# Patient Record
Sex: MALE | Race: BLACK OR AFRICAN AMERICAN | NOT HISPANIC OR LATINO | URBAN - METROPOLITAN AREA
[De-identification: names, ages, dates, MRNs, and addresses within clinical notes are randomized per-mention and may not be internally consistent; named-entity substitution may affect disease eponyms.]

---

## 2023-11-08 PROBLEM — R35.1 NOCTURIA: Status: ACTIVE | Noted: 2023-11-08

## 2023-11-08 PROBLEM — R01.1 HEART MURMUR: Status: ACTIVE | Noted: 2023-11-08

## 2023-11-08 PROBLEM — M47.816 LUMBAR SPONDYLOSIS: Status: ACTIVE | Noted: 2023-11-08

## 2023-11-08 PROBLEM — M54.16 LUMBAR RADICULITIS: Status: ACTIVE | Noted: 2023-11-08

## 2023-11-08 PROBLEM — M62.830 MUSCLE SPASM OF BACK: Status: ACTIVE | Noted: 2023-11-08

## 2023-11-08 PROBLEM — R33.9 INCOMPLETE BLADDER EMPTYING: Status: ACTIVE | Noted: 2023-11-08

## 2023-11-08 PROBLEM — R06.02 EXERTIONAL SHORTNESS OF BREATH: Status: ACTIVE | Noted: 2023-11-08

## 2023-11-08 PROBLEM — I10 HYPERTENSION: Status: ACTIVE | Noted: 2023-11-08

## 2023-11-08 RX ORDER — LOSARTAN POTASSIUM 25 MG/1
1 TABLET ORAL DAILY
COMMUNITY
Start: 2022-03-28 | End: 2024-02-26 | Stop reason: SDUPTHER

## 2023-11-08 RX ORDER — ALBUTEROL SULFATE 90 UG/1
2 AEROSOL, METERED RESPIRATORY (INHALATION) EVERY 4 HOURS PRN
COMMUNITY
Start: 2020-11-15 | End: 2023-11-27 | Stop reason: ALTCHOICE

## 2023-11-08 RX ORDER — AMLODIPINE BESYLATE 5 MG/1
5 TABLET ORAL DAILY
COMMUNITY
Start: 2020-11-15 | End: 2024-02-26 | Stop reason: ALTCHOICE

## 2023-11-08 RX ORDER — METHOCARBAMOL 500 MG/1
1-2 TABLET, FILM COATED ORAL EVERY 8 HOURS PRN
COMMUNITY
End: 2023-11-27 | Stop reason: ALTCHOICE

## 2023-11-08 RX ORDER — RISPERIDONE 1 MG/1
1 TABLET ORAL DAILY
COMMUNITY
Start: 2020-08-10 | End: 2023-11-27 | Stop reason: ALTCHOICE

## 2023-11-08 RX ORDER — ACYCLOVIR 50 MG/G
CREAM TOPICAL
COMMUNITY
Start: 2022-03-29

## 2023-11-08 RX ORDER — ATORVASTATIN CALCIUM 40 MG/1
1 TABLET, FILM COATED ORAL DAILY
COMMUNITY
Start: 2017-11-08 | End: 2023-11-27 | Stop reason: ALTCHOICE

## 2023-11-09 ENCOUNTER — OFFICE VISIT (OUTPATIENT)
Dept: ORTHOPEDIC SURGERY | Facility: CLINIC | Age: 53
End: 2023-11-09
Payer: COMMERCIAL

## 2023-11-09 DIAGNOSIS — M47.816 LUMBAR SPONDYLOSIS: ICD-10-CM

## 2023-11-09 DIAGNOSIS — M54.16 LUMBAR RADICULITIS: Primary | ICD-10-CM

## 2023-11-09 PROCEDURE — 99214 OFFICE O/P EST MOD 30 MIN: CPT | Performed by: PHYSICAL MEDICINE & REHABILITATION

## 2023-11-09 PROCEDURE — 1036F TOBACCO NON-USER: CPT | Performed by: PHYSICAL MEDICINE & REHABILITATION

## 2023-11-09 RX ORDER — GABAPENTIN 100 MG/1
100 CAPSULE ORAL 3 TIMES DAILY
Qty: 90 CAPSULE | Refills: 2 | Status: SHIPPED | OUTPATIENT
Start: 2023-11-09 | End: 2023-11-27 | Stop reason: ALTCHOICE

## 2023-11-09 NOTE — PROGRESS NOTES
Follow Up Note    Today's Date:   11/9/2023     Assessment: This is a very pleasant 53-year-old male DJ with chronic primarily right low back pain. Likely lumbar spondylosis with radiculitis.  Reticular pain into the right leg seems to be worsening. No red flag symptoms. Pain can be intermittently severe and impact his daily activity.  -Lumbar spondylosis with facet arthropathy most significant from L4-S1 bilaterally-of note does have mild retrolisthesis of L5 on S1  -Lumbar radiculitis    -November 9, 2023 update: Pain has been worsening since his last visit.  He has trialed medications as above and below with minimal relief.  He does have paresthesia to the right foot and ankle compared to left.  Due to this I will obtain a lumbar MRI to further assess.  He also understands the importance of starting physical therapy.  New Rx was also placed for low-dose gabapentin with instructions to titrate slowly and as tolerated.  OARRS was checked with no suspicious activity and he signed a pain agreement today.     PLAN:  1) Imaging/Diagnostic Studies: Lumbosacral x-ray due to chronicity and lack of prior imaging. Per my read mild retrolisthesis of L5 and S1 with facet arthropathy bilaterally.  2) Therapy/Rehabilitation: Referred to PT and encouraged home exercise program  3) Pharmacological Management: Advised to limit NSAIDs given history of elevated creatinine, will prescribe muscle relaxant-Robaxin 500 mg twice daily.  Low-dose gabapentin  4) Spine/Surgical Interventions: Discussed injections and surgery as potential future options, however patient wishes to avoid these at this time   5) Alternative Treatments: May consider alternative treatment options in the future including manipulation (chiropractor versus osteopathic) and/or acupuncture if patient does not obtain optimal relief with initial treatment plan.  6) Consultations: Physical therapy  7) Follow -up: 4-6 weeks or PRN if symptoms worsen/do not improve.   8)  Future treatment considerations: Pending lumbar MRI-May be a good candidate for ANTOINETTE and/or further surgical consult.  Further titration of gabapentin    Patient advised of the difference between hurt and harm and advised to continue with all normal activities and exercises. Patient verbalized understanding of the above plan and was happy with the care provided.      The above clinical summary has been dictated with voice recognition software. It has not been proofread for grammatical errors, typographical mistakes, or other semantic inconsistencies.    Thank you for visiting our office today. It was our pleasure to take part in your healthcare.     Do not hesitate to call with any questions regarding your plan of care after leaving at [phone]    To clinicians, thank you very much for this kind referral. It is a privilege to partner with you in the care of your patients. My office would be delighted to assist you with any further consultations or with questions regarding the plan of care outlined. Do not hesitate to call the office or contact me directly.     Sincerely,    JENNIFER Case MD  , Physical Medicine and Rehabilitation, Orthopedic Spine  [employer] [facility] [employer]  [facility] [place]        Ayla Paredes  is a 53 y.o. male who was last seen September 28, 2023.  Since the last visit the pain is worse overall.  More pain with deep breaths and bearing down.  Notes pain into the right leg worse than left.  No worsening weakness or numbness.  Pain can be a 7/10, sleeping in the wrong position, forward flexion.    - PT: Has not yet started  - Minimal relief with muscle relaxer      TREATMENTS  IN THE LAST SIX MONTHS     Active conservative therapy  in the last six months (see below)              1. Physical therapy:   No                                                                                  2. Home exercise program after PT: No                                                     3. A physician supervised home exercise program (HEP): Yes              4. Chiropractic Care:   Yes                                                                   Passive conservative therapy  in the last six months (see below)              1. NSAIDS:            Yes but avoiding due to CKD                                                                                               2. Prescription pain medication: Methocarbamol, gabapentin                                                            3. Acupuncture:                                                                                             4. Tens unit:      Patient denies bowel/bladder incontinence, denies fever, denies unintentional weight loss, denies clumsiness of hands, feet, or dropping things.  Denies any constitutional or myelopathic symptomatology.     ROS: Other than listed in HPI, PMHX below, the patient denies any complaint of the following: night pain, joint swelling, myalgias, cough, fainting, vision or language changes, shortness of breath, chest pain, nausea, vomiting or diarrhea, rash, dysuria, seizures, excessive sweating, or bleeding problems.    I have confirmed and edited as necessary Past Medical, Past Surgical, Family, Social History and ROS as obtained by others. No new sig. changes since last visit.       PHYSICAL EXAM:   GENERAL APPEARANCE:  Well nourished, well developed, and no apparent distress.  NEURO PSYCH: Patient oriented to person, place, Mood pleasant. Benign affect.  MUSCULOSKELETAL and NEUROLOGICAL   SPINE ROM:   LUMBAR ROM: Limited forward flexion due to pain  MUSCLE BULK: Normal and symmetrical in the upper & lower extremities.  MUSCLE TONE: Normal  MOTOR: Seems functionally full in the lower extremities.  Able to go up on heels and toes with no significant weakness  SENSORY: Mildly decreased to light touch at the right L5 compared to left  GAIT: Normal.  No sig.  balance deficit     DATA REVIEW:   The below imaging studies were personally reviewed and discussed with the patient.    Medical Decision Making:  The above note constitutes a Moderate to High level of medical decision making based on past data and imaging review, new and chronic symptoms with exacerbation, change in weakness or sensation, new imaging and diagnostic studies ordered, discussion of potential interventional or surgical treatment options, acute or chronic pain that may pose a threat to bodily function.    Current Outpatient Medications on File Prior to Visit   Medication Sig Dispense Refill    acyclovir (Zovirax) 5 % cream Acyclovir 5 % External Cream   Quantity: 5  Refills: 0        Start : 29-Mar-2022   Active      albuterol 90 mcg/actuation inhaler Inhale 2 puffs every 4 hours if needed.      amLODIPine (Norvasc) 5 mg tablet Take 1 tablet (5 mg) by mouth once daily.      atorvastatin (Lipitor) 40 mg tablet Take 1 tablet (40 mg) by mouth once daily.      losartan (Cozaar) 25 mg tablet Take 1 tablet (25 mg) by mouth once daily.      methocarbamol (Robaxin) 500 mg tablet Take 1-2 tablets (500-1,000 mg) by mouth every 8 hours if needed for muscle spasms.      risperiDONE (RisperDAL) 1 mg tablet Take 1 tablet (1 mg) by mouth once daily.       No current facility-administered medications on file prior to visit.        History reviewed. No pertinent past medical history.     History reviewed. No pertinent surgical history.     No Known Allergies

## 2023-11-26 ENCOUNTER — APPOINTMENT (OUTPATIENT)
Dept: RADIOLOGY | Facility: HOSPITAL | Age: 53
End: 2023-11-26
Payer: COMMERCIAL

## 2023-11-27 ENCOUNTER — LAB (OUTPATIENT)
Dept: LAB | Facility: LAB | Age: 53
End: 2023-11-27
Payer: COMMERCIAL

## 2023-11-27 ENCOUNTER — OFFICE VISIT (OUTPATIENT)
Dept: CARDIOLOGY | Facility: CLINIC | Age: 53
End: 2023-11-27
Payer: COMMERCIAL

## 2023-11-27 VITALS
WEIGHT: 187.5 LBS | DIASTOLIC BLOOD PRESSURE: 121 MMHG | HEART RATE: 68 BPM | BODY MASS INDEX: 33.21 KG/M2 | SYSTOLIC BLOOD PRESSURE: 168 MMHG | OXYGEN SATURATION: 98 %

## 2023-11-27 DIAGNOSIS — E78.2 MIXED HYPERLIPIDEMIA: ICD-10-CM

## 2023-11-27 DIAGNOSIS — I50.9 CONGESTIVE HEART FAILURE, UNSPECIFIED HF CHRONICITY, UNSPECIFIED HEART FAILURE TYPE (MULTI): Primary | ICD-10-CM

## 2023-11-27 DIAGNOSIS — I42.8 OTHER CARDIOMYOPATHY (MULTI): ICD-10-CM

## 2023-11-27 DIAGNOSIS — I10 PRIMARY HYPERTENSION: ICD-10-CM

## 2023-11-27 DIAGNOSIS — R06.02 EXERTIONAL SHORTNESS OF BREATH: ICD-10-CM

## 2023-11-27 PROBLEM — I42.9 CARDIOMYOPATHY (MULTI): Status: ACTIVE | Noted: 2023-11-27

## 2023-11-27 LAB
ALT SERPL W P-5'-P-CCNC: 13 U/L (ref 10–52)
AST SERPL W P-5'-P-CCNC: 17 U/L (ref 9–39)
ATRIAL RATE: 61 BPM
CHOLEST SERPL-MCNC: 277 MG/DL (ref 0–199)
CHOLESTEROL/HDL RATIO: 6.2
HDLC SERPL-MCNC: 45 MG/DL
LDLC SERPL CALC-MCNC: 202 MG/DL
NON HDL CHOLESTEROL: 232 MG/DL (ref 0–149)
P AXIS: 1 DEGREES
P OFFSET: 176 MS
P ONSET: 125 MS
PR INTERVAL: 180 MS
Q ONSET: 215 MS
QRS COUNT: 10 BEATS
QRS DURATION: 84 MS
QT INTERVAL: 408 MS
QTC CALCULATION(BAZETT): 410 MS
QTC FREDERICIA: 410 MS
R AXIS: -5 DEGREES
T AXIS: -10 DEGREES
T OFFSET: 419 MS
TRIGL SERPL-MCNC: 149 MG/DL (ref 0–149)
VENTRICULAR RATE: 61 BPM
VLDL: 30 MG/DL (ref 0–40)

## 2023-11-27 PROCEDURE — 93005 ELECTROCARDIOGRAM TRACING: CPT | Performed by: NURSE PRACTITIONER

## 2023-11-27 PROCEDURE — 36415 COLL VENOUS BLD VENIPUNCTURE: CPT

## 2023-11-27 PROCEDURE — 3077F SYST BP >= 140 MM HG: CPT | Performed by: NURSE PRACTITIONER

## 2023-11-27 PROCEDURE — 3080F DIAST BP >= 90 MM HG: CPT | Performed by: NURSE PRACTITIONER

## 2023-11-27 PROCEDURE — 1036F TOBACCO NON-USER: CPT | Performed by: NURSE PRACTITIONER

## 2023-11-27 PROCEDURE — 84460 ALANINE AMINO (ALT) (SGPT): CPT

## 2023-11-27 PROCEDURE — 99214 OFFICE O/P EST MOD 30 MIN: CPT | Performed by: NURSE PRACTITIONER

## 2023-11-27 PROCEDURE — 84450 TRANSFERASE (AST) (SGOT): CPT

## 2023-11-27 PROCEDURE — 93010 ELECTROCARDIOGRAM REPORT: CPT | Performed by: INTERNAL MEDICINE

## 2023-11-27 PROCEDURE — 80061 LIPID PANEL: CPT

## 2023-11-27 ASSESSMENT — ENCOUNTER SYMPTOMS
LOSS OF SENSATION IN FEET: 0
OCCASIONAL FEELINGS OF UNSTEADINESS: 0
DEPRESSION: 0

## 2023-11-27 ASSESSMENT — LIFESTYLE VARIABLES
HOW OFTEN DO YOU HAVE SIX OR MORE DRINKS ON ONE OCCASION: NEVER
AUDIT-C TOTAL SCORE: 2
HOW MANY STANDARD DRINKS CONTAINING ALCOHOL DO YOU HAVE ON A TYPICAL DAY: 1 OR 2
SKIP TO QUESTIONS 9-10: 1
HOW OFTEN DO YOU HAVE A DRINK CONTAINING ALCOHOL: 2-4 TIMES A MONTH

## 2023-11-27 ASSESSMENT — PAIN SCALES - GENERAL: PAINLEVEL: 4

## 2023-11-27 NOTE — PROGRESS NOTES
Subjective   Ayla Paredes is a 53 y.o. male.    Chief Complaint:  Hypertension, Hyperlipidemia, and Cardiomyopathy    Mr. Paredes returns for a follow up. He is seen in collaboration with Dr. Banks. He was last seen over a year and half ago. He comes in today with complaints of mucus in his throat. He was treated with a few different antibiotics and is not seeing an improvement. He is starting to wonder if it is more heart related. He denies a cough though does complain of some dyspnea. He reports compliance with his medications, though has been off lipid lowering therapy for some time. He denies any recent ER visits or hospitalizations. He offers no specific cardiovascular complaints or concerns today. He denies any complaints of chest pain, lightheadedness, dizziness, palpitations, syncope, orthopnea, paroxysmal nocturnal dyspnea, lower extremity swelling or bleeding concerns.          Review of Systems   All other systems reviewed and are negative.      Objective   Physical Exam  Constitutional:       Appearance: Healthy appearance. In no distress  Pulmonary:      Effort: Pulmonary effort is normal.      Breath sounds: Normal breath sounds.   Cardiovascular:      Normal rate. Regular rhythm. Normal S1. Normal S2.       Murmurs: There is no murmur.   Edema:     Peripheral edema absent.   Abdominal:      Palpations: Abdomen is soft.   Musculoskeletal: Normal range of motion.      Cervical back: Normal range of motion. Skin:     General: Skin is warm and dry.   Neurological:      Mental Status: Alert and oriented to person, place and time.     EKG obtained and reviewed. Normal sinus rhythm. Moderate voltage criteria for LVH. HR 61      Assessment/Plan   Mr. Paredes is a pleasant 53-year-old -American male with a past medical history significant for hypertension, hyperlipidemia, anxiety/depression and a family history of coronary artery disease. Stress test 5/2022 showed no evidence of ischemia at a  high workload. Echocardiogram 5/2022 showed a mildly reduced EF of 45%. He presents today after not being seen for over a year and a half. He comes in today with complaints of extra mucus in his throat and dyspnea. His BP is elevated, though he did not take his morning medications. He apparently has been off lipid lowering therapy for some and mentions needing a new PCP. I will have him obtain a FLP with AST and ALT today. I will call him with his results once available, and as we discussed will likely need to reinitiate atorvastatin. Given his complaints of dyspnea and his history of cardiomyopathy, I will also schedule him for an echocardiogram to reassess his LV function. Lastly, we will schedule him for a coronary CT angiogram as his dyspnea may represent an anginal equivalent and to rule out any CAD for the cause of his cardiomyopathy. He will follow up with us in clinic in 3 months. He knows to call for any concerns.

## 2023-11-27 NOTE — PATIENT INSTRUCTIONS
Schedule an echocardiogram     Schedule a coronary CT angio    Obtain fasting labs    Follow up in 3 months

## 2023-11-28 ENCOUNTER — APPOINTMENT (OUTPATIENT)
Dept: PHYSICAL THERAPY | Facility: CLINIC | Age: 53
End: 2023-11-28
Payer: COMMERCIAL

## 2023-11-28 ENCOUNTER — TELEPHONE (OUTPATIENT)
Dept: CARDIOLOGY | Facility: HOSPITAL | Age: 53
End: 2023-11-28
Payer: COMMERCIAL

## 2023-11-28 DIAGNOSIS — E78.2 MIXED HYPERLIPIDEMIA: Primary | ICD-10-CM

## 2023-11-28 RX ORDER — ATORVASTATIN CALCIUM 40 MG/1
40 TABLET, FILM COATED ORAL DAILY
Qty: 30 TABLET | Refills: 11 | Status: SHIPPED | OUTPATIENT
Start: 2023-11-28 | End: 2023-12-05 | Stop reason: SDUPTHER

## 2023-11-28 NOTE — TELEPHONE ENCOUNTER
I spoke to patient. Cholesterol panel elevated. Will restart lipitor 40 mg daily. Prescription sent to his [facility] on file.

## 2023-12-01 ENCOUNTER — LAB (OUTPATIENT)
Dept: LAB | Facility: LAB | Age: 53
End: 2023-12-01
Payer: COMMERCIAL

## 2023-12-01 DIAGNOSIS — I10 PRIMARY HYPERTENSION: Primary | ICD-10-CM

## 2023-12-01 DIAGNOSIS — I10 PRIMARY HYPERTENSION: ICD-10-CM

## 2023-12-01 LAB
ANION GAP SERPL CALC-SCNC: 12 MMOL/L (ref 10–20)
BUN SERPL-MCNC: 20 MG/DL (ref 6–23)
CALCIUM SERPL-MCNC: 9.7 MG/DL (ref 8.6–10.6)
CHLORIDE SERPL-SCNC: 99 MMOL/L (ref 98–107)
CO2 SERPL-SCNC: 31 MMOL/L (ref 21–32)
CREAT SERPL-MCNC: 1.49 MG/DL (ref 0.5–1.3)
GFR SERPL CREATININE-BSD FRML MDRD: 56 ML/MIN/1.73M*2
GLUCOSE SERPL-MCNC: 97 MG/DL (ref 74–99)
POTASSIUM SERPL-SCNC: 4 MMOL/L (ref 3.5–5.3)
SODIUM SERPL-SCNC: 138 MMOL/L (ref 136–145)

## 2023-12-01 PROCEDURE — 80048 BASIC METABOLIC PNL TOTAL CA: CPT

## 2023-12-01 PROCEDURE — 36415 COLL VENOUS BLD VENIPUNCTURE: CPT

## 2023-12-05 DIAGNOSIS — E78.2 MIXED HYPERLIPIDEMIA: ICD-10-CM

## 2023-12-05 RX ORDER — ATORVASTATIN CALCIUM 40 MG/1
40 TABLET, FILM COATED ORAL DAILY
Qty: 90 TABLET | Refills: 3 | Status: SHIPPED | OUTPATIENT
Start: 2023-12-05 | End: 2024-02-26 | Stop reason: SDUPTHER

## 2023-12-12 ENCOUNTER — TELEPHONE (OUTPATIENT)
Dept: PRIMARY CARE | Facility: CLINIC | Age: 53
End: 2023-12-12
Payer: COMMERCIAL

## 2023-12-12 ENCOUNTER — APPOINTMENT (OUTPATIENT)
Dept: PRIMARY CARE | Facility: CLINIC | Age: 53
End: 2023-12-12
Payer: COMMERCIAL

## 2023-12-12 NOTE — TELEPHONE ENCOUNTER
PT had appointment scheduled 12/12/2023 @ 8:30.  Patient had already been checked in when Dr. Clayton came to the  and stated that this patient needed to have his appointment rescheduled, as she stated she already had a physical scheduled before this appointment and didn't want to rush through this patients appointment, and this appointment should have been rescheduled.  PT was notified, but was very upset about not being seen today.  States he took time off of work and changed other appointments to be able to have today's appointment.  PT was rescheduled for next available appointment on 12/28/2023.

## 2023-12-13 ENCOUNTER — APPOINTMENT (OUTPATIENT)
Dept: CARDIOLOGY | Facility: CLINIC | Age: 53
End: 2023-12-13
Payer: COMMERCIAL

## 2023-12-18 ENCOUNTER — APPOINTMENT (OUTPATIENT)
Dept: CARDIOLOGY | Facility: CLINIC | Age: 53
End: 2023-12-18
Payer: COMMERCIAL

## 2023-12-18 ENCOUNTER — APPOINTMENT (OUTPATIENT)
Dept: RADIOLOGY | Facility: HOSPITAL | Age: 53
End: 2023-12-18
Payer: COMMERCIAL

## 2023-12-19 ENCOUNTER — HOSPITAL ENCOUNTER (OUTPATIENT)
Dept: RADIOLOGY | Facility: HOSPITAL | Age: 53
Discharge: HOME | End: 2023-12-19
Payer: COMMERCIAL

## 2023-12-19 VITALS — OXYGEN SATURATION: 100 % | DIASTOLIC BLOOD PRESSURE: 91 MMHG | HEART RATE: 62 BPM | SYSTOLIC BLOOD PRESSURE: 154 MMHG

## 2023-12-19 DIAGNOSIS — I50.9 CONGESTIVE HEART FAILURE, UNSPECIFIED HF CHRONICITY, UNSPECIFIED HEART FAILURE TYPE (MULTI): ICD-10-CM

## 2023-12-19 PROCEDURE — 2500000005 HC RX 250 GENERAL PHARMACY W/O HCPCS: Mod: SE | Performed by: RADIOLOGY

## 2023-12-19 PROCEDURE — 75574 CT ANGIO HRT W/3D IMAGE: CPT

## 2023-12-19 PROCEDURE — 2550000001 HC RX 255 CONTRASTS: Mod: SE | Performed by: NURSE PRACTITIONER

## 2023-12-19 PROCEDURE — 2500000001 HC RX 250 WO HCPCS SELF ADMINISTERED DRUGS (ALT 637 FOR MEDICARE OP): Mod: SE | Performed by: RADIOLOGY

## 2023-12-19 PROCEDURE — 75574 CT ANGIO HRT W/3D IMAGE: CPT | Performed by: RADIOLOGY

## 2023-12-19 RX ORDER — METOPROLOL TARTRATE 1 MG/ML
5 INJECTION, SOLUTION INTRAVENOUS ONCE AS NEEDED
Status: DISCONTINUED | OUTPATIENT
Start: 2023-12-19 | End: 2023-12-20 | Stop reason: HOSPADM

## 2023-12-19 RX ORDER — METOPROLOL TARTRATE 1 MG/ML
5 INJECTION, SOLUTION INTRAVENOUS ONCE
Status: COMPLETED | OUTPATIENT
Start: 2023-12-19 | End: 2023-12-19

## 2023-12-19 RX ORDER — NITROGLYCERIN 0.4 MG/1
0.8 TABLET SUBLINGUAL ONCE
Status: COMPLETED | OUTPATIENT
Start: 2023-12-19 | End: 2023-12-19

## 2023-12-19 RX ORDER — METOPROLOL TARTRATE 100 MG/1
100 TABLET ORAL ONCE
Status: DISCONTINUED | OUTPATIENT
Start: 2023-12-19 | End: 2023-12-20 | Stop reason: HOSPADM

## 2023-12-19 RX ORDER — METOPROLOL TARTRATE 1 MG/ML
5 INJECTION, SOLUTION INTRAVENOUS ONCE AS NEEDED
Status: COMPLETED | OUTPATIENT
Start: 2023-12-19 | End: 2023-12-19

## 2023-12-19 RX ORDER — METOPROLOL TARTRATE 100 MG/1
100 TABLET ORAL ONCE AS NEEDED
Status: DISCONTINUED | OUTPATIENT
Start: 2023-12-19 | End: 2023-12-20 | Stop reason: HOSPADM

## 2023-12-19 RX ORDER — LORAZEPAM 2 MG/ML
0.5 INJECTION INTRAMUSCULAR EVERY 5 MIN PRN
Status: DISCONTINUED | OUTPATIENT
Start: 2023-12-19 | End: 2023-12-20 | Stop reason: HOSPADM

## 2023-12-19 RX ADMIN — IOHEXOL 90 ML: 350 INJECTION, SOLUTION INTRAVENOUS at 09:52

## 2023-12-19 RX ADMIN — NITROGLYCERIN 0.8 MG: 0.4 TABLET SUBLINGUAL at 09:39

## 2023-12-19 RX ADMIN — METOPROLOL TARTRATE 5 MG: 5 INJECTION, SOLUTION INTRAVENOUS at 09:37

## 2023-12-19 ASSESSMENT — PAIN - FUNCTIONAL ASSESSMENT: PAIN_FUNCTIONAL_ASSESSMENT: 0-10

## 2023-12-19 ASSESSMENT — PAIN SCALES - GENERAL: PAINLEVEL_OUTOF10: 0 - NO PAIN

## 2023-12-28 ENCOUNTER — APPOINTMENT (OUTPATIENT)
Dept: PRIMARY CARE | Facility: CLINIC | Age: 53
End: 2023-12-28
Payer: COMMERCIAL

## 2024-02-26 ENCOUNTER — OFFICE VISIT (OUTPATIENT)
Dept: CARDIOLOGY | Facility: CLINIC | Age: 54
End: 2024-02-26
Payer: COMMERCIAL

## 2024-02-26 VITALS
SYSTOLIC BLOOD PRESSURE: 149 MMHG | BODY MASS INDEX: 30.73 KG/M2 | DIASTOLIC BLOOD PRESSURE: 103 MMHG | OXYGEN SATURATION: 98 % | HEART RATE: 72 BPM | WEIGHT: 180 LBS | HEIGHT: 64 IN

## 2024-02-26 DIAGNOSIS — E78.5 HYPERLIPIDEMIA, UNSPECIFIED HYPERLIPIDEMIA TYPE: ICD-10-CM

## 2024-02-26 DIAGNOSIS — E78.2 MIXED HYPERLIPIDEMIA: ICD-10-CM

## 2024-02-26 DIAGNOSIS — I10 PRIMARY HYPERTENSION: ICD-10-CM

## 2024-02-26 PROCEDURE — 3077F SYST BP >= 140 MM HG: CPT | Performed by: NURSE PRACTITIONER

## 2024-02-26 PROCEDURE — 99214 OFFICE O/P EST MOD 30 MIN: CPT | Performed by: NURSE PRACTITIONER

## 2024-02-26 PROCEDURE — 1036F TOBACCO NON-USER: CPT | Performed by: NURSE PRACTITIONER

## 2024-02-26 PROCEDURE — 3080F DIAST BP >= 90 MM HG: CPT | Performed by: NURSE PRACTITIONER

## 2024-02-26 RX ORDER — LOSARTAN POTASSIUM 50 MG/1
50 TABLET ORAL DAILY
Qty: 90 TABLET | Refills: 3 | Status: SHIPPED | OUTPATIENT
Start: 2024-02-26

## 2024-02-26 RX ORDER — METOPROLOL SUCCINATE 25 MG/1
25 TABLET, EXTENDED RELEASE ORAL DAILY
Qty: 90 TABLET | Refills: 3 | Status: SHIPPED | OUTPATIENT
Start: 2024-02-26 | End: 2025-02-25

## 2024-02-26 RX ORDER — ATORVASTATIN CALCIUM 40 MG/1
40 TABLET, FILM COATED ORAL DAILY
Qty: 90 TABLET | Refills: 3 | Status: SHIPPED | OUTPATIENT
Start: 2024-02-26 | End: 2025-02-25

## 2024-02-26 ASSESSMENT — PAIN SCALES - GENERAL: PAINLEVEL: 0-NO PAIN

## 2024-02-26 NOTE — PROGRESS NOTES
Subjective   Ayla Paredes is a 53 y.o. male.    Chief Complaint:  Hypertension    Mr. Paredes returns for a 3 month follow up. He is seen in collaboration with Dr. Banks. He has been feeling well from a cardiac standpoint. He has remained compliant with his medications, denying any intolerances. He unfortunately did not get his echocardiogram completed. He denies any recent ER visits or hospitalizations. He offers no specific cardiovascular complaints or concerns today. He denies any complaints of chest pain, shortness of breath, lightheadedness, dizziness, palpitations, syncope, orthopnea, paroxysmal nocturnal dyspnea, lower extremity swelling or bleeding concerns.          Review of Systems   All other systems reviewed and are negative.      Objective   Physical Exam  Constitutional:       Appearance: Healthy appearance. In no distress  Pulmonary:      Effort: Pulmonary effort is normal.      Breath sounds: Normal breath sounds.   Cardiovascular:      Normal rate. Regular rhythm. Normal S1. Normal S2.       Murmurs: There is no murmur.      Carotids: right carotid pulse +2, no bruit heard over the right carotid. left carotid pulse +2, no bruit heard over the left carotid.  Edema:     Peripheral edema absent.   Abdominal:      Palpations: Abdomen is soft.   Musculoskeletal:       Cervical back: Normal range of motion.   Skin:     General: Skin is warm and dry. Normal color and pigmentation   Neurological:      Mental Status: Alert and oriented to person, place and time.   Psychiatric:     Mood and Affect: appropriate mood and appropriate affect.       Lab Review:   Lab Results   Component Value Date     12/01/2023    K 4.0 12/01/2023    CL 99 12/01/2023    CO2 31 12/01/2023    BUN 20 12/01/2023    CREATININE 1.49 (H) 12/01/2023    GLUCOSE 97 12/01/2023    CALCIUM 9.7 12/01/2023       Lab Results   Component Value Date    CHOL 277 (H) 11/27/2023    TRIG 149 11/27/2023    HDL 45.0 11/27/2023        Assessment/Plan   Mr. Paredes is a pleasant 53-year-old -American male with a past medical history significant for hypertension, hyperlipidemia, anxiety/depression, cardiomyopathy with an EF of 45% by echocardiogram 5/2022 and a family history of coronary artery disease. Stress test 5/2022 showed no evidence of ischemia at a high workload. Coronary CT angiogram 12/2023 showed no significant atherosclerotic changes or stenotic disease with a total CACS of 20.3. He presents today for routine follow up fairly stable from a cardiac standpoint. Despite compliance with his medications, his BP remains elevated. I will increase his losartan to 50 mg daily. I will also have him start metoprolol 25 mg daily. I will have him obtain a FLP and CMP in about 2 weeks. He will follow up with us in clinic in 2 months with an echocardiogram to reassess his LV function. He knows to call for any concerns.

## 2024-02-26 NOTE — PATIENT INSTRUCTIONS
Increase losartan  to 50 mg daily    Start metoprolol 25 mg daily     Obtain fasting labs in 2 weeks     Follow up in 2 months with an echo the same day     Call for any concerns

## 2024-03-12 ENCOUNTER — HOSPITAL ENCOUNTER (OUTPATIENT)
Dept: CARDIOLOGY | Facility: CLINIC | Age: 54
Discharge: HOME | End: 2024-03-12
Payer: COMMERCIAL

## 2024-03-12 DIAGNOSIS — I50.9 CONGESTIVE HEART FAILURE, UNSPECIFIED HF CHRONICITY, UNSPECIFIED HEART FAILURE TYPE (MULTI): ICD-10-CM

## 2024-03-12 DIAGNOSIS — R06.02 EXERTIONAL SHORTNESS OF BREATH: ICD-10-CM

## 2024-03-12 LAB
AORTIC VALVE PEAK VELOCITY: 1.43 M/S
AV PEAK GRADIENT: 8.2 MMHG
AVA (PEAK VEL): 2.59 CM2
EJECTION FRACTION APICAL 4 CHAMBER: 62.6
EJECTION FRACTION: 64 %
LEFT ATRIUM VOLUME AREA LENGTH INDEX BSA: 33.5 ML/M2
LEFT VENTRICLE INTERNAL DIMENSION DIASTOLE: 4.74 CM (ref 3.5–6)
LEFT VENTRICULAR OUTFLOW TRACT DIAMETER: 1.91 CM
MITRAL VALVE E/A RATIO: 0.94
TRICUSPID ANNULAR PLANE SYSTOLIC EXCURSION: 2.7 CM

## 2024-03-12 PROCEDURE — 93306 TTE W/DOPPLER COMPLETE: CPT

## 2024-03-12 PROCEDURE — 93306 TTE W/DOPPLER COMPLETE: CPT | Performed by: INTERNAL MEDICINE

## 2024-05-28 ENCOUNTER — HOSPITAL ENCOUNTER (EMERGENCY)
Facility: HOSPITAL | Age: 54
Discharge: HOME | End: 2024-05-28
Payer: COMMERCIAL

## 2024-05-28 ENCOUNTER — CLINICAL SUPPORT (OUTPATIENT)
Dept: EMERGENCY MEDICINE | Facility: HOSPITAL | Age: 54
End: 2024-05-28
Payer: COMMERCIAL

## 2024-05-28 VITALS
TEMPERATURE: 98.7 F | OXYGEN SATURATION: 98 % | HEIGHT: 63 IN | BODY MASS INDEX: 31.89 KG/M2 | SYSTOLIC BLOOD PRESSURE: 161 MMHG | HEART RATE: 84 BPM | WEIGHT: 180 LBS | DIASTOLIC BLOOD PRESSURE: 105 MMHG | RESPIRATION RATE: 16 BRPM

## 2024-05-28 LAB
ALBUMIN SERPL BCP-MCNC: 4.4 G/DL (ref 3.4–5)
ALP SERPL-CCNC: 77 U/L (ref 33–120)
ALT SERPL W P-5'-P-CCNC: 10 U/L (ref 10–52)
ANION GAP SERPL CALC-SCNC: 12 MMOL/L (ref 10–20)
AST SERPL W P-5'-P-CCNC: 16 U/L (ref 9–39)
ATRIAL RATE: 70 BPM
BASOPHILS # BLD AUTO: 0.08 X10*3/UL (ref 0–0.1)
BASOPHILS NFR BLD AUTO: 1 %
BILIRUB SERPL-MCNC: 0.6 MG/DL (ref 0–1.2)
BNP SERPL-MCNC: 67 PG/ML (ref 0–99)
BUN SERPL-MCNC: 17 MG/DL (ref 6–23)
CALCIUM SERPL-MCNC: 9.5 MG/DL (ref 8.6–10.6)
CARDIAC TROPONIN I PNL SERPL HS: 30 NG/L (ref 0–53)
CARDIAC TROPONIN I PNL SERPL HS: 33 NG/L (ref 0–53)
CHLORIDE SERPL-SCNC: 103 MMOL/L (ref 98–107)
CO2 SERPL-SCNC: 30 MMOL/L (ref 21–32)
CREAT SERPL-MCNC: 1.43 MG/DL (ref 0.5–1.3)
EGFRCR SERPLBLD CKD-EPI 2021: 59 ML/MIN/1.73M*2
EOSINOPHIL # BLD AUTO: 0.18 X10*3/UL (ref 0–0.7)
EOSINOPHIL NFR BLD AUTO: 2.2 %
ERYTHROCYTE [DISTWIDTH] IN BLOOD BY AUTOMATED COUNT: 12.5 % (ref 11.5–14.5)
GLUCOSE SERPL-MCNC: 58 MG/DL (ref 74–99)
HCT VFR BLD AUTO: 38.9 % (ref 41–52)
HGB BLD-MCNC: 13.1 G/DL (ref 13.5–17.5)
IMM GRANULOCYTES # BLD AUTO: 0.01 X10*3/UL (ref 0–0.7)
IMM GRANULOCYTES NFR BLD AUTO: 0.1 % (ref 0–0.9)
LYMPHOCYTES # BLD AUTO: 3.57 X10*3/UL (ref 1.2–4.8)
LYMPHOCYTES NFR BLD AUTO: 44.2 %
MCH RBC QN AUTO: 28.1 PG (ref 26–34)
MCHC RBC AUTO-ENTMCNC: 33.7 G/DL (ref 32–36)
MCV RBC AUTO: 83 FL (ref 80–100)
MONOCYTES # BLD AUTO: 0.77 X10*3/UL (ref 0.1–1)
MONOCYTES NFR BLD AUTO: 9.5 %
NEUTROPHILS # BLD AUTO: 3.46 X10*3/UL (ref 1.2–7.7)
NEUTROPHILS NFR BLD AUTO: 43 %
NRBC BLD-RTO: 0 /100 WBCS (ref 0–0)
P AXIS: 15 DEGREES
P OFFSET: 185 MS
P ONSET: 129 MS
PLATELET # BLD AUTO: 316 X10*3/UL (ref 150–450)
POTASSIUM SERPL-SCNC: 3.5 MMOL/L (ref 3.5–5.3)
PR INTERVAL: 178 MS
PROT SERPL-MCNC: 7.5 G/DL (ref 6.4–8.2)
Q ONSET: 218 MS
QRS COUNT: 12 BEATS
QRS DURATION: 92 MS
QT INTERVAL: 394 MS
QTC CALCULATION(BAZETT): 425 MS
QTC FREDERICIA: 415 MS
R AXIS: -6 DEGREES
RBC # BLD AUTO: 4.67 X10*6/UL (ref 4.5–5.9)
SODIUM SERPL-SCNC: 141 MMOL/L (ref 136–145)
T AXIS: 0 DEGREES
T OFFSET: 415 MS
VENTRICULAR RATE: 70 BPM
WBC # BLD AUTO: 8.1 X10*3/UL (ref 4.4–11.3)

## 2024-05-28 PROCEDURE — 99281 EMR DPT VST MAYX REQ PHY/QHP: CPT

## 2024-05-28 PROCEDURE — 36415 COLL VENOUS BLD VENIPUNCTURE: CPT | Performed by: EMERGENCY MEDICINE

## 2024-05-28 PROCEDURE — 83880 ASSAY OF NATRIURETIC PEPTIDE: CPT | Performed by: EMERGENCY MEDICINE

## 2024-05-28 PROCEDURE — 80053 COMPREHEN METABOLIC PANEL: CPT | Performed by: EMERGENCY MEDICINE

## 2024-05-28 PROCEDURE — 93005 ELECTROCARDIOGRAM TRACING: CPT

## 2024-05-28 PROCEDURE — 85025 COMPLETE CBC W/AUTO DIFF WBC: CPT | Performed by: EMERGENCY MEDICINE

## 2024-05-28 PROCEDURE — 84484 ASSAY OF TROPONIN QUANT: CPT | Performed by: EMERGENCY MEDICINE

## 2024-05-28 ASSESSMENT — COLUMBIA-SUICIDE SEVERITY RATING SCALE - C-SSRS
2. HAVE YOU ACTUALLY HAD ANY THOUGHTS OF KILLING YOURSELF?: NO
1. IN THE PAST MONTH, HAVE YOU WISHED YOU WERE DEAD OR WISHED YOU COULD GO TO SLEEP AND NOT WAKE UP?: NO
6. HAVE YOU EVER DONE ANYTHING, STARTED TO DO ANYTHING, OR PREPARED TO DO ANYTHING TO END YOUR LIFE?: NO

## 2024-05-28 ASSESSMENT — PAIN SCALES - GENERAL: PAINLEVEL_OUTOF10: 6

## 2024-05-28 ASSESSMENT — PAIN - FUNCTIONAL ASSESSMENT: PAIN_FUNCTIONAL_ASSESSMENT: 0-10

## 2024-05-28 NOTE — ED TRIAGE NOTES
Patient is having chest pain and shortness of breath. Patient woke up 30 minutes ago with intermittent L chest pain with pain in L arm. Patient has a cardiac hx with a recent EF 2/24 of 45%.

## 2024-06-24 NOTE — PROGRESS NOTES
"Subjective   Ayla Paredes is a 54 y.o. male who presents for NPV TO ESTABLISH PCP CARE and FOR CARE GAP REVIEW.    HPI:    54 y.o. male who presents for NPV TO ESTABLISH PCP CARE and FOR CARE GAP REVIEW.     [facility] records reviewed.    PMHx:       HTN       Exertional shortness of breath  Heart murmur  Hyperlipidemia  Hypertension  Incomplete bladder emptying  Nocturia  Lumbar radiculitis  Lumbar spondylosis  Muscle spasm of back  Cardiomyopathy (Multi); EF of 45% by echocardiogram 5/2022.  Stress test 5/2022 showed no evidence of ischemia at a high workload. Coronary CT angiogram 12/2023 showed no significant atherosclerotic changes or stenotic disease with a total CACS of 20.3.          Healthcare Providers:  Cardiology: UGO Sarabia-CNP and Dr. Jocelyn Wade denistry    Preventive Health Services:  -Last physical: ?  -last PSA: ?  -last colonoscopy: ?  -last STI screening: ?  -Hep C screening: ?     Immunizations:   -Childhood vaccines: completed per patient    -COVID vaccine and booster:  -updated COVID spike vaccine: NOW DUE  -Flu vaccine: NOW DUE  -TDAP:  ? NOW DUE      Today Ayla reports:    - doing and feeling well.     -taking all medications as prescribed with no reported adverse medication side effects    -BP has been running at home (160-170s/90-100s).  Today he denies chest pain, heart palpitations, N/V, SOB, cough, orthopnea, or LE swelling.       Today no other reported complaints, issues, or problems.  ROS is NEG for HEADACHE, NAUSEA, VOMITING, DIARRHEA, CHEST PAIN, SOB, and BLEEDING.  Review of systems (10+) is negative for all systems except for any identified issues in HPI above.          Objective     BP (!) 172/116   Pulse 67   Temp 36.7 °C (98.1 °F)   Ht 1.626 m (5' 4\")   Wt 80.6 kg (177 lb 9.6 oz)   SpO2 98%   BMI 30.48 kg/m²      Physical Examination:       GENERAL           General Appearance: well-appearing, well-developed, well-hydrated, well-nourished, no " acute distress.        HEENT           NECK supple, no masses or thyromegaly, no carotid bruit.        EYES           Extraocular Movements: normal, bilateral eyes SVETA, no conjunctival injection.        HEART           Rate and Rhythm regular rate and rhythm. Heart sounds: normal S1S2, no S3 or S4. Murmurs: none.        CHEST           Breath sounds: Clear to IPPA, RR<16 no use of accessory muscles.        ABDOMEN           General: Neg for LKKS or masses, no scleral icterus or jaundice.        MUSCULOSKELETAL           Joints Demonstration: Neg for erythema, swelling or joint deformities. gross abnormalities no gross abnormalities.        EXTREMITIES           Lower Extremities: Neg for cyanosis, clubbing or edema.       Assessment/Plan   Problem List Items Addressed This Visit       Benign essential hypertension    Relevant Orders    Comprehensive metabolic panel    Urinalysis with Reflex Microscopic    Heart murmur    Hyperlipidemia    Relevant Orders    Lipid panel    Lumbar radiculitis    Cardiomyopathy (Multi)     Other Visit Diagnoses       Encounter to establish care    -  Primary    Relevant Orders    CBC and Auto Differential    Comprehensive metabolic panel    Urinalysis with Reflex Microscopic    Tsh With Reflex To Free T4 If Abnormal    Lower urinary tract symptoms (LUTS)        Relevant Orders    Referral to Urology    Diabetes mellitus screening        Relevant Orders    Hemoglobin A1c    Vitamin D deficiency        Relevant Orders    Vitamin D 25-Hydroxy,Total (for eval of Vitamin D levels)    Encounter for hepatitis C screening test for low risk patient        Relevant Orders    Hepatitis C antibody    Routine screening for STI (sexually transmitted infection)        Relevant Orders    HIV 1/2 Antigen/Antibody Screen with Reflex to Confirmation    Syphilis Screen with Reflex    C. trachomatis / N. gonorrhoeae, DNA probe    Trichomonas vaginalis, Amplified    Prostate cancer screening         Relevant Orders    Prostate Spec.Ag,Screen    Colon cancer screening        Relevant Orders    Colonoscopy Screening; Average Risk Patient    Cologuard® colon cancer screening    Immunization counseling              Establish PCP care  -labs ordered (see A/P above for details)    HTN: uncontrolled; asymptomatic. Follows with cardiology  -EK bpm, sinus bradycardia, LVH, no acute ischemic changes, abnormal EKG.   -increase losartan from 50 mg to 100 mg daily in AM  -CMP, UA ordered  -low salt, low cholesterol diet, regularly exercise, and limit alcohol intake  -referral to cardiology ordered; patent advised to schedule follow up with cardiology or he can see another cardiologist  -Emergency Dept and 911/EMS precautions discussed and reviewed with patient    Hyperlipidemia  -cont  atorvastatin 40 mg daily  -lipid panel ordered  -low salt, low cholesterol diet, regularly exercise, and limit alcohol intake    Cardiac murmur and cardiomyopathy: followed by cardiology  -cont management by cardiology  -emergency Dept precautions discussed and reviewed by patient    LUTS/BPH:  -start tamsulosin 0.4 mg at bedtime  -referral to urology ordered    Diabetes Screening  -HgBA1c ordered    Vitamin D deficiency  -Vit D levels ordered     Hep C screening  -Hep C antibody ordered     STI Screening:  -HIV, syphilis, GC/CT, trich ordered    Prostate Cancer screening:  -PSA ordered    Colon Cancer Screening:   -colonoscopy and cologuard ordered; patient advised to only complete one of these screening tests    Counseling:       Medication education:         Education:  The patient is counseled regarding potential side-effects of all new medications        Understanding:  Patient expressed understanding        Adherence:  No barriers to adherence identified        Immunizations Counseling  -TDAP now due==> will discuss at follow up  -recommend updated COVID spike vaccine that can be obtained at local pharmacy     FOLLOW-UP: 1 week for  BP check and 4 weeks to discuss and review test results and 8 weeks for PHYSICAL     Discussed recommended plan of care with patient. Patient expressed understanding and agreement with plan of care. All of patient's questions were answered at the time. Patient had no additional questions at the time.           Prudence Clayton MD, PhD

## 2024-06-24 NOTE — PATIENT INSTRUCTIONS
It was nice meeting you today.    Please increase losartan from 50 mg to 100 mg daily; this was sent to your pharmacy    Please schedule to see cardiology for uncontrolled blood pressure    Please go to [facility] lab or another [facility] lab facility to complete the lab testing that I ordered           Please call referral line to schedule : 1) to see urology and 2) colonoscopy     I have also ordered a cologuard if you prefer instead of a colonoscopy to screen for colon cancer screening that will be sent to your home. Only complete EITHER the colonoscopy or cologuard and not both.     I recommend receiving the TDAP booster that prevents tetanus and other infectious disease, and shingles vaccine.    I recommend the updated COVID vaccine that can be obtained at your local pharmacy    Please schedule a follow up with me in 1 week for blood pressure check and  4  weeks to discuss and review your test results    If you develop chest pain, heart palpitations, or pass out immediately call 911.

## 2024-06-25 ENCOUNTER — OFFICE VISIT (OUTPATIENT)
Dept: PRIMARY CARE | Facility: CLINIC | Age: 54
End: 2024-06-25
Payer: COMMERCIAL

## 2024-06-25 VITALS
OXYGEN SATURATION: 98 % | BODY MASS INDEX: 30.32 KG/M2 | SYSTOLIC BLOOD PRESSURE: 172 MMHG | WEIGHT: 177.6 LBS | HEART RATE: 67 BPM | DIASTOLIC BLOOD PRESSURE: 116 MMHG | TEMPERATURE: 98.1 F | HEIGHT: 64 IN

## 2024-06-25 DIAGNOSIS — I42.8 OTHER CARDIOMYOPATHY (MULTI): ICD-10-CM

## 2024-06-25 DIAGNOSIS — Z12.11 COLON CANCER SCREENING: ICD-10-CM

## 2024-06-25 DIAGNOSIS — E78.2 MIXED HYPERLIPIDEMIA: ICD-10-CM

## 2024-06-25 DIAGNOSIS — R01.1 HEART MURMUR: ICD-10-CM

## 2024-06-25 DIAGNOSIS — Z13.1 DIABETES MELLITUS SCREENING: ICD-10-CM

## 2024-06-25 DIAGNOSIS — E55.9 VITAMIN D DEFICIENCY: ICD-10-CM

## 2024-06-25 DIAGNOSIS — Z11.59 ENCOUNTER FOR HEPATITIS C SCREENING TEST FOR LOW RISK PATIENT: ICD-10-CM

## 2024-06-25 DIAGNOSIS — Z76.89 ENCOUNTER TO ESTABLISH CARE: Primary | ICD-10-CM

## 2024-06-25 DIAGNOSIS — R39.9 LOWER URINARY TRACT SYMPTOMS (LUTS): ICD-10-CM

## 2024-06-25 DIAGNOSIS — Z71.85 IMMUNIZATION COUNSELING: ICD-10-CM

## 2024-06-25 DIAGNOSIS — I10 BENIGN ESSENTIAL HYPERTENSION: ICD-10-CM

## 2024-06-25 DIAGNOSIS — I10 UNCONTROLLED HYPERTENSION: ICD-10-CM

## 2024-06-25 DIAGNOSIS — Z12.5 PROSTATE CANCER SCREENING: ICD-10-CM

## 2024-06-25 DIAGNOSIS — M54.16 LUMBAR RADICULITIS: ICD-10-CM

## 2024-06-25 DIAGNOSIS — Z11.3 ROUTINE SCREENING FOR STI (SEXUALLY TRANSMITTED INFECTION): ICD-10-CM

## 2024-06-25 PROCEDURE — 1036F TOBACCO NON-USER: CPT | Performed by: FAMILY MEDICINE

## 2024-06-25 PROCEDURE — 99204 OFFICE O/P NEW MOD 45 MIN: CPT | Performed by: FAMILY MEDICINE

## 2024-06-25 PROCEDURE — 3077F SYST BP >= 140 MM HG: CPT | Performed by: FAMILY MEDICINE

## 2024-06-25 PROCEDURE — 93010 ELECTROCARDIOGRAM REPORT: CPT | Performed by: FAMILY MEDICINE

## 2024-06-25 PROCEDURE — 3080F DIAST BP >= 90 MM HG: CPT | Performed by: FAMILY MEDICINE

## 2024-06-25 PROCEDURE — 99214 OFFICE O/P EST MOD 30 MIN: CPT | Performed by: FAMILY MEDICINE

## 2024-06-25 PROCEDURE — 93005 ELECTROCARDIOGRAM TRACING: CPT | Performed by: FAMILY MEDICINE

## 2024-06-25 RX ORDER — LOSARTAN POTASSIUM 100 MG/1
100 TABLET ORAL DAILY
Qty: 90 TABLET | Refills: 3 | Status: SHIPPED | OUTPATIENT
Start: 2024-06-25 | End: 2025-06-25

## 2024-06-25 ASSESSMENT — COLUMBIA-SUICIDE SEVERITY RATING SCALE - C-SSRS
1. IN THE PAST MONTH, HAVE YOU WISHED YOU WERE DEAD OR WISHED YOU COULD GO TO SLEEP AND NOT WAKE UP?: NO
6. HAVE YOU EVER DONE ANYTHING, STARTED TO DO ANYTHING, OR PREPARED TO DO ANYTHING TO END YOUR LIFE?: NO
2. HAVE YOU ACTUALLY HAD ANY THOUGHTS OF KILLING YOURSELF?: NO

## 2024-06-25 ASSESSMENT — PATIENT HEALTH QUESTIONNAIRE - PHQ9
1. LITTLE INTEREST OR PLEASURE IN DOING THINGS: NOT AT ALL
2. FEELING DOWN, DEPRESSED OR HOPELESS: NOT AT ALL
SUM OF ALL RESPONSES TO PHQ9 QUESTIONS 1 AND 2: 0

## 2024-06-25 ASSESSMENT — PAIN SCALES - GENERAL: PAINLEVEL: 4

## 2024-07-02 ENCOUNTER — APPOINTMENT (OUTPATIENT)
Dept: PRIMARY CARE | Facility: CLINIC | Age: 54
End: 2024-07-02
Payer: COMMERCIAL

## 2024-07-07 NOTE — PATIENT INSTRUCTIONS
It was nice seeing you today.     Please continue losartan 100 mg daily in morning m metoprolol 25 mg daily in AM    -please start amlodipine 10 mg daily in the morning     Please schedule to see cardiology for a follow up appointment.     Please go to [facility] lab or another [facility] lab facility to complete the lab testing that I ordered on 6/25/24.                            Please call referral line to schedule : 1) to see urology and 2) colonoscopy      I have also ordered on 6/25/24 a cologuard if you prefer instead of a colonoscopy to screen for colon cancer screening that will be sent to your home. Only complete EITHER the colonoscopy or cologuard and not both.                I recommend receiving the TDAP booster that prevents tetanus and other infectious disease, and shingles vaccine.      I recommend the updated COVID vaccine that can be obtained at your local pharmacy     Please schedule a follow up with me in 4  weeks to discuss and review your test results and 8 weeks for a physical     If you develop chest pain, heart palpitations, headache, blurry vision, difficulty talking or walking, or pass out immediately call 911.

## 2024-07-07 NOTE — PROGRESS NOTES
Subjective   Ayla Paredes is a 54 y.o. male who presents for  FOLLOW UP VISIT FOR HTN and BLOOD PRESSURE CHECK..    HPI:    54 y.o. male who presents for  FOLLOW UP VISIT FOR HTN and BLOOD PRESSURE CHECK.     EMR/EPIC records reviewed.      Last seen by me on NPV TO ESTABLISH PCP CARE and FOR CARE GAP REVIEW. At visit:    Establish PCP care  -labs ordered (see A/P above for details)     HTN: uncontrolled; asymptomatic. Follows with cardiology  -EK bpm, sinus bradycardia, LVH, no acute ischemic changes, abnormal EKG.   -increase losartan from 50 mg to 100 mg daily in AM  -CMP, UA ordered  -low salt, low cholesterol diet, regularly exercise, and limit alcohol intake  -referral to cardiology ordered; patent advised to schedule follow up with cardiology or he can see another cardiologist  -Emergency Dept and 911/EMS precautions discussed and reviewed with patient     Hyperlipidemia  -cont  atorvastatin 40 mg daily  -lipid panel ordered  -low salt, low cholesterol diet, regularly exercise, and limit alcohol intake     Cardiac murmur and cardiomyopathy: followed by cardiology  -cont management by cardiology  -emergency Dept precautions discussed and reviewed by patient     LUTS/BPH:  -start tamsulosin 0.4 mg at bedtime  -referral to urology ordered     Diabetes Screening  -HgBA1c ordered     Vitamin D deficiency  -Vit D levels ordered     Hep C screening  -Hep C antibody ordered     STI Screening:  -HIV, syphilis, GC/CT, trich ordered     Prostate Cancer screening:  -PSA ordered     Colon Cancer Screening:   -colonoscopy and cologuard ordered; patient advised to only complete one of these screening tests        Immunizations Counseling  -TDAP now due==> will discuss at follow up  -recommend updated COVID spike vaccine that can be obtained at local pharmacy     FOLLOW-UP: 1 week for BP check and 4 weeks to discuss and review test results and 8 weeks for PHYSICAL          PMHx:       HTN       Exertional shortness  of breath  Heart murmur  Hyperlipidemia  Hypertension  Incomplete bladder emptying  Nocturia  Lumbar radiculitis  Lumbar spondylosis  Muscle spasm of back  Cardiomyopathy (Multi); EF of 45% by echocardiogram 5/2022.  Stress test 5/2022 showed no evidence of ischemia at a high workload. Coronary CT angiogram 12/2023 showed no significant atherosclerotic changes or stenotic disease with a total CACS of 20.3.            Healthcare Providers:  Cardiology: Elma Jennings, APRN-CNP [facility]     Preventive Health Services:  -Last physical: ?  -last PSA: ?; ordered 6/25/24  -last colonoscopy: ?; cologuard ordered 6/25/24  -last STI screening: ? GC/CT/trich/HIV/syphilis ordered 6/25/24  -Hep C screening: ? ordered 6/25/24     Immunizations:   -Childhood vaccines: completed per patient    -COVID vaccine and booster:  -updated COVID spike vaccine: NOW DUE  -TDAP:   NOW DUE     Immunization History   Administered Date(s) Administered    Td (adult), unspecified 11/07/2011     INTERVAL HISTORY:    -labs ordered 6/25/24 NOT YET COMPLETED     Today Tamone reports:     - doing and feeling well.     -not taking tamsulosin 0.4 mg at bedtime, with continued BPH/LUTS      -taking all medications as prescribed, including losartan 100 mg daily in AM, with no reported adverse medication side effects     -BP at home 150/90s.  Today he denies chest pain, heart palpitations, N/V, SOB, cough, orthopnea, or LE swelling.         Today he has no other reported complaints, issues, or problems.    ROS is NEG for HEADACHE, NAUSEA, VOMITING, DIARRHEA, CHEST PAIN, SOB, and BLEEDING.  Review of systems (12 point) is negative for all systems except for any identified issues in HPI above.            Objective     BP (!) 168/110   Pulse 68   Temp 36.7 °C (98.1 °F)   SpO2 97%      Physical Examination:       GENERAL           General Appearance: pleasant, well-appearing, well-developed, well-hydrated, well-nourished,  well-groomed.        HEENT           NECK supple, Neg for adneopathy no thyroid enlargement or nodules, Oropharynx normal no exudates.        EYES           Pupils: PERRLA, no photophobia.        HEART           Rate and Rhythm regular rate and rhythm. Heart sounds: normal S1S2. Murmurs: systolic murmur Grade 2-3/6.        LUNGS           Effort: Normal chest wall, no pectus, Normal air entry all fields, Clear to IPPA, RR<16 with no use of accessory muscles.        BACK           General: unremarkable, no spinal tenderness or rashes.        ABDOMEN           General: Normal to inspection, neg for LKKS or masses and BSs heard in all quadrants         MUSCULOSKELETAL           gross abnormalities no gross abnormalities, no joint redness or swelling.        EXTREMITIES           Varicose veins: not present. Pulses: 2+ bilateral. Clubbing: none. Cyanosis: no.         PSYCHOLOGY           Affect: appropriate. Mood: pleasant.       Assessment/Plan   Problem List Items Addressed This Visit       Hyperlipidemia    Hypertension - Primary     Other Visit Diagnoses       Lower urinary tract symptoms (LUTS)        Diabetes mellitus screening        Vitamin D deficiency        Prostate cancer screening        Colon cancer screening        Immunization counseling              HTN: uncontrolled.  Follows with cardiology  -EKG 6/25/24: 56 bpm, sinus bradycardia, LVH, no acute ischemic changes, abnormal EKG.   -cont losartan 100 mg daily in AM and metoprolol XL 25 mg daily  -start amlodipine 10 mg daily  -CMP, UA ordered 6/25/24==> NOT YET COMPLETED==> patient advised to go to  lab to complete  -low salt, low cholesterol diet, regularly exercise, and limit alcohol intake  -referral to cardiology ordered; patent advised to schedule follow up with cardiology or he can see another cardiologist  -Emergency Dept and 911/EMS precautions discussed and reviewed with patient     Hyperlipidemia  -cont  atorvastatin 40 mg daily  -lipid panel  ordered 6/25/24==> NOT YET COMPLETED==> patient advised to go to  lab to complete  -low salt, low cholesterol diet, regularly exercise, and limit alcohol intake     Cardiac murmur and cardiomyopathy: followed by cardiology  -cont management by cardiology  -emergency Dept precautions discussed and reviewed by patient     LUTS/BPH:  -re-start tamsulosin 0.4 mg at bedtime  -referral to urology ordered 6/25/24; patient advised to call to see urology     Diabetes Screening  -HgBA1c ordered 6/25/24==> NOT YET COMPLETED==> patient advised to go to  lab to complete     Vitamin D deficiency  -Vit D levels ordered 6/25/24==> NOT YET COMPLETED==> patient advised to go to [facility] lab to complete     Hep C screening  -Hep C antibody ordered 6/25/24==> NOT YET COMPLETED==> patient advised to go to [facility] lab to complete     STI Screening:  -HIV, syphilis, GC/CT, trich ordered 6/25/24==> NOT YET COMPLETED==> patient advised to go to [facility] lab to complete     Prostate Cancer screening:  -PSA ordered 6/25/24==> NOT YET COMPLETED==> patient advised to go to [facility] lab to complete     Colon Cancer Screening:   -colonoscopy and cologuard ordered 6/25/24; patient advised to only complete one of these screening tests     Counseling:       Medication education:         Education:  The patient is counseled regarding potential side-effects of all new medications        Understanding:  Patient expressed understanding        Adherence:  No barriers to adherence identified        Immunizations Counseling  -TDAP now due==> declined today  -recommend updated COVID spike vaccine that can be obtained at local pharmacy     FOLLOW-UP: 4 weeks to discuss and review test results and 8 weeks for PHYSICAL     Discussed recommended plan of care with patient. Patient expressed understanding and agreement with plan of care. All of patient's questions were answered at the time. Patient had no additional questions at the time.       Prudence Clayton M.D.

## 2024-07-08 ENCOUNTER — APPOINTMENT (OUTPATIENT)
Dept: UROLOGY | Facility: CLINIC | Age: 54
End: 2024-07-08
Payer: COMMERCIAL

## 2024-07-09 ENCOUNTER — OFFICE VISIT (OUTPATIENT)
Dept: PRIMARY CARE | Facility: CLINIC | Age: 54
End: 2024-07-09
Payer: COMMERCIAL

## 2024-07-09 VITALS
OXYGEN SATURATION: 97 % | TEMPERATURE: 98.1 F | DIASTOLIC BLOOD PRESSURE: 110 MMHG | SYSTOLIC BLOOD PRESSURE: 168 MMHG | HEART RATE: 68 BPM

## 2024-07-09 DIAGNOSIS — R39.9 LOWER URINARY TRACT SYMPTOMS (LUTS): ICD-10-CM

## 2024-07-09 DIAGNOSIS — Z12.11 COLON CANCER SCREENING: ICD-10-CM

## 2024-07-09 DIAGNOSIS — E78.2 MIXED HYPERLIPIDEMIA: ICD-10-CM

## 2024-07-09 DIAGNOSIS — N40.1 BENIGN PROSTATIC HYPERPLASIA WITH URINARY FREQUENCY: ICD-10-CM

## 2024-07-09 DIAGNOSIS — Z71.85 IMMUNIZATION COUNSELING: ICD-10-CM

## 2024-07-09 DIAGNOSIS — R35.0 BENIGN PROSTATIC HYPERPLASIA WITH URINARY FREQUENCY: ICD-10-CM

## 2024-07-09 DIAGNOSIS — I10 PRIMARY HYPERTENSION: Primary | ICD-10-CM

## 2024-07-09 DIAGNOSIS — Z13.1 DIABETES MELLITUS SCREENING: ICD-10-CM

## 2024-07-09 DIAGNOSIS — Z12.5 PROSTATE CANCER SCREENING: ICD-10-CM

## 2024-07-09 DIAGNOSIS — E55.9 VITAMIN D DEFICIENCY: ICD-10-CM

## 2024-07-09 PROCEDURE — 3077F SYST BP >= 140 MM HG: CPT | Performed by: FAMILY MEDICINE

## 2024-07-09 PROCEDURE — 99214 OFFICE O/P EST MOD 30 MIN: CPT | Performed by: FAMILY MEDICINE

## 2024-07-09 PROCEDURE — 3080F DIAST BP >= 90 MM HG: CPT | Performed by: FAMILY MEDICINE

## 2024-07-09 PROCEDURE — 1036F TOBACCO NON-USER: CPT | Performed by: FAMILY MEDICINE

## 2024-07-09 RX ORDER — AMLODIPINE BESYLATE 5 MG/1
5 TABLET ORAL DAILY
Qty: 30 TABLET | Refills: 11 | Status: SHIPPED | OUTPATIENT
Start: 2024-07-09 | End: 2024-07-09

## 2024-07-09 RX ORDER — TAMSULOSIN HYDROCHLORIDE 0.4 MG/1
0.4 CAPSULE ORAL DAILY
Qty: 30 CAPSULE | Refills: 11 | Status: SHIPPED | OUTPATIENT
Start: 2024-07-09 | End: 2025-07-09

## 2024-07-09 RX ORDER — AMLODIPINE BESYLATE 5 MG/1
10 TABLET ORAL DAILY
Qty: 60 TABLET | Refills: 11 | Status: SHIPPED | OUTPATIENT
Start: 2024-07-09 | End: 2025-07-09

## 2024-07-09 ASSESSMENT — PAIN SCALES - GENERAL: PAINLEVEL: 0-NO PAIN

## 2024-07-17 NOTE — PROGRESS NOTES
Subjective   Ayla Paredes is a 54 y.o. male who presents for  UNCONTROLLED HTN and BP CHECK.    HPI:      54 y.o. male who presents for  UNCONTROLLED HTN and BP CHECK.     EMR/EPIC records reviewed.    Last seen by me on 7/9/24  for  FOLLOW UP VISIT FOR HTN and BLOOD PRESSURE CHECK. At visit:    HTN: uncontrolled.  Follows with cardiology  -EKG 6/25/24: 56 bpm, sinus bradycardia, LVH, no acute ischemic changes, abnormal EKG.   -cont losartan 100 mg daily in AM and metoprolol XL 25 mg daily  -start amlodipine 10 mg daily  -CMP, UA ordered 6/25/24==> NOT YET COMPLETED==> patient advised to go to  lab to complete  -low salt, low cholesterol diet, regularly exercise, and limit alcohol intake  -referral to cardiology ordered; patent advised to schedule follow up with cardiology or he can see another cardiologist  -Emergency Dept and 911/EMS precautions discussed and reviewed with patient     Hyperlipidemia  -cont  atorvastatin 40 mg daily  -lipid panel ordered 6/25/24==> NOT YET COMPLETED==> patient advised to go to  lab to complete  -low salt, low cholesterol diet, regularly exercise, and limit alcohol intake     Cardiac murmur and cardiomyopathy: followed by cardiology  -cont management by cardiology  -emergency Dept precautions discussed and reviewed by patient     LUTS/BPH:  -re-start tamsulosin 0.4 mg at bedtime  -referral to urology ordered 6/25/24; patient advised to call to see urology     Diabetes Screening  -HgBA1c ordered 6/25/24==> NOT YET COMPLETED==> patient advised to go to  lab to complete     Vitamin D deficiency  -Vit D levels ordered 6/25/24==> NOT YET COMPLETED==> patient advised to go to  lab to complete     Hep C screening  -Hep C antibody ordered 6/25/24==> NOT YET COMPLETED==> patient advised to go to  lab to complete     STI Screening:  -HIV, syphilis, GC/CT, trich ordered 6/25/24==> NOT YET COMPLETED==> patient advised to go to  lab to complete     Prostate Cancer  screening:  -PSA ordered 24==> NOT YET COMPLETED==> patient advised to go to  lab to complete     Colon Cancer Screening:   -colonoscopy and cologuard ordered 24; patient advised to only complete one of these screening tests      Immunizations Counseling  -TDAP now due==> declined today  -recommend updated COVID spike vaccine that can be obtained at local pharmacy     FOLLOW-UP: 4 weeks to discuss and review test results and 8 weeks for PHYSICAL             Also seen by me on NPV TO ESTABLISH PCP CARE and FOR CARE GAP REVIEW. At visit:     Establish PCP care  -labs ordered (see A/P above for details)     HTN: uncontrolled; asymptomatic. Follows with cardiology  -EK bpm, sinus bradycardia, LVH, no acute ischemic changes, abnormal EKG.   -increase losartan from 50 mg to 100 mg daily in AM  -CMP, UA ordered  -low salt, low cholesterol diet, regularly exercise, and limit alcohol intake  -referral to cardiology ordered; patent advised to schedule follow up with cardiology or he can see another cardiologist  -Emergency Dept and 911/EMS precautions discussed and reviewed with patient     Hyperlipidemia  -cont  atorvastatin 40 mg daily  -lipid panel ordered  -low salt, low cholesterol diet, regularly exercise, and limit alcohol intake     Cardiac murmur and cardiomyopathy: followed by cardiology  -cont management by cardiology  -emergency Dept precautions discussed and reviewed by patient     LUTS/BPH:  -start tamsulosin 0.4 mg at bedtime  -referral to urology ordered     Diabetes Screening  -HgBA1c ordered     Vitamin D deficiency  -Vit D levels ordered     Hep C screening  -Hep C antibody ordered     STI Screening:  -HIV, syphilis, GC/CT, trich ordered     Prostate Cancer screening:  -PSA ordered     Colon Cancer Screening:   -colonoscopy and cologuard ordered; patient advised to only complete one of these screening tests        Immunizations Counseling  -TDAP now due==> will discuss at follow  up  -recommend updated COVID spike vaccine that can be obtained at local pharmacy     FOLLOW-UP: 1 week for BP check and 4 weeks to discuss and review test results and 8 weeks for PHYSICAL           PMHx:       HTN       Exertional shortness of breath  Heart murmur  Hyperlipidemia  Hypertension  Incomplete bladder emptying  Nocturia  Lumbar radiculitis  Lumbar spondylosis  Muscle spasm of back  Cardiomyopathy (Multi); EF of 45% by echocardiogram 5/2022.  Stress test 5/2022 showed no evidence of ischemia at a high workload. Coronary CT angiogram 12/2023 showed no significant atherosclerotic changes or stenotic disease with a total CACS of 20.3.            Healthcare Providers:  Cardiology: Elma Jennings, UGO-CNP and Dr. Banks  [facility]     Preventive Health Services:  -Last physical: ?  -last PSA: ?; ordered 6/25/24  -last colonoscopy: ?; cologuard ordered 6/25/24  -last STI screening: ? GC/CT/trich/HIV/syphilis ordered 6/25/24  -Hep C screening: ? ordered 6/25/24     Immunizations:   -Childhood vaccines: completed per patient    -COVID vaccine and booster:  -updated COVID spike vaccine: NOW DUE  -TDAP:   NOW DUE   -shingles: NOW DUE     Immunization History   Administered Date(s) Administered    Td (adult), unspecified 11/07/2011          INTERVAL HISTORY:     -labs ordered 6/25/24 NOT YET COMPLETED    -cologuard ordered 6/25/24 NOT YET COMPLETED       Today Tamone reports:     - doing and feeling well.      -not taking tamsulosin 0.4 mg at bedtime, with continued BPH/LUTS      -taking all medications as prescribed, including losartan 100 mg daily in AM, amlodipine 10 mg daily, and  metoprolol XL 25 mg daily with no reported adverse medication side effects     -BP at home 130/80s.  Today he denies chest pain, heart palpitations, N/V, SOB, cough, orthopnea, or LE swelling.         Today he has no other reported complaints, issues, or problems.     ROS is NEG for HEADACHE, NAUSEA, VOMITING, DIARRHEA,  CHEST PAIN, SOB, and BLEEDING.  Review of systems (12 point) is negative for all systems except for any identified issues in HPI above.       Objective     There were no vitals taken for this visit.     Physical Examination:       GENERAL           General Appearance: well-appearing, well-developed, well-hydrated, well-nourished, no acute distress.        HEENT           NECK supple, no masses or thyromegaly, no carotid bruit.        EYES           Extraocular Movements: normal, bilateral eyes SVETA, no conjunctival injection.        HEART           Rate and Rhythm regular rate and rhythm. Heart sounds: normal S1S2, no S3 or S4. Murmurs: none.        CHEST           Breath sounds: Clear to IPPA, RR<16 no use of accessory muscles.        ABDOMEN           General: Neg for LKKS or masses, no scleral icterus or jaundice.        MUSCULOSKELETAL           Joints Demonstration: Neg for erythema, swelling or joint deformities. gross abnormalities no gross abnormalities.        EXTREMITIES           Lower Extremities: Neg for cyanosis, clubbing or edema.       Assessment/Plan   Problem List Items Addressed This Visit    None    HTN: well controlled.  Follows with cardiology  -EKG 6/25/24: 56 bpm, sinus bradycardia, LVH, no acute ischemic changes, abnormal EKG.   -cont losartan 100 mg daily in AM and metoprolol XL 25 mg daily  -cont amlodipine 10 mg daily  -CMP, UA ordered 6/25/24==> NOT YET COMPLETED==> patient advised to go to  lab to complete  -low salt, low cholesterol diet, regularly exercise, and limit alcohol intake  -referral to cardiology previously ordered; patent advised to schedule follow up with cardiology or he can see another cardiologist  -Emergency Dept and 911/EMS precautions discussed and reviewed with patient     Hyperlipidemia  -cont  atorvastatin 40 mg daily  -lipid panel ordered 6/25/24==> NOT YET COMPLETED==> patient advised to go to  lab to complete  -low salt, low cholesterol diet, regularly  exercise, and limit alcohol intake     Cardiac murmur and cardiomyopathy: followed by cardiology  -cont management by cardiology  -emergency Dept precautions discussed and reviewed by patient     LUTS/BPH:  -cont tamsulosin 0.4 mg at bedtime  -referral to urology ordered 6/25/24; patient advised to call to see urology     Diabetes Screening  -HgBA1c ordered 6/25/24==> NOT YET COMPLETED==> patient advised to go to  lab to complete     Vitamin D deficiency  -Vit D levels ordered 6/25/24==> NOT YET COMPLETED==> patient advised to go to  lab to complete     Hep C screening  -Hep C antibody ordered 6/25/24==> NOT YET COMPLETED==> patient advised to go to  lab to complete     STI Screening:  -HIV, syphilis, GC/CT, trich ordered 6/25/24==> NOT YET COMPLETED==> patient advised to go to  lab to complete     Prostate Cancer screening:  -PSA ordered 6/25/24==> NOT YET COMPLETED==> patient advised to go to  lab to complete     Colon Cancer Screening:   -colonoscopy and cologuard ordered 6/25/24; patient advised to only complete one of these screening tests     Counseling:       Medication education:         Education:  The patient is counseled regarding potential side-effects of all new medications        Understanding:  Patient expressed understanding        Adherence:  No barriers to adherence identified        Immunizations Counseling  -TDAP and shingles now due==> declined today  -recommend updated COVID spike vaccine that can be obtained at local pharmacy     FOLLOW-UP: 4 weeks to discuss and review test results and 8 weeks for PHYSICAL     Discussed recommended plan of care with patient. Patient expressed understanding and agreement with plan of care. All of patient's questions were answered at the time. Patient had no additional questions at the time.            Prudence Clayton MD, PhD

## 2024-07-18 ENCOUNTER — APPOINTMENT (OUTPATIENT)
Dept: PRIMARY CARE | Facility: CLINIC | Age: 54
End: 2024-07-18
Payer: COMMERCIAL

## 2024-07-26 ENCOUNTER — OFFICE VISIT (OUTPATIENT)
Dept: CARDIOLOGY | Facility: HOSPITAL | Age: 54
End: 2024-07-26
Payer: COMMERCIAL

## 2024-07-26 ENCOUNTER — LAB (OUTPATIENT)
Dept: LAB | Facility: LAB | Age: 54
End: 2024-07-26
Payer: COMMERCIAL

## 2024-07-26 VITALS
OXYGEN SATURATION: 97 % | WEIGHT: 181.6 LBS | HEIGHT: 64 IN | DIASTOLIC BLOOD PRESSURE: 137 MMHG | HEART RATE: 69 BPM | SYSTOLIC BLOOD PRESSURE: 191 MMHG | BODY MASS INDEX: 31 KG/M2

## 2024-07-26 DIAGNOSIS — Z12.5 PROSTATE CANCER SCREENING: ICD-10-CM

## 2024-07-26 DIAGNOSIS — N18.31 STAGE 3A CHRONIC KIDNEY DISEASE (MULTI): Primary | ICD-10-CM

## 2024-07-26 DIAGNOSIS — E78.2 MIXED HYPERLIPIDEMIA: ICD-10-CM

## 2024-07-26 DIAGNOSIS — Z13.1 DIABETES MELLITUS SCREENING: ICD-10-CM

## 2024-07-26 DIAGNOSIS — E55.9 VITAMIN D DEFICIENCY: ICD-10-CM

## 2024-07-26 DIAGNOSIS — Z11.3 ROUTINE SCREENING FOR STI (SEXUALLY TRANSMITTED INFECTION): ICD-10-CM

## 2024-07-26 DIAGNOSIS — I42.8 OTHER CARDIOMYOPATHY (MULTI): ICD-10-CM

## 2024-07-26 DIAGNOSIS — I10 BENIGN ESSENTIAL HYPERTENSION: Primary | ICD-10-CM

## 2024-07-26 DIAGNOSIS — Z76.89 ENCOUNTER TO ESTABLISH CARE: ICD-10-CM

## 2024-07-26 DIAGNOSIS — I10 BENIGN ESSENTIAL HYPERTENSION: ICD-10-CM

## 2024-07-26 DIAGNOSIS — Z11.59 ENCOUNTER FOR HEPATITIS C SCREENING TEST FOR LOW RISK PATIENT: ICD-10-CM

## 2024-07-26 LAB
25(OH)D3 SERPL-MCNC: 18 NG/ML (ref 30–100)
ALBUMIN SERPL BCP-MCNC: 4.4 G/DL (ref 3.4–5)
ALP SERPL-CCNC: 88 U/L (ref 33–120)
ALT SERPL W P-5'-P-CCNC: 13 U/L (ref 10–52)
ANION GAP SERPL CALC-SCNC: 11 MMOL/L (ref 10–20)
APPEARANCE UR: CLEAR
AST SERPL W P-5'-P-CCNC: 17 U/L (ref 9–39)
BASOPHILS # BLD AUTO: 0.06 X10*3/UL (ref 0–0.1)
BASOPHILS NFR BLD AUTO: 1 %
BILIRUB SERPL-MCNC: 0.5 MG/DL (ref 0–1.2)
BILIRUB UR STRIP.AUTO-MCNC: NEGATIVE MG/DL
BUN SERPL-MCNC: 13 MG/DL (ref 6–23)
C TRACH RRNA SPEC QL NAA+PROBE: NEGATIVE
CALCIUM SERPL-MCNC: 9.4 MG/DL (ref 8.6–10.3)
CHLORIDE SERPL-SCNC: 100 MMOL/L (ref 98–107)
CHOLEST SERPL-MCNC: 188 MG/DL (ref 0–199)
CHOLESTEROL/HDL RATIO: 4.5
CO2 SERPL-SCNC: 33 MMOL/L (ref 21–32)
COLOR UR: COLORLESS
CREAT SERPL-MCNC: 1.42 MG/DL (ref 0.5–1.3)
EGFRCR SERPLBLD CKD-EPI 2021: 59 ML/MIN/1.73M*2
EOSINOPHIL # BLD AUTO: 0.2 X10*3/UL (ref 0–0.7)
EOSINOPHIL NFR BLD AUTO: 3.5 %
ERYTHROCYTE [DISTWIDTH] IN BLOOD BY AUTOMATED COUNT: 12.4 % (ref 11.5–14.5)
EST. AVERAGE GLUCOSE BLD GHB EST-MCNC: 128 MG/DL
GLUCOSE SERPL-MCNC: 105 MG/DL (ref 74–99)
GLUCOSE UR STRIP.AUTO-MCNC: NORMAL MG/DL
HBA1C MFR BLD: 6.1 %
HCT VFR BLD AUTO: 41.5 % (ref 41–52)
HCV AB SER QL: NONREACTIVE
HDLC SERPL-MCNC: 41.4 MG/DL
HGB BLD-MCNC: 14 G/DL (ref 13.5–17.5)
HIV 1+2 AB+HIV1 P24 AG SERPL QL IA: NONREACTIVE
IMM GRANULOCYTES # BLD AUTO: 0.01 X10*3/UL (ref 0–0.7)
IMM GRANULOCYTES NFR BLD AUTO: 0.2 % (ref 0–0.9)
KETONES UR STRIP.AUTO-MCNC: NEGATIVE MG/DL
LDLC SERPL CALC-MCNC: 121 MG/DL
LEUKOCYTE ESTERASE UR QL STRIP.AUTO: NEGATIVE
LYMPHOCYTES # BLD AUTO: 2.91 X10*3/UL (ref 1.2–4.8)
LYMPHOCYTES NFR BLD AUTO: 50.8 %
MCH RBC QN AUTO: 29.3 PG (ref 26–34)
MCHC RBC AUTO-ENTMCNC: 33.7 G/DL (ref 32–36)
MCV RBC AUTO: 87 FL (ref 80–100)
MONOCYTES # BLD AUTO: 0.42 X10*3/UL (ref 0.1–1)
MONOCYTES NFR BLD AUTO: 7.3 %
N GONORRHOEA DNA SPEC QL PROBE+SIG AMP: NEGATIVE
NEUTROPHILS # BLD AUTO: 2.13 X10*3/UL (ref 1.2–7.7)
NEUTROPHILS NFR BLD AUTO: 37.2 %
NITRITE UR QL STRIP.AUTO: NEGATIVE
NON HDL CHOLESTEROL: 147 MG/DL (ref 0–149)
NRBC BLD-RTO: 0 /100 WBCS (ref 0–0)
PH UR STRIP.AUTO: 6.5 [PH]
PLATELET # BLD AUTO: 293 X10*3/UL (ref 150–450)
POTASSIUM SERPL-SCNC: 4 MMOL/L (ref 3.5–5.3)
PROT SERPL-MCNC: 7.1 G/DL (ref 6.4–8.2)
PROT UR STRIP.AUTO-MCNC: ABNORMAL MG/DL
PSA SERPL-MCNC: 0.78 NG/ML
RBC # BLD AUTO: 4.78 X10*6/UL (ref 4.5–5.9)
RBC # UR STRIP.AUTO: NEGATIVE /UL
RBC #/AREA URNS AUTO: NORMAL /HPF
SODIUM SERPL-SCNC: 140 MMOL/L (ref 136–145)
SP GR UR STRIP.AUTO: 1.01
T VAGINALIS RRNA SPEC QL NAA+PROBE: NEGATIVE
TREPONEMA PALLIDUM IGG+IGM AB [PRESENCE] IN SERUM OR PLASMA BY IMMUNOASSAY: NONREACTIVE
TRIGL SERPL-MCNC: 128 MG/DL (ref 0–149)
TSH SERPL-ACNC: 1.53 MIU/L (ref 0.44–3.98)
UROBILINOGEN UR STRIP.AUTO-MCNC: NORMAL MG/DL
VLDL: 26 MG/DL (ref 0–40)
WBC # BLD AUTO: 5.7 X10*3/UL (ref 4.4–11.3)
WBC #/AREA URNS AUTO: NORMAL /HPF

## 2024-07-26 PROCEDURE — 87491 CHLMYD TRACH DNA AMP PROBE: CPT

## 2024-07-26 PROCEDURE — 3008F BODY MASS INDEX DOCD: CPT | Performed by: NURSE PRACTITIONER

## 2024-07-26 PROCEDURE — 87389 HIV-1 AG W/HIV-1&-2 AB AG IA: CPT

## 2024-07-26 PROCEDURE — 36415 COLL VENOUS BLD VENIPUNCTURE: CPT

## 2024-07-26 PROCEDURE — 86780 TREPONEMA PALLIDUM: CPT

## 2024-07-26 PROCEDURE — 80061 LIPID PANEL: CPT

## 2024-07-26 PROCEDURE — 85025 COMPLETE CBC W/AUTO DIFF WBC: CPT

## 2024-07-26 PROCEDURE — 84443 ASSAY THYROID STIM HORMONE: CPT

## 2024-07-26 PROCEDURE — 87591 N.GONORRHOEAE DNA AMP PROB: CPT

## 2024-07-26 PROCEDURE — 81001 URINALYSIS AUTO W/SCOPE: CPT

## 2024-07-26 PROCEDURE — 84153 ASSAY OF PSA TOTAL: CPT

## 2024-07-26 PROCEDURE — 99214 OFFICE O/P EST MOD 30 MIN: CPT | Performed by: NURSE PRACTITIONER

## 2024-07-26 PROCEDURE — 3080F DIAST BP >= 90 MM HG: CPT | Performed by: NURSE PRACTITIONER

## 2024-07-26 PROCEDURE — 1036F TOBACCO NON-USER: CPT | Performed by: NURSE PRACTITIONER

## 2024-07-26 PROCEDURE — 86803 HEPATITIS C AB TEST: CPT

## 2024-07-26 PROCEDURE — 87661 TRICHOMONAS VAGINALIS AMPLIF: CPT

## 2024-07-26 PROCEDURE — 3077F SYST BP >= 140 MM HG: CPT | Performed by: NURSE PRACTITIONER

## 2024-07-26 PROCEDURE — 80053 COMPREHEN METABOLIC PANEL: CPT

## 2024-07-26 PROCEDURE — 82306 VITAMIN D 25 HYDROXY: CPT

## 2024-07-26 PROCEDURE — 83036 HEMOGLOBIN GLYCOSYLATED A1C: CPT

## 2024-07-26 RX ORDER — ERGOCALCIFEROL 1.25 MG/1
50000 CAPSULE ORAL
Qty: 4 CAPSULE | Refills: 2 | Status: SHIPPED | OUTPATIENT
Start: 2024-07-28 | End: 2024-10-20

## 2024-07-26 ASSESSMENT — ENCOUNTER SYMPTOMS
HYPERTENSION: 1
LOSS OF SENSATION IN FEET: 0
DEPRESSION: 0
OCCASIONAL FEELINGS OF UNSTEADINESS: 0

## 2024-07-26 NOTE — PATIENT INSTRUCTIONS
Schedule renal artery ultrasound    Obtain blood work today     Monitor your BP 2-3 hours after your medications    Follow up in 2 weeks

## 2024-07-26 NOTE — PROGRESS NOTES
Subjective   Ayla Paredes is a 54 y.o. male.    Chief Complaint:  Hypertension, Hyperlipidemia, and Cardiomyopathy    Mr. Paredes returns for a follow up. He was recently seen by his PCP and had losartan increased to 100 mg daily and amlodipine 10 mg added. He reports compliance with his medications and seems to be tolerating them. He does not monitor his BP at home. He offers no other cardiovascular complaints or concerns today. He denies any complaints of chest pain, shortness of breath, lightheadedness, dizziness, palpitations, syncope, orthopnea, paroxysmal nocturnal dyspnea, lower extremity swelling or bleeding concerns.     Hypertension    Hyperlipidemia        Review of Systems   All other systems reviewed and are negative.      Objective   Physical Exam  Constitutional:       Appearance: Healthy appearance. In no distress  Pulmonary:      Effort: Pulmonary effort is normal.      Breath sounds: Normal breath sounds.   Cardiovascular:      Normal rate. Regular rhythm. Normal S1. Normal S2.       Murmurs: There is no murmur.      Carotids: right carotid pulse +2, no bruit heard over the right carotid. left carotid pulse +2, no bruit heard over the left carotid.  Edema:     Peripheral edema absent.   Abdominal:      Palpations: Abdomen is soft.   Musculoskeletal:       Cervical back: Normal range of motion.   Skin:     General: Skin is warm and dry. Normal color and pigmentation   Neurological:      Mental Status: Alert and oriented to person, place and time.   Psychiatric:     Mood and Affect: appropriate mood and appropriate affect.       Lab Review:   Lab Results   Component Value Date     05/28/2024    K 3.5 05/28/2024     05/28/2024    CO2 30 05/28/2024    BUN 17 05/28/2024    CREATININE 1.43 (H) 05/28/2024    GLUCOSE 58 (L) 05/28/2024    CALCIUM 9.5 05/28/2024     Lab Results   Component Value Date    WBC 8.1 05/28/2024    HGB 13.1 (L) 05/28/2024    HCT 38.9 (L) 05/28/2024    MCV 83  05/28/2024     05/28/2024     Lab Results   Component Value Date    CHOL 277 (H) 11/27/2023    TRIG 149 11/27/2023    HDL 45.0 11/27/2023       Assessment/Plan   Mr. Paredes is a pleasant 53-year-old -American male with a past medical history significant for hypertension, hyperlipidemia, anxiety/depression, cardiomyopathy (EF 45%, 5/2022) and a family history of coronary artery disease. Coronary CT angiogram 12/2023 showed no significant atherosclerotic changes or stenotic disease with a total CACS of 20.3. Echocardiogram 3/2024 showed an improved EF of 55-60%. He presents today for routine follow up stable from a cardiac standpoint. Despite compliance with his medications, his BP remains elevated. I will have him schedule a renal artery ultrasound to rule out any secondary causes of HTN. I will have him continue all medications unchanged, as he just took h is meds 30 minutes before our appointment. He will start monitoring his BP at home, a good 2-3 hours after his medications. He will follow up with me in clinic in 2 weeks and bring his home BP readings to compare. I will also have him obtain a battery of blood work today. He knows to call for any concerns.

## 2024-07-29 ENCOUNTER — APPOINTMENT (OUTPATIENT)
Dept: CARDIOLOGY | Facility: CLINIC | Age: 54
End: 2024-07-29
Payer: COMMERCIAL

## 2024-08-02 ENCOUNTER — HOSPITAL ENCOUNTER (OUTPATIENT)
Dept: RADIOLOGY | Facility: CLINIC | Age: 54
End: 2024-08-02
Payer: COMMERCIAL

## 2024-08-05 ENCOUNTER — APPOINTMENT (OUTPATIENT)
Dept: PRIMARY CARE | Facility: CLINIC | Age: 54
End: 2024-08-05
Payer: COMMERCIAL

## 2024-08-07 ENCOUNTER — HOSPITAL ENCOUNTER (OUTPATIENT)
Dept: VASCULAR MEDICINE | Facility: CLINIC | Age: 54
Discharge: HOME | End: 2024-08-07
Payer: COMMERCIAL

## 2024-08-07 DIAGNOSIS — I10 BENIGN ESSENTIAL HYPERTENSION: ICD-10-CM

## 2024-08-07 PROCEDURE — 93975 VASCULAR STUDY: CPT | Performed by: SURGERY

## 2024-08-07 PROCEDURE — 93975 VASCULAR STUDY: CPT

## 2024-08-12 ENCOUNTER — OFFICE VISIT (OUTPATIENT)
Dept: CARDIOLOGY | Facility: CLINIC | Age: 54
End: 2024-08-12
Payer: COMMERCIAL

## 2024-08-12 VITALS
HEIGHT: 65 IN | WEIGHT: 179.8 LBS | HEART RATE: 61 BPM | OXYGEN SATURATION: 98 % | SYSTOLIC BLOOD PRESSURE: 173 MMHG | DIASTOLIC BLOOD PRESSURE: 112 MMHG | BODY MASS INDEX: 29.96 KG/M2

## 2024-08-12 DIAGNOSIS — I10 PRIMARY HYPERTENSION: Primary | ICD-10-CM

## 2024-08-12 PROCEDURE — 3077F SYST BP >= 140 MM HG: CPT | Performed by: NURSE PRACTITIONER

## 2024-08-12 PROCEDURE — 3080F DIAST BP >= 90 MM HG: CPT | Performed by: NURSE PRACTITIONER

## 2024-08-12 PROCEDURE — 99214 OFFICE O/P EST MOD 30 MIN: CPT | Performed by: NURSE PRACTITIONER

## 2024-08-12 PROCEDURE — 3008F BODY MASS INDEX DOCD: CPT | Performed by: NURSE PRACTITIONER

## 2024-08-12 PROCEDURE — 1036F TOBACCO NON-USER: CPT | Performed by: NURSE PRACTITIONER

## 2024-08-12 RX ORDER — HYDROCHLOROTHIAZIDE 25 MG/1
25 TABLET ORAL DAILY
Qty: 30 TABLET | Refills: 11 | Status: SHIPPED | OUTPATIENT
Start: 2024-08-12 | End: 2025-08-12

## 2024-08-12 ASSESSMENT — PATIENT HEALTH QUESTIONNAIRE - PHQ9
2. FEELING DOWN, DEPRESSED OR HOPELESS: NOT AT ALL
1. LITTLE INTEREST OR PLEASURE IN DOING THINGS: NOT AT ALL
SUM OF ALL RESPONSES TO PHQ9 QUESTIONS 1 AND 2: 0

## 2024-08-12 ASSESSMENT — PAIN SCALES - GENERAL: PAINLEVEL: 0-NO PAIN

## 2024-08-12 ASSESSMENT — ENCOUNTER SYMPTOMS: HYPERTENSION: 1

## 2024-08-12 NOTE — PROGRESS NOTES
Subjective   Ayla Paredes is a 54 y.o. male.    Chief Complaint:  Follow-up and Hypertension    Mr. Paredes returns for a routine 2 week follow up. He is here for a BP check. He reports compliance with his medications, though when reviewing everything, amlodipine didn't sound familiar. He offers no other cardiovascular complaints or concerns today. He denies any complaints of chest pain, shortness of breath, lightheadedness, dizziness, palpitations, syncope, orthopnea, paroxysmal nocturnal dyspnea, lower extremity swelling or bleeding concerns.      Hypertension        Review of Systems   All other systems reviewed and are negative.      Objective   Physical Exam  Constitutional:       Appearance: Healthy appearance. In no distress  Pulmonary:      Effort: Pulmonary effort is normal.      Breath sounds: Normal breath sounds.   Cardiovascular:      Normal rate. Regular rhythm. Normal S1. Normal S2.       Murmurs: There is no murmur.      Carotids: right carotid pulse +2, no bruit heard over the right carotid. left carotid pulse +2, no bruit heard over the left carotid.  Edema:     Peripheral edema absent.   Abdominal:      Palpations: Abdomen is soft.   Musculoskeletal:       Cervical back: Normal range of motion.   Skin:     General: Skin is warm and dry. Normal color and pigmentation   Neurological:      Mental Status: Alert and oriented to person, place and time.   Psychiatric:     Mood and Affect: appropriate mood and appropriate affect.       Lab Review:   Lab Results   Component Value Date     07/26/2024    K 4.0 07/26/2024     07/26/2024    CO2 33 (H) 07/26/2024    BUN 13 07/26/2024    CREATININE 1.42 (H) 07/26/2024    GLUCOSE 105 (H) 07/26/2024    CALCIUM 9.4 07/26/2024     Lab Results   Component Value Date    WBC 5.7 07/26/2024    HGB 14.0 07/26/2024    HCT 41.5 07/26/2024    MCV 87 07/26/2024     07/26/2024     Lab Results   Component Value Date    CHOL 188 07/26/2024    TRIG 128  07/26/2024    HDL 41.4 07/26/2024       Assessment/Plan   Mr. Paredes is a pleasant 54-year-old -American male with a past medical history significant for hypertension, hyperlipidemia, anxiety/depression, cardiomyopathy (EF 45%, 5/2022) and a family history of coronary artery disease. Coronary CT angiogram 12/2023 showed no significant atherosclerotic changes or stenotic disease with a total CACS of 20.3. Echocardiogram 3/2024 showed an improved EF of 55-60%. Renal artery ultrasound 8/2024 showed no evidence of hemodynamically significant stenosis. He presents today for routine follow up stable from a cardiac standpoint. His BP unfortunately continues to remain elevated, and he is unsure if he is taking amlodipine. I will have him restart amlodipine and add hydrochlorothiazide 25 mg once daily. He was encouraged to remain compliant with all of his medications. He again was encouraged to start monitoring his BP at home. He will follow up with us in clinic in 2 months. He knows to call for any concerns.

## 2024-08-14 ENCOUNTER — ANESTHESIA EVENT (OUTPATIENT)
Dept: OPERATING ROOM | Facility: CLINIC | Age: 54
End: 2024-08-14
Payer: COMMERCIAL

## 2024-08-15 ENCOUNTER — ANESTHESIA (OUTPATIENT)
Dept: OPERATING ROOM | Facility: CLINIC | Age: 54
End: 2024-08-15
Payer: COMMERCIAL

## 2024-08-15 ENCOUNTER — HOSPITAL ENCOUNTER (OUTPATIENT)
Dept: OPERATING ROOM | Facility: CLINIC | Age: 54
Setting detail: OUTPATIENT SURGERY
Discharge: HOME | End: 2024-08-15
Payer: COMMERCIAL

## 2024-08-15 VITALS
WEIGHT: 175.49 LBS | DIASTOLIC BLOOD PRESSURE: 92 MMHG | RESPIRATION RATE: 16 BRPM | TEMPERATURE: 96.8 F | SYSTOLIC BLOOD PRESSURE: 137 MMHG | BODY MASS INDEX: 29.96 KG/M2 | OXYGEN SATURATION: 99 % | HEART RATE: 56 BPM | HEIGHT: 64 IN

## 2024-08-15 DIAGNOSIS — Z12.11 COLON CANCER SCREENING: ICD-10-CM

## 2024-08-15 PROCEDURE — 3600000002 HC OR TIME - INITIAL BASE CHARGE - PROCEDURE LEVEL TWO: Performed by: ANESTHESIOLOGY

## 2024-08-15 PROCEDURE — 7100000009 HC PHASE TWO TIME - INITIAL BASE CHARGE: Performed by: ANESTHESIOLOGY

## 2024-08-15 PROCEDURE — 45385 COLONOSCOPY W/LESION REMOVAL: CPT | Performed by: COLON & RECTAL SURGERY

## 2024-08-15 PROCEDURE — 2500000004 HC RX 250 GENERAL PHARMACY W/ HCPCS (ALT 636 FOR OP/ED): Mod: SE | Performed by: NURSE ANESTHETIST, CERTIFIED REGISTERED

## 2024-08-15 PROCEDURE — 3700000002 HC GENERAL ANESTHESIA TIME - EACH INCREMENTAL 1 MINUTE: Performed by: ANESTHESIOLOGY

## 2024-08-15 PROCEDURE — 7100000010 HC PHASE TWO TIME - EACH INCREMENTAL 1 MINUTE: Performed by: ANESTHESIOLOGY

## 2024-08-15 PROCEDURE — 3700000001 HC GENERAL ANESTHESIA TIME - INITIAL BASE CHARGE: Performed by: ANESTHESIOLOGY

## 2024-08-15 PROCEDURE — 2500000005 HC RX 250 GENERAL PHARMACY W/O HCPCS: Mod: SE | Performed by: NURSE ANESTHETIST, CERTIFIED REGISTERED

## 2024-08-15 PROCEDURE — 3600000007 HC OR TIME - EACH INCREMENTAL 1 MINUTE - PROCEDURE LEVEL TWO: Performed by: ANESTHESIOLOGY

## 2024-08-15 RX ORDER — LIDOCAINE HYDROCHLORIDE 20 MG/ML
INJECTION, SOLUTION INFILTRATION; PERINEURAL AS NEEDED
Status: DISCONTINUED | OUTPATIENT
Start: 2024-08-15 | End: 2024-08-15

## 2024-08-15 RX ORDER — LIDOCAINE IN NACL,ISO-OSMOT/PF 30 MG/3 ML
0.1 SYRINGE (ML) INJECTION ONCE
Status: DISCONTINUED | OUTPATIENT
Start: 2024-08-15 | End: 2024-08-16 | Stop reason: HOSPADM

## 2024-08-15 RX ORDER — SODIUM CHLORIDE, SODIUM LACTATE, POTASSIUM CHLORIDE, CALCIUM CHLORIDE 600; 310; 30; 20 MG/100ML; MG/100ML; MG/100ML; MG/100ML
100 INJECTION, SOLUTION INTRAVENOUS CONTINUOUS
Status: DISCONTINUED | OUTPATIENT
Start: 2024-08-15 | End: 2024-08-16 | Stop reason: HOSPADM

## 2024-08-15 RX ORDER — SODIUM CHLORIDE, SODIUM LACTATE, POTASSIUM CHLORIDE, CALCIUM CHLORIDE 600; 310; 30; 20 MG/100ML; MG/100ML; MG/100ML; MG/100ML
INJECTION, SOLUTION INTRAVENOUS CONTINUOUS PRN
Status: DISCONTINUED | OUTPATIENT
Start: 2024-08-15 | End: 2024-08-15

## 2024-08-15 RX ORDER — ONDANSETRON HYDROCHLORIDE 2 MG/ML
4 INJECTION, SOLUTION INTRAVENOUS ONCE AS NEEDED
Status: DISCONTINUED | OUTPATIENT
Start: 2024-08-15 | End: 2024-08-16 | Stop reason: HOSPADM

## 2024-08-15 RX ORDER — PROPOFOL 10 MG/ML
INJECTION, EMULSION INTRAVENOUS CONTINUOUS PRN
Status: DISCONTINUED | OUTPATIENT
Start: 2024-08-15 | End: 2024-08-15

## 2024-08-15 SDOH — HEALTH STABILITY: MENTAL HEALTH: CURRENT SMOKER: 0

## 2024-08-15 ASSESSMENT — PAIN SCALES - GENERAL
PAINLEVEL_OUTOF10: 0 - NO PAIN

## 2024-08-15 ASSESSMENT — PAIN - FUNCTIONAL ASSESSMENT
PAIN_FUNCTIONAL_ASSESSMENT: 0-10

## 2024-08-15 ASSESSMENT — COLUMBIA-SUICIDE SEVERITY RATING SCALE - C-SSRS
2. HAVE YOU ACTUALLY HAD ANY THOUGHTS OF KILLING YOURSELF?: NO
6. HAVE YOU EVER DONE ANYTHING, STARTED TO DO ANYTHING, OR PREPARED TO DO ANYTHING TO END YOUR LIFE?: NO
1. IN THE PAST MONTH, HAVE YOU WISHED YOU WERE DEAD OR WISHED YOU COULD GO TO SLEEP AND NOT WAKE UP?: NO

## 2024-08-15 NOTE — ANESTHESIA POSTPROCEDURE EVALUATION
Patient: Ayla Paredes    Procedure Summary       Date: 08/15/24 Room / Location: [facility] ASC OR    Anesthesia Start: 0737 Anesthesia Stop: 0843    Procedure: COLONOSCOPY Diagnosis: Colon cancer screening    Scheduled Providers: Darren Warren MD Responsible Provider: Eligio Gonzalez MD    Anesthesia Type: MAC ASA Status: 2            Anesthesia Type: MAC    Vitals Value Taken Time   /92 08/15/24 0911   Temp 36 °C (96.8 °F) 08/15/24 0841   Pulse 56 08/15/24 0911   Resp 16 08/15/24 0911   SpO2 99 % 08/15/24 0850       Anesthesia Post Evaluation    Patient location during evaluation: PACU  Patient participation: complete - patient participated  Level of consciousness: awake  Pain management: satisfactory to patient  Multimodal analgesia pain management approach  Airway patency: patent  Cardiovascular status: acceptable  Respiratory status: acceptable  Hydration status: acceptable  Postoperative Nausea and Vomiting: none  Comments: Did well    No notable events documented.

## 2024-08-15 NOTE — ANESTHESIA PREPROCEDURE EVALUATION
Patient: Ayla Paredes    Procedure Information       Anesthesia Start Date/Time: 08/15/24 0737    Scheduled providers: Darren Warren MD    Procedure: COLONOSCOPY    Location: [facility] OR            Relevant Problems   Cardiac   (+) Benign essential hypertension   (+) Heart murmur   (+) Hyperlipidemia   (+) Hypertension      Pulmonary   (+) Exertional shortness of breath      Neuro   (+) Lumbar radiculitis      Musculoskeletal   (+) Lumbar spondylosis       Clinical information reviewed:   Tobacco  Allergies  Meds   Med Hx  Surg Hx   Fam Hx  Soc Hx        NPO Detail:  NPO/Void Status  Date of Last Liquid: 08/15/24  Time of Last Liquid: 0230 (prep)  Date of Last Solid: 08/14/24  Time of Last Solid: 0900         Physical Exam    Airway  Mallampati: II     Cardiovascular   Rhythm: regular  Rate: normal     Dental - normal exam     Pulmonary - normal exam     Abdominal        Anesthesia Plan    History of general anesthesia?: yes  History of complications of general anesthesia?: no    ASA 2     MAC     The patient is not a current smoker.    intravenous induction   Anesthetic plan and risks discussed with patient.    Plan discussed with CRNA.

## 2024-08-15 NOTE — ANESTHESIA POSTPROCEDURE EVALUATION
Patient: Ayla Paredes    Procedure Summary       Date: 08/15/24 Room / Location: [facility] ASC OR    Anesthesia Start: 0737 Anesthesia Stop: 0843    Procedure: COLONOSCOPY Diagnosis: Colon cancer screening    Scheduled Providers: Darren Warren MD Responsible Provider: Eligio Gonzalez MD    Anesthesia Type: MAC ASA Status: 2            Anesthesia Type: MAC    Vitals Value Taken Time   BP 93/52 08/15/24 0843   Temp 36 08/15/24 0843   Pulse 60 08/15/24 0843   Resp 16 08/15/24 0843   SpO2 96 08/15/24 0843       Anesthesia Post Evaluation    No notable events documented.

## 2024-08-15 NOTE — DISCHARGE INSTRUCTIONS
Patient Instructions after a Colonoscopy      The anesthetics, sedatives or narcotics which were given to you today will be acting in your body for the next 24 hours, so you might feel a little sleepy or groggy.  This feeling should slowly wear off. Carefully read and follow the instructions.     You received sedation today:  - Do not drive or operate any machinery or power tools of any kind.   - No alcoholic beverages today, not even beer or wine.  - Do not make any important decisions or sign any legal documents.  - No over the counter medications that contain alcohol or that may cause drowsiness.  - Do not make any important decisions or sign any legal documents.  - Make sure you have someone with you for first 24 hours.    While it is common to experience mild to moderate abdominal distention, gas, or belching after your procedure, if any of these symptoms occur following discharge from the GI Lab or within one week of having your procedure, call the [facility] to be advised whether a visit to your nearest Urgent Care or Emergency Department is indicated.  Take this paper with you if you go.     - If you develop an allergic reaction to the medications that were given during your procedure such as difficulty breathing, rash, hives, severe nausea, vomiting or lightheadedness.  - If you experience chest pain, shortness of breath, severe abdominal pain, fevers and chills.  -If you develop signs and symptoms of bleeding such as blood in your spit, if your stools turn black, tarry, or bloody  - If you have not urinated within 8 hours following your procedure.  - If your IV site becomes painful, red, inflamed, or looks infected.    If you received a biopsy/polypectomy/sphincterotomy the following instructions apply below:    __ Do not use Aspirin containing products, non-steroidal medications or anti-coagulants for one week following your procedure. (Examples of these types of medications are: Advil,  Arthrotec, Aleve, Coumadin, Ecotrin, Heparin, Ibuprofen, Indocin, Motrin, Naprosyn, Nuprin, Plavix, Vioxx, and Voltarin, or their generic forms.  This list is not all-inclusive.  Check with your physician or pharmacist before resuming medications.)   __ Eat a soft diet today.  Avoid foods that are poorly digested for the next 24 hours.  These foods would include: nuts, beans, lettuce, red meats, and fried foods. Start with liquids and advance your diet as tolerated, gradually work up to eating solids.   __ Do not have a Barium Study or Enema for one week.    Your physician recommends the additional following instructions:    -You have a contact number available for emergencies. The signs and symptoms of potential delayed complications were discussed with you. You may return to normal activities tomorrow.  -Resume your previous diet.  -Continue your present medications.   -We are waiting for your pathology results.  -Your physician has recommended a repeat colonoscopy (date to be determined after pending pathology results are reviewed) for surveillance based on pathology results.  -The findings and recommendations have been discussed with you.  -The findings and recommendations were discussed with your family.  - Please see Medication Reconciliation Form for new medication/medications prescribed.       If you experience any problems or have any questions following discharge from the GI Lab, please call:    Darren Warren MD  [facility]  Office Phone: [phone]  After Hours: [phone]        Nurse Signature                                                                        Date___________________                                                                            Patient/Responsible Party Signature                                        Date___________________

## 2024-08-26 LAB
LABORATORY COMMENT REPORT: NORMAL
PATH REPORT.FINAL DX SPEC: NORMAL
PATH REPORT.GROSS SPEC: NORMAL
PATH REPORT.TOTAL CANCER: NORMAL

## 2024-11-26 DIAGNOSIS — E55.9 VITAMIN D DEFICIENCY: ICD-10-CM

## 2024-11-26 RX ORDER — ERGOCALCIFEROL 1.25 MG/1
50000 CAPSULE ORAL
Qty: 12 CAPSULE | Refills: 0 | OUTPATIENT
Start: 2024-11-26

## 2024-11-26 NOTE — TELEPHONE ENCOUNTER
Patient should schedule follow up visit for HTN and physical; at visit will then order labs including Vit D testing

## 2024-12-10 DIAGNOSIS — E78.2 MIXED HYPERLIPIDEMIA: ICD-10-CM

## 2024-12-10 DIAGNOSIS — I10 PRIMARY HYPERTENSION: ICD-10-CM

## 2024-12-10 DIAGNOSIS — I10 BENIGN ESSENTIAL HYPERTENSION: ICD-10-CM

## 2024-12-10 RX ORDER — LOSARTAN POTASSIUM 100 MG/1
100 TABLET ORAL DAILY
Qty: 90 TABLET | Refills: 0 | Status: SHIPPED | OUTPATIENT
Start: 2024-12-10 | End: 2025-03-10

## 2024-12-10 RX ORDER — HYDROCHLOROTHIAZIDE 25 MG/1
25 TABLET ORAL DAILY
Qty: 90 TABLET | Refills: 0 | Status: SHIPPED | OUTPATIENT
Start: 2024-12-10 | End: 2025-03-10

## 2024-12-10 RX ORDER — METOPROLOL SUCCINATE 25 MG/1
25 TABLET, EXTENDED RELEASE ORAL DAILY
Qty: 90 TABLET | Refills: 0 | Status: SHIPPED | OUTPATIENT
Start: 2024-12-10 | End: 2025-03-10

## 2024-12-10 RX ORDER — AMLODIPINE BESYLATE 5 MG/1
10 TABLET ORAL DAILY
Qty: 90 TABLET | Refills: 0 | Status: SHIPPED | OUTPATIENT
Start: 2024-12-10 | End: 2024-12-12 | Stop reason: SDUPTHER

## 2024-12-10 RX ORDER — ATORVASTATIN CALCIUM 40 MG/1
40 TABLET, FILM COATED ORAL DAILY
Qty: 90 TABLET | Refills: 0 | Status: SHIPPED | OUTPATIENT
Start: 2024-12-10 | End: 2025-03-10

## 2024-12-10 NOTE — TELEPHONE ENCOUNTER
Pt needs refills of the populated rx's. Pt last seen 7/9/24 and has upcoming appt 3/13/25 as this was the soonest pt could schedule.

## 2024-12-12 ENCOUNTER — APPOINTMENT (OUTPATIENT)
Dept: NEPHROLOGY | Facility: CLINIC | Age: 54
End: 2024-12-12
Payer: COMMERCIAL

## 2024-12-12 VITALS
DIASTOLIC BLOOD PRESSURE: 115 MMHG | HEART RATE: 65 BPM | BODY MASS INDEX: 31.58 KG/M2 | SYSTOLIC BLOOD PRESSURE: 170 MMHG | WEIGHT: 184 LBS

## 2024-12-12 DIAGNOSIS — I10 PRIMARY HYPERTENSION: ICD-10-CM

## 2024-12-12 DIAGNOSIS — N18.31 STAGE 3A CHRONIC KIDNEY DISEASE (MULTI): ICD-10-CM

## 2024-12-12 PROCEDURE — 3080F DIAST BP >= 90 MM HG: CPT | Performed by: INTERNAL MEDICINE

## 2024-12-12 PROCEDURE — 1036F TOBACCO NON-USER: CPT | Performed by: INTERNAL MEDICINE

## 2024-12-12 PROCEDURE — 3077F SYST BP >= 140 MM HG: CPT | Performed by: INTERNAL MEDICINE

## 2024-12-12 PROCEDURE — 99204 OFFICE O/P NEW MOD 45 MIN: CPT | Performed by: INTERNAL MEDICINE

## 2024-12-12 RX ORDER — CHLORTHALIDONE 25 MG/1
25 TABLET ORAL DAILY
Qty: 90 TABLET | Refills: 3 | Status: SHIPPED | OUTPATIENT
Start: 2024-12-12 | End: 2025-12-12

## 2024-12-12 RX ORDER — TAMSULOSIN HYDROCHLORIDE 0.4 MG/1
0.4 CAPSULE ORAL DAILY
Qty: 90 CAPSULE | Refills: 3 | Status: SHIPPED | OUTPATIENT
Start: 2024-12-12 | End: 2025-12-12

## 2024-12-12 RX ORDER — AMLODIPINE BESYLATE 10 MG/1
10 TABLET ORAL DAILY
Qty: 90 TABLET | Refills: 3 | Status: SHIPPED | OUTPATIENT
Start: 2024-12-12 | End: 2025-12-12

## 2024-12-12 ASSESSMENT — PATIENT HEALTH QUESTIONNAIRE - PHQ9
SUM OF ALL RESPONSES TO PHQ9 QUESTIONS 1 & 2: 0
1. LITTLE INTEREST OR PLEASURE IN DOING THINGS: NOT AT ALL
2. FEELING DOWN, DEPRESSED OR HOPELESS: NOT AT ALL

## 2024-12-12 NOTE — PATIENT INSTRUCTIONS
Increase amlodipine to 10 mg once a day, new prescription sent. Stop hydrochlorothiazide, start Chlorthalidone 25 mg once a day, prescription sent. Start Tamsulosin 0.4 mg once at night. Complete blood and urine tests a week before next appointment.Check kidney US to rule out enlarged prostate.  See me in 4 mo

## 2024-12-12 NOTE — PROGRESS NOTES
"Referred by Dr. Clayton for elevated creatinine  No H/o DM   H/o htn since 14-15 years, \"always high at home\", unable to give me readings. No h/o ER visit/hospitalization for uncontrolled htn.No eye damage from high BP  No h/o CAD/CHF/PAD/stroke/TIA  No h/o kidney stones  No h/o regular NSAID use  No h/o OTC supplements/ herbal medications use  No h/o gout  No h/o cancers  No h/o autoimmune diseases like lupus, RA, sarcoidosis,Sjogren's  No h/o blood clots  H/o nocturia 10 times/night, every 30 min during the day, h/o stop and start urination and hesitancy last year, now improved  No h/o leg swelling/Louis/orthopnea/hematuria/cola colored/frothy urine  FH: no f/h/o dialysis/kidney stones/kidney disease  Occupational history: DJ  Smoking/alcohol/illicit drug use: quit smoking 30 years ago, occasional alcohol, no drugs  Out of amlodipine since a week, not sure of other medications he takes    RoS negative for all other systems except as noted above.        8/15/2024     7:10 AM 8/15/2024     8:41 AM 8/15/2024     8:56 AM 8/15/2024     9:11 AM 12/12/2024     1:15 PM 12/12/2024     1:16 PM 12/12/2024     1:17 PM   Vitals   Systolic 175 93 120 137 170 158 170   Diastolic 106 52 77 92 119 104 115   Heart Rate 55 60 69 56 62 67 65   Temp 36.2 °C (97.2 °F) 36 °C (96.8 °F)        Resp 16 16 16 16      Height 1.626 m (5' 4\")         Weight (lb) 175.49    184     BMI 30.12 kg/m2    31.58 kg/m2     BSA (m2) 1.9 m2    1.94 m2     Visit Report     Report Report Report   No distress  HEENT:  moist, no pallor  No edema tu LE  Breath sounds tu equal, clear  S1 S2 regular, normal, no rub or murmur  Abdomen soft  AAO x3, non focal    Sodium   Date/Time Value Ref Range Status   07/26/2024 09:23  136 - 145 mmol/L Final   05/28/2024 06:32  136 - 145 mmol/L Final   12/01/2023 02:01  136 - 145 mmol/L Final     Potassium   Date/Time Value Ref Range Status   07/26/2024 09:23 AM 4.0 3.5 - 5.3 mmol/L Final   05/28/2024 06:32 " PM 3.5 3.5 - 5.3 mmol/L Final   12/01/2023 02:01 PM 4.0 3.5 - 5.3 mmol/L Final     Chloride   Date/Time Value Ref Range Status   07/26/2024 09:23  98 - 107 mmol/L Final   05/28/2024 06:32  98 - 107 mmol/L Final   12/01/2023 02:01 PM 99 98 - 107 mmol/L Final     Urea Nitrogen   Date/Time Value Ref Range Status   07/26/2024 09:23 AM 13 6 - 23 mg/dL Final   05/28/2024 06:32 PM 17 6 - 23 mg/dL Final   12/01/2023 02:01 PM 20 6 - 23 mg/dL Final     Creatinine   Date/Time Value Ref Range Status   07/26/2024 09:23 AM 1.42 (H) 0.50 - 1.30 mg/dL Final   05/28/2024 06:32 PM 1.43 (H) 0.50 - 1.30 mg/dL Final   12/01/2023 02:01 PM 1.49 (H) 0.50 - 1.30 mg/dL Final     eGFR   Date/Time Value Ref Range Status   07/26/2024 09:23 AM 59 (L) >60 mL/min/1.73m*2 Final     Comment:     Calculations of estimated GFR are performed using the 2021 CKD-EPI Study Refit equation without the race variable for the IDMS-Traceable creatinine methods.  https://jasn.asnjournals.org/content/early/2021/09/22/ASN.8095213476   05/28/2024 06:32 PM 59 (L) >60 mL/min/1.73m*2 Final     Comment:     Calculations of estimated GFR are performed using the 2021 CKD-EPI Study Refit equation without the race variable for the IDMS-Traceable creatinine methods.  https://jasn.asnjournals.org/content/early/2021/09/22/ASN.8097845783   12/01/2023 02:01 PM 56 (L) >60 mL/min/1.73m*2 Final     Comment:     Calculations of estimated GFR are performed using the 2021 CKD-EPI Study Refit equation without the race variable for the IDMS-Traceable creatinine methods.  https://jasn.asnjournals.org/content/early/2021/09/22/ASN.3816825975     Calcium   Date/Time Value Ref Range Status   07/26/2024 09:23 AM 9.4 8.6 - 10.3 mg/dL Final   05/28/2024 06:32 PM 9.5 8.6 - 10.6 mg/dL Final   12/01/2023 02:01 PM 9.7 8.6 - 10.6 mg/dL Final     Vitamin D, 25-Hydroxy, Total   Date/Time Value Ref Range Status   07/26/2024 09:23 AM 18 (L) 30 - 100 ng/mL Final     Hemoglobin   Date/Time  Value Ref Range Status   07/26/2024 09:23 AM 14.0 13.5 - 17.5 g/dL Final   05/28/2024 06:32 PM 13.1 (L) 13.5 - 17.5 g/dL Final     54-year-old with longstanding history of uncontrolled hypertension referred for evaluation of elevated creatinine.    # CKD 3A A1:  Patient is a poor historian, unable to tell me if he had been aware of slow kidney function in the past.  On review of chart, creatinine 1.4 mg per DL in December 2023, no prior labs in  EMR.  Creatinine 1.3 mg per DL from 11/12/2020 to 11/14/2020 when he was admitted with COVID to [facility] based on records in Care Everywhere.  Previous urine analysis negative for blood and protein.  Suspect etiology of CKD is uncontrolled hypertension.  Continue to avoid NSAIDs.  Discussed the need to control blood pressure to avoid further damage to the kidneys.  Will check CARISSA, C3, C4, hepatitis B, C, HIV serologies as well as serum and urine immunofixation electrophoresis and kappa lambda ratio to rule out other causes of kidney disease.    # Hypertension: Goal blood pressure 120/80, not at goal.  Unclear if patient is compliant with his medications, does not know the names on review of medications.  Increase amlodipine to 10 mg p.o. daily, discontinue hydrochlorothiazide, start chlorthalidone 25 mg p.o. daily.  2.5 g sodium restricted diet per day, discussed need to read labels and avoid high sodium foods.    # Nocturia, daytime frequency: Suggestive of BPH.  Trial tamsulosin 0.4 mg p.o. nightly, advised him to double the dose if no relief of symptoms after 2 to 3 weeks.  Will check renal ultrasound to rule out bladder outlet obstruction.    RTC: 4 months, check labs 1 week before visit

## 2025-03-13 ENCOUNTER — APPOINTMENT (OUTPATIENT)
Dept: PRIMARY CARE | Facility: CLINIC | Age: 55
End: 2025-03-13
Payer: COMMERCIAL

## 2025-04-06 NOTE — PROGRESS NOTES
Subjective   Ayla Paredes is a 54 y.o. male who presents for FOLLOW UP FOR ANNUAL PHYSICAL EXAM.        HPI:       54 y.o. male who presents for FOLLOW UP FOR ANNUAL PHYSICAL EXAM.       EMR/EPIC records reviewed.    Last seen by me on 7/9/24 for  FOLLOW UP VISIT FOR HTN and BLOOD PRESSURE CHECK. At visit:    HTN: uncontrolled.  Follows with cardiology  -EKG 6/25/24: 56 bpm, sinus bradycardia, LVH, no acute ischemic changes, abnormal EKG.   -RESTART losartan 100 mg daily in AM, metoprolol XL 25 mg daily, and amlodipine 10 mg daily  -CMP, UA ordered 6/25/24==> NOT YET COMPLETED==> patient advised to go to  lab to complete  -low salt, low cholesterol diet, regularly exercise, and limit alcohol intake  -referral to cardiology ordered; patent advised to schedule follow up with cardiology or he can see another cardiologist  -Emergency Dept and 911/EMS precautions discussed and reviewed with patient     Hyperlipidemia  -cont  atorvastatin 40 mg daily  -lipid panel ordered 6/25/24==> NOT YET COMPLETED==> patient advised to go to  lab to complete  -low salt, low cholesterol diet, regularly exercise, and limit alcohol intake     Cardiac murmur and cardiomyopathy: followed by cardiology  -cont management by cardiology  -emergency Dept precautions discussed and reviewed by patient     LUTS/BPH:  -re-start tamsulosin 0.4 mg at bedtime  -referral to urology ordered 6/25/24; patient advised to call to see urology     Diabetes Screening  -HgBA1c ordered 6/25/24==> NOT YET COMPLETED==> patient advised to go to  lab to complete     Vitamin D deficiency  -Vit D levels ordered 6/25/24==> NOT YET COMPLETED==> patient advised to go to  lab to complete     Hep C screening  -Hep C antibody ordered 6/25/24==> NOT YET COMPLETED==> patient advised to go to  lab to complete     STI Screening:  -HIV, syphilis, GC/CT, trich ordered 6/25/24==> NOT YET COMPLETED==> patient advised to go to  lab to complete     Prostate Cancer  screening:  -PSA ordered 6/25/24==> NOT YET COMPLETED==> patient advised to go to  lab to complete     Colon Cancer Screening:   -colonoscopy and cologuard ordered 6/25/24; patient advised to only complete one of these screening tests           Immunizations Counseling  -TDAP now due==> declined today  -recommend updated COVID spike vaccine that can be obtained at local pharmacy     FOLLOW-UP: 4 weeks to discuss and review test results and 8 weeks for PHYSICAL    PMHx:       HTN       Prediabetes      CKD stage 3A       Exertional shortness of breath  Heart murmur  Hyperlipidemia  Incomplete bladder emptying  Nocturia  Lumbar radiculitis  Lumbar spondylosis  Muscle spasm of back  Cardiomyopathy (Multi); EF of 45% by echocardiogram 5/2022.  Stress test 5/2022 showed no evidence of ischemia at a high workload. Coronary CT angiogram 12/2023 showed no significant atherosclerotic changes or stenotic disease with a total CACS of 20.3.            Healthcare Providers:  Cardiology: UGO Sarabia-SIVA and Dr. Banks  Nephrology: Diane Bush MD   GI/colorectal surgery (colonoscopy): Darren Warren MD   [facility]     Preventive Health Services:  -Last physical: 4/8/25  -last PSA: 0.78 (7/26/24)   -last colonoscopy: 8/15/24==> polyp==>REPEAT 5 YEARS (DUE 8/2029)  -last STI screening: negative  GC/CT/trich/HIV/syphilis 7/26/24  -Hep C screening: negative 7/26/24     Immunizations:   -Childhood vaccines: completed per patient    -COVID vaccine and booster:  -updated COVID spike vaccine: NOW DUE  -TDAP:   NOW DUE  -Prevnar 20: NOW DUE  -shingrix: NOW DUE    Immunization History   Administered Date(s) Administered    Td (adult), unspecified 11/07/2011          INTERVAL HISTORY:     -completed colonoscopy    -saw nephrology for CKD stage 3A       Today Tamone reports:     - doing and feeling well.      -not taking tamsulosin 0.4 mg at bedtime, with continued BPH/LUTS     -DID NOT TAKE BP MEDICATIONS  "TODAY     -taking all medications as prescribed with no reported adverse medication side effects     -BP at home 130/80s.   Today he denies chest pain, heart palpitations, N/V, SOB, cough, orthopnea, or LE swelling.         Today he has no other reported complaints, issues, or problems.     ROS is NEG for HEADACHE, NAUSEA, VOMITING, DIARRHEA, CHEST PAIN, SOB, and BLEEDING.  Review of systems (12 point) is negative for all systems except for any identified issues in HPI above.         Objective     BP (!) 158/92   Pulse 67   Temp 36.9 °C (98.4 °F)   Ht 1.651 m (5' 5\")   Wt 85 kg (187 lb 6.4 oz)   SpO2 97%   BMI 31.18 kg/m²      Physical Examination: patient declined chaperone       GENERAL           General Appearance: pleasant, well-appearing, well-developed, well-hydrated, well-nourished, well-groomed.        HEENT           NECK supple, Neg for adneopathy no thyroid enlargement or nodules, Oropharynx normal no exudates. Ears: TMs intact, normal, no effusions       EYES           Pupils: PERRLA, no photophobia.        HEART           Rate and Rhythm regular rate and rhythm. Heart sounds: normal S1S2. Murmurs: systolic murmur Grade 2-3/6.          LUNGS           Effort: Normal chest wall, no pectus, Normal air entry all fields, Clear to IPPA, RR<16 with no use of accessory muscles.        BACK           General: unremarkable, no spinal tenderness or rashes.        ABDOMEN           General: Normal to inspection, neg for LKKS or masses and BSs heard in all quadrants                  LYMPHATICS           Cervical: none. Axillary: none.        MUSCULOSKELETAL           gross abnormalities no gross abnormalities, no joint redness or swelling.        EXTREMITIES           Varicose veins: not present. Pulses: 2+ bilateral. Clubbing: none. Cyanosis: no.        NEUROLOGICAL           Orientation: alert and oriented x 3. Grossly normal: yes. Plantars: downgoing bilaterally. Muscle Bulk: normal . Cranial Nerves: CN's " II-XII grossly intact.        PSYCHOLOGY           Affect: appropriate. Mood: pleasant.       SKIN: no rashes. No lesions.      (patient declined chaperone): Penis: normal circumcised phallus no lesions. Scrotum: no lesions. Testicles: descended bilaterally, non-tender to palpation, no palpable masses or inguinal hernias. No inguinal LAD.        Assessment/Plan   Problem List Items Addressed This Visit       Heart murmur    Hyperlipidemia    Relevant Orders    Lipid Panel    Hypertension    Relevant Orders    Urinalysis with Reflex Microscopic    Comprehensive Metabolic Panel    Cardiomyopathy     Other Visit Diagnoses       Physical exam    -  Primary    Relevant Orders    TSH with reflex to Free T4 if abnormal    Urinalysis with Reflex Microscopic    Comprehensive Metabolic Panel    CBC and Auto Differential    Prediabetes        Relevant Orders    POCT glycosylated hemoglobin (Hb A1C) manually resulted    Lower urinary tract symptoms (LUTS)        Relevant Orders    Urinalysis with Reflex Microscopic    Vitamin D deficiency        Relevant Orders    Vitamin D 25-Hydroxy,Total (for eval of Vitamin D levels)    Routine screening for STI (sexually transmitted infection)        Relevant Orders    Trichomonas vaginalis, Amplified    C. trachomatis / N. gonorrhoeae, Amplified, Urogenital    Syphilis Screen with Reflex    HIV 1/2 Antigen/Antibody Screen with Reflex to Confirmation    Prostate cancer screening        Relevant Orders    Prostate Spec.Ag,Screen    Immunization counseling        Stage 3a chronic kidney disease (Multi)        Encounter for screening for coronary artery disease        Relevant Orders    CT cardiac scoring wo IV contrast          Annual Physical Exam:  -labs ordered (see A/P above)    HTN: uncontrolled-- did NOT TAKE BP MEDICATIONS TODAY.  asymptomatic Follows with cardiology. EKG 6/25/24: 56 bpm, sinus bradycardia, LVH, no acute ischemic changes, abnormal EKG.   -cont amlodipine 10 mg  daily, chlorthalidone 25 mg daily; at follow up visit if BP is elevated will start losartan 50 mg daily in AM  -CMP, UA ordered   -low carbohydrate, low salt, low cholesterol diet, regularly exercise, and limit alcohol intake  -referral to cardiology previously ordered; patent advised to schedule follow up with cardiology or he can see another cardiologist  -Emergency Dept and 911/EMS precautions discussed and reviewed with patient       Prediabetes: POCT HgBA1c: 6.0 TODAY (4/8/25)  -POCT HgBA1c , CMP  -low carbohydrate, low salt, low cholesterol diet, regularly exercise, and limit alcohol intake  -HgBa1c NEXT DUE 7/2025       CKD Stage 3A: followed by nephrology  -CMP, UA, urine albumin ordered  -low carbohydrate, low salt, low cholesterol diet, regularly exercise, and limit alcohol intake  -BP and glycemic control  -patient counseled to avoid NSAIDS that can worsen renal function  -cont management by nephrology    Hyperlipidemia  -cont  atorvastatin 40 mg daily  -lipid panel ordered   -low salt, low cholesterol diet, regularly exercise, and limit alcohol intake     Cardiac murmur and cardiomyopathy: followed by cardiology  -cont management by cardiology  -emergency Dept precautions discussed and reviewed by patient     LUTS/BPH:  -cont tamsulosin 0.4 mg at bedtime  -referral to urology previously ordered 6/25/24; patient advised to call to see urology     Vitamin D deficiency  -Vit D levels ordered      Hep C screening: negative 2024     STI Screening: negative HIV and syphilis 2024.  GC/CT, trich, HIV, syphilis  ordered      Prostate Cancer screening:  -PSA ordered      Colon Cancer Screening: UTD       Heart Disease Screening:  -CT Heart Ca scoring ordered    Counseling:       Medication education:         Education:  The patient is counseled regarding potential side-effects of all new medications        Understanding:  Patient expressed understanding        Adherence:  No barriers to adherence identified         Immunizations Counseling  -TDAP, Prevnar 20, shingrix now due==> declined today  -recommend updated COVID spike vaccine that can be obtained at local pharmacy     FOLLOW-UP: 2-3 weeks for BP check and to discuss and review test results     Discussed recommended plan of care with patient. Patient expressed understanding and agreement with plan of care. All of patient's questions were answered at the time. Patient had no additional questions at the time.            Prudence Clayton M.D.

## 2025-04-08 ENCOUNTER — OFFICE VISIT (OUTPATIENT)
Dept: PRIMARY CARE | Facility: CLINIC | Age: 55
End: 2025-04-08
Payer: COMMERCIAL

## 2025-04-08 VITALS
DIASTOLIC BLOOD PRESSURE: 92 MMHG | WEIGHT: 187.4 LBS | BODY MASS INDEX: 31.22 KG/M2 | TEMPERATURE: 98.4 F | OXYGEN SATURATION: 97 % | HEIGHT: 65 IN | HEART RATE: 67 BPM | SYSTOLIC BLOOD PRESSURE: 158 MMHG

## 2025-04-08 DIAGNOSIS — E78.2 MIXED HYPERLIPIDEMIA: ICD-10-CM

## 2025-04-08 DIAGNOSIS — Z00.00 PHYSICAL EXAM: Primary | ICD-10-CM

## 2025-04-08 DIAGNOSIS — R73.03 PREDIABETES: ICD-10-CM

## 2025-04-08 DIAGNOSIS — E55.9 VITAMIN D DEFICIENCY: ICD-10-CM

## 2025-04-08 DIAGNOSIS — Z13.6 ENCOUNTER FOR SCREENING FOR CORONARY ARTERY DISEASE: ICD-10-CM

## 2025-04-08 DIAGNOSIS — Z12.5 PROSTATE CANCER SCREENING: ICD-10-CM

## 2025-04-08 DIAGNOSIS — Z71.85 IMMUNIZATION COUNSELING: ICD-10-CM

## 2025-04-08 DIAGNOSIS — I42.8 OTHER CARDIOMYOPATHY: ICD-10-CM

## 2025-04-08 DIAGNOSIS — Z11.3 ROUTINE SCREENING FOR STI (SEXUALLY TRANSMITTED INFECTION): ICD-10-CM

## 2025-04-08 DIAGNOSIS — I10 BENIGN ESSENTIAL HYPERTENSION: ICD-10-CM

## 2025-04-08 DIAGNOSIS — N18.31 STAGE 3A CHRONIC KIDNEY DISEASE (MULTI): ICD-10-CM

## 2025-04-08 DIAGNOSIS — R01.1 HEART MURMUR: ICD-10-CM

## 2025-04-08 DIAGNOSIS — I10 PRIMARY HYPERTENSION: ICD-10-CM

## 2025-04-08 DIAGNOSIS — R39.9 LOWER URINARY TRACT SYMPTOMS (LUTS): ICD-10-CM

## 2025-04-08 LAB — POC HEMOGLOBIN A1C: 6 % (ref 4.2–6.5)

## 2025-04-08 PROCEDURE — 99396 PREV VISIT EST AGE 40-64: CPT | Performed by: FAMILY MEDICINE

## 2025-04-08 PROCEDURE — 83036 HEMOGLOBIN GLYCOSYLATED A1C: CPT | Performed by: FAMILY MEDICINE

## 2025-04-08 PROCEDURE — 3080F DIAST BP >= 90 MM HG: CPT | Performed by: FAMILY MEDICINE

## 2025-04-08 PROCEDURE — 3077F SYST BP >= 140 MM HG: CPT | Performed by: FAMILY MEDICINE

## 2025-04-08 PROCEDURE — 1036F TOBACCO NON-USER: CPT | Performed by: FAMILY MEDICINE

## 2025-04-08 PROCEDURE — 3008F BODY MASS INDEX DOCD: CPT | Performed by: FAMILY MEDICINE

## 2025-04-08 ASSESSMENT — COLUMBIA-SUICIDE SEVERITY RATING SCALE - C-SSRS
1. IN THE PAST MONTH, HAVE YOU WISHED YOU WERE DEAD OR WISHED YOU COULD GO TO SLEEP AND NOT WAKE UP?: NO
2. HAVE YOU ACTUALLY HAD ANY THOUGHTS OF KILLING YOURSELF?: NO
6. HAVE YOU EVER DONE ANYTHING, STARTED TO DO ANYTHING, OR PREPARED TO DO ANYTHING TO END YOUR LIFE?: NO

## 2025-04-08 ASSESSMENT — PATIENT HEALTH QUESTIONNAIRE - PHQ9
2. FEELING DOWN, DEPRESSED OR HOPELESS: NOT AT ALL
SUM OF ALL RESPONSES TO PHQ9 QUESTIONS 1 AND 2: 0
1. LITTLE INTEREST OR PLEASURE IN DOING THINGS: NOT AT ALL

## 2025-04-08 ASSESSMENT — PAIN SCALES - GENERAL: PAINLEVEL_OUTOF10: 0-NO PAIN

## 2025-04-11 RX ORDER — ERGOCALCIFEROL 1.25 MG/1
1.25 CAPSULE ORAL WEEKLY
Qty: 4 CAPSULE | Refills: 2 | Status: SHIPPED | OUTPATIENT
Start: 2025-04-11 | End: 2025-07-04

## 2025-04-13 LAB
25(OH)D3+25(OH)D2 SERPL-MCNC: 22 NG/ML (ref 30–100)
ALBUMIN SERPL-MCNC: 4.5 G/DL (ref 3.6–5.1)
ALP SERPL-CCNC: 79 U/L (ref 35–144)
ALT SERPL-CCNC: 12 U/L (ref 9–46)
ANION GAP SERPL CALCULATED.4IONS-SCNC: 12 MMOL/L (CALC) (ref 7–17)
APPEARANCE UR: CLEAR
AST SERPL-CCNC: 16 U/L (ref 10–35)
BASOPHILS # BLD AUTO: 70 CELLS/UL (ref 0–200)
BASOPHILS NFR BLD AUTO: 1.3 %
BILIRUB SERPL-MCNC: 0.6 MG/DL (ref 0.2–1.2)
BILIRUB UR QL STRIP: NEGATIVE
BUN SERPL-MCNC: 17 MG/DL (ref 7–25)
C TRACH RRNA SPEC QL NAA+PROBE: NOT DETECTED
CALCIUM SERPL-MCNC: 10 MG/DL (ref 8.6–10.3)
CHLORIDE SERPL-SCNC: 100 MMOL/L (ref 98–110)
CHOLEST SERPL-MCNC: 280 MG/DL
CHOLEST/HDLC SERPL: 6.5 (CALC)
CO2 SERPL-SCNC: 26 MMOL/L (ref 20–32)
COLOR UR: YELLOW
CREAT SERPL-MCNC: 1.56 MG/DL (ref 0.7–1.3)
EGFRCR SERPLBLD CKD-EPI 2021: 52 ML/MIN/1.73M2
EOSINOPHIL # BLD AUTO: 232 CELLS/UL (ref 15–500)
EOSINOPHIL NFR BLD AUTO: 4.3 %
ERYTHROCYTE [DISTWIDTH] IN BLOOD BY AUTOMATED COUNT: 13.3 % (ref 11–15)
GLUCOSE SERPL-MCNC: 124 MG/DL (ref 65–99)
GLUCOSE UR QL STRIP: NEGATIVE
HCT VFR BLD AUTO: 44.5 % (ref 38.5–50)
HDLC SERPL-MCNC: 43 MG/DL
HGB BLD-MCNC: 14.6 G/DL (ref 13.2–17.1)
HGB UR QL STRIP: NEGATIVE
HIV 1+2 AB+HIV1 P24 AG SERPL QL IA: NORMAL
KETONES UR QL STRIP: NEGATIVE
LDLC SERPL CALC-MCNC: 209 MG/DL (CALC)
LEUKOCYTE ESTERASE UR QL STRIP: NEGATIVE
LYMPHOCYTES # BLD AUTO: 2754 CELLS/UL (ref 850–3900)
LYMPHOCYTES NFR BLD AUTO: 51 %
MCH RBC QN AUTO: 28.6 PG (ref 27–33)
MCHC RBC AUTO-ENTMCNC: 32.8 G/DL (ref 32–36)
MCV RBC AUTO: 87.3 FL (ref 80–100)
MONOCYTES # BLD AUTO: 394 CELLS/UL (ref 200–950)
MONOCYTES NFR BLD AUTO: 7.3 %
N GONORRHOEA RRNA SPEC QL NAA+PROBE: NOT DETECTED
NEUTROPHILS # BLD AUTO: 1949 CELLS/UL (ref 1500–7800)
NEUTROPHILS NFR BLD AUTO: 36.1 %
NITRITE UR QL STRIP: NEGATIVE
NONHDLC SERPL-MCNC: 237 MG/DL (CALC)
PH UR STRIP: 5.5 [PH] (ref 5–8)
PLATELET # BLD AUTO: 369 THOUSAND/UL (ref 140–400)
PMV BLD REES-ECKER: 9.6 FL (ref 7.5–12.5)
POTASSIUM SERPL-SCNC: 4.2 MMOL/L (ref 3.5–5.3)
PROT SERPL-MCNC: 7.3 G/DL (ref 6.1–8.1)
PROT UR QL STRIP: NEGATIVE
PSA SERPL-MCNC: 0.76 NG/ML
QUEST GC CT AMPLIFIED (ALWAYS MESSAGE): NORMAL
RBC # BLD AUTO: 5.1 MILLION/UL (ref 4.2–5.8)
SODIUM SERPL-SCNC: 138 MMOL/L (ref 135–146)
SP GR UR STRIP: 1.02 (ref 1–1.03)
T PALLIDUM AB SER QL IA: NEGATIVE
T VAGINALIS RRNA SPEC QL NAA+PROBE: NOT DETECTED
TRIGL SERPL-MCNC: 131 MG/DL
TSH SERPL-ACNC: 0.87 MIU/L (ref 0.4–4.5)
WBC # BLD AUTO: 5.4 THOUSAND/UL (ref 3.8–10.8)

## 2025-04-24 ENCOUNTER — APPOINTMENT (OUTPATIENT)
Dept: NEPHROLOGY | Facility: CLINIC | Age: 55
End: 2025-04-24
Payer: COMMERCIAL

## 2025-04-24 VITALS
SYSTOLIC BLOOD PRESSURE: 145 MMHG | BODY MASS INDEX: 31.12 KG/M2 | HEART RATE: 64 BPM | DIASTOLIC BLOOD PRESSURE: 90 MMHG | WEIGHT: 187 LBS

## 2025-04-24 DIAGNOSIS — I10 PRIMARY HYPERTENSION: ICD-10-CM

## 2025-04-24 DIAGNOSIS — N18.31 STAGE 3A CHRONIC KIDNEY DISEASE (MULTI): Primary | ICD-10-CM

## 2025-04-24 PROCEDURE — 3080F DIAST BP >= 90 MM HG: CPT | Performed by: INTERNAL MEDICINE

## 2025-04-24 PROCEDURE — 3075F SYST BP GE 130 - 139MM HG: CPT | Performed by: INTERNAL MEDICINE

## 2025-04-24 PROCEDURE — 99214 OFFICE O/P EST MOD 30 MIN: CPT | Performed by: INTERNAL MEDICINE

## 2025-04-24 RX ORDER — CHLORTHALIDONE 25 MG/1
37.5 TABLET ORAL DAILY
Qty: 135 TABLET | Refills: 3 | Status: SHIPPED | OUTPATIENT
Start: 2025-04-24 | End: 2026-04-24

## 2025-04-24 NOTE — PROGRESS NOTES
"Referred by Dr. Clayton for elevated creatinine. History of CKD3a likely from HTN.     No H/o DM   H/o htn since 14-15 years, \"always high at home\", unable to give me readings. No h/o ER visit/hospitalization for uncontrolled htn.No eye damage from high BP  No h/o CAD/CHF/PAD/stroke/TIA    Did not have blood work done for GN workup. Did not have renal US completed. He notes that he stopped taking his medications last week other than Losartan and Atorvastatin.    He notes waking up at night to urinate but also episodes of choking. No history of sleep apnea in self and reports not being tested.            Lab Results   Component Value Date    WBC 5.4 04/09/2025    HGB 14.6 04/09/2025    HCT 44.5 04/09/2025    MCV 87.3 04/09/2025     04/09/2025     Lab Results   Component Value Date    GLUCOSE 124 (H) 04/09/2025    CALCIUM 10.0 04/09/2025     04/09/2025    K 4.2 04/09/2025    CO2 26 04/09/2025     04/09/2025    BUN 17 04/09/2025    CREATININE 1.56 (H) 04/09/2025     # CKD 3A A1:  Patient is a poor historian, unable to tell me if he had been aware of slow kidney function in the past.  On review of chart, creatinine 1.4 mg per DL in December 2023, no prior labs in  EMR.  Creatinine 1.3 mg per DL from 11/12/2020 to 11/14/2020 when he was admitted with COVID to [facility] based on records in Care Everywhere.  Previous urine analysis negative for blood and protein.  Suspect etiology of CKD is uncontrolled hypertension.  Continue to avoid NSAIDs.  Discussed the need to control blood pressure to avoid further damage to the kidneys.  Will check CARISAS, C3, C4, hepatitis B, C, HIV serologies as well as serum and urine immunofixation electrophoresis and kappa lambda ratio to rule out other causes of kidney disease.    -Discussed benefits of SGLT2i. He is refusing at this time but was open to taking an information packet that was printed for him.  -To schedule ultrasound as well  -RTO in 4 months       # " Hypertension: Goal blood pressure 120/80, not at goal.  Not taking BP meds for past week.  -Increased Chlorthalidone to 37.5  -Amlodipine at 10mg  -Losartan 100mg qday  -To discuss Possible sleep apnea testing given intermittent history of choking.     # Nocturia, daytime frequency: Suggestive of BPH.  Trial tamsulosin 0.4 mg p.o. nightly, advised him to double the dose if no relief of symptoms after 2 to 3 weeks.  Will check renal ultrasound to rule out bladder outlet obstruction.  Would recommend SGLT2       RTC: 4 months, check labs 1 week before visit

## 2025-04-24 NOTE — PATIENT INSTRUCTIONS
Please continue checking your blood pressure daily. 1 hour after taking your medications  Your current medications for blood pressure are:  1) Amlodipine 10mg, 1 tablet daily  2) Chlorthalidone 1.5 tablets daily  Please continue with these doses    Discuss with your primary doctor about having a sleep study done, which can cause high blood pressure  Please visit and  lab and call scheduling to have your kidney ultrasound scheduled    See you in 4 months!

## 2025-06-08 NOTE — PROGRESS NOTES
Subjective   Ayla Paredes is a 54 y.o. male who presents for follow up visit to discuss and review test results, HTN and other health issues, and  BP check.    HPI:      54 y.o. male who presents for follow up visit to discuss and review test results, HTN and other health issues, and  BP check.       [facility]/[employer] records reviewed.    Last seen by me on  4/8/25 for FOLLOW UP FOR ANNUAL PHYSICAL EXAM. At visit:     Annual Physical Exam:  -labs ordered (see A/P above)     HTN: uncontrolled-- did NOT TAKE BP MEDICATIONS TODAY.  asymptomatic Follows with cardiology. EKG 6/25/24: 56 bpm, sinus bradycardia, LVH, no acute ischemic changes, abnormal EKG.   -cont amlodipine 10 mg daily, chlorthalidone 25 mg daily; at follow up visit if BP is elevated will start losartan 50 mg daily in AM  -CMP, UA ordered   -low carbohydrate, low salt, low cholesterol diet, regularly exercise, and limit alcohol intake  -referral to cardiology previously ordered; patent advised to schedule follow up with cardiology or he can see another cardiologist  -Emergency Dept and 911/EMS precautions discussed and reviewed with patient        Prediabetes: POCT HgBA1c: 6.0 TODAY (4/8/25)  -POCT HgBA1c , CMP  -low carbohydrate, low salt, low cholesterol diet, regularly exercise, and limit alcohol intake  -HgBa1c NEXT DUE 7/2025        CKD Stage 3A: followed by nephrology  -CMP, UA, urine albumin ordered  -low carbohydrate, low salt, low cholesterol diet, regularly exercise, and limit alcohol intake  -BP and glycemic control  -patient counseled to avoid NSAIDS that can worsen renal function  -cont management by nephrology     Hyperlipidemia  -cont  atorvastatin 40 mg daily  -lipid panel ordered   -low salt, low cholesterol diet, regularly exercise, and limit alcohol intake     Cardiac murmur and cardiomyopathy: followed by cardiology  -cont management by cardiology  -emergency Dept precautions discussed and reviewed by patient     LUTS/BPH:  -cont  tamsulosin 0.4 mg at bedtime  -referral to urology previously ordered 6/25/24; patient advised to call to see urology     Vitamin D deficiency  -Vit D levels ordered      Hep C screening: negative 2024     STI Screening: negative HIV and syphilis 2024.  GC/CT, trich, HIV, syphilis  ordered      Prostate Cancer screening:  -PSA ordered      Colon Cancer Screening: UTD        Heart Disease Screening:  -CT Heart Ca scoring ordered         Immunizations Counseling  -TDAP, Prevnar 20, shingrix now due==> declined today  -recommend updated COVID spike vaccine that can be obtained at local pharmacy     FOLLOW-UP: 2-3 weeks for BP check and to discuss and review test results       PMHx:       HTN       Prediabetes      CKD stage 3A       Exertional shortness of breath  Heart murmur  Hyperlipidemia  Incomplete bladder emptying  Nocturia  Lumbar radiculitis  Lumbar spondylosis  Muscle spasm of back  Cardiomyopathy (Multi); EF of 45% by echocardiogram 5/2022.  Stress test 5/2022 showed no evidence of ischemia at a high workload. Coronary CT angiogram 12/2023 showed no significant atherosclerotic changes or stenotic disease with a total CACS of 20.3.            Healthcare Providers:  Cardiology: UGO Sarabia-CNP and Dr. Banks  Nephrology: Diane Bush MD   GI/colorectal surgery (colonoscopy): Darren Warren MD   [facility]     Preventive Health Services:  -Last physical: 4/8/25  -last PSA: 0.78 (7/26/24)   -last colonoscopy: 8/15/24==> polyp==>REPEAT 5 YEARS (DUE 8/2029)  -last STI screening: negative  GC/CT/trich/HIV/syphilis 7/26/24  -Hep C screening: negative 7/26/24     Immunizations:   -Childhood vaccines: completed per patient    -COVID vaccine and booster:  -updated COVID spike vaccine: NOW DUE  -TDAP:   NOW DUE  -Prevnar 20: NOW DUE  -shingrix: NOW DUE     Immunization History   Administered Date(s) Administered    Td (adult), unspecified 11/07/2011           INTERVAL HISTORY:     -CT  Heart Ca scoring ordered 4/8/25 NOT YET COMPLETED     -saw nephrology for CKD stage 3A who increased chlorthalidone to  37.5 mg daily and renal US.        -completed labs ordered 4/9/25. Based on test results:    -borderline Vit D deficiency: patient advised please start Vit D 50,000 units once weekly for 12 weeks; Rx sent to pharmacy with 2 refills     -negative GC/CT/trich , HIV, syphilis,     -normal thyroid function     -normal PSA     -VERY elevated LDL cholesterol, elevated total cholesterol and non-HDL cholesterol: patient  advised that given elevated risk of heart attack and stroke in the next 10 years (15.9%) I recommend taking a cholesterol medication; you were previously prescribed atorvastatin 40 mg daily.   Please follow a low carbohydrate, low salt, low cholesterol, low fat diet, regularly exercise, and limit alcohol intake.     ASCVD 10-Year Risk Score   Current as of 2 days ago (Tuesday)   15.9%   0 to < 5%: Low Risk   5 to < 7.5%: Borderline Risk   7.5 to < 20%: Intermediate Risk   20 to 100%: High Risk     Last Change:     An Atherosclerotic Cardiovascular Disease (ASCVD) event is defined as myocardial infarction, CHD death, or stroke. The ASCVD risk score (Jarocho DK, et al., 2019, © 2021 American College [employer] Foundation) returns the percentage likelihood of a first time ASCVD event.         Age: 54   Legal Sex: Male   Non- : Yes   Smokes Tobacco: No   Has Diabetes Excluding Gestational Diabetes: No   Systolic BP: 158   HDL: 41.4 mg/dL   Total cholesterol: 188 mg/dL   Is BP Treated: Yes       -elevated blood sugar in the setting of prediabetes     -decreased kidney function: Stage 3A chronic kidney disease. Patient advised to Please follow a low carbohydrate,  low salt, low cholesterol, low fat diet, regularly exercise, and limit alcohol intake. Please AVOID NSAID medications that can worsen kidney function.     -normal urinalysis     -normal blood count              Today Ayla reports:     - doing and feeling well.      -now taking tamsulosin 0.4 mg at bedtime, with improved BPH/LUTS sxs     -took BP medication 20 minutes ago.      -taking all medications as prescribed with no reported adverse medication side effects     -BP at home 1110-120/70-80s.   Today he denies chest pain, heart palpitations, N/V, SOB, cough, orthopnea, or LE swelling.         Today he has no other reported complaints, issues, or problems.     ROS is NEG for HEADACHE, NAUSEA, VOMITING, DIARRHEA, CHEST PAIN, SOB, and BLEEDING.  Review of systems (12 point) is negative for all systems except for any identified issues in HPI above.         Objective     BP (!) 134/92   Pulse 73   Temp 36.8 °C (98.2 °F)   Wt 84.7 kg (186 lb 12.8 oz)   SpO2 98%   BMI 31.09 kg/m²      Physical Examination:       GENERAL           General Appearance: well-appearing, well-developed, well-hydrated, well-nourished, no acute distress.        HEENT           NECK supple, no masses or thyromegaly, no carotid bruit.        EYES           Extraocular Movements: normal, bilateral eyes SVETA, no conjunctival injection.        HEART           Rate and Rhythm regular rate and rhythm. Heart sounds: normal S1S2, no S3 or S4. Murmurs: systolic murmur Grade 2-3/6.           CHEST           Breath sounds: Clear to IPPA, RR<16 no use of accessory muscles.        ABDOMEN           General: Neg for LKKS or masses, no scleral icterus or jaundice.        MUSCULOSKELETAL           Joints Demonstration: Neg for erythema, swelling or joint deformities. gross abnormalities no gross abnormalities.        EXTREMITIES           Lower Extremities: Neg for cyanosis, clubbing or edema.       Assessment/Plan   Problem List Items Addressed This Visit       Heart murmur    Hyperlipidemia    Relevant Medications    atorvastatin (Lipitor) 40 mg tablet    Hypertension - Primary    Relevant Orders    Renal function panel     Other Visit Diagnoses          Prediabetes          Lower urinary tract symptoms (LUTS)          Vitamin D deficiency          Stage 3a chronic kidney disease (Multi)        Relevant Orders    Albumin-Creatinine Ratio, Urine Random      Medicine refill        Relevant Medications    acyclovir (Zovirax) 5 % cream      Encounter for screening for coronary artery disease              HTN: mildly elevated, asymptomatic. Follows with cardiology and nephrology. EKG 6/25/24: 56 bpm, sinus bradycardia, LVH, no acute ischemic changes, abnormal EKG.   -RFP ordered  -cont amlodipine 10 mg daily, chlorthalidone 37.5 mg daily, and losartan 100 mg daily  -low carbohydrate, low salt, low cholesterol diet, regularly exercise, and limit alcohol intake  -referral to cardiology previously ordered; patent advised to schedule follow up with cardiology or he can see another cardiologist  -Emergency Dept and 911/EMS precautions discussed and reviewed with patient        Prediabetes: POCT HgBA1c: 6.0 (4/8/25)  -POCT HgBA1c , CMP  -low carbohydrate, low salt, low cholesterol diet, regularly exercise, and limit alcohol intake  -HgBa1c NEXT DUE 7/2025        CKD Stage 3A: followed by nephrology  -low carbohydrate, low salt, low cholesterol diet, regularly exercise, and limit alcohol intake  -BP and glycemic control  -patient counseled to avoid NSAIDS that can worsen renal function  -cont management by nephrology     Hyperlipidemia  -cont  atorvastatin 40 mg daily  -lipid panel ordered   -low salt, low cholesterol diet, regularly exercise, and limit alcohol intake     Cardiac murmur and cardiomyopathy: followed by cardiology  -cont management by cardiology  -emergency Dept precautions discussed and reviewed by patient     LUTS/BPH:  -cont tamsulosin 0.4 mg at bedtime  -referral to urology previously ordered 6/25/24; patient advised to call to see urology     Vitamin D deficiency  -cont Vit D 50,000 units once weekly; patient advised after completing Vit D 50,000 units once  weekly to start OTC Vit D3 2,000 units daily   -will plan to check Vit D after repletion            Heart Disease Screening:  -CT Heart Ca scoring ordered 4/8/25     Counseling:       Medication education:         Education:  The patient is counseled regarding potential side-effects of all new medications        Understanding:  Patient expressed understanding        Adherence:  No barriers to adherence identified        Immunizations Counseling  -TDAP, Prevnar 20, shingrix now due==> declined today  -recommend updated COVID spike vaccine that can be obtained at local pharmacy     FOLLOW-UP: 4 weeks HgBA1c check      I have personally reviewed all available pertinent labs, imaging, and consult notes with the patient.     Discussed recommended plan of care with patient. Patient expressed understanding and agreement with plan of care. All of patient's  questions were answered at the time. Patient had no additional questions at the time.        Prudence Clayton MD, PhD

## 2025-06-12 ENCOUNTER — OFFICE VISIT (OUTPATIENT)
Dept: PRIMARY CARE | Facility: CLINIC | Age: 55
End: 2025-06-12
Payer: COMMERCIAL

## 2025-06-12 VITALS
OXYGEN SATURATION: 98 % | HEART RATE: 73 BPM | BODY MASS INDEX: 31.09 KG/M2 | SYSTOLIC BLOOD PRESSURE: 134 MMHG | WEIGHT: 186.8 LBS | DIASTOLIC BLOOD PRESSURE: 92 MMHG | TEMPERATURE: 98.2 F

## 2025-06-12 DIAGNOSIS — E78.2 MIXED HYPERLIPIDEMIA: ICD-10-CM

## 2025-06-12 DIAGNOSIS — R73.03 PREDIABETES: ICD-10-CM

## 2025-06-12 DIAGNOSIS — Z13.6 ENCOUNTER FOR SCREENING FOR CORONARY ARTERY DISEASE: ICD-10-CM

## 2025-06-12 DIAGNOSIS — R39.9 LOWER URINARY TRACT SYMPTOMS (LUTS): ICD-10-CM

## 2025-06-12 DIAGNOSIS — Z76.0 MEDICINE REFILL: ICD-10-CM

## 2025-06-12 DIAGNOSIS — R01.1 HEART MURMUR: ICD-10-CM

## 2025-06-12 DIAGNOSIS — N18.31 STAGE 3A CHRONIC KIDNEY DISEASE (MULTI): ICD-10-CM

## 2025-06-12 DIAGNOSIS — E55.9 VITAMIN D DEFICIENCY: ICD-10-CM

## 2025-06-12 DIAGNOSIS — I10 PRIMARY HYPERTENSION: Primary | ICD-10-CM

## 2025-06-12 PROCEDURE — 99214 OFFICE O/P EST MOD 30 MIN: CPT | Performed by: FAMILY MEDICINE

## 2025-06-12 PROCEDURE — 3075F SYST BP GE 130 - 139MM HG: CPT | Performed by: FAMILY MEDICINE

## 2025-06-12 PROCEDURE — 3080F DIAST BP >= 90 MM HG: CPT | Performed by: FAMILY MEDICINE

## 2025-06-12 PROCEDURE — 1036F TOBACCO NON-USER: CPT | Performed by: FAMILY MEDICINE

## 2025-06-12 RX ORDER — ACYCLOVIR 50 MG/G
CREAM TOPICAL
Qty: 5 G | Refills: 1 | Status: SHIPPED | OUTPATIENT
Start: 2025-06-12 | End: 2025-07-12

## 2025-06-12 RX ORDER — ATORVASTATIN CALCIUM 40 MG/1
40 TABLET, FILM COATED ORAL DAILY
Qty: 90 TABLET | Refills: 3 | Status: SHIPPED | OUTPATIENT
Start: 2025-06-12 | End: 2026-06-12

## 2025-06-12 ASSESSMENT — PAIN SCALES - GENERAL: PAINLEVEL_OUTOF10: 0-NO PAIN

## 2025-06-21 DIAGNOSIS — I10 BENIGN ESSENTIAL HYPERTENSION: ICD-10-CM

## 2025-06-23 RX ORDER — LOSARTAN POTASSIUM 100 MG/1
100 TABLET ORAL DAILY
Qty: 90 TABLET | Refills: 0 | Status: SHIPPED | OUTPATIENT
Start: 2025-06-23

## 2025-06-24 NOTE — TELEPHONE ENCOUNTER
[facility]  patient is to be having dental surgery and they are requesting copy of labs and recent office notes to be sent by end of day. According to Trudy they have been trying to send a medical records request for the past week but I do not see one on file. Please advise.     F [phone]

## 2025-06-26 ENCOUNTER — TELEPHONE (OUTPATIENT)
Dept: PRIMARY CARE | Facility: CLINIC | Age: 55
End: 2025-06-26
Payer: COMMERCIAL

## 2025-06-26 NOTE — TELEPHONE ENCOUNTER
[facility] has refaxed the Medical Clearance Request needing the providers signature and faxed back to [facility] at [phone]. Document has been placed in the providers folder in the front office. For any questions call [facility] [phone]. Please advise.

## 2025-06-26 NOTE — TELEPHONE ENCOUNTER
Call placed to pt explained multiple times pt will need an appt for ppwk. Pt asked for me to hold the line. Or can I call back pt advised I can hold for a second pt states okay and the phone disconnected

## 2025-06-28 ENCOUNTER — HOSPITAL ENCOUNTER (OUTPATIENT)
Dept: RADIOLOGY | Facility: HOSPITAL | Age: 55
Discharge: HOME | End: 2025-06-28
Payer: COMMERCIAL

## 2025-06-28 DIAGNOSIS — Z13.6 ENCOUNTER FOR SCREENING FOR CORONARY ARTERY DISEASE: ICD-10-CM

## 2025-06-28 PROCEDURE — 75571 CT HRT W/O DYE W/CA TEST: CPT

## 2025-06-29 ENCOUNTER — TELEPHONE (OUTPATIENT)
Dept: PRIMARY CARE | Facility: CLINIC | Age: 55
End: 2025-06-29
Payer: COMMERCIAL

## 2025-06-29 NOTE — TELEPHONE ENCOUNTER
Please call patient and ask if  his dentist will be using anesthesia for his dental procedure. If anesthesia or nitrous gas is being used, he will need cardiac clearance from his cardiologist given history of cardiomyopathy. Please advise.     Please advise him to make sure he takes his blood pressure medication at least 1 hour before  his visit with me.

## 2025-06-30 NOTE — PROGRESS NOTES
Subjective   Ayla Paredes is a 55 y.o. male who presents for follow-up visit for hypertension, cardiomyopathy and other health issues requesting paperwork completion for clearance for dental procedure. No paperwork available at appt today for review.    HPI:    55 y.o. male who presents for follow-up visit for hypertension, cardiomyopathy and other health issues requesting paperwork completion for clearance for dental procedure.No paperwork available at appt today for review.       [facility] records reviewed.    Last seen by me on 6/12/2025 follow up visit to discuss and review test results, HTN and other health issues, and  BP check.  At visit:     HTN: mildly elevated, asymptomatic. Follows with cardiology and nephrology. EKG 6/25/24: 56 bpm, sinus bradycardia, LVH, no acute ischemic changes, abnormal EKG.   -RFP ordered  -cont amlodipine 10 mg daily, chlorthalidone 37.5 mg daily, and losartan 100 mg daily  -low carbohydrate, low salt, low cholesterol diet, regularly exercise, and limit alcohol intake  -referral to cardiology previously ordered; patent advised to schedule follow up with cardiology or he can see another cardiologist  -Emergency Dept and 911/EMS precautions discussed and reviewed with patient        Prediabetes: POCT HgBA1c: 6.0 (4/8/25)  -POCT HgBA1c , CMP  -low carbohydrate, low salt, low cholesterol diet, regularly exercise, and limit alcohol intake  -HgBa1c NEXT DUE 7/2025        CKD Stage 3A: followed by nephrology  -low carbohydrate, low salt, low cholesterol diet, regularly exercise, and limit alcohol intake  -BP and glycemic control  -patient counseled to avoid NSAIDS that can worsen renal function  -cont management by nephrology     Hyperlipidemia  -cont  atorvastatin 40 mg daily  -lipid panel ordered   -low salt, low cholesterol diet, regularly exercise, and limit alcohol intake     Cardiac murmur and cardiomyopathy: followed by cardiology  -cont management by cardiology  -emergency  Dept precautions discussed and reviewed by patient     LUTS/BPH:  -cont tamsulosin 0.4 mg at bedtime  -referral to urology previously ordered 6/25/24; patient advised to call to see urology     Vitamin D deficiency  -cont Vit D 50,000 units once weekly; patient advised after completing Vit D 50,000 units once weekly to start OTC Vit D3 2,000 units daily   -will plan to check Vit D after repletion            Heart Disease Screening:  -CT Heart Ca scoring ordered 4/8/25         Immunizations Counseling  -TDAP, Prevnar 20, shingrix now due==> declined today  -recommend updated COVID spike vaccine that can be obtained at local pharmacy     FOLLOW-UP: 4 weeks HgBA1c check          PMHx:       HTN       Prediabetes       Hyperlipidemia       CKD stage 3A       Exertional shortness of breath  Heart murmur  Incomplete bladder emptying  Nocturia  Lumbar radiculitis  Lumbar spondylosis  Muscle spasm of back  Cardiomyopathy; EF of 45% by echocardiogram 5/2022.  Stress test 5/2022 showed no evidence of ischemia at a high workload. Coronary CT angiogram 12/2023 showed no significant atherosclerotic changes or stenotic disease with a total CACS of 20.3.    Vitamin D deficiency          Healthcare Providers:  Cardiology: UGO Sarabia-CNP and Dr. Banks  Nephrology: Diane Bush MD   GI/colorectal surgery (colonoscopy): Darren Warren MD   [facility]     Preventive Health Services:  -Last physical: 4/8/25  -last PSA: 0.76 (4/9/25)   -last colonoscopy: 8/15/24==> polyp==>REPEAT 5 YEARS (DUE 8/2029)  -last STI screening: negative  GC/CT/trich/HIV/syphilis 7/26/24  -Hep C screening: negative 7/26/24     Immunizations:   -Childhood vaccines: completed per patient    -COVID vaccine and booster:  -updated COVID spike vaccine: NOW DUE  -TDAP:   NOW DUE  -Prevnar 20: NOW DUE  -shingrix: NOW DUE     Immunization History   Administered Date(s) Administered    Td (adult), unspecified 11/07/2011            INTERVAL HISTORY:     -CT Heart Ca scoring ordered 4/8/25 NOT YET COMPLETED      -RFP and urine albumin ordered by me 6/12/25 not yet completed     Today Tamone reports:     - Requesting completion of paperwork for clearance for upcoming dental procedure, although no paperwork available for review at appt today.  Patient states that he will be receiving anesthesia and put to sleep.  Discussed with patient that since he is receiving general anesthesia/? nitrous gas he will need to be cleared for dental procedure by cardiology given that he has heart failure and cardiomyopathy.    -doing and feeling well.       -taking all medications as prescribed with no reported adverse medication side effects     -ongoing seasonal allergy sxs and is interested in starting allergy medications        Today he has no other reported complaints, issues, or problems.     ROS is NEG for HEADACHE, NAUSEA, VOMITING, DIARRHEA, CHEST PAIN, SOB, and BLEEDING.  Review of systems (12 point) is negative for all systems except for any identified issues in HPI above.        Objective     BP (!) 144/94   Pulse 75   Temp 37.1 °C (98.7 °F)   Wt 84.5 kg (186 lb 3.2 oz)   SpO2 98%   BMI 30.99 kg/m²      Physical Examination:       GENERAL           General Appearance: well-appearing, well-developed, well-hydrated, well-nourished, no acute distress.        HEENT           NECK supple, no masses or thyromegaly, no carotid bruit.        EYES           Extraocular Movements: normal, bilateral eyes SVETA, no conjunctival injection.        HEART           Rate and Rhythm regular rate and rhythm. Heart sounds: normal S1S2, no S3 or S4. Murmurs: none.        CHEST           Breath sounds: Clear to IPPA, RR<16 no use of accessory muscles.        ABDOMEN           General: Neg for LKKS or masses, no scleral icterus or jaundice.        MUSCULOSKELETAL           Joints Demonstration: Neg for erythema, swelling or joint deformities. gross abnormalities no  gross abnormalities.        EXTREMITIES           Lower Extremities: Neg for cyanosis, clubbing or edema.       Assessment/Plan   Problem List Items Addressed This Visit       Heart murmur    Hyperlipidemia    Relevant Orders    Lipid panel    Hypertension - Primary    Relevant Medications    metoprolol succinate XL (Toprol-XL) 50 mg 24 hr tablet    fluticasone (Flonase) 50 mcg/actuation nasal spray    cetirizine (ZyrTEC) 10 mg tablet    Other Relevant Orders    Renal function panel    Urinalysis with Reflex Microscopic    Cardiomyopathy    Relevant Medications    metoprolol succinate XL (Toprol-XL) 50 mg 24 hr tablet     Other Visit Diagnoses         Prediabetes        Relevant Orders    Referral to Nutrition Services    POCT glycosylated hemoglobin (Hb A1C) manually resulted      Lower urinary tract symptoms (LUTS)          Vitamin D deficiency        Relevant Orders    Vitamin D 25-Hydroxy,Total (for eval of Vitamin D levels)      Stage 3a chronic kidney disease (Multi)        Relevant Orders    Renal function panel    Urinalysis with Reflex Microscopic    Albumin-Creatinine Ratio, Urine Random      Encounter for completion of form with patient          Diabetes mellitus screening        Relevant Medications    metFORMIN XR (Glucophage-XR) 500 mg 24 hr tablet    Dexcom G7 Sensor device    Dexcom G7  misc    Other Relevant Orders    POCT glycosylated hemoglobin (Hb A1C) manually resulted    Referral to Endocrinology    Referral to Diabetes Education    Referral to Ophthalmology    Referral to Podiatry    Referral to Clinical Pharmacy          Request for form completion for clearance for dental procedure: no form available to complete at visit today  - Discussed with patient since he is receiving general anesthesia/? nitrous oxide for his dental procedure he will need to receive cardiac clearance from cardiology for the procedure; patient advised to call and schedule follow-up appointment with cardiology  for clearance for dental procedure. [facility]  -CBC, CMP ordered  -discussed with patient that he is newly diagnosed with diabetes that increases his risk of an infection from a surgical procedure    HTN: mildly elevated, asymptomatic. Follows with cardiology and nephrology. EKG 6/25/24: 56 bpm, sinus bradycardia, LVH, no acute ischemic changes, abnormal EKG.   -RFP ordered 6/15/25 not yet completed  -cont amlodipine 10 mg daily, chlorthalidone 37.5 mg daily, and losartan 100 mg daily  -increase metoprolol XL from 25 to  50 mg daily  -diabetic, low salt, low cholesterol diet, regularly exercise, and limit alcohol intake  -referral to cardiology previously ordered; patent advised to schedule follow up with cardiology   -Emergency Dept and 911/EMS precautions discussed and reviewed with patient        DM2: HgBA1c: 6.5   today 7/2/25<  6.0 (4/8/25)  -POCT HgBA1c , RFP, urine albumin ordered  -start metformin 500 mg XR bid   -cont statin   -referral to ophthalmology and podiatry ordered for diabetic eye exam and foot care  -referral to clinical pharmacy ordered for assistance with diabetes management  -referral to endocrinology ordered  -referral to nutrition and diabetes education ordered  -diabetic,  low salt, low cholesterol diet, regularly exercise, and limit alcohol intake  -HgBa1c NEXT DUE 10/2/2025        CKD Stage 3A: followed by nephrology  -CMP, UA ordered  -low carbohydrate, low salt, low cholesterol diet, regularly exercise, and limit alcohol intake  -BP and glycemic control  -patient counseled to avoid NSAIDS that can worsen renal function  -cont management by nephrology     Hyperlipidemia  -Lipid panel ordered  -cont  atorvastatin 40 mg daily  -low salt, low cholesterol diet, regularly exercise, and limit alcohol intake     Cardiac murmur, HFrEF, cardiomyopathy: followed by cardiology  -cont management by cardiology  -emergency Dept  and 911/EMS  precautions discussed and reviewed by patient     LUTS/BPH:  -cont tamsulosin 0.4 mg at bedtime  -referral to urology previously ordered 6/25/24; patient advised to call to see urology     Vitamin D deficiency  -cont Vit D 50,000 units once weekly; patient advised after completing Vit D 50,000 units once weekly to start OTC Vit D3 2,000 units daily   -Vit D level ordered            Heart Disease Screening:  -CT Heart Ca scoring ordered 4/8/25     Counseling:       Medication education:         Education:  The patient is counseled regarding potential side-effects of all new medications        Understanding:  Patient expressed understanding        Adherence:  No barriers to adherence identified        Immunizations Counseling  -TDAP, Prevnar 20, shingrix now due==> declined today  -recommend updated COVID spike vaccine that can be obtained at local pharmacy     FOLLOW-UP: 4 weeks for BP check and POCT glucose       I have personally reviewed all available pertinent labs, imaging, and consult notes with the patient.     Discussed recommended plan of care with patient. Patient expressed understanding and agreement with plan of care. All of patient's  questions were answered at the time. Patient had no additional questions at the time.           Prudence Clayton MD, PhD

## 2025-07-02 ENCOUNTER — OFFICE VISIT (OUTPATIENT)
Dept: PRIMARY CARE | Facility: CLINIC | Age: 55
End: 2025-07-02
Payer: COMMERCIAL

## 2025-07-02 ENCOUNTER — TELEPHONE (OUTPATIENT)
Dept: PRIMARY CARE | Facility: CLINIC | Age: 55
End: 2025-07-02

## 2025-07-02 VITALS
BODY MASS INDEX: 30.99 KG/M2 | OXYGEN SATURATION: 98 % | DIASTOLIC BLOOD PRESSURE: 94 MMHG | SYSTOLIC BLOOD PRESSURE: 144 MMHG | WEIGHT: 186.2 LBS | HEART RATE: 75 BPM | TEMPERATURE: 98.7 F

## 2025-07-02 DIAGNOSIS — I42.8 OTHER CARDIOMYOPATHY: ICD-10-CM

## 2025-07-02 DIAGNOSIS — R73.03 PREDIABETES: ICD-10-CM

## 2025-07-02 DIAGNOSIS — R39.9 LOWER URINARY TRACT SYMPTOMS (LUTS): ICD-10-CM

## 2025-07-02 DIAGNOSIS — R82.90 ABNORMAL FINDING ON URINALYSIS: ICD-10-CM

## 2025-07-02 DIAGNOSIS — E87.6 HYPOKALEMIA: ICD-10-CM

## 2025-07-02 DIAGNOSIS — E78.2 MIXED HYPERLIPIDEMIA: ICD-10-CM

## 2025-07-02 DIAGNOSIS — Z01.89 ENCOUNTER FOR ROUTINE LABORATORY TESTING: ICD-10-CM

## 2025-07-02 DIAGNOSIS — I10 PRIMARY HYPERTENSION: Primary | ICD-10-CM

## 2025-07-02 DIAGNOSIS — Z13.1 DIABETES MELLITUS SCREENING: ICD-10-CM

## 2025-07-02 DIAGNOSIS — E55.9 VITAMIN D DEFICIENCY: ICD-10-CM

## 2025-07-02 DIAGNOSIS — Z02.89 ENCOUNTER FOR COMPLETION OF FORM WITH PATIENT: ICD-10-CM

## 2025-07-02 DIAGNOSIS — R01.1 HEART MURMUR: ICD-10-CM

## 2025-07-02 DIAGNOSIS — I10 BENIGN ESSENTIAL HYPERTENSION: Primary | ICD-10-CM

## 2025-07-02 DIAGNOSIS — N18.31 STAGE 3A CHRONIC KIDNEY DISEASE (MULTI): ICD-10-CM

## 2025-07-02 DIAGNOSIS — E83.52 HYPERCALCEMIA: ICD-10-CM

## 2025-07-02 DIAGNOSIS — E66.9 OBESITY (BMI 30-39.9): ICD-10-CM

## 2025-07-02 LAB — POC HEMOGLOBIN A1C: 6.5 % (ref 4.2–6.5)

## 2025-07-02 PROCEDURE — 3077F SYST BP >= 140 MM HG: CPT | Performed by: FAMILY MEDICINE

## 2025-07-02 PROCEDURE — 3080F DIAST BP >= 90 MM HG: CPT | Performed by: FAMILY MEDICINE

## 2025-07-02 PROCEDURE — 99214 OFFICE O/P EST MOD 30 MIN: CPT | Performed by: FAMILY MEDICINE

## 2025-07-02 PROCEDURE — 1036F TOBACCO NON-USER: CPT | Performed by: FAMILY MEDICINE

## 2025-07-02 PROCEDURE — 83036 HEMOGLOBIN GLYCOSYLATED A1C: CPT | Performed by: FAMILY MEDICINE

## 2025-07-02 RX ORDER — METOPROLOL SUCCINATE 50 MG/1
50 TABLET, EXTENDED RELEASE ORAL DAILY
Qty: 90 TABLET | Refills: 3 | Status: SHIPPED | OUTPATIENT
Start: 2025-07-02 | End: 2026-07-02

## 2025-07-02 RX ORDER — CETIRIZINE HYDROCHLORIDE 10 MG/1
10 TABLET ORAL DAILY
Qty: 90 TABLET | Refills: 3 | Status: SHIPPED | OUTPATIENT
Start: 2025-07-02 | End: 2026-07-02

## 2025-07-02 RX ORDER — METFORMIN HYDROCHLORIDE 500 MG/1
500 TABLET, EXTENDED RELEASE ORAL
Qty: 180 TABLET | Refills: 3 | Status: SHIPPED | OUTPATIENT
Start: 2025-07-02 | End: 2026-07-02

## 2025-07-02 RX ORDER — BLOOD-GLUCOSE SENSOR
EACH MISCELLANEOUS
Qty: 3 EACH | Refills: 11 | Status: SHIPPED | OUTPATIENT
Start: 2025-07-02

## 2025-07-02 RX ORDER — FLUTICASONE PROPIONATE 50 MCG
1 SPRAY, SUSPENSION (ML) NASAL DAILY
Qty: 16 G | Refills: 11 | Status: SHIPPED | OUTPATIENT
Start: 2025-07-02 | End: 2026-07-02

## 2025-07-02 RX ORDER — BLOOD-GLUCOSE,RECEIVER,CONT
EACH MISCELLANEOUS
Qty: 1 EACH | Refills: 0 | Status: SHIPPED | OUTPATIENT
Start: 2025-07-02

## 2025-07-02 ASSESSMENT — PAIN SCALES - GENERAL: PAINLEVEL_OUTOF10: 0-NO PAIN

## 2025-07-02 NOTE — TELEPHONE ENCOUNTER
Call placed to daina who is asking why wasn't the pt cleared by Dr. Clayton. Daina made aware pt was told he will need to show that he has clearance from cardiology daina stating pt was cleared by cardiology on Monday 3/30 stating she will fax the ppwk to us    Awaiting fax from daina

## 2025-07-02 NOTE — TELEPHONE ENCOUNTER
At his visit today our office HAD NOT YET RECEIVED paperwork for surgical  clearance, and I let him know he needed cardiac clearance since the procedure is under anesthesia.    He will need an appt with me specifically for surgical clearance once I receive documentation of cardiac clearance for his oral surgery.      Today patient was diagnosed with diabetes at his appt as well.  Thank you

## 2025-07-02 NOTE — TELEPHONE ENCOUNTER
Received a call from [facility] Trudy. Trudy states the patient was in our office today 07/02/25 and Dr Clayton would not clear him for surgery. Office is reaching out for more information as to why he was not cleared as they received clearance from Cardiology 06/24/2025. Please advise.

## 2025-07-02 NOTE — TELEPHONE ENCOUNTER
Call placed to [facility] to update spoke with daina who is stating they never needed pcp clearance only cardiac also letting her know he is newly diagnosed diabetic at 6.5 daina stating pt is within limits for their standard wanting to be 8% or less. Daina  asking for pcp to reach out to ab the  [facility] at [phone] in regards to this stating the labs that the pt has from April is also within in there limits for labs stating from their stand point the pt is good to go for surgery      Daina was told pcp is out of the office and will not be back until next week stating is there any way possible pcp could reach out before the 7th

## 2025-07-03 NOTE — TELEPHONE ENCOUNTER
Covering provider for Dr. Clayton.  I would defer to PCP return for final recommendations, however cardiac clearance from March is likely not sufficient.  With patient's history of heart disease should have updated evaluation generally within 30 days prior to anaesthesia.

## 2025-07-03 NOTE — TELEPHONE ENCOUNTER
PT came into office requesting referral for a nutritionist. Please advise if referral can be placed.

## 2025-07-03 NOTE — TELEPHONE ENCOUNTER
Pt made aware given referral line number    Also made aware he needs to have labs drawn that was order by physician before surgery date

## 2025-07-04 LAB
ALBUMIN SERPL-MCNC: 4.7 G/DL (ref 3.6–5.1)
ALP SERPL-CCNC: 79 U/L (ref 35–144)
ALT SERPL-CCNC: 18 U/L (ref 9–46)
ANION GAP SERPL CALCULATED.4IONS-SCNC: 8 MMOL/L (CALC) (ref 7–17)
AST SERPL-CCNC: 18 U/L (ref 10–35)
BASOPHILS # BLD AUTO: 72 CELLS/UL (ref 0–200)
BASOPHILS NFR BLD AUTO: 1.1 %
BILIRUB SERPL-MCNC: 0.6 MG/DL (ref 0.2–1.2)
BUN SERPL-MCNC: 22 MG/DL (ref 7–25)
CALCIUM SERPL-MCNC: 10.5 MG/DL (ref 8.6–10.3)
CHLORIDE SERPL-SCNC: 97 MMOL/L (ref 98–110)
CO2 SERPL-SCNC: 33 MMOL/L (ref 20–32)
CREAT SERPL-MCNC: 1.6 MG/DL (ref 0.7–1.3)
EGFRCR SERPLBLD CKD-EPI 2021: 51 ML/MIN/1.73M2
EOSINOPHIL # BLD AUTO: 169 CELLS/UL (ref 15–500)
EOSINOPHIL NFR BLD AUTO: 2.6 %
ERYTHROCYTE [DISTWIDTH] IN BLOOD BY AUTOMATED COUNT: 13.9 % (ref 11–15)
GLUCOSE SERPL-MCNC: 100 MG/DL (ref 65–139)
HCT VFR BLD AUTO: 41.2 % (ref 38.5–50)
HGB BLD-MCNC: 13.6 G/DL (ref 13.2–17.1)
LYMPHOCYTES # BLD AUTO: 2568 CELLS/UL (ref 850–3900)
LYMPHOCYTES NFR BLD AUTO: 39.5 %
MCH RBC QN AUTO: 29.1 PG (ref 27–33)
MCHC RBC AUTO-ENTMCNC: 33 G/DL (ref 32–36)
MCV RBC AUTO: 88.2 FL (ref 80–100)
MONOCYTES # BLD AUTO: 442 CELLS/UL (ref 200–950)
MONOCYTES NFR BLD AUTO: 6.8 %
NEUTROPHILS # BLD AUTO: 3250 CELLS/UL (ref 1500–7800)
NEUTROPHILS NFR BLD AUTO: 50 %
PLATELET # BLD AUTO: 372 THOUSAND/UL (ref 140–400)
PMV BLD REES-ECKER: 9.1 FL (ref 7.5–12.5)
POTASSIUM SERPL-SCNC: 3.4 MMOL/L (ref 3.5–5.3)
PROT SERPL-MCNC: 7.8 G/DL (ref 6.1–8.1)
RBC # BLD AUTO: 4.67 MILLION/UL (ref 4.2–5.8)
SODIUM SERPL-SCNC: 138 MMOL/L (ref 135–146)
WBC # BLD AUTO: 6.5 THOUSAND/UL (ref 3.8–10.8)

## 2025-07-05 LAB
25(OH)D3+25(OH)D2 SERPL-MCNC: 89 NG/ML (ref 30–100)
ALBUMIN SERPL-MCNC: 4.6 G/DL (ref 3.6–5.1)
ALBUMIN/CREAT UR: 7 MG/G CREAT
APPEARANCE UR: CLEAR
BILIRUB UR QL STRIP: NEGATIVE
BUN SERPL-MCNC: 22 MG/DL (ref 7–25)
BUN/CREAT SERPL: 14 (CALC) (ref 6–22)
CALCIUM SERPL-MCNC: 10.7 MG/DL (ref 8.6–10.3)
CHLORIDE SERPL-SCNC: 97 MMOL/L (ref 98–110)
CHOLEST SERPL-MCNC: 164 MG/DL
CHOLEST/HDLC SERPL: 4 (CALC)
CO2 SERPL-SCNC: 32 MMOL/L (ref 20–32)
COLOR UR: YELLOW
CREAT SERPL-MCNC: 1.59 MG/DL (ref 0.7–1.3)
CREAT UR-MCNC: 56 MG/DL (ref 20–320)
EGFRCR SERPLBLD CKD-EPI 2021: 51 ML/MIN/1.73M2
GLUCOSE SERPL-MCNC: 99 MG/DL (ref 65–139)
GLUCOSE UR QL STRIP: NEGATIVE
HDLC SERPL-MCNC: 41 MG/DL
HGB UR QL STRIP: NEGATIVE
KETONES UR QL STRIP: NEGATIVE
LDLC SERPL CALC-MCNC: 102 MG/DL (CALC)
LEUKOCYTE ESTERASE UR QL STRIP: NEGATIVE
MICROALBUMIN UR-MCNC: 0.4 MG/DL
NITRITE UR QL STRIP: NEGATIVE
NONHDLC SERPL-MCNC: 123 MG/DL (CALC)
PH UR STRIP: 8 [PH] (ref 5–8)
PHOSPHATE SERPL-MCNC: 4 MG/DL (ref 2.5–4.5)
POTASSIUM SERPL-SCNC: 3.4 MMOL/L (ref 3.5–5.3)
PROT UR QL STRIP: NEGATIVE
SODIUM SERPL-SCNC: 138 MMOL/L (ref 135–146)
SP GR UR STRIP: 1.01 (ref 1–1.03)
TRIGL SERPL-MCNC: 117 MG/DL

## 2025-07-06 RX ORDER — POTASSIUM CHLORIDE 750 MG/1
10 TABLET, FILM COATED, EXTENDED RELEASE ORAL 2 TIMES DAILY
Qty: 60 TABLET | Refills: 11 | Status: SHIPPED | OUTPATIENT
Start: 2025-07-06 | End: 2026-07-06

## 2025-07-11 ENCOUNTER — APPOINTMENT (OUTPATIENT)
Dept: RADIOLOGY | Facility: HOSPITAL | Age: 55
End: 2025-07-11
Payer: COMMERCIAL

## 2025-07-11 DIAGNOSIS — I10 PRIMARY HYPERTENSION: ICD-10-CM

## 2025-07-11 DIAGNOSIS — N18.31 STAGE 3A CHRONIC KIDNEY DISEASE (MULTI): ICD-10-CM

## 2025-07-11 PROCEDURE — 76770 US EXAM ABDO BACK WALL COMP: CPT | Performed by: RADIOLOGY

## 2025-07-11 PROCEDURE — 76770 US EXAM ABDO BACK WALL COMP: CPT

## 2025-07-16 ENCOUNTER — APPOINTMENT (OUTPATIENT)
Dept: CARDIOLOGY | Facility: HOSPITAL | Age: 55
End: 2025-07-16
Payer: COMMERCIAL

## 2025-07-16 ENCOUNTER — HOSPITAL ENCOUNTER (EMERGENCY)
Facility: HOSPITAL | Age: 55
Discharge: HOME | End: 2025-07-16
Payer: COMMERCIAL

## 2025-07-16 ENCOUNTER — RESULTS FOLLOW-UP (OUTPATIENT)
Dept: NEPHROLOGY | Facility: HOSPITAL | Age: 55
End: 2025-07-16
Payer: COMMERCIAL

## 2025-07-16 ENCOUNTER — APPOINTMENT (OUTPATIENT)
Dept: RADIOLOGY | Facility: HOSPITAL | Age: 55
End: 2025-07-16
Payer: COMMERCIAL

## 2025-07-16 VITALS
OXYGEN SATURATION: 98 % | WEIGHT: 180 LBS | TEMPERATURE: 98.2 F | BODY MASS INDEX: 26.66 KG/M2 | SYSTOLIC BLOOD PRESSURE: 129 MMHG | HEIGHT: 69 IN | DIASTOLIC BLOOD PRESSURE: 87 MMHG | RESPIRATION RATE: 14 BRPM | HEART RATE: 63 BPM

## 2025-07-16 DIAGNOSIS — E87.6 HYPOKALEMIA: ICD-10-CM

## 2025-07-16 DIAGNOSIS — G62.9 NEUROPATHY: Primary | ICD-10-CM

## 2025-07-16 DIAGNOSIS — E11.40 TYPE 2 DIABETES MELLITUS WITH DIABETIC NEUROPATHY, UNSPECIFIED WHETHER LONG TERM INSULIN USE (MULTI): ICD-10-CM

## 2025-07-16 LAB
ALBUMIN SERPL BCP-MCNC: 4.2 G/DL (ref 3.4–5)
ALP SERPL-CCNC: 74 U/L (ref 33–120)
ALT SERPL W P-5'-P-CCNC: 12 U/L (ref 10–52)
ANION GAP BLDV CALCULATED.4IONS-SCNC: 7 MMOL/L (ref 10–25)
ANION GAP SERPL CALCULATED.3IONS-SCNC: 13 MMOL/L (ref 10–20)
APPEARANCE UR: CLEAR
AST SERPL W P-5'-P-CCNC: 18 U/L (ref 9–39)
BASE EXCESS BLDV CALC-SCNC: 6.9 MMOL/L (ref -2–3)
BASOPHILS # BLD AUTO: 0.09 X10*3/UL (ref 0–0.1)
BASOPHILS NFR BLD AUTO: 0.9 %
BILIRUB SERPL-MCNC: 0.5 MG/DL (ref 0–1.2)
BILIRUB UR STRIP.AUTO-MCNC: NEGATIVE MG/DL
BODY TEMPERATURE: 37 DEGREES CELSIUS
BUN SERPL-MCNC: 23 MG/DL (ref 6–23)
CA-I BLDV-SCNC: 1.16 MMOL/L (ref 1.1–1.33)
CALCIUM SERPL-MCNC: 9.4 MG/DL (ref 8.6–10.3)
CARDIAC TROPONIN I PNL SERPL HS: 51 NG/L (ref 0–20)
CARDIAC TROPONIN I PNL SERPL HS: 52 NG/L (ref 0–20)
CHLORIDE BLDV-SCNC: 96 MMOL/L (ref 98–107)
CHLORIDE SERPL-SCNC: 97 MMOL/L (ref 98–107)
CO2 SERPL-SCNC: 28 MMOL/L (ref 21–32)
COLOR UR: COLORLESS
CREAT SERPL-MCNC: 1.47 MG/DL (ref 0.5–1.3)
EGFRCR SERPLBLD CKD-EPI 2021: 56 ML/MIN/1.73M*2
EOSINOPHIL # BLD AUTO: 0.22 X10*3/UL (ref 0–0.7)
EOSINOPHIL NFR BLD AUTO: 2.3 %
ERYTHROCYTE [DISTWIDTH] IN BLOOD BY AUTOMATED COUNT: 12.7 % (ref 11.5–14.5)
GLUCOSE BLD MANUAL STRIP-MCNC: 106 MG/DL (ref 74–99)
GLUCOSE BLDV-MCNC: 107 MG/DL (ref 74–99)
GLUCOSE SERPL-MCNC: 104 MG/DL (ref 74–99)
GLUCOSE UR STRIP.AUTO-MCNC: NORMAL MG/DL
HCO3 BLDV-SCNC: 31.3 MMOL/L (ref 22–26)
HCT VFR BLD AUTO: 33.6 % (ref 41–52)
HCT VFR BLD EST: 36 % (ref 41–52)
HGB BLD-MCNC: 11.2 G/DL (ref 13.5–17.5)
HGB BLDV-MCNC: 12 G/DL (ref 13.5–17.5)
IMM GRANULOCYTES # BLD AUTO: 0.02 X10*3/UL (ref 0–0.7)
IMM GRANULOCYTES NFR BLD AUTO: 0.2 % (ref 0–0.9)
INHALED O2 CONCENTRATION: 97 %
KETONES UR STRIP.AUTO-MCNC: NEGATIVE MG/DL
LACTATE BLDV-SCNC: 1.3 MMOL/L (ref 0.4–2)
LEUKOCYTE ESTERASE UR QL STRIP.AUTO: NEGATIVE
LYMPHOCYTES # BLD AUTO: 4.43 X10*3/UL (ref 1.2–4.8)
LYMPHOCYTES NFR BLD AUTO: 46.6 %
MAGNESIUM SERPL-MCNC: 2.01 MG/DL (ref 1.6–2.4)
MCH RBC QN AUTO: 28.6 PG (ref 26–34)
MCHC RBC AUTO-ENTMCNC: 33.3 G/DL (ref 32–36)
MCV RBC AUTO: 86 FL (ref 80–100)
MONOCYTES # BLD AUTO: 0.86 X10*3/UL (ref 0.1–1)
MONOCYTES NFR BLD AUTO: 9.1 %
NEUTROPHILS # BLD AUTO: 3.88 X10*3/UL (ref 1.2–7.7)
NEUTROPHILS NFR BLD AUTO: 40.9 %
NITRITE UR QL STRIP.AUTO: NEGATIVE
NRBC BLD-RTO: 0 /100 WBCS (ref 0–0)
OXYHGB MFR BLDV: 73.1 % (ref 45–75)
PCO2 BLDV: 43 MM HG (ref 41–51)
PH BLDV: 7.47 PH (ref 7.33–7.43)
PH UR STRIP.AUTO: 5.5 [PH]
PHOSPHATE SERPL-MCNC: 3.3 MG/DL (ref 2.5–4.9)
PLATELET # BLD AUTO: 350 X10*3/UL (ref 150–450)
PO2 BLDV: 46 MM HG (ref 35–45)
POTASSIUM BLDV-SCNC: 3.3 MMOL/L (ref 3.5–5.3)
POTASSIUM SERPL-SCNC: 3 MMOL/L (ref 3.5–5.3)
PROT SERPL-MCNC: 7.6 G/DL (ref 6.4–8.2)
PROT UR STRIP.AUTO-MCNC: NEGATIVE MG/DL
RBC # BLD AUTO: 3.92 X10*6/UL (ref 4.5–5.9)
RBC # UR STRIP.AUTO: NEGATIVE MG/DL
SAO2 % BLDV: 74 % (ref 45–75)
SODIUM BLDV-SCNC: 131 MMOL/L (ref 136–145)
SODIUM SERPL-SCNC: 135 MMOL/L (ref 136–145)
SP GR UR STRIP.AUTO: 1.01
UROBILINOGEN UR STRIP.AUTO-MCNC: NORMAL MG/DL
WBC # BLD AUTO: 9.5 X10*3/UL (ref 4.4–11.3)

## 2025-07-16 PROCEDURE — 93005 ELECTROCARDIOGRAM TRACING: CPT

## 2025-07-16 PROCEDURE — 36415 COLL VENOUS BLD VENIPUNCTURE: CPT

## 2025-07-16 PROCEDURE — 70450 CT HEAD/BRAIN W/O DYE: CPT | Performed by: RADIOLOGY

## 2025-07-16 PROCEDURE — 85018 HEMOGLOBIN: CPT | Mod: 59

## 2025-07-16 PROCEDURE — 84100 ASSAY OF PHOSPHORUS: CPT

## 2025-07-16 PROCEDURE — 85025 COMPLETE CBC W/AUTO DIFF WBC: CPT

## 2025-07-16 PROCEDURE — 2500000001 HC RX 250 WO HCPCS SELF ADMINISTERED DRUGS (ALT 637 FOR MEDICARE OP)

## 2025-07-16 PROCEDURE — 81003 URINALYSIS AUTO W/O SCOPE: CPT | Mod: 59

## 2025-07-16 PROCEDURE — 80307 DRUG TEST PRSMV CHEM ANLYZR: CPT

## 2025-07-16 PROCEDURE — 83735 ASSAY OF MAGNESIUM: CPT

## 2025-07-16 PROCEDURE — 2500000004 HC RX 250 GENERAL PHARMACY W/ HCPCS (ALT 636 FOR OP/ED)

## 2025-07-16 PROCEDURE — 84484 ASSAY OF TROPONIN QUANT: CPT

## 2025-07-16 PROCEDURE — 82947 ASSAY GLUCOSE BLOOD QUANT: CPT | Mod: 59

## 2025-07-16 PROCEDURE — 99285 EMERGENCY DEPT VISIT HI MDM: CPT | Mod: 25

## 2025-07-16 PROCEDURE — 70450 CT HEAD/BRAIN W/O DYE: CPT

## 2025-07-16 PROCEDURE — 96374 THER/PROPH/DIAG INJ IV PUSH: CPT

## 2025-07-16 PROCEDURE — 84132 ASSAY OF SERUM POTASSIUM: CPT | Mod: 59

## 2025-07-16 RX ORDER — PROCHLORPERAZINE EDISYLATE 5 MG/ML
10 INJECTION INTRAMUSCULAR; INTRAVENOUS ONCE
Status: COMPLETED | OUTPATIENT
Start: 2025-07-16 | End: 2025-07-16

## 2025-07-16 RX ORDER — DIPHENHYDRAMINE HYDROCHLORIDE 50 MG/ML
25 INJECTION, SOLUTION INTRAMUSCULAR; INTRAVENOUS ONCE
Status: DISCONTINUED | OUTPATIENT
Start: 2025-07-16 | End: 2025-07-16 | Stop reason: HOSPADM

## 2025-07-16 RX ORDER — ACETAMINOPHEN 325 MG/1
975 TABLET ORAL ONCE
Status: COMPLETED | OUTPATIENT
Start: 2025-07-16 | End: 2025-07-16

## 2025-07-16 RX ORDER — POTASSIUM CHLORIDE 1.5 G/1.58G
40 POWDER, FOR SOLUTION ORAL ONCE
Status: COMPLETED | OUTPATIENT
Start: 2025-07-16 | End: 2025-07-16

## 2025-07-16 RX ADMIN — PROCHLORPERAZINE EDISYLATE 10 MG: 5 INJECTION INTRAMUSCULAR; INTRAVENOUS at 08:15

## 2025-07-16 RX ADMIN — ACETAMINOPHEN 975 MG: 325 TABLET ORAL at 08:15

## 2025-07-16 RX ADMIN — POTASSIUM CHLORIDE 40 MEQ: 1.5 POWDER, FOR SOLUTION ORAL at 05:27

## 2025-07-16 NOTE — ED PROVIDER NOTES
HPI   Chief Complaint   Patient presents with    Numbness     PT comes into the ER with a chief complaint of bilateral numbness of the upper and lower extremities that started about 24 hrs ago. Pt says he was recently diagnosed with type 2 diabetes in the past week and has been wearing a monitor device on his arm that syncs with his phone. The last blood sugar at 02:59 per the susan was 120. Pt says he takes metformin daily and hasn't had any side effects that he knows about. He says he also has a hx of hypertension and that's what lead to the diabetes. Pt has no co         History provided by:  Patient          Patient History   Medical History[1]  Surgical History[2]  Family History[3]  Social History[4]    Physical Exam   ED Triage Vitals [07/16/25 0255]   Temperature Heart Rate Respirations BP   36.8 °C (98.2 °F) 68 16 (!) 142/106      Pulse Ox Temp Source Heart Rate Source Patient Position   97 % Tympanic Monitor Sitting      BP Location FiO2 (%)     Left arm --       Physical Exam      ED Course & MDM                  No data recorded     [place] Coma Scale Score: 15 (07/16/25 0256 : Bertha Sanford, EMT)                           Medical Decision Making      Procedure  Procedures       [1]   Past Medical History:  Diagnosis Date    Hypertension    [2]   Past Surgical History:  Procedure Laterality Date    CT ANGIO CORONARY ART WITH HEARTFLOW IF SCORE >30%  12/19/2023    CT ANGIO CORONARY ART WITH HEARTFLOW IF SCORE >30% 12/19/2023 [facility] CT   [3]   Family History  Problem Relation Name Age of Onset    Heart attack Father     [4]   Social History  Tobacco Use    Smoking status: Never     Passive exposure: Past    Smokeless tobacco: Never   Vaping Use    Vaping status: Never Used   Substance Use Topics    Alcohol use: Not Currently     Alcohol/week: 4.0 standard drinks of alcohol     Types: 4 Shots of liquor per week     Comment: occassionally    Drug use: Never          Eyes:      Extraocular Movements: Extraocular movements intact.      Conjunctiva/sclera: Conjunctivae normal.      Pupils: Pupils are equal, round, and reactive to light.       Cardiovascular:      Rate and Rhythm: Normal rate and regular rhythm.      Pulses: Normal pulses.      Heart sounds: Normal heart sounds. No murmur heard.  Pulmonary:      Effort: Pulmonary effort is normal. No respiratory distress.      Breath sounds: Normal breath sounds.   Abdominal:      General: Abdomen is flat. Bowel sounds are normal.      Palpations: Abdomen is soft.      Tenderness: There is no abdominal tenderness.     Musculoskeletal:         General: No swelling. Normal range of motion.      Cervical back: Normal range of motion and neck supple.     Skin:     General: Skin is warm and dry.      Capillary Refill: Capillary refill takes less than 2 seconds.     Neurological:      General: No focal deficit present.      Mental Status: He is alert and oriented to person, place, and time. Mental status is at baseline.      Cranial Nerves: No cranial nerve deficit.     Psychiatric:         Mood and Affect: Mood normal.         Behavior: Behavior normal.         Thought Content: Thought content normal.         Judgment: Judgment normal.           ED Course & MDM   ED Course as of 08/01/25 1554   Wed Jul 16, 2025   0336 EKG interpreted by myself independently, EKG showed normal sinus rhythm, rate 66 beats minute, parable 202, QRS 86, , QTc 429, patient has no ST elevation or depression, negative for acute MI. [WILMRA]      ED Course User Index  [WILMAR] Mansoor Street DO         Diagnoses as of 08/01/25 1554   Neuropathy   Type 2 diabetes mellitus with diabetic neuropathy, unspecified whether long term insulin use (Multi)   Hypokalemia                 No data recorded     [place] Coma Scale Score: 15 (07/16/25 0256 : Bertha Sanford, SKY)                           Medical Decision Making  Patient seen and evaluated at bedside, patient is in  no acute distress.  I will order a CBC, troponin, CMP, magnesium, phosphorus, urinalysis, CT head, . Differential diagnosis includes but is not limited to neuropathy, electrolyte abnormality, myalgias,  Patient's lab work shows a CBC with a anemia of 11.2, hematocrit 33.6, pH 7.47, CMP showed potassium 3.0, this was replenished orally, creatinine 1.47, troponin 52 and 51, urinalysis negative, patient was pending results of CT scan at time of my departure, please see oncoming clinician note for final disposition.        Procedure  Procedures  Sections of this report were created using voice-to-text technology and may contain errors in translation    Mansoor Street DO  Emergency Medicine           [1]   Past Medical History:  Diagnosis Date    Hypertension    [2]   Past Surgical History:  Procedure Laterality Date    CT ANGIO CORONARY ART WITH HEARTFLOW IF SCORE >30%  12/19/2023    CT ANGIO CORONARY ART WITH HEARTFLOW IF SCORE >30% 12/19/2023 [facility] CT   [3]   Family History  Problem Relation Name Age of Onset    Heart attack Father     [4]   Social History  Tobacco Use    Smoking status: Never     Passive exposure: Past    Smokeless tobacco: Never   Vaping Use    Vaping status: Never Used   Substance Use Topics    Alcohol use: Not Currently     Alcohol/week: 4.0 standard drinks of alcohol     Types: 4 Shots of liquor per week     Comment: occassionally    Drug use: Never        Mansoor Street DO  08/01/25 [id number]

## 2025-07-16 NOTE — DISCHARGE INSTRUCTIONS
The cause of your numbness and tingling is likely neuropathy that can be caused by diabetes.  Is very important to control your blood sugars well.  Follow-up with your primary care physician for repeat evaluation and consider of medication adjustment to help with the symptoms if they persist.  Your potassium is low at 3.0 which also can contribute.  Would recommend that you increase your dose of potassium to 20 mill equivalents twice daily for the next 1 week and have this rechecked by your primary care physician.  Return to emergency department if you develop weakness in one-sided extremities or other symptoms that concern you.

## 2025-07-16 NOTE — ED PROVIDER NOTES
Care endorsed to me by Dr. Street pending CT brain to evaluate for bilateral hand and feet paresthesias.  Otherwise neurologically intact with NIH of 0.  Given bilaterality very low suspicion for CVA.  CT brain without acute process.  Labs without significant abnormalities aside from potassium of 3.0, is on chronic supplement advised to increase dosing for next 1 week and follow-up with PCP for recheck as this could be contributory to his paresthesias.  Troponins are flat likely elevated due to his chronic renal insufficiency as he is not having any chest pain or ACS equivalent symptoms.  At this time low suspicion for emergent pathology and appropriate for outpatient management.    ED Course as of 07/16/25 0929 Wed Jul 16, 2025 0336 EKG interpreted by myself independently, EKG showed normal sinus rhythm, rate 66 beats minute, parable 202, QRS 86, , QTc 429, patient has no ST elevation or depression, negative for acute MI. [WILMAR]      ED Course User Index  [WILMAR] Mansoor Street DO         Diagnoses as of 07/16/25 0929   Neuropathy   Type 2 diabetes mellitus with diabetic neuropathy, unspecified whether long term insulin use (Multi)   Hypokalemia

## 2025-07-16 NOTE — Clinical Note
Ayla Paredes was seen and treated in our emergency department on 7/16/2025.  He may return to work on 07/17/2025.       If you have any questions or concerns, please don't hesitate to call.      Nicole Bai MD

## 2025-07-17 ENCOUNTER — TELEPHONE (OUTPATIENT)
Dept: PRIMARY CARE | Facility: CLINIC | Age: 55
End: 2025-07-17
Payer: COMMERCIAL

## 2025-07-17 LAB
ATRIAL RATE: 66 BPM
P AXIS: 36 DEGREES
P OFFSET: 171 MS
P ONSET: 119 MS
PR INTERVAL: 202 MS
Q ONSET: 220 MS
QRS COUNT: 11 BEATS
QRS DURATION: 86 MS
QT INTERVAL: 410 MS
QTC CALCULATION(BAZETT): 429 MS
QTC FREDERICIA: 423 MS
R AXIS: 81 DEGREES
T AXIS: -12 DEGREES
T OFFSET: 425 MS
VENTRICULAR RATE: 66 BPM

## 2025-07-17 NOTE — TELEPHONE ENCOUNTER
Pt elodialy at [facility] ED waiting to be seen - he scheduled for ER fu with PCP next week

## 2025-07-17 NOTE — TELEPHONE ENCOUNTER
PT came into office stating he was seen in [facility] last night for numbness in his hands and feet. PT states his hands were blue when he went to the ER. PT states they did a CT scan, and said something about his brain, and that they can't treat him in the ER. (ER notes show diagnosis for visit as diabetic neuropathy)     PT states he still can't feel his hands and feet, and it is now traveling up his leg toward his knee and affecting his speech as he feels his tongue is numb as well. PT advised to return to ER (Advised [facility]). PT requesting a return call at [phone]

## 2025-07-18 ENCOUNTER — APPOINTMENT (OUTPATIENT)
Dept: RADIOLOGY | Facility: HOSPITAL | Age: 55
End: 2025-07-18
Payer: COMMERCIAL

## 2025-07-18 ENCOUNTER — CLINICAL SUPPORT (OUTPATIENT)
Dept: EMERGENCY MEDICINE | Facility: HOSPITAL | Age: 55
End: 2025-07-18
Payer: COMMERCIAL

## 2025-07-18 ENCOUNTER — TELEPHONE (OUTPATIENT)
Dept: PRIMARY CARE | Facility: CLINIC | Age: 55
End: 2025-07-18

## 2025-07-18 ENCOUNTER — HOSPITAL ENCOUNTER (INPATIENT)
Facility: HOSPITAL | Age: 55
DRG: 094 | End: 2025-07-18
Attending: EMERGENCY MEDICINE | Admitting: PSYCHIATRY & NEUROLOGY
Payer: COMMERCIAL

## 2025-07-18 ENCOUNTER — TELEPHONE (OUTPATIENT)
Dept: PRIMARY CARE | Facility: CLINIC | Age: 55
End: 2025-07-18
Payer: COMMERCIAL

## 2025-07-18 ENCOUNTER — OFFICE VISIT (OUTPATIENT)
Dept: PRIMARY CARE | Facility: CLINIC | Age: 55
End: 2025-07-18
Payer: COMMERCIAL

## 2025-07-18 VITALS
DIASTOLIC BLOOD PRESSURE: 104 MMHG | HEART RATE: 55 BPM | WEIGHT: 180.6 LBS | TEMPERATURE: 96.3 F | BODY MASS INDEX: 30.09 KG/M2 | OXYGEN SATURATION: 99 % | SYSTOLIC BLOOD PRESSURE: 130 MMHG | HEIGHT: 65 IN

## 2025-07-18 DIAGNOSIS — G61.0 AIDP (ACUTE INFLAMMATORY DEMYELINATING POLYNEUROPATHY) (MULTI): ICD-10-CM

## 2025-07-18 DIAGNOSIS — R79.89 ELEVATED TROPONIN: ICD-10-CM

## 2025-07-18 DIAGNOSIS — E22.2 SIADH (SYNDROME OF INAPPROPRIATE ADH PRODUCTION) (MULTI): ICD-10-CM

## 2025-07-18 DIAGNOSIS — M54.2 NECK PAIN: ICD-10-CM

## 2025-07-18 DIAGNOSIS — E87.6 HYPOKALEMIA: ICD-10-CM

## 2025-07-18 DIAGNOSIS — I10 HYPERTENSION, UNSPECIFIED TYPE: ICD-10-CM

## 2025-07-18 DIAGNOSIS — R06.02 EXERTIONAL SHORTNESS OF BREATH: ICD-10-CM

## 2025-07-18 DIAGNOSIS — R26.81 UNSTEADY GAIT: ICD-10-CM

## 2025-07-18 DIAGNOSIS — M79.2 NEUROPATHIC PAIN: ICD-10-CM

## 2025-07-18 DIAGNOSIS — R27.0 ATAXIA: Primary | ICD-10-CM

## 2025-07-18 LAB
ALBUMIN SERPL BCP-MCNC: 4.2 G/DL (ref 3.4–5)
ALP SERPL-CCNC: 76 U/L (ref 33–120)
ALT SERPL W P-5'-P-CCNC: 11 U/L (ref 10–52)
AMPHETAMINES UR QL SCN: NORMAL
AMPHETAMINES UR QL SCN: NORMAL
ANION GAP SERPL CALC-SCNC: 9 MMOL/L (ref 10–20)
AST SERPL W P-5'-P-CCNC: 18 U/L (ref 9–39)
BARBITURATES UR QL SCN: NORMAL
BARBITURATES UR QL SCN: NORMAL
BASOPHILS # BLD AUTO: 0.07 X10*3/UL (ref 0–0.1)
BASOPHILS NFR BLD AUTO: 1 %
BENZODIAZ UR QL SCN: NORMAL
BENZODIAZ UR QL SCN: NORMAL
BILIRUB SERPL-MCNC: 0.5 MG/DL (ref 0–1.2)
BUN SERPL-MCNC: 15 MG/DL (ref 6–23)
BZE UR QL SCN: NORMAL
BZE UR QL SCN: NORMAL
CALCIUM SERPL-MCNC: 9.3 MG/DL (ref 8.6–10.6)
CANNABINOIDS UR QL SCN: NORMAL
CANNABINOIDS UR QL SCN: NORMAL
CARDIAC TROPONIN I PNL SERPL HS: 31 NG/L (ref 0–53)
CARDIAC TROPONIN I PNL SERPL HS: 34 NG/L (ref 0–53)
CHLORIDE SERPL-SCNC: 93 MMOL/L (ref 98–107)
CO2 SERPL-SCNC: 32 MMOL/L (ref 21–32)
CREAT SERPL-MCNC: 1.39 MG/DL (ref 0.5–1.3)
EGFRCR SERPLBLD CKD-EPI 2021: 60 ML/MIN/1.73M*2
EOSINOPHIL # BLD AUTO: 0.07 X10*3/UL (ref 0–0.7)
EOSINOPHIL NFR BLD AUTO: 1 %
ERYTHROCYTE [DISTWIDTH] IN BLOOD BY AUTOMATED COUNT: 12.7 % (ref 11.5–14.5)
ETHANOL SERPL-MCNC: <10 MG/DL
FENTANYL+NORFENTANYL UR QL SCN: NORMAL
FENTANYL+NORFENTANYL UR QL SCN: NORMAL
FOLATE SERPL-MCNC: 12.9 NG/ML
FOLATE SERPL-MCNC: 13.7 NG/ML
GLUCOSE BLD MANUAL STRIP-MCNC: 105 MG/DL (ref 74–99)
GLUCOSE BLD MANUAL STRIP-MCNC: 110 MG/DL (ref 74–99)
GLUCOSE SERPL-MCNC: 98 MG/DL (ref 74–99)
HAV IGM SER QL: NONREACTIVE
HBV CORE IGM SER QL: NONREACTIVE
HBV SURFACE AG SERPL QL IA: NONREACTIVE
HCT VFR BLD AUTO: 34.7 % (ref 41–52)
HCV AB SER QL: NONREACTIVE
HGB BLD-MCNC: 11.6 G/DL (ref 13.5–17.5)
HIV 1+2 AB+HIV1 P24 AG SERPL QL IA: NONREACTIVE
IMM GRANULOCYTES # BLD AUTO: 0.02 X10*3/UL (ref 0–0.7)
IMM GRANULOCYTES NFR BLD AUTO: 0.3 % (ref 0–0.9)
LYMPHOCYTES # BLD AUTO: 2.19 X10*3/UL (ref 1.2–4.8)
LYMPHOCYTES NFR BLD AUTO: 32.4 %
MAGNESIUM SERPL-MCNC: 1.97 MG/DL (ref 1.6–2.4)
MCH RBC QN AUTO: 27.6 PG (ref 26–34)
MCHC RBC AUTO-ENTMCNC: 33.4 G/DL (ref 32–36)
MCV RBC AUTO: 83 FL (ref 80–100)
METHADONE UR QL SCN: NORMAL
METHADONE UR QL SCN: NORMAL
MONOCYTES # BLD AUTO: 0.39 X10*3/UL (ref 0.1–1)
MONOCYTES NFR BLD AUTO: 5.8 %
NEUTROPHILS # BLD AUTO: 4.02 X10*3/UL (ref 1.2–7.7)
NEUTROPHILS NFR BLD AUTO: 59.5 %
NRBC BLD-RTO: 0 /100 WBCS (ref 0–0)
OPIATES UR QL SCN: NORMAL
OPIATES UR QL SCN: NORMAL
OXYCODONE+OXYMORPHONE UR QL SCN: NORMAL
OXYCODONE+OXYMORPHONE UR QL SCN: NORMAL
PCP UR QL SCN: NORMAL
PCP UR QL SCN: NORMAL
PLATELET # BLD AUTO: 422 X10*3/UL (ref 150–450)
POTASSIUM SERPL-SCNC: 3.4 MMOL/L (ref 3.5–5.3)
PROT SERPL-MCNC: 7.5 G/DL (ref 6.4–8.2)
RBC # BLD AUTO: 4.2 X10*6/UL (ref 4.5–5.9)
SODIUM SERPL-SCNC: 131 MMOL/L (ref 136–145)
TREPONEMA PALLIDUM IGG+IGM AB [PRESENCE] IN SERUM OR PLASMA BY IMMUNOASSAY: NONREACTIVE
TSH SERPL-ACNC: 0.75 MIU/L (ref 0.44–3.98)
VIT B12 SERPL-MCNC: 872 PG/ML (ref 211–911)
WBC # BLD AUTO: 6.8 X10*3/UL (ref 4.4–11.3)

## 2025-07-18 PROCEDURE — 1036F TOBACCO NON-USER: CPT | Performed by: PHYSICIAN ASSISTANT

## 2025-07-18 PROCEDURE — 85025 COMPLETE CBC W/AUTO DIFF WBC: CPT | Performed by: EMERGENCY MEDICINE

## 2025-07-18 PROCEDURE — 2500000004 HC RX 250 GENERAL PHARMACY W/ HCPCS (ALT 636 FOR OP/ED)

## 2025-07-18 PROCEDURE — 3075F SYST BP GE 130 - 139MM HG: CPT | Performed by: PHYSICIAN ASSISTANT

## 2025-07-18 PROCEDURE — 99223 1ST HOSP IP/OBS HIGH 75: CPT

## 2025-07-18 PROCEDURE — 82947 ASSAY GLUCOSE BLOOD QUANT: CPT

## 2025-07-18 PROCEDURE — 83825 ASSAY OF MERCURY: CPT

## 2025-07-18 PROCEDURE — 82607 VITAMIN B-12: CPT

## 2025-07-18 PROCEDURE — 93005 ELECTROCARDIOGRAM TRACING: CPT

## 2025-07-18 PROCEDURE — A9575 INJ GADOTERATE MEGLUMI 0.1ML: HCPCS | Performed by: EMERGENCY MEDICINE

## 2025-07-18 PROCEDURE — 2500000001 HC RX 250 WO HCPCS SELF ADMINISTERED DRUGS (ALT 637 FOR MEDICARE OP)

## 2025-07-18 PROCEDURE — 80307 DRUG TEST PRSMV CHEM ANLYZR: CPT

## 2025-07-18 PROCEDURE — 70496 CT ANGIOGRAPHY HEAD: CPT | Performed by: STUDENT IN AN ORGANIZED HEALTH CARE EDUCATION/TRAINING PROGRAM

## 2025-07-18 PROCEDURE — 99285 EMERGENCY DEPT VISIT HI MDM: CPT | Mod: 25 | Performed by: EMERGENCY MEDICINE

## 2025-07-18 PROCEDURE — 72157 MRI CHEST SPINE W/O & W/DYE: CPT | Performed by: STUDENT IN AN ORGANIZED HEALTH CARE EDUCATION/TRAINING PROGRAM

## 2025-07-18 PROCEDURE — 72156 MRI NECK SPINE W/O & W/DYE: CPT

## 2025-07-18 PROCEDURE — 72157 MRI CHEST SPINE W/O & W/DYE: CPT

## 2025-07-18 PROCEDURE — 36415 COLL VENOUS BLD VENIPUNCTURE: CPT | Performed by: EMERGENCY MEDICINE

## 2025-07-18 PROCEDURE — 82746 ASSAY OF FOLIC ACID SERUM: CPT

## 2025-07-18 PROCEDURE — 71046 X-RAY EXAM CHEST 2 VIEWS: CPT | Performed by: RADIOLOGY

## 2025-07-18 PROCEDURE — 80053 COMPREHEN METABOLIC PANEL: CPT | Performed by: EMERGENCY MEDICINE

## 2025-07-18 PROCEDURE — 71046 X-RAY EXAM CHEST 2 VIEWS: CPT

## 2025-07-18 PROCEDURE — 83735 ASSAY OF MAGNESIUM: CPT | Performed by: EMERGENCY MEDICINE

## 2025-07-18 PROCEDURE — 84630 ASSAY OF ZINC: CPT

## 2025-07-18 PROCEDURE — 84425 ASSAY OF VITAMIN B-1: CPT

## 2025-07-18 PROCEDURE — 96374 THER/PROPH/DIAG INJ IV PUSH: CPT

## 2025-07-18 PROCEDURE — 99215 OFFICE O/P EST HI 40 MIN: CPT | Performed by: PHYSICIAN ASSISTANT

## 2025-07-18 PROCEDURE — 84443 ASSAY THYROID STIM HORMONE: CPT

## 2025-07-18 PROCEDURE — 36415 COLL VENOUS BLD VENIPUNCTURE: CPT

## 2025-07-18 PROCEDURE — 70498 CT ANGIOGRAPHY NECK: CPT

## 2025-07-18 PROCEDURE — 80074 ACUTE HEPATITIS PANEL: CPT

## 2025-07-18 PROCEDURE — 3080F DIAST BP >= 90 MM HG: CPT | Performed by: PHYSICIAN ASSISTANT

## 2025-07-18 PROCEDURE — 70551 MRI BRAIN STEM W/O DYE: CPT | Performed by: STUDENT IN AN ORGANIZED HEALTH CARE EDUCATION/TRAINING PROGRAM

## 2025-07-18 PROCEDURE — 2550000001 HC RX 255 CONTRASTS: Performed by: EMERGENCY MEDICINE

## 2025-07-18 PROCEDURE — 3008F BODY MASS INDEX DOCD: CPT | Performed by: PHYSICIAN ASSISTANT

## 2025-07-18 PROCEDURE — 84207 ASSAY OF VITAMIN B-6: CPT

## 2025-07-18 PROCEDURE — 84484 ASSAY OF TROPONIN QUANT: CPT | Performed by: EMERGENCY MEDICINE

## 2025-07-18 PROCEDURE — 72156 MRI NECK SPINE W/O & W/DYE: CPT | Performed by: STUDENT IN AN ORGANIZED HEALTH CARE EDUCATION/TRAINING PROGRAM

## 2025-07-18 PROCEDURE — 84446 ASSAY OF VITAMIN E: CPT

## 2025-07-18 PROCEDURE — 70498 CT ANGIOGRAPHY NECK: CPT | Performed by: STUDENT IN AN ORGANIZED HEALTH CARE EDUCATION/TRAINING PROGRAM

## 2025-07-18 PROCEDURE — 70551 MRI BRAIN STEM W/O DYE: CPT

## 2025-07-18 PROCEDURE — 1100000001 HC PRIVATE ROOM DAILY

## 2025-07-18 PROCEDURE — 82077 ASSAY SPEC XCP UR&BREATH IA: CPT

## 2025-07-18 PROCEDURE — 87389 HIV-1 AG W/HIV-1&-2 AB AG IA: CPT

## 2025-07-18 PROCEDURE — 99285 EMERGENCY DEPT VISIT HI MDM: CPT | Performed by: EMERGENCY MEDICINE

## 2025-07-18 PROCEDURE — 86318 IA INFECTIOUS AGENT ANTIBODY: CPT

## 2025-07-18 PROCEDURE — 82525 ASSAY OF COPPER: CPT

## 2025-07-18 PROCEDURE — 86780 TREPONEMA PALLIDUM: CPT

## 2025-07-18 RX ORDER — GADOTERATE MEGLUMINE 376.9 MG/ML
20 INJECTION INTRAVENOUS
Status: COMPLETED | OUTPATIENT
Start: 2025-07-18 | End: 2025-07-18

## 2025-07-18 RX ORDER — THIAMINE HYDROCHLORIDE 100 MG/ML
500 INJECTION, SOLUTION INTRAMUSCULAR; INTRAVENOUS ONCE
Status: COMPLETED | OUTPATIENT
Start: 2025-07-18 | End: 2025-07-18

## 2025-07-18 RX ORDER — POTASSIUM CHLORIDE 1.5 G/1.58G
40 POWDER, FOR SOLUTION ORAL ONCE
Status: COMPLETED | OUTPATIENT
Start: 2025-07-18 | End: 2025-07-18

## 2025-07-18 RX ORDER — DEXTROSE 50 % IN WATER (D50W) INTRAVENOUS SYRINGE
25
Status: DISCONTINUED | OUTPATIENT
Start: 2025-07-18 | End: 2025-07-30 | Stop reason: HOSPADM

## 2025-07-18 RX ORDER — INSULIN LISPRO 100 [IU]/ML
0-5 INJECTION, SOLUTION INTRAVENOUS; SUBCUTANEOUS
Status: DISCONTINUED | OUTPATIENT
Start: 2025-07-19 | End: 2025-07-30 | Stop reason: HOSPADM

## 2025-07-18 RX ORDER — ACETAMINOPHEN 325 MG/1
650 TABLET ORAL ONCE
Status: COMPLETED | OUTPATIENT
Start: 2025-07-18 | End: 2025-07-18

## 2025-07-18 RX ORDER — CETIRIZINE HYDROCHLORIDE 10 MG/1
10 TABLET ORAL DAILY
Status: DISCONTINUED | OUTPATIENT
Start: 2025-07-18 | End: 2025-07-21

## 2025-07-18 RX ORDER — FLUTICASONE PROPIONATE 50 MCG
1 SPRAY, SUSPENSION (ML) NASAL DAILY
Status: DISCONTINUED | OUTPATIENT
Start: 2025-07-19 | End: 2025-07-30 | Stop reason: HOSPADM

## 2025-07-18 RX ORDER — LOSARTAN POTASSIUM 100 MG/1
100 TABLET ORAL DAILY
Status: DISCONTINUED | OUTPATIENT
Start: 2025-07-18 | End: 2025-07-20

## 2025-07-18 RX ORDER — TAMSULOSIN HYDROCHLORIDE 0.4 MG/1
0.4 CAPSULE ORAL DAILY
Status: DISCONTINUED | OUTPATIENT
Start: 2025-07-18 | End: 2025-07-21

## 2025-07-18 RX ORDER — AMLODIPINE BESYLATE 10 MG/1
10 TABLET ORAL DAILY
Status: DISCONTINUED | OUTPATIENT
Start: 2025-07-18 | End: 2025-07-21

## 2025-07-18 RX ORDER — POLYETHYLENE GLYCOL 3350 17 G/17G
17 POWDER, FOR SOLUTION ORAL DAILY
Status: DISCONTINUED | OUTPATIENT
Start: 2025-07-18 | End: 2025-07-21

## 2025-07-18 RX ORDER — METOPROLOL SUCCINATE 25 MG/1
25 TABLET, EXTENDED RELEASE ORAL DAILY
Status: DISCONTINUED | OUTPATIENT
Start: 2025-07-18 | End: 2025-07-21

## 2025-07-18 RX ORDER — ERGOCALCIFEROL 1.25 MG/1
1.25 CAPSULE ORAL
Status: DISCONTINUED | OUTPATIENT
Start: 2025-07-20 | End: 2025-07-30 | Stop reason: HOSPADM

## 2025-07-18 RX ORDER — DEXTROSE 50 % IN WATER (D50W) INTRAVENOUS SYRINGE
12.5
Status: DISCONTINUED | OUTPATIENT
Start: 2025-07-18 | End: 2025-07-30 | Stop reason: HOSPADM

## 2025-07-18 RX ORDER — ATORVASTATIN CALCIUM 40 MG/1
40 TABLET, FILM COATED ORAL NIGHTLY
Status: DISCONTINUED | OUTPATIENT
Start: 2025-07-18 | End: 2025-07-21

## 2025-07-18 RX ADMIN — GADOTERATE MEGLUMINE 20 ML: 376.9 INJECTION INTRAVENOUS at 19:43

## 2025-07-18 RX ADMIN — AMLODIPINE BESYLATE 10 MG: 10 TABLET ORAL at 18:22

## 2025-07-18 RX ADMIN — IOHEXOL 80 ML: 350 INJECTION, SOLUTION INTRAVENOUS at 13:41

## 2025-07-18 RX ADMIN — ACETAMINOPHEN 650 MG: 325 TABLET ORAL at 18:22

## 2025-07-18 RX ADMIN — LOSARTAN POTASSIUM 100 MG: 50 TABLET, FILM COATED ORAL at 18:23

## 2025-07-18 RX ADMIN — ATORVASTATIN CALCIUM 40 MG: 40 TABLET, FILM COATED ORAL at 22:05

## 2025-07-18 RX ADMIN — THIAMINE HYDROCHLORIDE 500 MG: 100 INJECTION, SOLUTION INTRAMUSCULAR; INTRAVENOUS at 16:00

## 2025-07-18 RX ADMIN — POTASSIUM CHLORIDE 40 MEQ: 1.5 POWDER, FOR SOLUTION ORAL at 16:08

## 2025-07-18 SDOH — SOCIAL STABILITY: SOCIAL INSECURITY: HAS ANYONE EVER THREATENED TO HURT YOUR FAMILY OR YOUR PETS?: NO

## 2025-07-18 SDOH — SOCIAL STABILITY: SOCIAL INSECURITY: HAVE YOU HAD ANY THOUGHTS OF HARMING ANYONE ELSE?: NO

## 2025-07-18 SDOH — SOCIAL STABILITY: SOCIAL INSECURITY: ABUSE: ADULT

## 2025-07-18 SDOH — SOCIAL STABILITY: SOCIAL INSECURITY: WERE YOU ABLE TO COMPLETE ALL THE BEHAVIORAL HEALTH SCREENINGS?: YES

## 2025-07-18 SDOH — SOCIAL STABILITY: SOCIAL INSECURITY: DO YOU FEEL ANYONE HAS EXPLOITED OR TAKEN ADVANTAGE OF YOU FINANCIALLY OR OF YOUR PERSONAL PROPERTY?: NO

## 2025-07-18 SDOH — SOCIAL STABILITY: SOCIAL INSECURITY: HAVE YOU HAD THOUGHTS OF HARMING ANYONE ELSE?: NO

## 2025-07-18 SDOH — SOCIAL STABILITY: SOCIAL INSECURITY: DO YOU FEEL UNSAFE GOING BACK TO THE PLACE WHERE YOU ARE LIVING?: NO

## 2025-07-18 SDOH — SOCIAL STABILITY: SOCIAL INSECURITY: ARE YOU OR HAVE YOU BEEN THREATENED OR ABUSED PHYSICALLY, EMOTIONALLY, OR SEXUALLY BY ANYONE?: NO

## 2025-07-18 SDOH — SOCIAL STABILITY: SOCIAL INSECURITY: ARE THERE ANY APPARENT SIGNS OF INJURIES/BEHAVIORS THAT COULD BE RELATED TO ABUSE/NEGLECT?: NO

## 2025-07-18 SDOH — SOCIAL STABILITY: SOCIAL INSECURITY: DOES ANYONE TRY TO KEEP YOU FROM HAVING/CONTACTING OTHER FRIENDS OR DOING THINGS OUTSIDE YOUR HOME?: NO

## 2025-07-18 ASSESSMENT — ACTIVITIES OF DAILY LIVING (ADL)
HEARING - RIGHT EAR: FUNCTIONAL
FEEDING YOURSELF: INDEPENDENT
DRESSING YOURSELF: INDEPENDENT
JUDGMENT_ADEQUATE_SAFELY_COMPLETE_DAILY_ACTIVITIES: YES
WALKS IN HOME: INDEPENDENT
HEARING - LEFT EAR: FUNCTIONAL
PATIENT'S MEMORY ADEQUATE TO SAFELY COMPLETE DAILY ACTIVITIES?: YES
BATHING: INDEPENDENT
LACK_OF_TRANSPORTATION: NO
TOILETING: INDEPENDENT
GROOMING: INDEPENDENT
ADEQUATE_TO_COMPLETE_ADL: YES

## 2025-07-18 ASSESSMENT — ENCOUNTER SYMPTOMS
DYSPHORIC MOOD: 0
SPEECH DIFFICULTY: 0
NERVOUS/ANXIOUS: 1
SLEEP DISTURBANCE: 0
FACIAL ASYMMETRY: 0
ENDOCRINE NEGATIVE: 1
ARTHRALGIAS: 1
RESPIRATORY NEGATIVE: 1
ACTIVITY CHANGE: 0
NECK PAIN: 1
FATIGUE: 0
HALLUCINATIONS: 0
DIAPHORESIS: 0
APPETITE CHANGE: 0
ALLERGIC/IMMUNOLOGIC NEGATIVE: 1
DIZZINESS: 1
HEMATOLOGIC/LYMPHATIC NEGATIVE: 1
AGITATION: 0
FEVER: 0
DECREASED CONCENTRATION: 1
TREMORS: 0
HEADACHES: 1
CHILLS: 0
CARDIOVASCULAR NEGATIVE: 1
SEIZURES: 0
JOINT SWELLING: 0
NECK STIFFNESS: 1
MYALGIAS: 0
CONFUSION: 1
LIGHT-HEADEDNESS: 1
WEAKNESS: 1
UNEXPECTED WEIGHT CHANGE: 0
GASTROINTESTINAL NEGATIVE: 1
EYES NEGATIVE: 1
HYPERACTIVE: 0
NUMBNESS: 0
BACK PAIN: 1

## 2025-07-18 ASSESSMENT — LIFESTYLE VARIABLES
HAVE PEOPLE ANNOYED YOU BY CRITICIZING YOUR DRINKING: NO
HOW MANY STANDARD DRINKS CONTAINING ALCOHOL DO YOU HAVE ON A TYPICAL DAY: PATIENT DOES NOT DRINK
EVER HAD A DRINK FIRST THING IN THE MORNING TO STEADY YOUR NERVES TO GET RID OF A HANGOVER: NO
HOW OFTEN DO YOU HAVE A DRINK CONTAINING ALCOHOL: NEVER
AUDIT-C TOTAL SCORE: 0
HOW OFTEN DO YOU HAVE 6 OR MORE DRINKS ON ONE OCCASION: NEVER
SUBSTANCE_ABUSE_PAST_12_MONTHS: NO
PRESCIPTION_ABUSE_PAST_12_MONTHS: NO
AUDIT-C TOTAL SCORE: 0
SKIP TO QUESTIONS 9-10: 1
EVER FELT BAD OR GUILTY ABOUT YOUR DRINKING: NO
HAVE YOU EVER FELT YOU SHOULD CUT DOWN ON YOUR DRINKING: NO
TOTAL SCORE: 0

## 2025-07-18 ASSESSMENT — PAIN - FUNCTIONAL ASSESSMENT
PAIN_FUNCTIONAL_ASSESSMENT: 0-10

## 2025-07-18 ASSESSMENT — PATIENT HEALTH QUESTIONNAIRE - PHQ9
1. LITTLE INTEREST OR PLEASURE IN DOING THINGS: NOT AT ALL
SUM OF ALL RESPONSES TO PHQ9 QUESTIONS 1 & 2: 0
2. FEELING DOWN, DEPRESSED OR HOPELESS: SEVERAL DAYS
SUM OF ALL RESPONSES TO PHQ9 QUESTIONS 1 AND 2: 1
1. LITTLE INTEREST OR PLEASURE IN DOING THINGS: NOT AT ALL
2. FEELING DOWN, DEPRESSED OR HOPELESS: NOT AT ALL

## 2025-07-18 ASSESSMENT — COGNITIVE AND FUNCTIONAL STATUS - GENERAL
MOBILITY SCORE: 23
CLIMB 3 TO 5 STEPS WITH RAILING: A LITTLE
PATIENT BASELINE BEDBOUND: NO
DAILY ACTIVITIY SCORE: 24

## 2025-07-18 ASSESSMENT — PAIN DESCRIPTION - ORIENTATION: ORIENTATION: UPPER

## 2025-07-18 ASSESSMENT — PAIN SCALES - GENERAL
PAINLEVEL_OUTOF10: 7
PAINLEVEL_OUTOF10: 7
PAINLEVEL_OUTOF10: 0 - NO PAIN
PAINLEVEL_OUTOF10: 7

## 2025-07-18 ASSESSMENT — PAIN DESCRIPTION - LOCATION
LOCATION: NECK
LOCATION: BACK

## 2025-07-18 ASSESSMENT — PAIN DESCRIPTION - DESCRIPTORS
DESCRIPTORS: DISCOMFORT
DESCRIPTORS: TIGHTNESS;SHOOTING

## 2025-07-18 ASSESSMENT — PAIN DESCRIPTION - PAIN TYPE: TYPE: ACUTE PAIN

## 2025-07-18 NOTE — PROGRESS NOTES
Subjective     Patient ID: Ayla Paredes is a 55 y.o. male who presents for Follow-up (Decrease circulation in both hands, feet and knees. Numbing sensation. X 4 d. Only change in habit was increased weight lifting and exercise. Unstable ambulation.).    HPI  Ayla Paredes is new to me today, normally see's Dr. Clayton.    Approximately 72 hours ago he started noticing numbness and tingling in bilateral hands which moved to his wrists, then feet, then knees. He then went to [facility] where a CT brain was performed which showed no acute process, troponin's were elevated but flat. Hypokalemia was noted and thought to be responsible for his paresthesias, he was subsequently discharged w/ instructions to follow up with PCP.    His gait then start to become uncoordinated the day after so he went to [facility] where workup was negative, abnormal gait was noted but not further investigated. He was subsequently discharged. He came to our office today requesting to be seen, I was able to accommodate seeing him in my schedule. He reports over the past 24 hours his gait continued to become unsteady and has almost fallen several times. He does endorse some mild UE bilateral weakness (noted 4/5 on exam), new headache, and C-Spine pressure/pain. He was found to be hypertensive, EMS was subsequently called for him to be taken back to the ER out of concerns of a cerebellar stroke. Recommend MRI/neuro consult. EMS mentioned they would take him to [facility] [place], so I personally called the ER there and spoke to Jerri to express my concern of cerebellar CVA. Pt was agreeable to plan of care.     Review of Systems   Constitutional:  Negative for activity change, appetite change, chills, diaphoresis, fatigue, fever and unexpected weight change.   HENT: Negative.     Eyes: Negative.    Respiratory: Negative.     Cardiovascular: Negative.    Gastrointestinal: Negative.    Endocrine: Negative.    Genitourinary: Negative.   "  Musculoskeletal:  Positive for arthralgias, back pain, gait problem (Large), neck pain and neck stiffness. Negative for joint swelling and myalgias.   Skin: Negative.    Allergic/Immunologic: Negative.    Neurological:  Positive for dizziness, weakness (Slight bilat UE), light-headedness and headaches. Negative for tremors, seizures, syncope, facial asymmetry, speech difficulty and numbness.   Hematological: Negative.    Psychiatric/Behavioral:  Positive for confusion (Trace) and decreased concentration. Negative for agitation, behavioral problems, dysphoric mood, hallucinations, self-injury, sleep disturbance and suicidal ideas. The patient is nervous/anxious. The patient is not hyperactive.        Objective  Vitals:  BP (!) 130/104 (BP Location: Right arm, Patient Position: Sitting, BP Cuff Size: Large adult)   Pulse 55   Temp 35.7 °C (96.3 °F)   Ht 1.651 m (5' 5\")   Wt 81.9 kg (180 lb 9.6 oz)   SpO2 99%   BMI 30.05 kg/m²     Physical Exam  Vitals and nursing note reviewed.   Constitutional:       General: He is not in acute distress.     Appearance: He is not ill-appearing, toxic-appearing or diaphoretic.      Comments: Alert and oriented x 3, but appears to have slight delay in cognitive response to questions.   HENT:      Head: Normocephalic and atraumatic.      Right Ear: Tympanic membrane, ear canal and external ear normal. There is no impacted cerumen.      Left Ear: Tympanic membrane, ear canal and external ear normal. There is no impacted cerumen.      Nose: Nose normal. No congestion or rhinorrhea.      Mouth/Throat:      Mouth: Mucous membranes are moist.      Pharynx: No oropharyngeal exudate or posterior oropharyngeal erythema.     Eyes:      General:         Right eye: No discharge.         Left eye: No discharge.      Extraocular Movements: Extraocular movements intact.      Conjunctiva/sclera: Conjunctivae normal.      Pupils: Pupils are equal, round, and reactive to light.     Neck:      " Vascular: No carotid bruit.      Comments: Cervical paraspinal muscle spasm.    Cardiovascular:      Rate and Rhythm: Regular rhythm. Bradycardia present.      Pulses: Normal pulses.      Heart sounds: Normal heart sounds. No murmur heard.  Pulmonary:      Effort: Pulmonary effort is normal. No respiratory distress.      Breath sounds: Normal breath sounds. No stridor. No wheezing, rhonchi or rales.   Chest:      Chest wall: No tenderness.   Abdominal:      General: There is no distension.      Palpations: Abdomen is soft. There is no mass.      Tenderness: There is no abdominal tenderness. There is no right CVA tenderness, left CVA tenderness, guarding or rebound.      Hernia: No hernia is present.     Musculoskeletal:         General: No swelling, deformity or signs of injury. Normal range of motion.      Cervical back: Normal range of motion. Rigidity and tenderness present.      Right lower leg: No edema.      Left lower leg: No edema.   Lymphadenopathy:      Cervical: No cervical adenopathy.     Skin:     General: Skin is warm.      Capillary Refill: Capillary refill takes less than 2 seconds.      Coloration: Skin is not jaundiced.      Findings: No bruising, erythema, lesion or rash.     Neurological:      Mental Status: He is alert and oriented to person, place, and time.      Cranial Nerves: No cranial nerve deficit.      Sensory: No sensory deficit.      Motor: Weakness (Trace UE weakness against downward force. 4/5 bilaterally) present.      Coordination: Coordination normal.      Gait: Gait abnormal (Strong abnormal gait. Strong fall risk).      Deep Tendon Reflexes: Reflexes normal.     Psychiatric:         Mood and Affect: Mood normal.         Behavior: Behavior normal.         Thought Content: Thought content normal.         Judgment: Judgment normal.       .CBC:   Lab Results   Component Value Date    WBC 9.5 07/16/2025    RBC 3.92 (L) 07/16/2025    HGB 11.2 (L) 07/16/2025    HCT 33.6 (L) 07/16/2025  "   MCV 86 07/16/2025    MCH 28.6 07/16/2025    MCHC 33.3 07/16/2025     07/16/2025    MPV 9.1 07/03/2025       CBC w/Diff:   Lab Results   Component Value Date    WBC 9.5 07/16/2025    NRBC 0.0 07/16/2025    RBC 3.92 (L) 07/16/2025    HGB 11.2 (L) 07/16/2025    HCT 33.6 (L) 07/16/2025    MCV 86 07/16/2025    MCHC 33.3 07/16/2025     07/16/2025    RDW 12.7 07/16/2025    NEUTOPHILPCT 40.9 07/16/2025    IGPCT 0.2 07/16/2025    LYMPHOPCT 46.6 07/16/2025    MONOPCT 9.1 07/16/2025    EOSPCT 2.3 07/16/2025    BASOPCT 0.9 07/16/2025    NEUTROABS 3.88 07/16/2025    LYMPHSABS 4.43 07/16/2025    MONOSABS 0.86 07/16/2025    EOSABS 0.22 07/16/2025    BASOSABS 0.09 07/16/2025        CMP:  Lab Results   Component Value Date    GLUCOSE 104 (H) 07/16/2025     (L) 07/16/2025    K 3.0 (L) 07/16/2025    CL 97 (L) 07/16/2025    CO2 28 07/16/2025    ANIONGAP 13 07/16/2025    BUN 23 07/16/2025    CREATININE 1.47 (H) 07/16/2025    CALCIUM 9.4 07/16/2025    ALBUMIN 4.2 07/16/2025    ALKPHOS 74 07/16/2025    PROT 7.6 07/16/2025    AST 18 07/16/2025    BILITOT 0.5 07/16/2025    ALT 12 07/16/2025        BMP:  Lab Results   Component Value Date    GLUCOSE 104 (H) 07/16/2025    BUN 23 07/16/2025    CREATININE 1.47 (H) 07/16/2025     (L) 07/16/2025    K 3.0 (L) 07/16/2025    CL 97 (L) 07/16/2025    CO2 28 07/16/2025    ANIONGAP 13 07/16/2025    CALCIUM 9.4 07/16/2025    EGFR 56 (L) 07/16/2025        Lipid:   Lab Results   Component Value Date    CHOL 164 07/03/2025    HDL 41 07/03/2025    CHHDL 4.0 07/03/2025    LDLCALC 102 (H) 07/03/2025    VLDL 26 07/26/2024    TRIG 117 07/03/2025       LDL Direct:  No results found for: \"LDLDIRECT\"     TSH:   Lab Results   Component Value Date    TSH 0.87 04/09/2025        B12:  No results found for: \"TYOHSAKA04\"    Vitamin D:  Lab Results   Component Value Date    VITD25 89 07/03/2025        HgA1c:   Lab Results   Component Value Date    HGBA1C 6.5 07/02/2025    DULXMUVM4Q 128 " "07/26/2024       PSA:   Lab Results   Component Value Date    PSA 0.76 04/09/2025       FreeT4:  No results found for: \"FREET4\"    T3:   No results found for: \"T3FREE\"      ontains critical data Troponin, High Sensitivity, 1 Hour  Order: [id number] - Part of Panel Order [id number]   Status: Final result    Test Result Released: Yes (not seen)    0 Result Notes         Component  Ref Range & Units 2 d ago  (7/16/25) 2 d ago  (7/16/25) 1 yr ago  (5/28/24) 1 yr ago  (5/28/24)   Troponin I, High Sensitivity  0 - 20 ng/L 51 High Panic  52 High Panic  33 R 30 R   Comment: Previous result verified on 7/16/2025 0442 on specimen/case [id number] called with component TRPHS for procedure Troponin I, High Sensitivity, Initial with value 52 ng/L.        Narrative & Impression   Interpreted By:  Miley Infante,   STUDY:  CT HEAD WO IV CONTRAST;  7/16/2025 7:50 am      INDICATION:  Signs/Symptoms:Numbness.      COMPARISON:  None      ACCESSION NUMBER(S):  [id number]      ORDERING CLINICIAN:  LOYDA ARDON      TECHNIQUE:  Examination was performed in the axial plane with sagittal and  coronal reconstructions. Bone and soft tissue algorithms were  performed.      FINDINGS:  INTRACRANIAL:  The ventricular system is symmetrical and nondilated.  No mass or mass effect is identified.  There is no hemorrhage or subdural fluid collection.  There is no acute infarct.          EXTRACRANIAL:  Visualized paranasal sinuses and mastoids are clear.      IMPRESSION:  No acute intracranial pathology.      MACRO:  None      Signed by: Miley Infante 7/16/2025 8:31 AM  Dictation workstation:   [id number]           1. Ataxia        2. Unsteady gait        3. Neck pain        4. Hypertension, unspecified type        5. Elevated troponin     Flat at ER but higher then past documented ones.       Sent by [facility] to [facility] out of concern for Cerebellar Stroke. Recommend neuro consult w/ possible MRI. May benefit from admission for neuro " workup.       I personally called in report to nurse Guthrie at [facility] [place], operated by [facility],

## 2025-07-18 NOTE — CONSULTS
GENERAL CONSULT NOTE    History of Present Illness: Ayla Praedes is a 55 y.o.  male with a PMHx of HLD, T2DM, CKD presenting to the ED for bilateral hand and foot numbness for which neurology is consulted for.     Patient reports that on Tuesday 7/15 he developed acute onset hand and foot numbness post-coitally. He was seen at [facility] on 7/16. CTH was normal. Had a mild troponin elevation which was thought to be secondary to chronic kidney disease. CMP with mild electrolyte abnoramlities and kidney dysfunction. CBC without leukocytosis. UA bland. Syphilis treponemal w/reflex was negative.     Today he reports that he continues to have numbness in his hands and feet and is now unsteady on his feet. No complaints of any visual changes.     Occupation: Runs night clubs   Travel: None   Living: No pets, lives with fiance  Recent Illnesses: None   Vaccinations: None     ROS: All systems reviewed and were negative except as above    Home Medications:     Current Outpatient Medications   Medication Instructions    amLODIPine (NORVASC) 10 mg, oral, Daily    atorvastatin (LIPITOR) 40 mg, oral, Daily    cetirizine (ZYRTEC) 10 mg, oral, Daily    chlorthalidone (HYGROTON) 37.5 mg, oral, Daily    Dexcom G7  misc Use as instructed    Dexcom G7 Sensor device Change every 10 days    ergocalciferol (VITAMIN D-2) 50,000 Units, oral, Once Weekly    fluticasone (Flonase) 50 mcg/actuation nasal spray 1 spray, Each Nostril, Daily, Shake gently. Before first use, prime pump. After use, clean tip and replace cap.    losartan (COZAAR) 100 mg, oral, Daily    metFORMIN XR (GLUCOPHAGE-XR) 500 mg, oral, 2 times daily (morning and late afternoon), Do not crush, chew, or split.    metoprolol succinate XL (TOPROL-XL) 25 mg, oral, Daily, Do not crush or chew.    metoprolol succinate XL (TOPROL-XL) 50 mg, oral, Daily, Do not crush or chew.    potassium chloride CR (Klor-Con) 10 mEq ER tablet 10 mEq, oral, 2 times daily, Do not crush, chew, or  split.    tamsulosin (FLOMAX) 0.4 mg, oral, Daily     Past Medical History:    has a past medical history of Hypertension.    Past Surgical History:    has a past surgical history that includes CT angio coronary art with heartflow if score >30% (12/19/2023).    Allergies:   RX Allergies[1]    Family History:   Family History[2]    Past Social History:   Social History[3]    Vitals:   Temperature:  [35.7 °C (96.3 °F)-36 °C (96.8 °F)] 36 °C (96.8 °F)  Heart Rate:  [55-64] 55  Respirations:  [16] 16  BP: (130-210)/(104-130) 210/130    Physical Exam:   General Appearance:  No distress, alert, interactive and cooperative.     Cardiovascular: Regular, rate and rhythm, no murmurs, normal S1 and S2. Carotid pulses intact without any bruits. Pulses +2 and equal in all extremities. No swelling, varicosities, edema, or tenderness to palpation.      Mental State: Orientation was normal to time, place and person. Recent and remote memory was intact.  Attention span and concentration were normal. Language testing was normal for comprehension, repetition, expression, and naming. General fund of knowledge intact    Cranial Nerves:   CN2 : Not tested  CN 3, 4, 6: Pupils round, 4 mm in diameter, equally reactive to light. Lids symmetric; no ptosis. EOMs normal alignment, full range with normal saccades, pursuit and convergence.   No nystagmus.   CN 5: Facial sensation intact bilaterally.   CN 7: Normal and symmetric facial strength. Nasolabial folds symmetric.   CN 8: Hearing intact to voice  CN 9: Palate elevates symmetrically.   CN 11: Normal strength of shoulder shrug and neck turning.   CN 12: Tongue midline, with normal bulk and strength; no fasciculations.     Motor: Muscle bulk and tone were normal in both upper and lower extremities. No fasciculations, tremor or other abnormal movements were present.      Strength      R L  Shoulder abduction (C4-C5)  5 5  Elbow flexion (C5-C6)   5 5  Elbow extension (C7-C8)  5 5  Wrist  extension (C6-C7)  5 5  Finger extension (C7-C8)  5 5  Finger flexion Median (C8-T1)  5 5  Finger flexion Ulnar (C8-T1)  5 5    Hip flexion (IP, L2-L3)      5 5  Knee extension (Fem, L2-L4)    5 5  Knee flexion (Sci, L5-S1)      5 5  DorsiFlex (DePer, L5-S1l)             5          5  PlantarFlex (Tibial, S1-S2)          5          5  Inversion (Tibial L5 )   5 5   Eversion (SupPer, L5-S1)  5 5    Reflexes: Right/ Left:  Biceps 2/2, brachioradialis 2/2, triceps 2/2, patellar 3/3 (bilateral cross adductors present), ankle 2/2  toes downgoing to plantar stimulation. No clonus, frontal release signs or other pathologic reflexes present.     Sensory: In both upper and lower extremities, sensation was intact to light touch  - Sensation reduced to pinprick by 20% in the left dorsal and volar aspects of the hand  - Otherwise sensation to pinprick intact in the bilateral upper and lower extremities proximally and distally   - Sensation to temperature intact in the bilateral upper and lower extremities proximally and distally   - Proprioception intact in bilateral toes     Coordination: In both upper extremities, finger-nose-finger was intact without dysmetria or overshoot. I     Gait: Station was unstable with a wide base.  Romberg sign was positive. Unable to perform tandem gait.    Labs:   Results from last 72 hours   Lab Units 07/18/25  1348 07/16/25  0331   WBC AUTO x10*3/uL 6.8 9.5   HEMOGLOBIN g/dL 11.6* 11.2*   HEMATOCRIT % 34.7* 33.6*   PLATELETS AUTO x10*3/uL 422 350      Results from last 72 hours   Lab Units 07/18/25  1348 07/16/25  0331   SODIUM mmol/L 131* 135*   POTASSIUM mmol/L 3.4* 3.0*   CHLORIDE mmol/L 93* 97*   CO2 mmol/L 32 28   BUN mg/dL 15 23   CREATININE mg/dL 1.39* 1.47*   MAGNESIUM mg/dL 1.97 2.01   PHOSPHORUS mg/dL  --  3.3   GLUCOSE mg/dL 98 104*      Results from last 72 hours   Lab Units 07/18/25  1348 07/16/25  0331   ALK PHOS U/L 76 74   AST U/L 18 18   ALT U/L 11 12   BILIRUBIN TOTAL mg/dL  0.5 0.5               BNP   Date/Time Value Ref Range Status   05/28/2024 06:32 PM 67 0 - 99 pg/mL Final       Lab Results   Component Value Date    GLUCOSEU Normal 07/16/2025    BLOODU NEGATIVE 07/16/2025    PROTUR NEGATIVE 07/16/2025    NITRITEU NEGATIVE 07/16/2025    LEUKOCYTESU NEGATIVE 07/16/2025    WBCU NONE 07/26/2024    RBCU 1-2 07/26/2024       Imaging:  MR brain wo IV contrast  Result Date: 7/18/2025  1. No acute infarction or acute hemorrhage.   2. Probable mild chronic white matter small vessel ischemic changes.   I personally reviewed the images/study and I agree with the findings as stated by resident Primo Caldera. This study was interpreted at [place], [place].   MACRO: None   Signed by: Jessee Solano 7/18/2025 3:50 PM Dictation workstation:   [id number]    CT head wo IV contrast  Result Date: 7/16/2025  No acute intracranial pathology.   MACRO: None   Signed by: Miley Infante 7/16/2025 8:31 AM Dictation workstation:   [id number]      Assessment/Recommendations  Ayla Paredes is a 55 y.o.  male with a PMHx of  HTN, DM, CKD presenting for bilateral hand and foot numbness for which neurology is consulted for. Symptoms began acutely on  7/15 and have not progressed beyond the hands and feet. On neurological exam he has no objective sensory changes, however he has a positive romberg sign as well as a wide based gait with inability to perform tandem gait. He has no recent travel, but his occupation does put him at a increased infection risk. No recent weight changes or substance use to suggest a nutritional issue. MRI brain personally reviewed and shows no acute abnormalities. Given the diffuse hyperreflexia and positive romberg sign this picture localizes to a likely spinal cord etiology, possibly posterior spinal. In light of this would recommend further imaging to rule out cord compression.     - MRI C, T, L spine with and without contrast    -  Please send Vitamin B12, B6, B1, Vitamin E, TSH, Hepatitis panel, Copper, Zinc, HIV, syphillis with reflex, UDS, heavy metal screen.     - Regarding the Treponemal antibody testing,  per laboratory services this lab is available but I do not have clearance to order this lab.  I have requested a consult from the physician lab consult team and when I receive a response I will provide an update. Per the automated message line this can take up to 24 hours.      - Discussed admission with patient, he requested to not stay. If imaging is negative for acute emergencies acceptable to follow up outpatient with very close follow up.     - Please place outpatient neurology follow up with STAT referral.     Patient seen, examined, and discussed with attending physician Dr. Bullock.    Yamilex Benites MD  Department of Neurology, PGY-4  General [id number]             [1] No Known Allergies  [2]   Family History  Problem Relation Name Age of Onset    Heart attack Father     [3]   Social History  Tobacco Use    Smoking status: Never     Passive exposure: Past    Smokeless tobacco: Never   Vaping Use    Vaping status: Never Used   Substance Use Topics    Alcohol use: Not Currently     Alcohol/week: 4.0 standard drinks of alcohol     Types: 4 Shots of liquor per week     Comment: occassionally    Drug use: Never

## 2025-07-18 NOTE — TELEPHONE ENCOUNTER
"Nurse went up and spoke with patient who states the ER has sent him home 2 times now but something is wrong and he needs to see his PCP is what they are telling him - I advised him she is out of office and we had an appt on Tuesday we added him tro - he states \"something is wrong\" I advised patient that we call 911 to have him sent to ER for a workup and evaluation patient then stood uop and walked out of the office. Will keep visit scheduled for Tuesday on file. Sending to PCP as an FYI. Epic notes available for review from ER visits   "

## 2025-07-18 NOTE — ED PROVIDER NOTES
History of Present Illness     History provided by: Patient  Limitations to History: None    HPI:  Ayla Paredes is a 55 y.o. male presenting with shortness of breath, bilateral hand and feet numbness tingling and upper back tingling.  Patient states that these symptoms been going on for 3 days and progressing daily.  He states his symptoms for started at 2 AM the day prior to presenting to [facility].  Patient states that the evening before this started he was working out and bench pressing, states that he did not have any symptoms at the time of working out and his neck pain and bilateral hand and feet numbness started acutely in the middle of the night after sexual intercourse.  Patient had imaging done at [facility] including a CT head without contrast on 7/16 that did not show any acute infarct or hemorrhage.  Patient was then seen at [facility] ED with numbness and tingling in bilateral palms and soles of feet.  Patient was ambulatory at that time with an unsteady gait, he was discharged home to follow-up with his PCP.  Patient saw a primary care physician today sent him to the ED with concern for cerebellar stroke.  Patient states the progression of numbness and tingling bilateral hands which then moved to his wrists then feet then knees, this is now progressed to uncoordination with difficulty ambulating, patient has not fallen but states he is almost fallen several times.  He has head and C-spine pressure and pain.  Patient states that last night he was woken up out of his sleep feeling short of breath and this feeling lasted for 30 minutes.  He states that it went away and he has not felt short of breath since then.  Denies any shortness of breath with ambulation.  No chest pain.  Denies any vision changes, lightheadedness or dizziness.  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  He states the only  changes in his food recently were he tried lobster rolls from a new place this week.  Denies any recent travel.  Denies daily alcohol use.  Denies any recent illness, cough or congestion.    Physical Exam   Triage vitals:  T 36 °C (96.8 °F)  HR 64  BP (!) 190/118  RR 16  O2 97 % None (Room air)    General: Awake, alert, in no acute distress  Eyes: Gaze conjugate.  No scleral icterus or injection  HENT: Normo-cephalic, atraumatic. No stridor. pain to palpation of C-spine, described as pressure  CV: Regular rate, regular rhythm. Radial pulses 2+ bilaterally  Resp: Breathing non-labored, speaking in full sentences.  Clear to auscultation bilaterally  GI: Soft, non-distended, non-tender. No rebound or guarding.  MSK/Extremities: No gross bony deformities. Moving all extremities  Skin: Warm. Appropriate color  Neuro: Awake and alert. Answers all questions appropriately. Speech is clear. Face is symmetric without facial droop and facial sensation to light touch equal bilaterally. Uvula midline. Tongue protrusion midline. Hearing intact bilaterally. Full and equal shoulder shrug and head turn against resistance. 5/5 motor strength of UEs and LEs. Sensation decreased to light touch in bilateral hands and feet, sensation is intact at the level of the wrist and ankles. Finger to nose and heel to shin intact.  Positive Romberg sign.  Ataxia and wide-based gait when ambulating.  Psych: Appropriate mood and affect    Medical Decision Making & ED Course   Medical Decision Makin y.o. male presenting with ataxia, shortness of breath, bilateral upper and lower extremity hands and feet numbness and pain to C-spine. Triage vital signs reviewed and patient is hemodynamically stable at this time.  On no examination patient has signs of ataxia, when he attempts to ambulate he falls from side to side and has a wide-based gait, in the setting I am concerned for cerebellar stroke.  Additionally patient has history of lifting  weights at gym prior to symptoms, differential includes vertebral artery dissection.  Given episode of shortness of breath, chest x-ray was obtained that showed no acute abnormalities including pleural effusions or pneumothorax.  Given distribution of numbness and tingling, also considered Wernicke's, vitamin deficiencies, the studies were ordered.  Alcohol level was negative, patient additionally does not endorse daily drinking.  Patient does not have signs of infection, no prior symptoms or recent illness, patient is afebrile.  No cough or congestion.  CT imaging of head and neck were obtained.  Reviewed prior CT head imaging from ED's.  These were interpreted as negative for acute abnormalities, no lesions described. neurology was consulted for ataxia and symptoms, discussed with neurology need for LP, they recommended obtaining MRIs of spine first to evaluate for lesions and discussed with patient admission to their service for further workup.  Additional lab work was ordered at this time including heavy metals, copper and zinc.  Syphilis had not resulted at this time, patient did have prior negative syphilis test at [facility].  HIV testing was ordered as well.  Neurology recommended admission to their service, patient initially stated he did not want to stay in the hospital, however had lengthy discussion with patient at bedside discussing the risks given his symptoms and progression and need for further observation and possible LP after MRI imaging.  Patient agreeable to stay for admission and admitted to the neurology service with spine MRIs pending.    ED Course:  Diagnoses as of 07/20/25 2109   Ataxia       Independent Result Review and Interpretation: Relevant laboratory and radiographic results were reviewed and independently interpreted by myself.  As necessary, they are commented on in the ED Course.    Care Considerations: As documented above in MDM      Disposition   As a result of their  workup, the patient will require admission to the hospital.  The patient was informed of his diagnosis.  The patient was given the opportunity to ask questions and I answered them. The patient agreed to be admitted to the hospital.    Procedures   Procedures    Patient seen and discussed with ED attending physician.    Vida Lou DO  Emergency Medicine     Vida Lou DO  Resident  07/20/25 4003

## 2025-07-18 NOTE — TELEPHONE ENCOUNTER
PT came into office stating he was seen in [facility] ER and [facility] for numbness in his hands and feet. PT states he was discharged from both hospitals, but he is getting worse. PT states it is now going into his spine. PT very unstable while walking, and now having shortness of breath.  Offered PT appointment on Tuesday when Dr. Clayton returns to the office. Advised PT to return to ER with new symptom of shortness of breath. Offered to call 911, but PT refused and walked out of the office stating he doesn't think he will be alive by Tuesday.

## 2025-07-18 NOTE — ED TRIAGE NOTES
Pt presented with c/o SOB, bilateral hand and feet numbness and tingling and upper back numbness and tingling, pt states that symptoms have been going on for 3 days pt was advised that he needed an MRI   
DISPLAY PLAN FREE TEXT

## 2025-07-18 NOTE — TELEPHONE ENCOUNTER
PT calling office stating he was at [facility] and told he would have to make an appointment for MRI, as he was told that is what he needed while in the office today for his appointment to rule out stroke. PT was sent out from appointment via EMS and taken to [facility], but PT states he was told he had to go to [facility] and had a friend pick him up to take him there. PT currently very confused.    PT advised to go to ER at [facility] to be checked in for concern for cerebellar stroke. PT states he will go to ER at [facility].

## 2025-07-18 NOTE — TELEPHONE ENCOUNTER
Offer him an appointment to see me today, otherwise agree with 911/ER as needed.    Kenneth Francisco PA-C

## 2025-07-18 NOTE — H&P
GENERAL CONSULT NOTE    History of Present Illness: Ayla Paredes is a 55 y.o.  male with a PMHx of HLD, T2DM, CKD presenting to the ED for bilateral hand and foot numbness for which neurology is consulted for.     Patient reports that on Tuesday 7/15 he developed acute onset hand and foot numbness post-coitally. He was seen at [facility] on 7/16. CTH was normal. Had a mild troponin elevation which was thought to be secondary to chronic kidney disease. CMP with mild electrolyte abnoramlities and kidney dysfunction. CBC without leukocytosis. UA bland. Syphilis treponemal w/reflex was negative.     Today he reports that he continues to have numbness in his hands and feet and is now unsteady on his feet. No complaints of any visual changes.     Occupation: Runs night clubs   Travel: None   Living: No pets, lives with fiance  Recent Illnesses: None   Vaccinations: None     ROS: All systems reviewed and were negative except as above    Home Medications:     Current Outpatient Medications   Medication Instructions    amLODIPine (NORVASC) 10 mg, oral, Daily    atorvastatin (LIPITOR) 40 mg, oral, Daily    cetirizine (ZYRTEC) 10 mg, oral, Daily    chlorthalidone (HYGROTON) 37.5 mg, oral, Daily    Dexcom G7  misc Use as instructed    Dexcom G7 Sensor device Change every 10 days    ergocalciferol (VITAMIN D-2) 50,000 Units, oral, Once Weekly    fluticasone (Flonase) 50 mcg/actuation nasal spray 1 spray, Each Nostril, Daily, Shake gently. Before first use, prime pump. After use, clean tip and replace cap.    losartan (COZAAR) 100 mg, oral, Daily    metFORMIN XR (GLUCOPHAGE-XR) 500 mg, oral, 2 times daily (morning and late afternoon), Do not crush, chew, or split.    metoprolol succinate XL (TOPROL-XL) 25 mg, oral, Daily, Do not crush or chew.    metoprolol succinate XL (TOPROL-XL) 50 mg, oral, Daily, Do not crush or chew.    potassium chloride CR (Klor-Con) 10 mEq ER tablet 10 mEq, oral, 2 times daily, Do not crush, chew, or  split.    tamsulosin (FLOMAX) 0.4 mg, oral, Daily     Past Medical History:    has a past medical history of Hypertension.    Past Surgical History:    has a past surgical history that includes CT angio coronary art with heartflow if score >30% (12/19/2023).    Allergies:   RX Allergies[1]    Family History:   Family History[2]    Past Social History:   Social History[3]    Vitals:   Temperature:  [35.7 °C (96.3 °F)-36 °C (96.8 °F)] 36 °C (96.8 °F)  Heart Rate:  [55-64] 55  Respirations:  [15-16] 15  BP: (130-210)/(104-130) 199/110    Physical Exam:   General Appearance:  No distress, alert, interactive and cooperative.     Cardiovascular: Regular, rate and rhythm, no murmurs, normal S1 and S2. Carotid pulses intact without any bruits. Pulses +2 and equal in all extremities. No swelling, varicosities, edema, or tenderness to palpation.      Mental State: Orientation was normal to time, place and person. Recent and remote memory was intact.  Attention span and concentration were normal. Language testing was normal for comprehension, repetition, expression, and naming. General fund of knowledge intact    Cranial Nerves:   CN2 : Not tested  CN 3, 4, 6: Pupils round, 4 mm in diameter, equally reactive to light. Lids symmetric; no ptosis. EOMs normal alignment, full range with normal saccades, pursuit and convergence.   No nystagmus.   CN 5: Facial sensation intact bilaterally.   CN 7: Normal and symmetric facial strength. Nasolabial folds symmetric.   CN 8: Hearing intact to voice  CN 9: Palate elevates symmetrically.   CN 11: Normal strength of shoulder shrug and neck turning.   CN 12: Tongue midline, with normal bulk and strength; no fasciculations.     Motor: Muscle bulk and tone were normal in both upper and lower extremities. No fasciculations, tremor or other abnormal movements were present.      Strength      R L  Shoulder abduction (C4-C5)  5 5  Elbow flexion (C5-C6)   5 5  Elbow extension (C7-C8)  5 5  Wrist  extension (C6-C7)  5 5  Finger extension (C7-C8)  5 5  Finger flexion Median (C8-T1)  5 5  Finger flexion Ulnar (C8-T1)  5 5    Hip flexion (IP, L2-L3)      5 5  Knee extension (Fem, L2-L4)    5 5  Knee flexion (Sci, L5-S1)      5 5  DorsiFlex (DePer, L5-S1l)             5          5  PlantarFlex (Tibial, S1-S2)          5          5  Inversion (Tibial L5 )   5 5   Eversion (SupPer, L5-S1)  5 5    Reflexes: Right/ Left:  Biceps 2/2, brachioradialis 2/2, triceps 2/2, patellar 3/3 (bilateral cross adductors present), ankle 2/2  toes downgoing to plantar stimulation. No clonus, frontal release signs or other pathologic reflexes present.     Sensory: In both upper and lower extremities, sensation was intact to light touch  - Sensation reduced to pinprick by 20% in the left dorsal and volar aspects of the hand  - Otherwise sensation to pinprick intact in the bilateral upper and lower extremities proximally and distally   - Sensation to temperature intact in the bilateral upper and lower extremities proximally and distally   - Proprioception intact in bilateral toes     Coordination: In both upper extremities, finger-nose-finger was intact without dysmetria or overshoot. I     Gait: Station was unstable with a wide base.  Romberg sign was positive. Unable to perform tandem gait.    Labs:   Results from last 72 hours   Lab Units 07/18/25  1348 07/16/25  0331   WBC AUTO x10*3/uL 6.8 9.5   HEMOGLOBIN g/dL 11.6* 11.2*   HEMATOCRIT % 34.7* 33.6*   PLATELETS AUTO x10*3/uL 422 350      Results from last 72 hours   Lab Units 07/18/25  1348 07/16/25  0331   SODIUM mmol/L 131* 135*   POTASSIUM mmol/L 3.4* 3.0*   CHLORIDE mmol/L 93* 97*   CO2 mmol/L 32 28   BUN mg/dL 15 23   CREATININE mg/dL 1.39* 1.47*   MAGNESIUM mg/dL 1.97 2.01   PHOSPHORUS mg/dL  --  3.3   GLUCOSE mg/dL 98 104*      Results from last 72 hours   Lab Units 07/18/25  1348 07/16/25  0331   ALK PHOS U/L 76 74   AST U/L 18 18   ALT U/L 11 12   BILIRUBIN TOTAL mg/dL  0.5 0.5               BNP   Date/Time Value Ref Range Status   05/28/2024 06:32 PM 67 0 - 99 pg/mL Final       Lab Results   Component Value Date    GLUCOSEU Normal 07/16/2025    BLOODU NEGATIVE 07/16/2025    PROTUR NEGATIVE 07/16/2025    NITRITEU NEGATIVE 07/16/2025    LEUKOCYTESU NEGATIVE 07/16/2025    WBCU NONE 07/26/2024    RBCU 1-2 07/26/2024       Imaging:  MR brain wo IV contrast  Result Date: 7/18/2025  1. No acute infarction or acute hemorrhage.   2. Probable mild chronic white matter small vessel ischemic changes.   I personally reviewed the images/study and I agree with the findings as stated by resident Primo Caldera. This study was interpreted at [place], [place].   MACRO: None   Signed by: Jessee Solano 7/18/2025 3:50 PM Dictation workstation:   [id number]    CT head wo IV contrast  Result Date: 7/16/2025  No acute intracranial pathology.   MACRO: None   Signed by: Miley Infante 7/16/2025 8:31 AM Dictation workstation:   [id number]      Assessment/Recommendations  Ayla Paredes is a 55 y.o.  male with a PMHx of  HTN, DM, CKD presenting for bilateral hand and foot numbness for which neurology is consulted for. Symptoms began acutely on  7/15 and have not progressed beyond the hands and feet. On neurological exam he has no objective sensory changes, however he has a positive romberg sign as well as a wide based gait with inability to perform tandem gait. He has no recent travel, but his occupation does put him at a increased infection risk. No recent weight changes or substance use to suggest a nutritional issue. MRI brain personally reviewed and shows no acute abnormalities. Given the diffuse hyperreflexia and positive romberg sign this picture localizes to a likely spinal cord etiology, possibly posterior spinal. In light of this would recommend further imaging to rule out cord compression.     Impression: Acute symmetric stocking and glove  neuropathy     #Acute symmetric stocking and glove neuropathy   - MRI C, T, L spine with and without contrast  -  Vitamin B12, B6, B1, Vitamin E, TSH, Hepatitis panel, Copper, Zinc, HIV, syphillis with reflex, UDS, heavy metal screen.   - Regarding the Treponemal antibody testing,  per laboratory services this lab is available but I do not have clearance to order this lab.  I have requested a consult from the physician lab consult team and when I receive a response I will provide an update. Per the automated message line this can take up to 24 hours.    - Consider LP in the AM     #HTN  - BP Goal: Normotension   - c/w losartan 100 mg daily   - c/w metoprolol succinate 50 mg daily   - c/w amlodipine 10 mg daily     #HLD   - c/w atorvastatin 40 mg daily   - hold metformin   - Glucose checks ACHS     F: PRN  E: PRN  N: Regular   A: PIV    O2:regular   Bowel Regimen: miralax  DVT ppx: Low DVT risk score, SCDs     Code Status: Presumed full code     Patient seen, examined, and discussed with attending physician Dr. Bullock.    Yamilex Benites MD  Department of Neurology, PGY-4  General [id number]               [1] No Known Allergies  [2]   Family History  Problem Relation Name Age of Onset    Heart attack Father     [3]   Social History  Tobacco Use    Smoking status: Never     Passive exposure: Past    Smokeless tobacco: Never   Vaping Use    Vaping status: Never Used   Substance Use Topics    Alcohol use: Not Currently     Alcohol/week: 4.0 standard drinks of alcohol     Types: 4 Shots of liquor per week     Comment: occassionally    Drug use: Never

## 2025-07-19 LAB
ALBUMIN SERPL BCP-MCNC: 4.6 G/DL (ref 3.4–5)
ANION GAP SERPL CALC-SCNC: 14 MMOL/L (ref 10–20)
ATRIAL RATE: 59 BPM
BASOPHILS # BLD AUTO: 0.08 X10*3/UL (ref 0–0.1)
BASOPHILS NFR BLD AUTO: 1.1 %
BUN SERPL-MCNC: 13 MG/DL (ref 6–23)
CALCIUM SERPL-MCNC: 10.1 MG/DL (ref 8.6–10.6)
CHLORIDE SERPL-SCNC: 88 MMOL/L (ref 98–107)
CO2 SERPL-SCNC: 30 MMOL/L (ref 21–32)
CREAT SERPL-MCNC: 1.28 MG/DL (ref 0.5–1.3)
EGFRCR SERPLBLD CKD-EPI 2021: 66 ML/MIN/1.73M*2
EOSINOPHIL # BLD AUTO: 0.08 X10*3/UL (ref 0–0.7)
EOSINOPHIL NFR BLD AUTO: 1.1 %
ERYTHROCYTE [DISTWIDTH] IN BLOOD BY AUTOMATED COUNT: 12.7 % (ref 11.5–14.5)
GLUCOSE BLD MANUAL STRIP-MCNC: 145 MG/DL (ref 74–99)
GLUCOSE BLD MANUAL STRIP-MCNC: 153 MG/DL (ref 74–99)
GLUCOSE BLD MANUAL STRIP-MCNC: 153 MG/DL (ref 74–99)
GLUCOSE SERPL-MCNC: 124 MG/DL (ref 74–99)
HCT VFR BLD AUTO: 37.8 % (ref 41–52)
HGB BLD-MCNC: 13 G/DL (ref 13.5–17.5)
IMM GRANULOCYTES # BLD AUTO: 0.02 X10*3/UL (ref 0–0.7)
IMM GRANULOCYTES NFR BLD AUTO: 0.3 % (ref 0–0.9)
LYMPHOCYTES # BLD AUTO: 2.74 X10*3/UL (ref 1.2–4.8)
LYMPHOCYTES NFR BLD AUTO: 36.7 %
MAGNESIUM SERPL-MCNC: 1.96 MG/DL (ref 1.6–2.4)
MCH RBC QN AUTO: 28.4 PG (ref 26–34)
MCHC RBC AUTO-ENTMCNC: 34.4 G/DL (ref 32–36)
MCV RBC AUTO: 83 FL (ref 80–100)
MONOCYTES # BLD AUTO: 0.39 X10*3/UL (ref 0.1–1)
MONOCYTES NFR BLD AUTO: 5.2 %
NEUTROPHILS # BLD AUTO: 4.15 X10*3/UL (ref 1.2–7.7)
NEUTROPHILS NFR BLD AUTO: 55.6 %
NRBC BLD-RTO: 0 /100 WBCS (ref 0–0)
P AXIS: 36 DEGREES
P OFFSET: 161 MS
P ONSET: 134 MS
PHOSPHATE SERPL-MCNC: 4.3 MG/DL (ref 2.5–4.9)
PLATELET # BLD AUTO: 488 X10*3/UL (ref 150–450)
POTASSIUM SERPL-SCNC: 3.6 MMOL/L (ref 3.5–5.3)
PR INTERVAL: 168 MS
Q ONSET: 218 MS
QRS COUNT: 9 BEATS
QRS DURATION: 84 MS
QT INTERVAL: 428 MS
QTC CALCULATION(BAZETT): 423 MS
QTC FREDERICIA: 425 MS
R AXIS: -3 DEGREES
RBC # BLD AUTO: 4.57 X10*6/UL (ref 4.5–5.9)
SODIUM SERPL-SCNC: 128 MMOL/L (ref 136–145)
T AXIS: -19 DEGREES
T OFFSET: 432 MS
VENTRICULAR RATE: 59 BPM
WBC # BLD AUTO: 7.5 X10*3/UL (ref 4.4–11.3)

## 2025-07-19 PROCEDURE — 2500000004 HC RX 250 GENERAL PHARMACY W/ HCPCS (ALT 636 FOR OP/ED)

## 2025-07-19 PROCEDURE — 83735 ASSAY OF MAGNESIUM: CPT

## 2025-07-19 PROCEDURE — 82947 ASSAY GLUCOSE BLOOD QUANT: CPT

## 2025-07-19 PROCEDURE — 99233 SBSQ HOSP IP/OBS HIGH 50: CPT

## 2025-07-19 PROCEDURE — 97161 PT EVAL LOW COMPLEX 20 MIN: CPT | Mod: GP

## 2025-07-19 PROCEDURE — 2500000001 HC RX 250 WO HCPCS SELF ADMINISTERED DRUGS (ALT 637 FOR MEDICARE OP)

## 2025-07-19 PROCEDURE — 93010 ELECTROCARDIOGRAM REPORT: CPT

## 2025-07-19 PROCEDURE — 2500000002 HC RX 250 W HCPCS SELF ADMINISTERED DRUGS (ALT 637 FOR MEDICARE OP, ALT 636 FOR OP/ED)

## 2025-07-19 PROCEDURE — 2500000005 HC RX 250 GENERAL PHARMACY W/O HCPCS

## 2025-07-19 PROCEDURE — 1100000001 HC PRIVATE ROOM DAILY

## 2025-07-19 PROCEDURE — 80069 RENAL FUNCTION PANEL: CPT

## 2025-07-19 PROCEDURE — 85025 COMPLETE CBC W/AUTO DIFF WBC: CPT

## 2025-07-19 PROCEDURE — 36415 COLL VENOUS BLD VENIPUNCTURE: CPT

## 2025-07-19 RX ORDER — CALCIUM CARBONATE 200(500)MG
500 TABLET,CHEWABLE ORAL ONCE AS NEEDED
Status: COMPLETED | OUTPATIENT
Start: 2025-07-19 | End: 2025-07-19

## 2025-07-19 RX ORDER — DIPHENHYDRAMINE HCL 25 MG
25 CAPSULE ORAL ONCE
Status: DISCONTINUED | OUTPATIENT
Start: 2025-07-19 | End: 2025-07-20

## 2025-07-19 RX ORDER — LIDOCAINE 560 MG/1
1 PATCH PERCUTANEOUS; TOPICAL; TRANSDERMAL DAILY
Status: DISCONTINUED | OUTPATIENT
Start: 2025-07-19 | End: 2025-07-30 | Stop reason: HOSPADM

## 2025-07-19 RX ORDER — ACETAMINOPHEN 325 MG/1
650 TABLET ORAL ONCE
Status: COMPLETED | OUTPATIENT
Start: 2025-07-19 | End: 2025-07-19

## 2025-07-19 RX ORDER — LIDOCAINE 560 MG/1
2 PATCH PERCUTANEOUS; TOPICAL; TRANSDERMAL DAILY PRN
Status: DISCONTINUED | OUTPATIENT
Start: 2025-07-19 | End: 2025-07-30 | Stop reason: HOSPADM

## 2025-07-19 RX ORDER — HYDRALAZINE HYDROCHLORIDE 10 MG/1
10 TABLET, FILM COATED ORAL 3 TIMES DAILY
Status: DISCONTINUED | OUTPATIENT
Start: 2025-07-19 | End: 2025-07-20

## 2025-07-19 RX ADMIN — METOPROLOL SUCCINATE 25 MG: 25 TABLET, FILM COATED, EXTENDED RELEASE ORAL at 08:34

## 2025-07-19 RX ADMIN — LOSARTAN POTASSIUM 100 MG: 50 TABLET, FILM COATED ORAL at 08:34

## 2025-07-19 RX ADMIN — HYDRALAZINE HYDROCHLORIDE 10 MG: 10 TABLET ORAL at 10:08

## 2025-07-19 RX ADMIN — ACETAMINOPHEN 650 MG: 325 TABLET ORAL at 10:08

## 2025-07-19 RX ADMIN — TAMSULOSIN HYDROCHLORIDE 0.4 MG: 0.4 CAPSULE ORAL at 08:34

## 2025-07-19 RX ADMIN — POLYETHYLENE GLYCOL 3350 17 G: 17 POWDER, FOR SOLUTION ORAL at 22:23

## 2025-07-19 RX ADMIN — HYDRALAZINE HYDROCHLORIDE 10 MG: 10 TABLET ORAL at 16:43

## 2025-07-19 RX ADMIN — CETIRIZINE HYDROCHLORIDE 10 MG: 10 TABLET ORAL at 08:34

## 2025-07-19 RX ADMIN — AMLODIPINE BESYLATE 10 MG: 10 TABLET ORAL at 08:34

## 2025-07-19 RX ADMIN — LIDOCAINE 4% 1 PATCH: 40 PATCH TOPICAL at 02:13

## 2025-07-19 RX ADMIN — CALCIUM CARBONATE 1 TABLET: 500 TABLET, CHEWABLE ORAL at 22:35

## 2025-07-19 ASSESSMENT — COGNITIVE AND FUNCTIONAL STATUS - GENERAL
MOBILITY SCORE: 15
STANDING UP FROM CHAIR USING ARMS: A LITTLE
MOVING FROM LYING ON BACK TO SITTING ON SIDE OF FLAT BED WITH BEDRAILS: A LITTLE
MOVING TO AND FROM BED TO CHAIR: A LITTLE
CLIMB 3 TO 5 STEPS WITH RAILING: TOTAL
WALKING IN HOSPITAL ROOM: A LOT
TURNING FROM BACK TO SIDE WHILE IN FLAT BAD: A LITTLE

## 2025-07-19 ASSESSMENT — PAIN SCALES - GENERAL
PAINLEVEL_OUTOF10: 0 - NO PAIN
PAINLEVEL_OUTOF10: 6

## 2025-07-19 ASSESSMENT — PAIN - FUNCTIONAL ASSESSMENT
PAIN_FUNCTIONAL_ASSESSMENT: 0-10
PAIN_FUNCTIONAL_ASSESSMENT: 0-10

## 2025-07-19 ASSESSMENT — ACTIVITIES OF DAILY LIVING (ADL): ADL_ASSISTANCE: INDEPENDENT

## 2025-07-19 NOTE — PROGRESS NOTES
"Ayla Paredes is a 55 y.o. male on day 1 of admission presenting with Ataxia.      Subjective   No acute events overnight. Overnight, patient had peak in blood pressure to 185/134. Night team was later called, but patient was asymptomatic. No acute treatment changes were made. Patient this morning denied new changes in vision, dizziness, headache, or chest pain. He endorsed pain bilaterally in his shoulders which he has had for some time and is exacerbated by resting in the lateral position. Patient also noted he continues to have numbness in his hands and feet, along with increased sensation of cold. Since yesterday, patient's lips have been numb as well.    During rounds, patient described having a mild headache primarily in the occipital region.        Objective     Last Recorded Vitals  Blood pressure (!) 171/107, pulse 58, temperature 36.4 °C (97.6 °F), resp. rate 18, height 1.651 m (5' 5\"), weight 81.6 kg (180 lb), SpO2 97%.    Physical Exam    General Appearance:  No distress, alert, interactive and cooperative.      Cardiovascular: Regular, rate and rhythm, no murmurs, normal S1 and S2. Carotid pulses intact without any bruits. Pulses +2 and equal in all extremities. No swelling, varicosities, edema, or tenderness to palpation.      Mental State: Orientation was normal to time, place and person. Recent and remote memory was intact.  Attention span and concentration were normal. Language testing was normal for comprehension, repetition, expression, and naming. General fund of knowledge intact     Cranial Nerves:   CN2 : Not tested  CN 3, 4, 6: Pupils round, 4 mm in diameter, equally reactive to light. Lids symmetric; no ptosis. EOMs normal alignment, full range with normal saccades, pursuit and convergence.   No nystagmus.   CN 5: Facial sensation intact bilaterally.   CN 7: Normal and symmetric facial strength. Nasolabial folds symmetric.   CN 8: Hearing intact to voice  CN 9: Palate elevates " symmetrically.   CN 11: Normal strength of shoulder shrug and neck turning.   CN 12: Tongue midline, with normal bulk and strength; no fasciculations.      Motor: Muscle bulk and tone were normal in both upper and lower extremities. No fasciculations, tremor or other abnormal movements were present.       Strength                                             R          L  Shoulder abduction (C4-C5)                5          5  Elbow flexion (C5-C6)                          5          5  Elbow extension (C7-C8)                     5          5  Wrist extension (C6-C7)                      5          5  Finger extension (C7-C8)                     5          5  Finger flexion Median (C8-T1)             5          5  Finger flexion Ulnar (C8-T1)                5          5     Hip flexion (IP, L2-L3)                          5          5  Knee extension (Fem, L2-L4)               5          5  Knee flexion (Sci, L5-S1)                      5          5  DorsiFlex (DePer, L5-S1l)                     5          5  PlantarFlex (Tibial, S1-S2)                    5          5  Inversion (Tibial L5 )                            5          5   Eversion (SupPer, L5-S1)                     5          5     Reflexes: Right/ Left:  Biceps 2/2, brachioradialis 2/2, triceps 2/2,  ankle 2/2  toes downgoing to plantar stimulation. No clonus, frontal release signs or other pathologic reflexes present.      Sensory: In both upper and lower extremities, sensation was intact to light touch  - Otherwise sensation to pinprick intact in the bilateral upper and lower extremities proximally and distally   - Sensation to temperature intact in the bilateral upper and lower extremities proximally and distally   - Proprioception intact in bilateral toes      Coordination: In both upper extremities, finger-nose-finger was intact without dysmetria or overshoot. Patient mildly slower during cerebellar testing.     Gait: Station was unstable with a  wide base.  Romberg sign was positive. Unable to perform tandem gait.      Relevant Results              Webster Coma Scale  Best Eye Response: Spontaneous  Best Verbal Response: Oriented  Best Motor Response: Follows commands  Webster Coma Scale Score: 15           Scheduled medications  Scheduled Medications[1]  Continuous medications  Continuous Medications[2]  PRN medications  PRN Medications[3]     Results for orders placed or performed during the hospital encounter of 07/18/25 (from the past 24 hours)   Troponin, High Sensitivity, 1 Hour   Result Value Ref Range    Troponin I, High Sensitivity (CMC) 34 0 - 53 ng/L   Vitamin B12   Result Value Ref Range    Vitamin B12 872 211 - 911 pg/mL   Folate   Result Value Ref Range    Folate, Serum 12.9 >5.0 ng/mL   Ethanol   Result Value Ref Range    Alcohol <10 <=10 mg/dL   Syphilis Screen with Reflex   Result Value Ref Range    Syphilis Total Ab Nonreactive Nonreactive   Hepatitis panel, acute   Result Value Ref Range    Hepatitis B Surface AG Nonreactive Nonreactive    Hepatitis A  AB- IgM Nonreactive Nonreactive    Hepatitis B Core AB; IgM Nonreactive Nonreactive    Hepatitis C AB Nonreactive Nonreactive   CBC and Auto Differential   Result Value Ref Range    WBC 6.8 4.4 - 11.3 x10*3/uL    nRBC 0.0 0.0 - 0.0 /100 WBCs    RBC 4.20 (L) 4.50 - 5.90 x10*6/uL    Hemoglobin 11.6 (L) 13.5 - 17.5 g/dL    Hematocrit 34.7 (L) 41.0 - 52.0 %    MCV 83 80 - 100 fL    MCH 27.6 26.0 - 34.0 pg    MCHC 33.4 32.0 - 36.0 g/dL    RDW 12.7 11.5 - 14.5 %    Platelets 422 150 - 450 x10*3/uL    Neutrophils % 59.5 40.0 - 80.0 %    Immature Granulocytes %, Automated 0.3 0.0 - 0.9 %    Lymphocytes % 32.4 13.0 - 44.0 %    Monocytes % 5.8 2.0 - 10.0 %    Eosinophils % 1.0 0.0 - 6.0 %    Basophils % 1.0 0.0 - 2.0 %    Neutrophils Absolute 4.02 1.20 - 7.70 x10*3/uL    Immature Granulocytes Absolute, Automated 0.02 0.00 - 0.70 x10*3/uL    Lymphocytes Absolute 2.19 1.20 - 4.80 x10*3/uL    Monocytes  Absolute 0.39 0.10 - 1.00 x10*3/uL    Eosinophils Absolute 0.07 0.00 - 0.70 x10*3/uL    Basophils Absolute 0.07 0.00 - 0.10 x10*3/uL   Comprehensive metabolic panel   Result Value Ref Range    Glucose 98 74 - 99 mg/dL    Sodium 131 (L) 136 - 145 mmol/L    Potassium 3.4 (L) 3.5 - 5.3 mmol/L    Chloride 93 (L) 98 - 107 mmol/L    Bicarbonate 32 21 - 32 mmol/L    Anion Gap 9 (L) 10 - 20 mmol/L    Urea Nitrogen 15 6 - 23 mg/dL    Creatinine 1.39 (H) 0.50 - 1.30 mg/dL    eGFR 60 (L) >60 mL/min/1.73m*2    Calcium 9.3 8.6 - 10.6 mg/dL    Albumin 4.2 3.4 - 5.0 g/dL    Alkaline Phosphatase 76 33 - 120 U/L    Total Protein 7.5 6.4 - 8.2 g/dL    AST 18 9 - 39 U/L    Bilirubin, Total 0.5 0.0 - 1.2 mg/dL    ALT 11 10 - 52 U/L   Magnesium   Result Value Ref Range    Magnesium 1.97 1.60 - 2.40 mg/dL   Troponin I, High Sensitivity, Initial   Result Value Ref Range    Troponin I, High Sensitivity (CMC) 31 0 - 53 ng/L   HIV 1/2 Antigen/Antibody Screen with Reflex to Confirmation   Result Value Ref Range    HIV 1/2 Antigen/Antibody Screen with Reflex to Confirmation Nonreactive Nonreactive   TSH   Result Value Ref Range    Thyroid Stimulating Hormone 0.75 0.44 - 3.98 mIU/L   Drug Screen, Urine   Result Value Ref Range    Amphetamine Screen, Urine Presumptive Negative Presumptive Negative    Barbiturate Screen, Urine Presumptive Negative Presumptive Negative    Benzodiazepines Screen, Urine Presumptive Negative Presumptive Negative    Cannabinoid Screen, Urine Presumptive Negative Presumptive Negative    Cocaine Metabolite Screen, Urine Presumptive Negative Presumptive Negative    Fentanyl Screen, Urine Presumptive Negative Presumptive Negative    Opiate Screen, Urine Presumptive Negative Presumptive Negative    Oxycodone Screen, Urine Presumptive Negative Presumptive Negative    PCP Screen, Urine Presumptive Negative Presumptive Negative    Methadone Screen, Urine Presumptive Negative Presumptive Negative   Green Top   Result Value Ref  Range    Extra Tube Hold for add-ons.    Lavender Top   Result Value Ref Range    Extra Tube Hold for add-ons.    PST Top   Result Value Ref Range    Extra Tube Hold for add-ons.    SST TOP   Result Value Ref Range    Extra Tube Hold for add-ons.    PST Top   Result Value Ref Range    Extra Tube Hold for add-ons.    PST Top   Result Value Ref Range    Extra Tube Hold for add-ons.    Folate   Result Value Ref Range    Folate, Serum 13.7 >5.0 ng/mL   POCT GLUCOSE   Result Value Ref Range    POCT Glucose 110 (H) 74 - 99 mg/dL   Lavender Top   Result Value Ref Range    Extra Tube Hold for add-ons.    Lavender Top   Result Value Ref Range    Extra Tube Hold for add-ons.    Gray Top   Result Value Ref Range    Extra Tube Hold for add-ons.    Gray Top   Result Value Ref Range    Extra Tube Hold for add-ons.    Gray Top   Result Value Ref Range    Extra Tube Hold for add-ons.    POCT GLUCOSE   Result Value Ref Range    POCT Glucose 145 (H) 74 - 99 mg/dL   Magnesium   Result Value Ref Range    Magnesium 1.96 1.60 - 2.40 mg/dL   Renal Function Panel   Result Value Ref Range    Glucose 124 (H) 74 - 99 mg/dL    Sodium 128 (L) 136 - 145 mmol/L    Potassium 3.6 3.5 - 5.3 mmol/L    Chloride 88 (L) 98 - 107 mmol/L    Bicarbonate 30 21 - 32 mmol/L    Anion Gap 14 10 - 20 mmol/L    Urea Nitrogen 13 6 - 23 mg/dL    Creatinine 1.28 0.50 - 1.30 mg/dL    eGFR 66 >60 mL/min/1.73m*2    Calcium 10.1 8.6 - 10.6 mg/dL    Phosphorus 4.3 2.5 - 4.9 mg/dL    Albumin 4.6 3.4 - 5.0 g/dL   CBC and Auto Differential   Result Value Ref Range    WBC 7.5 4.4 - 11.3 x10*3/uL    nRBC 0.0 0.0 - 0.0 /100 WBCs    RBC 4.57 4.50 - 5.90 x10*6/uL    Hemoglobin 13.0 (L) 13.5 - 17.5 g/dL    Hematocrit 37.8 (L) 41.0 - 52.0 %    MCV 83 80 - 100 fL    MCH 28.4 26.0 - 34.0 pg    MCHC 34.4 32.0 - 36.0 g/dL    RDW 12.7 11.5 - 14.5 %    Platelets 488 (H) 150 - 450 x10*3/uL    Neutrophils % 55.6 40.0 - 80.0 %    Immature Granulocytes %, Automated 0.3 0.0 - 0.9 %     Lymphocytes % 36.7 13.0 - 44.0 %    Monocytes % 5.2 2.0 - 10.0 %    Eosinophils % 1.1 0.0 - 6.0 %    Basophils % 1.1 0.0 - 2.0 %    Neutrophils Absolute 4.15 1.20 - 7.70 x10*3/uL    Immature Granulocytes Absolute, Automated 0.02 0.00 - 0.70 x10*3/uL    Lymphocytes Absolute 2.74 1.20 - 4.80 x10*3/uL    Monocytes Absolute 0.39 0.10 - 1.00 x10*3/uL    Eosinophils Absolute 0.08 0.00 - 0.70 x10*3/uL    Basophils Absolute 0.08 0.00 - 0.10 x10*3/uL   POCT GLUCOSE   Result Value Ref Range    POCT Glucose 153 (H) 74 - 99 mg/dL      MR cervical spine w and wo IV contrast  Result Date: 7/19/2025  CERVICAL SPINE:   Multilevel cervical spondylosis with posterior disc osteophyte complexes causing C5-C6 mild-to-moderate and C6-C7 moderate narrowing of the central canal. Addition, there is moderate-to-severe neural foramen narrowing bilaterally at these levels.   THORACIC SPINE:   No evidence of significant spinal canal stenosis or other acute abnormality in the thoracic spine.   I personally reviewed the images/study and I agree with the findings as stated by Jose Raul Waters DO, PGY-4. This study was interpreted at [place], [place].   MACRO: None   Signed by: River Diehl 7/19/2025 12:12 AM Dictation workstation:   [id number]    MR thoracic spine w and wo IV contrast  Result Date: 7/19/2025  CERVICAL SPINE:   Multilevel cervical spondylosis with posterior disc osteophyte complexes causing C5-C6 mild-to-moderate and C6-C7 moderate narrowing of the central canal. Addition, there is moderate-to-severe neural foramen narrowing bilaterally at these levels.   THORACIC SPINE:   No evidence of significant spinal canal stenosis or other acute abnormality in the thoracic spine.   I personally reviewed the images/study and I agree with the findings as stated by Jose Raul Waters DO, PGY-4. This study was interpreted at [place], [place].    MACRO: None   Signed by: River Diehl 7/19/2025 12:12 AM Dictation workstation:   [id number]    MR brain wo IV contrast  Result Date: 7/18/2025  1. No acute infarction or acute hemorrhage.   2. Probable mild chronic white matter small vessel ischemic changes.   I personally reviewed the images/study and I agree with the findings as stated by resident Primo Caldera. This study was interpreted at [facility], [place], [place].   MACRO: None   Signed by: Jessee Solano 7/18/2025 3:50 PM Dictation workstation:   [id number]    CT angio head and neck w and wo IV contrast  Result Date: 7/18/2025  No hemodynamically significant stenosis of the head and neck vasculature.   Recommendation: If clinically concerned for small acute ischemic infarct, further evaluation with MR of the brain without contrast is recommended.   MACRO: None   Signed by: Jessee Solano 7/18/2025 1:46 PM Dictation workstation:   [id number]    XR chest 2 views  Result Date: 7/18/2025  1.  No evidence of acute cardiopulmonary process.       MACRO: None   Signed by: Arpan Bradford 7/18/2025 12:55 PM Dictation workstation:   [id number]     Assessment & Plan  Ataxia    Ayla Paredes is a 55 y.o.  male with a PMHx of  HTN, DM, CKD presenting for bilateral hand and foot numbness for which neurology is consulted for. Symptoms began acutely on  7/15 and have not progressed beyond the hands and feet. On neurological exam he has no objective sensory changes, however he has a positive romberg sign as well as a wide based gait with inability to perform tandem gait. He has no recent travel, but his occupation does put him at a increased infection risk. No recent weight changes or substance use to suggest a nutritional issue. MRI brain personally reviewed and shows no acute abnormalities. Given the diffuse hyperreflexia and positive romberg sign this picture localizes to a likely spinal cord etiology, possibly  posterior spinal. Due to this, spinal imaging was conducted which showed no acute abnormalities in thoracic but cervical had multilevel cervical spondylosis with posterior disc osteophyte complexes causing C5-C6 and C6-C7 central canal narrowing.     Cervical imaging is unable to explain patient's neuropathy limited to hands and feet. Will await blood work. Adding paraneoplastic, thyroid antibodies, celiac panel. Possibility of a trigeminal neuralgia in association with patient's lip numbness.     Impression: Acute symmetric stocking and glove neuropathy     Updates as of 7/19/25  - Pt is considering leaving AMA  - Awaiting bloodwork     #Acute symmetric stocking and glove neuropathy   - MRI C, T, L spine with and without contrast  - B12 872, TSH 0.75, Hep Panel nonreactive, HIV nonreactive, Syphillis with reflex negative  Plan:  - Awaiting B6, B1, Vitamin E, TSH, Hepatitis panel, Copper, Zinc, heavy metal screen. .    - Consider LP   - Awaiting PT/OT     #HTN  - BP Goal: Normotension   - Started hydralazine 10 mg TID due to increased BP 7/18-7/19  - c/w losartan 100 mg daily   - c/w metoprolol succinate 50 mg daily   - c/w amlodipine 10 mg daily      #HLD   - c/w atorvastatin 40 mg daily   - hold metformin   - Glucose checks ACHS      F: PRN  E: PRN  N: Regular   A: PIV     O2:regular   Bowel Regimen: miralax  DVT ppx: Low DVT risk score, SCDs      Code Status: Presumed full code      Patient seen, examined, and discussed with attending physician Dr. Bullock.           Beth Molina MD  Neuro, PGY-1         [1] amLODIPine, 10 mg, oral, Daily  atorvastatin, 40 mg, oral, Nightly  cetirizine, 10 mg, oral, Daily  [START ON 7/20/2025] ergocalciferol, 1.25 mg, oral, Every Sunday  fluticasone, 1 spray, Each Nostril, Daily  hydrALAZINE, 10 mg, oral, TID  HYDROmorphone, 0.2 mg, intravenous, Once  insulin lispro, 0-5 Units, subcutaneous, TID AC  lidocaine, 1 patch, transdermal, Daily  losartan, 100 mg, oral,  Daily  metoprolol succinate XL, 25 mg, oral, Daily  polyethylene glycol, 17 g, oral, Daily  tamsulosin, 0.4 mg, oral, Daily  [2]    [3] PRN medications: dextrose, dextrose, glucagon, glucagon, lidocaine

## 2025-07-19 NOTE — CARE PLAN
The patient's goals for the shift include      The clinical goals for the shift include pt will remain safe and free from falls/injury    Over the shift, the patient did not have any falls or injury.  Problem: Pain - Adult  Goal: Verbalizes/displays adequate comfort level or baseline comfort level  Outcome: Progressing     Problem: Safety - Adult  Goal: Free from fall injury  Outcome: Progressing     Problem: Discharge Planning  Goal: Discharge to home or other facility with appropriate resources  Outcome: Progressing     Problem: Chronic Conditions and Co-morbidities  Goal: Patient's chronic conditions and co-morbidity symptoms are monitored and maintained or improved  Outcome: Progressing

## 2025-07-19 NOTE — PROGRESS NOTES
Physical Therapy    Physical Therapy Evaluation    Patient Name: Ayla Paredes  MRN: [id number]  Department: [place] [id number]  Room: 202/202A  Today's Date: 7/19/2025   Time Calculation  Start Time: 1420  Stop Time: 1437  Time Calculation (min): 17 min    Assessment/Plan   PT Assessment  PT Assessment Results: Decreased strength, Decreased range of motion, Decreased endurance, Impaired balance, Decreased mobility, Decreased coordination  Rehab Prognosis: Good  Barriers to Discharge Home: Caregiver assistance, Physical needs  Caregiver Assistance: Caregiver assistance needed per identified barriers - however, level of patient's required assistance exceeds assistance available at home  Physical Needs: Stair navigation into home limited by function/safety, 24hr mobility assistance needed, 24hr ADL assistance needed, High falls risk due to function or environment  Evaluation/Treatment Tolerance: Patient tolerated treatment well, Patient limited by pain  Medical Staff Made Aware: Yes  End of Session Communication: Bedside nurse  Assessment Comment: 55 y.o. M admitted for work up of acute onset hand/feet numbness and ataxic gait. Pt demonstrating impaired balance, gait, and strength. Pt will benefit from continued PT in house and after discharge at HIGH intensity to restore independence.  End of Session Patient Position: Up in chair, Alarm off, not on at start of session  IP OR SWING BED PT PLAN  Inpatient or Swing Bed: Inpatient  PT Plan  Treatment/Interventions: Bed mobility, Transfer training, Gait training, Balance training, Strengthening, Therapeutic exercise, Therapeutic activity  PT Plan: Ongoing PT  PT Frequency: 5 times per week  PT Discharge Recommendations: High intensity level of continued care (If impairments resolve, patient may tolerate LOW intensity PT at discharge.)  PT Recommended Transfer Status: Assist x1  PT - OK to Discharge: Yes    Subjective     PT Visit Info:  PT Received On: 07/19/25  General Visit  "Information:  General  Reason for Referral: ataxia, numbness in hands/feet  Past Medical History Relevant to Rehab: 55 y.o. male with a PMHx of  HTN, DM, CKD presenting for bilateral hand and foot numbness, unsteady/ataxic gait.  Family/Caregiver Present: No  Prior to Session Communication: Bedside nurse  Patient Position Received: Up in chair, Alarm off, not on at start of session  Preferred Learning Style: verbal, auditory  General Comment: Pt seated in chair upon entry. Lunch arrived upon PT entry, though patient willling to work with PT prior to eating lunch.  Home Living:  Home Living  Type of Home: House  Lives With: Spouse  Home Adaptive Equipment: None  Home Layout: One level  Home Access: Stairs to enter with rails  Entrance Stairs-Number of Steps: 3  Home Living Comments: No concerns with living arrangements.  Prior Level of Function:  Prior Function Per Pt/Caregiver Report  Level of Stevens: Independent with ADLs and functional transfers  ADL Assistance: Independent  Homemaking Assistance: Independent  Ambulatory Assistance: Independent  Prior Function Comments: No concerns with mobility at baseline.  Precautions:  Precautions  Medical Precautions: Fall precautions  Precautions Comment: Pt noting he hasn't fallen, though commented, \"I almost fell earlier.\"     Objective   Pain:  Pain Assessment  Pain Assessment: 0-10  0-10 (Numeric) Pain Score: 6  Pain Type: Acute pain  Pain Location: Shoulder (when turning/rolling/leaning on shoulder in bed.)  Cognition:  Cognition  Overall Cognitive Status: Within Functional Limits  Orientation Level: Oriented X4  Attention: Within Functional Limits  Insight: Within function limits  Impulsive: Within functional limits  Processing Speed: Within funtional limits    General Assessments:     Activity Tolerance  Endurance: Decreased tolerance for upright activites  Activity Tolerance Comments: Pt reports rapid oset of fatigue with short distance of " ambulation.    Sensation  Sensation Comment: Decreased sensation in bilat hands/feet    Strength  Strength Comments: BLE grossly >4/5 throughout  Strength  Strength Comments: BLE grossly >4/5 throughout    Perception  Inattention/Neglect: Appears intact    Coordination  Movements are Fluid and Coordinated: No  Coordination Comment: ataxic/unsteady gait. Pt requiring visual feedback to look at LE's for proper placement.    Postural Control  Postural Control: Impaired  Posture Comment: flexed posture with increased reliance on walker for stability.    Static Sitting Balance  Static Sitting-Balance Support: Feet supported  Static Sitting-Level of Assistance: Close supervision    Static Standing Balance  Static Standing-Balance Support: Bilateral upper extremity supported (on a wheeled walker)  Static Standing-Level of Assistance: Minimum assistance  Functional Assessments:     Bed Mobility  Bed Mobility: Yes  Bed Mobility 1  Bed Mobility Comments 1: Pt OOB in chair pre/post visit.    Transfers  Transfer: Yes  Transfer 1  Transfer From 1: Sit to, Stand to  Transfer to 1: Stand, Sit  Transfer Device 1: Walker  Transfer Level of Assistance 1: Minimum assistance, Minimal verbal cues, Minimal tactile cues    Ambulation/Gait Training  Ambulation/Gait Training Performed: Yes  Ambulation/Gait Training 1  Surface 1: Level tile  Device 1: Rolling walker  Assistance 1: Minimum assistance, Minimal verbal cues, Minimal tactile cues  Quality of Gait 1: Inconsistent stride length, Decreased step length, Shuffling gait, Ataxic, Forward flexed posture, Foot slap  Comments/Distance (ft) 1: 25ft    Outcome Measures:  [facility] Basic Mobility  Turning from your back to your side while in a flat bed without using bedrails: A little  Moving from lying on your back to sitting on the side of a flat bed without using bedrails: A little  Moving to and from bed to chair (including a wheelchair): A little  Standing up from a chair using your arms  (e.g. wheelchair or bedside chair): A little  To walk in hospital room: A lot  Climbing 3-5 steps with railing: Total  Basic Mobility - Total Score: 15    Encounter Problems       Encounter Problems (Active)       Mobility       STG - Patient will ambulate >75ft, wheeled walker, CGA       Start:  07/19/25    Expected End:  08/02/25               PT Transfers       STG - Patient to transfer to and from sit to supine CGA       Start:  07/19/25    Expected End:  08/02/25            STG - Patient will transfer sit to and from stand CGA       Start:  07/19/25    Expected End:  08/02/25                   Education Documentation  Precautions, taught by Bobby Montague, PT at 7/19/2025  3:12 PM.  Learner: Patient  Readiness: Acceptance  Method: Explanation  Response: Verbalizes Understanding  Comment: up with assist. Benefit from rehab at [place] for balance/gait.    Mobility Training, taught by Bobby Montague, PT at 7/19/2025  3:12 PM.  Learner: Patient  Readiness: Acceptance  Method: Explanation  Response: Verbalizes Understanding  Comment: up with assist. Benefit from rehab at [place] for balance/gait.    Education Comments  No comments found.

## 2025-07-20 ENCOUNTER — APPOINTMENT (OUTPATIENT)
Dept: RADIOLOGY | Facility: HOSPITAL | Age: 55
DRG: 094 | End: 2025-07-20
Payer: COMMERCIAL

## 2025-07-20 ENCOUNTER — APPOINTMENT (OUTPATIENT)
Dept: RADIOLOGY | Facility: HOSPITAL | Age: 55
End: 2025-07-20
Payer: COMMERCIAL

## 2025-07-20 LAB
ALBUMIN SERPL BCP-MCNC: 4.8 G/DL (ref 3.4–5)
ANION GAP BLDA CALCULATED.4IONS-SCNC: 9 MMO/L (ref 10–25)
ANION GAP BLDV CALCULATED.4IONS-SCNC: 9 MMOL/L (ref 10–25)
ANION GAP SERPL CALC-SCNC: 16 MMOL/L (ref 10–20)
APTT PPP: 21 SECONDS (ref 26–36)
BASE EXCESS BLDA CALC-SCNC: 2.9 MMOL/L (ref -2–3)
BASE EXCESS BLDV CALC-SCNC: 4.9 MMOL/L (ref -2–3)
BASOPHILS # BLD AUTO: 0.04 X10*3/UL (ref 0–0.1)
BASOPHILS # BLD AUTO: 0.04 X10*3/UL (ref 0–0.1)
BASOPHILS NFR BLD AUTO: 0.5 %
BASOPHILS NFR BLD AUTO: 0.5 %
BODY TEMPERATURE: ABNORMAL
BODY TEMPERATURE: ABNORMAL
BUN SERPL-MCNC: 12 MG/DL (ref 6–23)
CA-I BLDA-SCNC: 1.08 MMOL/L (ref 1.1–1.33)
CA-I BLDV-SCNC: 1.13 MMOL/L (ref 1.1–1.33)
CALCIUM SERPL-MCNC: 10.5 MG/DL (ref 8.6–10.6)
CHLORIDE BLDA-SCNC: 93 MMOL/L (ref 98–107)
CHLORIDE BLDV-SCNC: 87 MMOL/L (ref 98–107)
CHLORIDE SERPL-SCNC: 81 MMOL/L (ref 98–107)
CHLORIDE UR-SCNC: 139 MMOL/L
CHLORIDE/CREATININE (MMOL/G) IN URINE: 256 MMOL/G CREAT (ref 23–275)
CO2 SERPL-SCNC: 29 MMOL/L (ref 21–32)
CREAT SERPL-MCNC: 1.13 MG/DL (ref 0.5–1.3)
CREAT UR-MCNC: 54.2 MG/DL (ref 20–370)
CREAT UR-MCNC: 54.2 MG/DL (ref 20–370)
EGFRCR SERPLBLD CKD-EPI 2021: 77 ML/MIN/1.73M*2
EOSINOPHIL # BLD AUTO: 0.04 X10*3/UL (ref 0–0.7)
EOSINOPHIL # BLD AUTO: 0.07 X10*3/UL (ref 0–0.7)
EOSINOPHIL NFR BLD AUTO: 0.5 %
EOSINOPHIL NFR BLD AUTO: 0.9 %
ERYTHROCYTE [DISTWIDTH] IN BLOOD BY AUTOMATED COUNT: 12.3 % (ref 11.5–14.5)
ERYTHROCYTE [DISTWIDTH] IN BLOOD BY AUTOMATED COUNT: 12.4 % (ref 11.5–14.5)
GLUCOSE BLD MANUAL STRIP-MCNC: 138 MG/DL (ref 74–99)
GLUCOSE BLD MANUAL STRIP-MCNC: 156 MG/DL (ref 74–99)
GLUCOSE BLD MANUAL STRIP-MCNC: 157 MG/DL (ref 74–99)
GLUCOSE BLD MANUAL STRIP-MCNC: 164 MG/DL (ref 74–99)
GLUCOSE BLDA-MCNC: 160 MG/DL (ref 74–99)
GLUCOSE BLDV-MCNC: 161 MG/DL (ref 74–99)
GLUCOSE SERPL-MCNC: 142 MG/DL (ref 74–99)
HCO3 BLDA-SCNC: 25.7 MMOL/L (ref 22–26)
HCO3 BLDV-SCNC: 29.2 MMOL/L (ref 22–26)
HCT VFR BLD AUTO: 32.1 % (ref 41–52)
HCT VFR BLD AUTO: 40.6 % (ref 41–52)
HCT VFR BLD EST: 38 % (ref 41–52)
HCT VFR BLD EST: 39 % (ref 41–52)
HGB BLD-MCNC: 11.6 G/DL (ref 13.5–17.5)
HGB BLD-MCNC: 14.1 G/DL (ref 13.5–17.5)
HGB BLDA-MCNC: 12.8 G/DL (ref 13.5–17.5)
HGB BLDV-MCNC: 12.9 G/DL (ref 13.5–17.5)
HOLD SPECIMEN: NORMAL
IMM GRANULOCYTES # BLD AUTO: 0.02 X10*3/UL (ref 0–0.7)
IMM GRANULOCYTES # BLD AUTO: 0.03 X10*3/UL (ref 0–0.7)
IMM GRANULOCYTES NFR BLD AUTO: 0.3 % (ref 0–0.9)
IMM GRANULOCYTES NFR BLD AUTO: 0.4 % (ref 0–0.9)
INHALED O2 CONCENTRATION: 28 %
INHALED O2 CONCENTRATION: 30 %
INR PPP: 1.1 (ref 0.9–1.1)
LACTATE BLDA-SCNC: 0.9 MMOL/L (ref 0.4–2)
LACTATE BLDV-SCNC: 1.4 MMOL/L (ref 0.4–2)
LYMPHOCYTES # BLD AUTO: 1.8 X10*3/UL (ref 1.2–4.8)
LYMPHOCYTES # BLD AUTO: 2.03 X10*3/UL (ref 1.2–4.8)
LYMPHOCYTES NFR BLD AUTO: 23 %
LYMPHOCYTES NFR BLD AUTO: 25 %
MAGNESIUM SERPL-MCNC: 1.82 MG/DL (ref 1.6–2.4)
MCH RBC QN AUTO: 28.2 PG (ref 26–34)
MCH RBC QN AUTO: 28.3 PG (ref 26–34)
MCHC RBC AUTO-ENTMCNC: 34.7 G/DL (ref 32–36)
MCHC RBC AUTO-ENTMCNC: 36.1 G/DL (ref 32–36)
MCV RBC AUTO: 78 FL (ref 80–100)
MCV RBC AUTO: 81 FL (ref 80–100)
MONOCYTES # BLD AUTO: 0.49 X10*3/UL (ref 0.1–1)
MONOCYTES # BLD AUTO: 0.54 X10*3/UL (ref 0.1–1)
MONOCYTES NFR BLD AUTO: 6.3 %
MONOCYTES NFR BLD AUTO: 6.7 %
NEUTROPHILS # BLD AUTO: 5.41 X10*3/UL (ref 1.2–7.7)
NEUTROPHILS # BLD AUTO: 5.44 X10*3/UL (ref 1.2–7.7)
NEUTROPHILS NFR BLD AUTO: 66.5 %
NEUTROPHILS NFR BLD AUTO: 69.4 %
NRBC BLD-RTO: 0 /100 WBCS (ref 0–0)
NRBC BLD-RTO: 0 /100 WBCS (ref 0–0)
OSMOLALITY SERPL: 250 MOSM/KG (ref 280–300)
OSMOLALITY SERPL: 266 MOSM/KG (ref 280–300)
OSMOLALITY UR: 481 MOSM/KG (ref 200–1200)
OXYHGB MFR BLDA: 94.8 % (ref 94–98)
OXYHGB MFR BLDV: 68.7 % (ref 45–75)
PCO2 BLDA: 33 MM HG (ref 38–42)
PCO2 BLDV: 41 MM HG (ref 41–51)
PH BLDA: 7.5 PH (ref 7.38–7.42)
PH BLDV: 7.46 PH (ref 7.33–7.43)
PHOSPHATE SERPL-MCNC: 4.1 MG/DL (ref 2.5–4.9)
PLATELET # BLD AUTO: 365 X10*3/UL (ref 150–450)
PLATELET # BLD AUTO: 498 X10*3/UL (ref 150–450)
PO2 BLDA: 73 MM HG (ref 85–95)
PO2 BLDV: 43 MM HG (ref 35–45)
POTASSIUM BLDA-SCNC: 3.2 MMOL/L (ref 3.5–5.3)
POTASSIUM BLDV-SCNC: 3.1 MMOL/L (ref 3.5–5.3)
POTASSIUM SERPL-SCNC: 3 MMOL/L (ref 3.5–5.3)
POTASSIUM UR-SCNC: 37 MMOL/L
POTASSIUM/CREAT UR-RTO: 68 MMOL/G CREAT
PROTHROMBIN TIME: 11.8 SECONDS (ref 9.8–12.4)
RBC # BLD AUTO: 4.11 X10*6/UL (ref 4.5–5.9)
RBC # BLD AUTO: 4.99 X10*6/UL (ref 4.5–5.9)
SAO2 % BLDA: 96 % (ref 94–100)
SAO2 % BLDV: 70 % (ref 45–75)
SODIUM BLDA-SCNC: 124 MMOL/L (ref 136–145)
SODIUM BLDV-SCNC: 122 MMOL/L (ref 136–145)
SODIUM SERPL-SCNC: 116 MMOL/L (ref 136–145)
SODIUM SERPL-SCNC: 116 MMOL/L (ref 136–145)
SODIUM SERPL-SCNC: 123 MMOL/L (ref 136–145)
SODIUM SERPL-SCNC: 123 MMOL/L (ref 136–145)
SODIUM SERPL-SCNC: 125 MMOL/L (ref 136–145)
SODIUM UR-SCNC: 145 MMOL/L
SODIUM UR-SCNC: 145 MMOL/L
SODIUM/CREAT UR-RTO: 268 MMOL/G CREAT
SODIUM/CREAT UR-RTO: 268 MMOL/G CREAT
THYROPEROXIDASE AB SERPL-ACNC: 58 IU/ML
WBC # BLD AUTO: 7.8 X10*3/UL (ref 4.4–11.3)
WBC # BLD AUTO: 8.1 X10*3/UL (ref 4.4–11.3)

## 2025-07-20 PROCEDURE — 82947 ASSAY GLUCOSE BLOOD QUANT: CPT

## 2025-07-20 PROCEDURE — 84295 ASSAY OF SERUM SODIUM: CPT

## 2025-07-20 PROCEDURE — 2500000001 HC RX 250 WO HCPCS SELF ADMINISTERED DRUGS (ALT 637 FOR MEDICARE OP)

## 2025-07-20 PROCEDURE — 71045 X-RAY EXAM CHEST 1 VIEW: CPT | Performed by: RADIOLOGY

## 2025-07-20 PROCEDURE — 2500000002 HC RX 250 W HCPCS SELF ADMINISTERED DRUGS (ALT 637 FOR MEDICARE OP, ALT 636 FOR OP/ED)

## 2025-07-20 PROCEDURE — 84207 ASSAY OF VITAMIN B-6: CPT

## 2025-07-20 PROCEDURE — 99233 SBSQ HOSP IP/OBS HIGH 50: CPT

## 2025-07-20 PROCEDURE — 06HY33Z INSERTION OF INFUSION DEVICE INTO LOWER VEIN, PERCUTANEOUS APPROACH: ICD-10-PCS | Performed by: STUDENT IN AN ORGANIZED HEALTH CARE EDUCATION/TRAINING PROGRAM

## 2025-07-20 PROCEDURE — 2580000001 HC RX 258 IV SOLUTIONS

## 2025-07-20 PROCEDURE — 84132 ASSAY OF SERUM POTASSIUM: CPT

## 2025-07-20 PROCEDURE — 74018 RADEX ABDOMEN 1 VIEW: CPT

## 2025-07-20 PROCEDURE — 83935 ASSAY OF URINE OSMOLALITY: CPT

## 2025-07-20 PROCEDURE — 43761 REPOSITION GASTROSTOMY TUBE: CPT

## 2025-07-20 PROCEDURE — 86255 FLUORESCENT ANTIBODY SCREEN: CPT

## 2025-07-20 PROCEDURE — 2500000004 HC RX 250 GENERAL PHARMACY W/ HCPCS (ALT 636 FOR OP/ED)

## 2025-07-20 PROCEDURE — 36556 INSERT NON-TUNNEL CV CATH: CPT

## 2025-07-20 PROCEDURE — 82436 ASSAY OF URINE CHLORIDE: CPT

## 2025-07-20 PROCEDURE — 36415 COLL VENOUS BLD VENIPUNCTURE: CPT

## 2025-07-20 PROCEDURE — 84445 ASSAY OF TSI GLOBULIN: CPT

## 2025-07-20 PROCEDURE — 83930 ASSAY OF BLOOD OSMOLALITY: CPT

## 2025-07-20 PROCEDURE — 2020000001 HC ICU ROOM DAILY

## 2025-07-20 PROCEDURE — 83735 ASSAY OF MAGNESIUM: CPT

## 2025-07-20 PROCEDURE — 37799 UNLISTED PX VASCULAR SURGERY: CPT

## 2025-07-20 PROCEDURE — 3E0G76Z INTRODUCTION OF NUTRITIONAL SUBSTANCE INTO UPPER GI, VIA NATURAL OR ARTIFICIAL OPENING: ICD-10-PCS | Performed by: STUDENT IN AN ORGANIZED HEALTH CARE EDUCATION/TRAINING PROGRAM

## 2025-07-20 PROCEDURE — 82525 ASSAY OF COPPER: CPT

## 2025-07-20 PROCEDURE — 85610 PROTHROMBIN TIME: CPT

## 2025-07-20 PROCEDURE — 82726 LONG CHAIN FATTY ACIDS: CPT

## 2025-07-20 PROCEDURE — 0DH67UZ INSERTION OF FEEDING DEVICE INTO STOMACH, VIA NATURAL OR ARTIFICIAL OPENING: ICD-10-PCS | Performed by: STUDENT IN AN ORGANIZED HEALTH CARE EDUCATION/TRAINING PROGRAM

## 2025-07-20 PROCEDURE — 83825 ASSAY OF MERCURY: CPT

## 2025-07-20 PROCEDURE — 84300 ASSAY OF URINE SODIUM: CPT

## 2025-07-20 PROCEDURE — 82726 LONG CHAIN FATTY ACIDS: CPT | Performed by: PSYCHIATRY & NEUROLOGY

## 2025-07-20 PROCEDURE — 85025 COMPLETE CBC W/AUTO DIFF WBC: CPT

## 2025-07-20 PROCEDURE — 84630 ASSAY OF ZINC: CPT

## 2025-07-20 PROCEDURE — 84425 ASSAY OF VITAMIN B-1: CPT

## 2025-07-20 PROCEDURE — 71045 X-RAY EXAM CHEST 1 VIEW: CPT

## 2025-07-20 PROCEDURE — 99291 CRITICAL CARE FIRST HOUR: CPT

## 2025-07-20 PROCEDURE — 80069 RENAL FUNCTION PANEL: CPT

## 2025-07-20 PROCEDURE — 74018 RADEX ABDOMEN 1 VIEW: CPT | Performed by: RADIOLOGY

## 2025-07-20 PROCEDURE — 86376 MICROSOMAL ANTIBODY EACH: CPT

## 2025-07-20 RX ORDER — LABETALOL HYDROCHLORIDE 5 MG/ML
10 INJECTION, SOLUTION INTRAVENOUS EVERY 2 HOUR PRN
Status: DISCONTINUED | OUTPATIENT
Start: 2025-07-20 | End: 2025-07-20

## 2025-07-20 RX ORDER — MIDAZOLAM HYDROCHLORIDE 2 MG/2ML
2 INJECTION, SOLUTION INTRAMUSCULAR; INTRAVENOUS ONCE
Status: DISCONTINUED | OUTPATIENT
Start: 2025-07-20 | End: 2025-07-20

## 2025-07-20 RX ORDER — HYDROMORPHONE HYDROCHLORIDE 1 MG/ML
0.2 INJECTION, SOLUTION INTRAMUSCULAR; INTRAVENOUS; SUBCUTANEOUS
Status: DISCONTINUED | OUTPATIENT
Start: 2025-07-20 | End: 2025-07-23

## 2025-07-20 RX ORDER — 3% SODIUM CHLORIDE 3 G/100ML
75 INJECTION, SOLUTION INTRAVENOUS CONTINUOUS
Status: DISCONTINUED | OUTPATIENT
Start: 2025-07-20 | End: 2025-07-21

## 2025-07-20 RX ORDER — LABETALOL HYDROCHLORIDE 5 MG/ML
10 INJECTION, SOLUTION INTRAVENOUS EVERY 4 HOURS PRN
Status: DISCONTINUED | OUTPATIENT
Start: 2025-07-20 | End: 2025-07-20

## 2025-07-20 RX ORDER — POTASSIUM CHLORIDE 14.9 MG/ML
20 INJECTION INTRAVENOUS
Status: DISCONTINUED | OUTPATIENT
Start: 2025-07-20 | End: 2025-07-20

## 2025-07-20 RX ORDER — ACETAMINOPHEN 325 MG/1
650 TABLET ORAL EVERY 6 HOURS PRN
Status: DISCONTINUED | OUTPATIENT
Start: 2025-07-20 | End: 2025-07-20

## 2025-07-20 RX ORDER — HYDRALAZINE HYDROCHLORIDE 20 MG/ML
10 INJECTION INTRAMUSCULAR; INTRAVENOUS
Status: DISPENSED | OUTPATIENT
Start: 2025-07-20 | End: 2025-07-24

## 2025-07-20 RX ORDER — HYDROCHLOROTHIAZIDE 25 MG/1
25 TABLET ORAL DAILY
Status: DISCONTINUED | OUTPATIENT
Start: 2025-07-20 | End: 2025-07-20

## 2025-07-20 RX ORDER — HYDRALAZINE HYDROCHLORIDE 20 MG/ML
10 INJECTION INTRAMUSCULAR; INTRAVENOUS 3 TIMES DAILY
Status: DISCONTINUED | OUTPATIENT
Start: 2025-07-20 | End: 2025-07-20

## 2025-07-20 RX ORDER — ACETAMINOPHEN 325 MG/1
650 TABLET ORAL EVERY 6 HOURS PRN
Status: DISCONTINUED | OUTPATIENT
Start: 2025-07-20 | End: 2025-07-21

## 2025-07-20 RX ORDER — HYDRALAZINE HYDROCHLORIDE 20 MG/ML
10 INJECTION INTRAMUSCULAR; INTRAVENOUS EVERY 2 HOUR PRN
Status: DISCONTINUED | OUTPATIENT
Start: 2025-07-20 | End: 2025-07-20

## 2025-07-20 RX ORDER — POLYETHYLENE GLYCOL 3350 17 G/17G
17 POWDER, FOR SOLUTION ORAL DAILY
Status: CANCELLED | OUTPATIENT
Start: 2025-07-20

## 2025-07-20 RX ORDER — LABETALOL HYDROCHLORIDE 5 MG/ML
10 INJECTION, SOLUTION INTRAVENOUS EVERY 10 MIN PRN
Status: DISPENSED | OUTPATIENT
Start: 2025-07-20 | End: 2025-07-24

## 2025-07-20 RX ORDER — 3% SODIUM CHLORIDE 3 G/100ML
150 INJECTION, SOLUTION INTRAVENOUS ONCE
Status: COMPLETED | OUTPATIENT
Start: 2025-07-20 | End: 2025-07-20

## 2025-07-20 RX ORDER — 3% SODIUM CHLORIDE 3 G/100ML
75 INJECTION, SOLUTION INTRAVENOUS CONTINUOUS
Status: DISCONTINUED | OUTPATIENT
Start: 2025-07-20 | End: 2025-07-20

## 2025-07-20 RX ORDER — HYDROXYZINE HYDROCHLORIDE 25 MG/1
25 TABLET, FILM COATED ORAL EVERY 8 HOURS PRN
Status: DISCONTINUED | OUTPATIENT
Start: 2025-07-20 | End: 2025-07-21

## 2025-07-20 RX ORDER — SENNOSIDES 8.6 MG/1
2 TABLET ORAL 2 TIMES DAILY
Status: DISCONTINUED | OUTPATIENT
Start: 2025-07-20 | End: 2025-07-21

## 2025-07-20 RX ORDER — FENTANYL CITRATE 50 UG/ML
50 INJECTION, SOLUTION INTRAMUSCULAR; INTRAVENOUS ONCE
Status: COMPLETED | OUTPATIENT
Start: 2025-07-20 | End: 2025-07-20

## 2025-07-20 RX ADMIN — HYDROXYZINE HYDROCHLORIDE 25 MG: 25 TABLET, FILM COATED ORAL at 09:35

## 2025-07-20 RX ADMIN — HYDRALAZINE HYDROCHLORIDE 10 MG: 10 TABLET ORAL at 09:03

## 2025-07-20 RX ADMIN — LABETALOL HYDROCHLORIDE 10 MG: 5 INJECTION, SOLUTION INTRAVENOUS at 19:23

## 2025-07-20 RX ADMIN — SODIUM CHLORIDE 75 ML/HR: 3 INJECTION, SOLUTION INTRAVENOUS at 22:01

## 2025-07-20 RX ADMIN — SODIUM CHLORIDE 250 ML: 0.9 INJECTION, SOLUTION INTRAVENOUS at 09:29

## 2025-07-20 RX ADMIN — LOSARTAN POTASSIUM 100 MG: 50 TABLET, FILM COATED ORAL at 09:03

## 2025-07-20 RX ADMIN — HYDRALAZINE HYDROCHLORIDE 10 MG: 20 INJECTION INTRAMUSCULAR; INTRAVENOUS at 12:06

## 2025-07-20 RX ADMIN — ERGOCALCIFEROL 1.25 MG: 1.25 CAPSULE ORAL at 09:03

## 2025-07-20 RX ADMIN — HYDRALAZINE HYDROCHLORIDE 10 MG: 10 TABLET ORAL at 00:32

## 2025-07-20 RX ADMIN — SODIUM CHLORIDE 150 ML: 3 INJECTION, SOLUTION INTRAVENOUS at 22:37

## 2025-07-20 RX ADMIN — SODIUM CHLORIDE 250 ML: 3 INJECTION, SOLUTION INTRAVENOUS at 15:54

## 2025-07-20 RX ADMIN — FENTANYL CITRATE 50 MCG: 50 INJECTION INTRAMUSCULAR; INTRAVENOUS at 16:22

## 2025-07-20 RX ADMIN — HYDRALAZINE HYDROCHLORIDE 10 MG: 20 INJECTION INTRAMUSCULAR; INTRAVENOUS at 19:06

## 2025-07-20 RX ADMIN — TAMSULOSIN HYDROCHLORIDE 0.4 MG: 0.4 CAPSULE ORAL at 09:03

## 2025-07-20 RX ADMIN — HYDRALAZINE HYDROCHLORIDE 10 MG: 20 INJECTION INTRAMUSCULAR; INTRAVENOUS at 23:07

## 2025-07-20 RX ADMIN — POLYETHYLENE GLYCOL 3350 17 G: 17 POWDER, FOR SOLUTION ORAL at 09:02

## 2025-07-20 RX ADMIN — SODIUM CHLORIDE 75 ML/HR: 3 INJECTION, SOLUTION INTRAVENOUS at 18:02

## 2025-07-20 RX ADMIN — CETIRIZINE HYDROCHLORIDE 10 MG: 10 TABLET ORAL at 09:03

## 2025-07-20 RX ADMIN — ATORVASTATIN CALCIUM 40 MG: 40 TABLET, FILM COATED ORAL at 20:36

## 2025-07-20 RX ADMIN — HYDRALAZINE HYDROCHLORIDE 10 MG: 20 INJECTION INTRAMUSCULAR; INTRAVENOUS at 20:14

## 2025-07-20 RX ADMIN — LABETALOL HYDROCHLORIDE 10 MG: 5 INJECTION INTRAVENOUS at 09:30

## 2025-07-20 RX ADMIN — HYDROMORPHONE HYDROCHLORIDE 0.2 MG: 1 INJECTION, SOLUTION INTRAMUSCULAR; INTRAVENOUS; SUBCUTANEOUS at 14:54

## 2025-07-20 RX ADMIN — HYDROXYZINE HYDROCHLORIDE 25 MG: 25 TABLET, FILM COATED ORAL at 21:32

## 2025-07-20 RX ADMIN — AMLODIPINE BESYLATE 10 MG: 10 TABLET ORAL at 09:03

## 2025-07-20 RX ADMIN — SODIUM CHLORIDE 250 ML: 3 INJECTION, SOLUTION INTRAVENOUS at 18:30

## 2025-07-20 RX ADMIN — METOPROLOL SUCCINATE 25 MG: 25 TABLET, FILM COATED, EXTENDED RELEASE ORAL at 09:03

## 2025-07-20 RX ADMIN — SENNOSIDES 17.2 MG: 8.6 TABLET, FILM COATED ORAL at 20:36

## 2025-07-20 RX ADMIN — HYDRALAZINE HYDROCHLORIDE 10 MG: 20 INJECTION INTRAMUSCULAR; INTRAVENOUS at 15:19

## 2025-07-20 RX ADMIN — ACETAMINOPHEN 650 MG: 325 TABLET ORAL at 10:47

## 2025-07-20 RX ADMIN — SODIUM CHLORIDE 250 ML: 3 INJECTION, SOLUTION INTRAVENOUS at 14:26

## 2025-07-20 SDOH — ECONOMIC STABILITY: FOOD INSECURITY: WITHIN THE PAST 12 MONTHS, THE FOOD YOU BOUGHT JUST DIDN'T LAST AND YOU DIDN'T HAVE MONEY TO GET MORE.: NEVER TRUE

## 2025-07-20 SDOH — SOCIAL STABILITY: SOCIAL INSECURITY
WITHIN THE LAST YEAR, HAVE YOU BEEN KICKED, HIT, SLAPPED, OR OTHERWISE PHYSICALLY HURT BY YOUR PARTNER OR EX-PARTNER?: PATIENT DECLINED

## 2025-07-20 SDOH — ECONOMIC STABILITY: INCOME INSECURITY
IN THE PAST 12 MONTHS HAS THE ELECTRIC, GAS, OIL, OR WATER COMPANY THREATENED TO SHUT OFF SERVICES IN YOUR HOME?: PATIENT DECLINED

## 2025-07-20 SDOH — ECONOMIC STABILITY: FOOD INSECURITY: WITHIN THE PAST 12 MONTHS, YOU WORRIED THAT YOUR FOOD WOULD RUN OUT BEFORE YOU GOT THE MONEY TO BUY MORE.: NEVER TRUE

## 2025-07-20 SDOH — SOCIAL STABILITY: SOCIAL INSECURITY
WITHIN THE LAST YEAR, HAVE YOU BEEN RAPED OR FORCED TO HAVE ANY KIND OF SEXUAL ACTIVITY BY YOUR PARTNER OR EX-PARTNER?: PATIENT DECLINED

## 2025-07-20 SDOH — SOCIAL STABILITY: SOCIAL INSECURITY: WITHIN THE LAST YEAR, HAVE YOU BEEN AFRAID OF YOUR PARTNER OR EX-PARTNER?: PATIENT DECLINED

## 2025-07-20 SDOH — SOCIAL STABILITY: SOCIAL INSECURITY
WITHIN THE LAST YEAR, HAVE YOU BEEN HUMILIATED OR EMOTIONALLY ABUSED IN OTHER WAYS BY YOUR PARTNER OR EX-PARTNER?: PATIENT DECLINED

## 2025-07-20 ASSESSMENT — PAIN - FUNCTIONAL ASSESSMENT
PAIN_FUNCTIONAL_ASSESSMENT: 0-10
PAIN_FUNCTIONAL_ASSESSMENT: 0-10

## 2025-07-20 ASSESSMENT — PAIN SCALES - GENERAL
PAINLEVEL_OUTOF10: 0 - NO PAIN
PAINLEVEL_OUTOF10: 9

## 2025-07-20 ASSESSMENT — PAIN DESCRIPTION - LOCATION: LOCATION: HEAD

## 2025-07-20 NOTE — PROGRESS NOTES
[facility]  NEUROSCIENCE INTENSIVE CARE UNIT  DAILY PROGRESS NOTE       Patient Name: Ayla Paredes   MRN: [id number]     Admit Date: 2025     : 1970 AGE: 55 y.o. GENDER: male        Subjective  Ayla Paredes is a 55 y.o.  male with a PMHx of  HTN, DM, CKD transferred from [facility] [place] for bilateral hand and foot numbness. Symptoms began acutely on 7/15 and have not progressed beyond the hands and feet. Exam was notable for positive romberg sign and a wide gait with inability to perform tandem gait. Pt with no recent travel and trena changes or substance use to suggest a nutritional issue. MRI brain with no acute abnormalities. Given the diffuse hyperreflexia and positive romberg sign this picture localizes to a likely spinal cord etiology, possibly posterior spinal. Due to this, spinal imaging was conducted which showed no acute abnormalities in thoracic but cervical had multilevel cervical spondylosis with posterior disc osteophyte complexes causing C5-C6 and C6-C7 central canal narrowing. Cervical imaging is unremarkable. Pending Paraneoplastic and autoimmune labs results and MRI with contrast. Pt with Sodium of 134 on admission which downtrended to 123 within 48 hours.     Interval Events: Admitted to NSU for hypertonic saline      Objective   VITALS (24H):  Temp:  [36.2 °C (97.1 °F)-36.8 °C (98.2 °F)] 36.6 °C (97.9 °F)  Heart Rate:  [56-73] 66  Resp:  [16-23] 16  BP: (127-199)/() 159/105  INTAKE/OUTPUT:  Intake/Output Summary (Last 24 hours) at 2025 1457  Last data filed at 2025 1450  Gross per 24 hour   Intake 500 ml   Output 250 ml   Net 250 ml     VENT SETTINGS:        PHYSICAL EXAM:    NEUROLOGIC EXAM:    MENTAL STATUS:  - Alert and oriented to person, place, and time  - Recall of recent events intact. Fund of knowledge intact.    CRANIAL NERVES:  - CN I: Not Assessed  - CN II: Pupils constrict to light bilaterally 3->2 mm, visual fields intact  bilaterally  - CN III, IV, VI: Extraocular movements intact without nystagmus  - CN V: Facial sensation intact to light touch  - CN VII: No facial droop, closes eyes against resistance  - CN VIII: Hearing intact to voice  - CN IX, X: Palate elevates symmetrically  - CN XII: Tongue midline without atrophy or fasciculations    MOTOR:  - No tremors or abnormal movements  - Normal bulk and tone throughout  - Strength: R                L  Shoulder Abd 4                4  Shoulder Add 5                5  Elbow Flex 4                4  Elbow Ext  4                4  Hip flexion 5                5  Knee Ext  5                5  Knee Flex        5                 5  DorsiFlex 5                5  PlantarFlex       5                5    REFLEXES:   R               L  Biceps            +2               +2  Brachioradialis+2               +2  Patellar            +3               +3  Achilles  +2               +2  Plantar           Down            Down  Ankle Clonus   Absent          Absent    Sensation to light touch, temperature and vibration was sporadically decreased in the bilateral lower extremities, not limited to specific region or extremity. Intact sensation in bilateral upper extremities.    CV:  - RRR on telemetry, NSR  RESP:  - No signs of resp distress  - Oxygen: RA    :  - No catheter in place  GI:  - Abdomen NT/ND, soft  SKIN:  - Intact    MEDICATIONS:  Scheduled: PRN: Continuous:   Scheduled Medications[1] PRN Medications[2] Continuous Medications[3]     LAB RESULTS:  Results from last 72 hours   Lab Units 07/20/25  0801 07/19/25  0750   GLUCOSE mg/dL 142* 124*   SODIUM mmol/L 123* 128*   POTASSIUM mmol/L 3.0* 3.6   CHLORIDE mmol/L 81* 88*   CO2 mmol/L 29 30   ANION GAP mmol/L 16 14   BUN mg/dL 12 13   CREATININE mg/dL 1.13 1.28   EGFR mL/min/1.73m*2 77 66   CALCIUM mg/dL 10.5 10.1   PHOSPHORUS mg/dL 4.1 4.3   ALBUMIN g/dL 4.8 4.6   MAGNESIUM mg/dL 1.82 1.96      Results from last 72 hours   Lab Units  07/20/25  1400 07/20/25  0801   WBC AUTO x10*3/uL 8.1 7.8   NRBC AUTO /100 WBCs 0.0 0.0   RBC AUTO x10*6/uL 4.11* 4.99   HEMOGLOBIN g/dL 11.6* 14.1   HEMATOCRIT % 32.1* 40.6*   MCV fL 78* 81   MCH pg 28.2 28.3   MCHC g/dL 36.1* 34.7   RDW % 12.3 12.4   PLATELETS AUTO x10*3/uL 365 498*   NEUTROS PCT AUTO % 66.5 69.4   IG PCT AUTO % 0.4 0.3   LYMPHS PCT AUTO % 25.0 23.0   MONOS PCT AUTO % 6.7 6.3   EOS PCT AUTO % 0.9 0.5   BASOS PCT AUTO % 0.5 0.5   NEUTROS ABS x10*3/uL 5.41 5.44   IG AUTO x10*3/uL 0.03 0.02   LYMPHS ABS AUTO x10*3/uL 2.03 1.80   MONOS ABS AUTO x10*3/uL 0.54 0.49   EOS ABS AUTO x10*3/uL 0.07 0.04   BASOS ABS AUTO x10*3/uL 0.04 0.04           [unfilled]    IMAGING RESULTS:  MR brain wo IV contrast   Final Result   1. No acute infarction or acute hemorrhage.        2. Probable mild chronic white matter small vessel ischemic changes.        I personally reviewed the images/study and I agree with the findings   as stated by resident Primo Caldera. This study was interpreted   at [place], [place].        MACRO:   None        Signed by: Jessee Solano 7/18/2025 3:50 PM   Dictation workstation:   [id number]      CT angio head and neck w and wo IV contrast   Final Result   No hemodynamically significant stenosis of the head and neck   vasculature.        Recommendation: If clinically concerned for small acute ischemic   infarct, further evaluation with MR of the brain without contrast is   recommended.        MACRO:   None        Signed by: Jessee Solano 7/18/2025 1:46 PM   Dictation workstation:   [id number]      XR chest 2 views   Final Result   1.  No evidence of acute cardiopulmonary process.                  MACRO:   None        Signed by: Arpan Bradford 7/18/2025 12:55 PM   Dictation workstation:   [id number]             Assessment/Plan    55 y.o. male with PMH HTN, DM, CKD.  Admitted 7/18/2025 with Ataxia, admitted to NSU on 07/20 d/t acute  hyponatremia with neurologic exam change.    Originally presented for bilateral hand and foot numbness for which neurology is consulted for. Symptoms began acutely on  7/15 and have not progressed beyond the hands and feet. Spinal imaging was conducted which showed no acute abnormalities in thoracic but cervical had multilevel cervical spondylosis with posterior disc osteophyte complexes causing C5-C6 and C6-C7 central canal narrowing. Not included in impression, but lesion seen in right lateral angelic on MRI as shown below. Laterality unable to explain bilateral symptoms and significant gait issues. Sensory deficits are also stocking glove according to patient and on exam they appear in both lower extremities. Ordered paraneoplastic and autoimmune labs, awaiting. Will obtain MRI with contrast, and ordered CT abdomen/pelvis.        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 MRI Brain, not included in the impression, unilateral  - B12 872, TSH 0.75, Hep Panel nonreactive, HIV nonreactive, Syphillis with reflex negative  Plan:  - NSU  - Pending B6, B1, Vitamin E, TSH, Hepatitis panel, Copper, Zinc, heavy metal screen. .    - Pending MRI with contrast    - Pending LP today   - Neuro Checks: Q1H  - Pain: acetaminophen PRN  - Nausea: ondansetron  - PT/OT/SLP    CARDIOVASCULAR:  #HTN  #HLD  Assessment:  - Home meds: Losartan 100, metop succinate 50 daily, amlodipine 10 daily  - ECHO: 11/2023: Left ventricular systolic function is normal with a 55-60% estimated ejection fraction.     Plan:  - Continue to monitor on telemetry  - BP Goal: Normotension   - amlodipine 10 mg daily  - metop succinate 25 mg  - c/w atorvastatin 40 mg daily   - labetalol and hydral prn for sbp >180  - BP goal: SBP < 180    --> PRN: Labetalol and Hydralazine      RESPIRATORY:  #LAURA  Assessment:  - Subjective hypoxia, sating well on RA  Plan:  - Continuous pulse oximetry   - O2 PRN to maintain SpO2 > 94%, wean as tolerated  - c/w NC prn    RENAL/:  #LAURA  Assessment:  - Baseline BUN/Cr: 12/1.1-2  Results from last 72 hours   Lab Units 07/20/25  0801 07/19/25  0750   BUN mg/dL 12 13   CREATININE mg/dL 1.13 1.28       Plan:  - Monitor with daily RFP    FEN/GI:  #LAURA  Assessment:  - Acute hyponatremia, c/f siadh, etiology unknown   Plan:  - Monitor and replace electrolytes per protocol  - IVF: Hypertonic NaCl 3% bolus prn  - Diet: NPO   - Bowel Regimen: Docusate-Senna BID and Miralax BID    ENDOCRINE:  #Hyponatremia SIADH vs unknown etiology   #T2DM   Assessment:  Results from last 7 days   Lab Units 07/20/25  1127 07/20/25  0801 07/20/25  0751 07/19/25  1603 07/19/25  1138 07/19/25  0750 07/19/25  0744 07/18/25  2149 07/18/25  1348 07/16/25  0635 07/16/25  0331   POCT GLUCOSE mg/dL 157*  --  164* 153* 153*  --  145* 110*  --    < >  --    GLUCOSE mg/dL  --  142*  --   --   --  124*  --   --  98  --  104*   SODIUM mmol/L  --  123*  --   --   --  128*  --   --  131*  --  135*    < > = values in this interval not displayed.   - Acute hyponatremia correlating with worsening neuro exam and subjective fatigue/confusion   - No clear etiology at this time.  - Euvolemic   Plan:  - Accuchecks & ISS AC&HS   - hold metformin   - Repeat sodium, sodium q4h  - Urine lytes  - Hypertonic Saline bolus prn, goal improvement 6-8 mmol/L in first 24 hours  - Strict I&Os    HEMATOLOGY:  #LAURA  Assessment:  - Baseline Hgb: 13-14  - Baseline Plts: 350  Results from last 7 days   Lab Units 07/20/25  1400 07/20/25  0801 07/19/25  0750   HEMOGLOBIN g/dL 11.6* 14.1 13.0*   HEMATOCRIT % 32.1* 40.6* 37.8*   PLATELETS AUTO x10*3/uL 365 498* 488*     Plan:  - Continue to monitor with daily CBC and Coag panel    INFECTIOUS DISEASE:  #LAURA  Assessment:  Results from last 7 days   Lab Units 07/20/25  1400  25  0801 25  0750   WBC AUTO x10*3/uL 8.1 7.8 7.5    - Temp (24hrs), Av.5 °C (97.7 °F), Min:36.2 °C (97.1 °F), Max:36.8 °C (98.2 °F)     Plan:  - Continue to monitor for s/sx of infection  - Pan culture for temperature > 38.4 C    MUSCULOSKELETAL:  - No acute issues    SKIN:  - No acute issues  - Turns and skin care per NSU protocol    ACCESS:  - PIVs    PROPHYLAXIS:  - DVT Ppx: SCDs SQH on hold for LP  - GI Ppx: not indicated    RESTRAINTS:  Not indicated/Patient does not meet criteria for restraints    Daily ICU Checklist:  - Restraint order/note  [] Yes [] No [x] N/A   - NIHSS Frequency appropriate? [] Yes [x] No   - Daily Labs Ordered? [x] Yes [] No [] N/A   - Medication Route? [x] Yes [] No  - (PO, NG, OG, G Tube)   - PPI ordered/needed? [] Yes [x] No [] N/A   - DVT PPx [] Yes [x] No   - Antibiotic stop date? [] Yes [] No [x] N/A   - Miner? [] Yes [x] No  DC? [] Y [] N [] N/A   - Central Line? [] Yes [x] No  LOC: n/a  DC? [] Y [] N [x] N/A   - Central  orders [] Yes [] No [x] N/A   - Vent weaning plan? [] Yes [] No [x] N/A               UGO Bagley-CNP  Neuroscience Intensive Care       Total critical care time of 55 minutes, with > 50% of time spent in direct contact with patient/family for education, counseling and coordination of care.          [1] amLODIPine, 10 mg, oral, Daily  atorvastatin, 40 mg, oral, Nightly  cetirizine, 10 mg, oral, Daily  ergocalciferol, 1.25 mg, oral, Every   fluticasone, 1 spray, Each Nostril, Daily  insulin lispro, 0-5 Units, subcutaneous, TID AC  lidocaine, 1 patch, transdermal, Daily  metoprolol succinate XL, 25 mg, oral, Daily  polyethylene glycol, 17 g, oral, Daily  sennosides, 2 tablet, oral, BID  tamsulosin, 0.4 mg, oral, Daily     [2] PRN medications: acetaminophen, dextrose, dextrose, glucagon, glucagon, hydrALAZINE, HYDROmorphone, hydrOXYzine HCL, labetaloL, lidocaine  [3]

## 2025-07-20 NOTE — PROCEDURES
Central Line    Date/Time: 7/20/2025 5:32 PM    Performed by: FERNANDO Bagley  Authorized by: FERNANDO Bagley    Consent:     Consent obtained:  Verbal and written    Consent given by:  Patient    Risks, benefits, and alternatives were discussed: yes      Risks discussed:  Arterial puncture, incorrect placement, nerve damage, bleeding and infection    Alternatives discussed:  Delayed treatment  Universal protocol:     Procedure explained and questions answered to patient or proxy's satisfaction: yes      Site/side marked: yes      Immediately prior to procedure, a time out was called: yes      Patient identity confirmed:  Verbally with patient and arm band  Pre-procedure details:     Indication(s): central venous access      Hand hygiene: Hand hygiene performed prior to insertion      Sterile barrier technique: All elements of maximal sterile technique followed      Skin preparation:  Chlorhexidine    Skin preparation agent: Skin preparation agent completely dried prior to procedure    Sedation:     Sedation type:  Anxiolysis  Anesthesia:     Anesthesia method:  Local infiltration    Local anesthetic:  Lidocaine 1% w/o epi  Procedure details:     Location:  R femoral    Site selection rationale:  Pt is unable to lay flat. Perform multiple trials of laying flat and pt didn't tolerated. Also requiring frequent NT suctioning    Patient position:  Supine    Procedural supplies:  Triple lumen    Catheter length:  16    Landmarks identified: yes      Ultrasound guidance: yes      Ultrasound guidance timing: real time      Sterile ultrasound techniques: Sterile gel and sterile probe covers were used      Number of attempts:  1    Successful placement: yes    Post-procedure details:     Post-procedure:  Dressing applied and line sutured    Assessment:  Blood return through all ports    Procedure completion:  Tolerated well, no immediate complications  Comments:      VBG verify pO2 of 42

## 2025-07-20 NOTE — PROGRESS NOTES
Have Your Skin Lesions Been Treated?: not been treated Subjective   Ayla Paredes is a 55 y.o. male who presents for transitional care management and hospital follow-up visit for ataxia; admit 7/18/25-  .    HPI:      55 y.o. male who presents for transitional care management and hospital follow-up visit for ataxia; admit 7/18/25-       EMR records reviewed.    Last seen by Lauro Jurado PA-C 7/18/2025 for paresthesias and ataxia and sent to the emergency room for further evaluation and to rule out cerebellar stroke.      PMHx:       HTN       Prediabetes       Hyperlipidemia       CKD stage 3A       Exertional shortness of breath  Heart murmur  Incomplete bladder emptying  Nocturia  Lumbar radiculitis  Lumbar spondylosis  Muscle spasm of back  Cardiomyopathy; EF of 45% by echocardiogram 5/2022.  Stress test 5/2022 showed no evidence of ischemia at a high workload. Coronary CT angiogram 12/2023 showed no significant atherosclerotic changes or stenotic disease with a total CACS of 20.3.    Vitamin D deficiency  ataxia          Healthcare Providers:  Cardiology: UGO Sarabia-CNP and Dr. Banks  Nephrology: Diane Bush MD   GI/colorectal surgery (colonoscopy): Darren Warren MD   [facility]     Preventive Health Services:  -Last physical: 4/8/25  -last PSA: 0.76 (4/9/25)   -last colonoscopy: 8/15/24==> polyp==>REPEAT 5 YEARS (DUE 8/2029)  -last STI screening: negative  GC/CT/trich/HIV/syphilis 7/26/24  -Hep C screening: negative 7/26/24     Immunizations:   -Childhood vaccines: completed per patient    -COVID vaccine and booster:  -updated COVID spike vaccine: NOW DUE  -TDAP:   NOW DUE  -Prevnar 20: NOW DUE  -shingrix: NOW DUE     Immunization History   Administered Date(s) Administered    Td (adult), unspecified 11/07/2011             INTERVAL HISTORY:     CT Heart Ca scoring 6/28/25  Interpreted By: Sy Lagunas   IMPRESSION:  1. Coronary artery calcium score of 42       - Seen in ED and admitted  7/18/25  for ataxia and bilateral hand  "and foot numbness.  Emergency department and inpatient and neurology consult provider notes, labs, and imaging personally reviewed.  CT head neck angio 7/18/2025 per radiology report showed \"No hemodynamically significant stenosis of the head and neck vasculature, \" MRI brain without contrast 7/18/2025 per radiology report showed\" 1. No acute infarction or acute hemorrhage. 2. Probable mild chronic white matter small vessel ischemic changes, \"MRI cervical and thoracic spine with and without contrast 7/18/2025 per radiology report showed \" CERVICAL SPINE: Multilevel cervical spondylosis with posterior disc osteophyte complexes causing C5-C6 mild-to-moderate and C6-C7 moderate narrowing of the central canal. Addition, there is moderate-to-severe neural foramen narrowing bilaterally at these levels. THORACIC SPINE: No evidence of significant spinal canal stenosis or other acute abnormality in the thoracic spine.    Per provider hospital discharge summary:           Today Tamone reports:       - Ongoing bilateral hand and foot paresthesias since hospital discharge, rated as severity as 7/10, not worsening over time.  He expressed interest in starting gabapentin for possible diabetic neuropathy and referral to neurology for further evaluation.     - Otherwise doing and feeling well since hospital discharge      -taking all medications as prescribed with no reported adverse medication side effects           Today he has no other reported complaints, issues, or problems.     ROS is NEG for HEADACHE, NAUSEA, VOMITING, DIARRHEA, CHEST PAIN, SOB, and BLEEDING.  Review of systems (12 point) is negative for all systems except for any identified issues in HPI above.       Objective     There were no vitals taken for this visit.     Physical Examination:       GENERAL           General Appearance: well-appearing, well-developed, well-hydrated, well-nourished, no acute distress.        HEENT           NECK supple, no masses or " Is This A New Presentation, Or A Follow-Up?: Skin Lesions How Severe Is Your Skin Lesion?: mild thyromegaly, no carotid bruit.        EYES           Extraocular Movements: normal, bilateral eyes SVETA, no conjunctival injection.        HEART           Rate and Rhythm regular rate and rhythm. Heart sounds: normal S1S2, no S3 or S4. Murmurs: none.        CHEST           Breath sounds: Clear to IPPA, RR<16 no use of accessory muscles.        ABDOMEN           General: Neg for LKKS or masses, no scleral icterus or jaundice.        MUSCULOSKELETAL           Joints Demonstration: Neg for erythema, swelling or joint deformities. gross abnormalities no gross abnormalities.        EXTREMITIES           Lower Extremities: Neg for cyanosis, clubbing or edema.       Assessment/Plan   Problem List Items Addressed This Visit    None    Transitional care management and Hospital follow-up for ataxia and neuropathy (admit 7/18/25-   ): No red flag signs or symptoms today  -Referral to neurology ordered for further evaluation and management  - Start gabapentin 300 mg twice daily for suspected diabetic neuropathy  - Emergency department and 911/EMS precautions discussed and reviewed with patient     HTN: mildly elevated, asymptomatic. Follows with cardiology and nephrology. EKG 6/25/24: 56 bpm, sinus bradycardia, LVH, no acute ischemic changes, abnormal EKG.   -cont amlodipine 10 mg daily, chlorthalidone 37.5 mg daily, and losartan 100 mg daily  -cont metoprolol XL from 25 to  50 mg daily  -cont hydralazine hydralazine 10 mg TID   -diabetic, low salt, low cholesterol diet, regularly exercise, and limit alcohol intake  -referral to cardiology previously ordered; patent advised to schedule follow up with cardiology   -Emergency Dept and 911/EMS precautions discussed and reviewed with patient        DM2 with CKD stage 3a: HgBA1c: 6.5  7/2/25<  6.0 (4/8/25)  -cont metformin 500 mg XR bid   -cont statin   -referral to ophthalmology and podiatry ordered 7/2/25 for diabetic eye exam and foot care  -referral to clinical pharmacy ordered  7/2/25 for assistance with diabetes management  -referral to endocrinology ordered 7/2/25  -referral to nutrition and diabetes education ordered 7/2/25  -diabetic,  low salt, low cholesterol diet, regularly exercise, and limit alcohol intake  -HgBa1c NEXT DUE 10/2/2025        CKD Stage 3A: followed by nephrology  -diabetic, low salt, low cholesterol diet, regularly exercise, and limit alcohol intake  -BP and glycemic control  -patient counseled to avoid NSAIDS that can worsen renal function  -cont management by nephrology     Hyperlipidemia: LDL not at goal  -cont atorvastatin 40 mg daily  -diabetic, low salt, low cholesterol diet, regularly exercise, and limit alcohol intake     Cardiac murmur, HFrEF, cardiomyopathy: followed by cardiology  -cont management by cardiology  -emergency Dept  and 911/EMS precautions discussed and reviewed by patient     LUTS/BPH:  -cont tamsulosin 0.4 mg at bedtime  -referral to urology previously ordered 6/25/24; patient advised to call to see urology     Vitamin D deficiency: Vit D WNL 7/3/25  -cont Vit D 50,000 units once weekly; patient advised after completing Vit D 50,000 units once weekly to start OTC Vit D3 2,000 units daily         Heart Disease Screening:  -CT Heart Ca scoring ordered 4/8/25     Counseling:       Medication education:         Education:  The patient is counseled regarding potential side-effects of all new medications        Understanding:  Patient expressed understanding        Adherence:  No barriers to adherence identified        Immunizations Counseling  -TDAP, Prevnar 20, shingrix now due==> declined today  -recommend updated COVID spike vaccine that can be obtained at local pharmacy     FOLLOW-UP: 12 weeks HgBA1c check      I have personally reviewed all available pertinent labs, imaging, and consult notes with the patient.     Discussed recommended plan of care with patient. Patient expressed understanding and agreement with plan of care. All of  patient's  questions were answered at the time. Patient had no additional questions at the time.                Prudence Clayton MD, PhD

## 2025-07-20 NOTE — HOSPITAL COURSE
Ayla Paredes is a 55 y.o.  male with a PMHx of  HTN, DM, CKD presenting for bilateral hand and foot numbness for which neurology is consulted for. Symptoms began acutely on  7/15 and have not progressed beyond the hands and feet. On neurological exam he has no objective sensory changes, however he has a positive romberg sign as well as a wide based gait with inability to perform tandem gait. He has no recent travel, but his occupation does put him at a increased infection risk. No recent weight changes or substance use to suggest a nutritional issue. MRI brain personally reviewed and shows no acute abnormalities. Given the diffuse hyperreflexia and positive romberg sign this picture localizes to a likely spinal cord etiology, possibly posterior spinal. Due to this, spinal imaging was conducted which showed no acute abnormalities in thoracic but cervical had multilevel cervical spondylosis with posterior disc osteophyte complexes causing C5-C6 and C6-C7 central canal narrowing.      Cervical imaging is unable to explain patient's neuropathy limited to hands and feet. el. Possibility of a trigeminal neuralgia in association with patient's lip numbness.      Not included in impression, but lesion seen in right lateral angelic on MRI as shown below. Laterality unable to explain bilateral symptoms and significant gait issues. Sensory deficits are also stocking glove according to patient and on exam they appear in both lower extremities. Ordered paraneoplastic and autoimmune labs, awaiting. Will obtain MRI with contrast, and ordered CT abdomen/pelvis.     Patient to be transferred to the ICU for hypetonic saline in setting of Na 123.      issues.    Follow-ups:  - Neurology 2-6 weeks after discharge for AIDP follow-up  - PCP 2 to 6 weeks after discharge for post hospital follow-up for SIADH and sodium check  - Needs routine outpatient CT chest in 6-12 months due to subpleural upper lobe nodule seen on CT C/A/P 7/21/25    Medication changes:  - NaCl tablet 0.5 mg tablet, can wean off of sodium when hyponatremia normalizes  - Gabapentin 300 mg TID

## 2025-07-20 NOTE — CARE PLAN
The patient's goals for the shift include orient to new unit     The clinical goals for the shift include pt will remain safe and free from falls/injury    Over the shift, the patient did not make progress toward the following goals. Barriers to progression include   Problem: Safety - Adult  Goal: Free from fall injury  Outcome: Progressing     Problem: Pain - Adult  Goal: Verbalizes/displays adequate comfort level or baseline comfort level  Outcome: Progressing   . Recommendations to address these barriers include frequent reorientation

## 2025-07-20 NOTE — CARE PLAN
The patient's goals for the shift include      The clinical goals for the shift include pt will remain safe and free from falls/injury    Over the shift, the patient did not have any falls or injury. Ambulating with front wheeled walker and standby assist.  Problem: Pain - Adult  Goal: Verbalizes/displays adequate comfort level or baseline comfort level  Outcome: Progressing     Problem: Safety - Adult  Goal: Free from fall injury  Outcome: Progressing     Problem: Discharge Planning  Goal: Discharge to home or other facility with appropriate resources  Outcome: Progressing     Problem: Chronic Conditions and Co-morbidities  Goal: Patient's chronic conditions and co-morbidity symptoms are monitored and maintained or improved  Outcome: Progressing

## 2025-07-20 NOTE — PROGRESS NOTES
"Ayla Paredes is a 55 y.o. male on day 2 of admission presenting with Ataxia.      Subjective     Yesterday afternoon, patient complained of shortness of breath and anxiety, including sensation that he was going to die. Stated he felt like he could taste the medications he had recently taken. On physical exam, patient had no acute respiratory or cardiac abnormality. NIF and VC was ordered, -25/1.14, then repeated. Patient was given tums. Today, patient's condition unchanged. Still feels increased generalized weakness and some issues swallowing. Patient overall feeling worried about his hospitalization.     On rounds, patient was feeling short of breath after walking to the bathroom on his own.       Objective     Last Recorded Vitals  Blood pressure (!) 180/109, pulse 57, temperature 36.5 °C (97.7 °F), temperature source Temporal, resp. rate 16, height 1.651 m (5' 5\"), weight 81.6 kg (180 lb), SpO2 95%.    Physical Exam    General Appearance:  No distress, alert, interactive and cooperative.      Cardiovascular: Regular, rate and rhythm, no murmurs, normal S1 and S2. Carotid pulses intact without any bruits. Pulses +2 and equal in all extremities. No swelling, varicosities, edema, or tenderness to palpation.      Mental State: Orientation was normal to time, place and person. Recent and remote memory was intact.  Attention span and concentration were normal. Language testing was normal for comprehension, repetition, expression, and naming. General fund of knowledge intact     Cranial Nerves:   CN2 : Not tested  CN 3, 4, 6: Pupils round, 4 mm in diameter, equally reactive to light. Lids symmetric; no ptosis. EOMs normal alignment, full range with normal saccades, pursuit and convergence.   No nystagmus.   CN 5: Facial sensation intact bilaterally.   CN 7: Normal and symmetric facial strength. Nasolabial folds symmetric.   CN 8: Hearing intact to voice  CN 9: Palate elevates symmetrically.   CN 11: Normal strength " of shoulder shrug and neck turning.   CN 12: Tongue midline, with normal bulk and strength; no fasciculations.      Motor: Muscle bulk and tone were normal in both upper and lower extremities. No fasciculations, tremor or other abnormal movements were present.  NF and NE was 5/5.      Strength                                             R          L  Shoulder abduction (C4-C5)                3          3  Elbow flexion (C5-C6)                        3      3  Elbow extension (C7-C8)                     3          3  Wrist extension (C6-C7)                      3          3  Finger extension (C7-C8)                     3          3  Finger flexion Median (C8-T1)             3          3  Finger flexion Ulnar (C8-T1)                3          3     Hip flexion (IP, L2-L3)                          5          5  Knee extension (Fem, L2-L4)               5          5  Knee flexion (Sci, L5-S1)                      5          5  DorsiFlex (DePer, L5-S1l)                     5          5  PlantarFlex (Tibial, S1-S2)                    5          5  Inversion (Tibial L5 )                            5          5   Eversion (SupPer, L5-S1)                     5          5     Reflexes: Right/ Left:  Biceps 2/2, brachioradialis 2/2, triceps 2/2,  ankle 2/2  toes downgoing to plantar stimulation. No clonus, frontal release signs or other pathologic reflexes present.      Sensory:   - Sensation to light touch, temperature and vibration was sporadically decreased in the bilateral lower extremities, not limited to specific region or extremity. Intact sensation in bilateral upper extremities.     Coordination: Dysmetria and delay during finger to nose. Patient declined heel to shin.    Gait: Station was unstable with a wide base.  Romberg sign was positive. Unable to perform tandem gait.      Relevant Results              Roberto Coma Scale  Best Eye Response: Spontaneous  Best Verbal Response: Oriented  Best Motor Response:  Follows commands  Robreto Coma Scale Score: 15           Scheduled medications  Scheduled Medications[1]  Continuous medications  Continuous Medications[2]  PRN medications  PRN Medications[3]     Results for orders placed or performed during the hospital encounter of 07/18/25 (from the past 24 hours)   POCT GLUCOSE   Result Value Ref Range    POCT Glucose 145 (H) 74 - 99 mg/dL   Magnesium   Result Value Ref Range    Magnesium 1.96 1.60 - 2.40 mg/dL   Renal Function Panel   Result Value Ref Range    Glucose 124 (H) 74 - 99 mg/dL    Sodium 128 (L) 136 - 145 mmol/L    Potassium 3.6 3.5 - 5.3 mmol/L    Chloride 88 (L) 98 - 107 mmol/L    Bicarbonate 30 21 - 32 mmol/L    Anion Gap 14 10 - 20 mmol/L    Urea Nitrogen 13 6 - 23 mg/dL    Creatinine 1.28 0.50 - 1.30 mg/dL    eGFR 66 >60 mL/min/1.73m*2    Calcium 10.1 8.6 - 10.6 mg/dL    Phosphorus 4.3 2.5 - 4.9 mg/dL    Albumin 4.6 3.4 - 5.0 g/dL   CBC and Auto Differential   Result Value Ref Range    WBC 7.5 4.4 - 11.3 x10*3/uL    nRBC 0.0 0.0 - 0.0 /100 WBCs    RBC 4.57 4.50 - 5.90 x10*6/uL    Hemoglobin 13.0 (L) 13.5 - 17.5 g/dL    Hematocrit 37.8 (L) 41.0 - 52.0 %    MCV 83 80 - 100 fL    MCH 28.4 26.0 - 34.0 pg    MCHC 34.4 32.0 - 36.0 g/dL    RDW 12.7 11.5 - 14.5 %    Platelets 488 (H) 150 - 450 x10*3/uL    Neutrophils % 55.6 40.0 - 80.0 %    Immature Granulocytes %, Automated 0.3 0.0 - 0.9 %    Lymphocytes % 36.7 13.0 - 44.0 %    Monocytes % 5.2 2.0 - 10.0 %    Eosinophils % 1.1 0.0 - 6.0 %    Basophils % 1.1 0.0 - 2.0 %    Neutrophils Absolute 4.15 1.20 - 7.70 x10*3/uL    Immature Granulocytes Absolute, Automated 0.02 0.00 - 0.70 x10*3/uL    Lymphocytes Absolute 2.74 1.20 - 4.80 x10*3/uL    Monocytes Absolute 0.39 0.10 - 1.00 x10*3/uL    Eosinophils Absolute 0.08 0.00 - 0.70 x10*3/uL    Basophils Absolute 0.08 0.00 - 0.10 x10*3/uL   POCT GLUCOSE   Result Value Ref Range    POCT Glucose 153 (H) 74 - 99 mg/dL   POCT GLUCOSE   Result Value Ref Range    POCT Glucose 153  (H) 74 - 99 mg/dL   Gray Top   Result Value Ref Range    Extra Tube Hold for add-ons.    Gray Top   Result Value Ref Range    Extra Tube Hold for add-ons.    Gray Top   Result Value Ref Range    Extra Tube Hold for add-ons.    Lavender Top   Result Value Ref Range    Extra Tube Hold for add-ons.    Lavender Top   Result Value Ref Range    Extra Tube Hold for add-ons.       MR cervical spine w and wo IV contrast  Result Date: 7/19/2025  CERVICAL SPINE:   Multilevel cervical spondylosis with posterior disc osteophyte complexes causing C5-C6 mild-to-moderate and C6-C7 moderate narrowing of the central canal. Addition, there is moderate-to-severe neural foramen narrowing bilaterally at these levels.   THORACIC SPINE:   No evidence of significant spinal canal stenosis or other acute abnormality in the thoracic spine.   I personally reviewed the images/study and I agree with the findings as stated by Jose Raul Waters DO, PGY-4. This study was interpreted at [place], [place].   MACRO: None   Signed by: River Diehl 7/19/2025 12:12 AM Dictation workstation:   [id number]    MR thoracic spine w and wo IV contrast  Result Date: 7/19/2025  CERVICAL SPINE:   Multilevel cervical spondylosis with posterior disc osteophyte complexes causing C5-C6 mild-to-moderate and C6-C7 moderate narrowing of the central canal. Addition, there is moderate-to-severe neural foramen narrowing bilaterally at these levels.   THORACIC SPINE:   No evidence of significant spinal canal stenosis or other acute abnormality in the thoracic spine.   I personally reviewed the images/study and I agree with the findings as stated by Jose Raul Waters DO, PGY-4. This study was interpreted at [place], [place].   MACRO: None   Signed by: River Diehl 7/19/2025 12:12 AM Dictation workstation:   [id number]    MR brain wo IV contrast  Result Date:  7/18/2025  1. No acute infarction or acute hemorrhage.   2. Probable mild chronic white matter small vessel ischemic changes.   I personally reviewed the images/study and I agree with the findings as stated by resident Primo Caldera. This study was interpreted at [facility], [place], [place].   MACRO: None   Signed by: Jessee Solano 7/18/2025 3:50 PM Dictation workstation:   [id number]    CT angio head and neck w and wo IV contrast  Result Date: 7/18/2025  No hemodynamically significant stenosis of the head and neck vasculature.   Recommendation: If clinically concerned for small acute ischemic infarct, further evaluation with MR of the brain without contrast is recommended.   MACRO: None   Signed by: Jessee Solano 7/18/2025 1:46 PM Dictation workstation:   [id number]    XR chest 2 views  Result Date: 7/18/2025  1.  No evidence of acute cardiopulmonary process.       MACRO: None   Signed by: Arpan Bradford 7/18/2025 12:55 PM Dictation workstation:   [id number]     Assessment & Plan  Ataxia    Ayla Paredes is a 55 y.o.  male with a PMHx of  HTN, DM, CKD presenting for bilateral hand and foot numbness for which neurology is consulted for. Symptoms began acutely on  7/15 and have not progressed beyond the hands and feet. On neurological exam he has no objective sensory changes, however he has a positive romberg sign as well as a wide based gait with inability to perform tandem gait. He has no recent travel, but his occupation does put him at a increased infection risk. No recent weight changes or substance use to suggest a nutritional issue. MRI brain personally reviewed and shows no acute abnormalities. Given the diffuse hyperreflexia and positive romberg sign this picture localizes to a likely spinal cord etiology, possibly posterior spinal. Due to this, spinal imaging was conducted which showed no acute abnormalities in thoracic but cervical had multilevel cervical  spondylosis with posterior disc osteophyte complexes causing C5-C6 and C6-C7 central canal narrowing.     Cervical imaging is unable to explain patient's neuropathy limited to hands and feet. el. Possibility of a trigeminal neuralgia in association with patient's lip numbness.     Not included in impression, but lesion seen in right lateral angelic on MRI as shown below. Laterality unable to explain bilateral symptoms and significant gait issues. Sensory deficits are also stocking glove according to patient and on exam they appear in both lower extremities. Ordered paraneoplastic and autoimmune labs, awaiting. Will obtain MRI with contrast, and ordered CT abdomen/pelvis.     Impression: Ataxia, Acute symmetric stocking and glove neuropathy     Updates as of 7/20/25  - Awaiting bloodwork  - Awaiting MRI brain w contrast   - Awaiting CT abdomen pelvis         Date of Service: 7/20/2025  8:59 AM     Incomplete                         #Ataxia  #Acute symmetric stocking and glove neuropathy   - Spinal imaging unable to explain distribution of patient's neuropathy and significant gait ataxia  - Pontine lesion seen on MRI Brain, not included in the impression, unilateral  - B12 872, TSH 0.75, Hep Panel nonreactive, HIV nonreactive, Syphillis with reflex negative  Plan:  - Awaiting B6, B1, Vitamin E, TSH, Hepatitis panel, Copper, Zinc, heavy metal screen. .    - Ordering MRI with contrast    - Awaiting OT     #HTN  - BP Goal: Normotension   - Started prn labetalol   - Continue hydralazine 10 mg TID due to increased BP 7/18-7/19  - Will likely start hydrochlorothiazide tomorrow  - c/w losartan 100 mg daily   - c/w metoprolol succinate 50 mg daily   - c/w amlodipine 10 mg daily      #HLD   - c/w atorvastatin 40 mg daily   - hold metformin   - Glucose checks ACHS      F: PRN  E: PRN  N: Regular   A: PIV     O2:regular   Bowel Regimen: miralax  DVT ppx: Low DVT risk score, SCDs      Code Status: Presumed full code      Patient  seen, examined, and discussed with attending physician Dr. Bullock.           Beth Molina MD  Neuro, PGY-1             [1] amLODIPine, 10 mg, oral, Daily  atorvastatin, 40 mg, oral, Nightly  cetirizine, 10 mg, oral, Daily  diphenhydrAMINE, 25 mg, oral, Once  ergocalciferol, 1.25 mg, oral, Every Sunday  fluticasone, 1 spray, Each Nostril, Daily  hydrALAZINE, 10 mg, oral, TID  HYDROmorphone, 0.2 mg, intravenous, Once  insulin lispro, 0-5 Units, subcutaneous, TID AC  lidocaine, 1 patch, transdermal, Daily  losartan, 100 mg, oral, Daily  metoprolol succinate XL, 25 mg, oral, Daily  polyethylene glycol, 17 g, oral, Daily  tamsulosin, 0.4 mg, oral, Daily     [2]    [3] PRN medications: dextrose, dextrose, glucagon, glucagon, lidocaine

## 2025-07-20 NOTE — PROCEDURES
[facility]  NEUROSCIENCE INTENSIVE CARE UNIT  PROCEDURE NOTE:   SMALL BORE FEEDING TUBE PLACEMENT       Patient Name: Ayla Paredes   MRN: [id number]   Admit Date: 2025   : 1970 AGE: 55 y.o.     Primary Diagnosis: Ataxia      Patient with dysphagia 2/2 Ataxia, requiring small bore feeding tube placement for medication and nutrition administration. All labs and images examined for appropriateness of tube placement. Procedure explained to the patient and/or family.    Patient positioned and small bore feeding tubing prepped per device protocol. Tubing inserted into the right nare and, using [employer] electromagnetic sensing device and tubing, was advanced per imaging guidance into gastric antrum. Tube is secured with nasal bridle at 75 cm at the nares. KUB ordered to confirm placement.

## 2025-07-21 ENCOUNTER — APPOINTMENT (OUTPATIENT)
Dept: RADIOLOGY | Facility: HOSPITAL | Age: 55
End: 2025-07-21
Payer: COMMERCIAL

## 2025-07-21 ENCOUNTER — APPOINTMENT (OUTPATIENT)
Dept: CARDIOLOGY | Facility: HOSPITAL | Age: 55
DRG: 094 | End: 2025-07-21
Payer: COMMERCIAL

## 2025-07-21 VITALS
HEIGHT: 65 IN | SYSTOLIC BLOOD PRESSURE: 147 MMHG | DIASTOLIC BLOOD PRESSURE: 77 MMHG | WEIGHT: 185.63 LBS | BODY MASS INDEX: 30.93 KG/M2 | OXYGEN SATURATION: 97 % | HEART RATE: 96 BPM | RESPIRATION RATE: 25 BRPM | TEMPERATURE: 97.2 F

## 2025-07-21 LAB
ALBUMIN SERPL BCP-MCNC: 4 G/DL (ref 3.4–5)
ANION GAP SERPL CALC-SCNC: 13 MMOL/L
AORTIC VALVE MEAN GRADIENT: 6 MMHG
AORTIC VALVE PEAK VELOCITY: 1.82 M/S
AV PEAK GRADIENT: 13 MMHG
AVA (PEAK VEL): 2.59 CM2
AVA (VTI): 2.79 CM2
BACTERIA SPEC RESP CULT: ABNORMAL
BASOPHILS # BLD AUTO: 0.02 X10*3/UL (ref 0–0.1)
BASOPHILS NFR BLD AUTO: 0.2 %
BUN SERPL-MCNC: 12 MG/DL (ref 6–23)
CA-I BLD-SCNC: 1.1 MMOL/L (ref 1.1–1.33)
CALCIUM SERPL-MCNC: 8.6 MG/DL (ref 8.6–10.6)
CHLORIDE SERPL-SCNC: 96 MMOL/L (ref 98–107)
CO2 SERPL-SCNC: 24 MMOL/L (ref 21–32)
CREAT SERPL-MCNC: 0.98 MG/DL (ref 0.5–1.3)
EGFRCR SERPLBLD CKD-EPI 2021: >90 ML/MIN/1.73M*2
EJECTION FRACTION APICAL 4 CHAMBER: 69.2
EJECTION FRACTION: 68 %
EOSINOPHIL # BLD AUTO: 0 X10*3/UL (ref 0–0.7)
EOSINOPHIL NFR BLD AUTO: 0 %
ERYTHROCYTE [DISTWIDTH] IN BLOOD BY AUTOMATED COUNT: 12.5 % (ref 11.5–14.5)
GLUCOSE BLD MANUAL STRIP-MCNC: 142 MG/DL (ref 74–99)
GLUCOSE BLD MANUAL STRIP-MCNC: 143 MG/DL (ref 74–99)
GLUCOSE BLD MANUAL STRIP-MCNC: 149 MG/DL (ref 74–99)
GLUCOSE SERPL-MCNC: 152 MG/DL (ref 74–99)
GRAM STN SPEC: ABNORMAL
HCT VFR BLD AUTO: 34.8 % (ref 41–52)
HGB BLD-MCNC: 11.9 G/DL (ref 13.5–17.5)
IGG SERPL-MCNC: 1200 MG/DL (ref 700–1600)
IMM GRANULOCYTES # BLD AUTO: 0.04 X10*3/UL (ref 0–0.7)
IMM GRANULOCYTES NFR BLD AUTO: 0.3 % (ref 0–0.9)
LEFT ATRIUM VOLUME AREA LENGTH INDEX BSA: 23.4 ML/M2
LEFT VENTRICLE INTERNAL DIMENSION DIASTOLE: 4.9 CM (ref 3.5–6)
LEFT VENTRICULAR OUTFLOW TRACT DIAMETER: 2 CM
LYMPHOCYTES # BLD AUTO: 1.25 X10*3/UL (ref 1.2–4.8)
LYMPHOCYTES NFR BLD AUTO: 10.4 %
MAGNESIUM SERPL-MCNC: 1.67 MG/DL (ref 1.6–2.4)
MCH RBC QN AUTO: 28.5 PG (ref 26–34)
MCHC RBC AUTO-ENTMCNC: 34.2 G/DL (ref 32–36)
MCV RBC AUTO: 83 FL (ref 80–100)
MITRAL VALVE E/A RATIO: 0.76
MONOCYTES # BLD AUTO: 0.76 X10*3/UL (ref 0.1–1)
MONOCYTES NFR BLD AUTO: 6.3 %
MRSA DNA SPEC QL NAA+PROBE: NOT DETECTED
NEUTROPHILS # BLD AUTO: 9.92 X10*3/UL (ref 1.2–7.7)
NEUTROPHILS NFR BLD AUTO: 82.8 %
NRBC BLD-RTO: 0 /100 WBCS (ref 0–0)
OSMOLALITY SERPL: 275 MOSM/KG (ref 280–300)
OSMOLALITY SERPL: 275 MOSM/KG (ref 280–300)
OSMOLALITY SERPL: 277 MOSM/KG (ref 280–300)
OSMOLALITY SERPL: 278 MOSM/KG (ref 280–300)
OSMOLALITY SERPL: 283 MOSM/KG (ref 280–300)
PHOSPHATE SERPL-MCNC: 2.6 MG/DL (ref 2.5–4.9)
PLATELET # BLD AUTO: 466 X10*3/UL (ref 150–450)
POTASSIUM SERPL-SCNC: 3.6 MMOL/L (ref 3.5–5.3)
RBC # BLD AUTO: 4.18 X10*6/UL (ref 4.5–5.9)
SODIUM SERPL-SCNC: 127 MMOL/L (ref 136–145)
SODIUM SERPL-SCNC: 128 MMOL/L (ref 136–145)
SODIUM SERPL-SCNC: 128 MMOL/L (ref 136–145)
SODIUM SERPL-SCNC: 129 MMOL/L (ref 136–145)
TRICUSPID ANNULAR PLANE SYSTOLIC EXCURSION: 2.1 CM
WBC # BLD AUTO: 12 X10*3/UL (ref 4.4–11.3)

## 2025-07-21 PROCEDURE — 74177 CT ABD & PELVIS W/CONTRAST: CPT | Performed by: RADIOLOGY

## 2025-07-21 PROCEDURE — 2500000004 HC RX 250 GENERAL PHARMACY W/ HCPCS (ALT 636 FOR OP/ED)

## 2025-07-21 PROCEDURE — 83520 IMMUNOASSAY QUANT NOS NONAB: CPT

## 2025-07-21 PROCEDURE — 82330 ASSAY OF CALCIUM: CPT

## 2025-07-21 PROCEDURE — 99232 SBSQ HOSP IP/OBS MODERATE 35: CPT | Performed by: PSYCHIATRY & NEUROLOGY

## 2025-07-21 PROCEDURE — 2020000001 HC ICU ROOM DAILY

## 2025-07-21 PROCEDURE — 84295 ASSAY OF SERUM SODIUM: CPT

## 2025-07-21 PROCEDURE — 87640 STAPH A DNA AMP PROBE: CPT

## 2025-07-21 PROCEDURE — 37799 UNLISTED PX VASCULAR SURGERY: CPT

## 2025-07-21 PROCEDURE — 83930 ASSAY OF BLOOD OSMOLALITY: CPT

## 2025-07-21 PROCEDURE — 30243S1 TRANSFUSION OF NONAUTOLOGOUS GLOBULIN INTO CENTRAL VEIN, PERCUTANEOUS APPROACH: ICD-10-PCS | Performed by: STUDENT IN AN ORGANIZED HEALTH CARE EDUCATION/TRAINING PROGRAM

## 2025-07-21 PROCEDURE — 93306 TTE W/DOPPLER COMPLETE: CPT | Performed by: INTERNAL MEDICINE

## 2025-07-21 PROCEDURE — 2580000001 HC RX 258 IV SOLUTIONS

## 2025-07-21 PROCEDURE — 2580000001 HC RX 258 IV SOLUTIONS: Performed by: STUDENT IN AN ORGANIZED HEALTH CARE EDUCATION/TRAINING PROGRAM

## 2025-07-21 PROCEDURE — 2550000001 HC RX 255 CONTRASTS: Performed by: PSYCHIATRY & NEUROLOGY

## 2025-07-21 PROCEDURE — 82947 ASSAY GLUCOSE BLOOD QUANT: CPT

## 2025-07-21 PROCEDURE — 2500000004 HC RX 250 GENERAL PHARMACY W/ HCPCS (ALT 636 FOR OP/ED): Mod: JZ

## 2025-07-21 PROCEDURE — 83735 ASSAY OF MAGNESIUM: CPT

## 2025-07-21 PROCEDURE — 2500000001 HC RX 250 WO HCPCS SELF ADMINISTERED DRUGS (ALT 637 FOR MEDICARE OP)

## 2025-07-21 PROCEDURE — 80069 RENAL FUNCTION PANEL: CPT

## 2025-07-21 PROCEDURE — 36415 COLL VENOUS BLD VENIPUNCTURE: CPT

## 2025-07-21 PROCEDURE — 85025 COMPLETE CBC W/AUTO DIFF WBC: CPT

## 2025-07-21 PROCEDURE — 82784 ASSAY IGA/IGD/IGG/IGM EACH: CPT

## 2025-07-21 PROCEDURE — 97530 THERAPEUTIC ACTIVITIES: CPT | Mod: GO

## 2025-07-21 PROCEDURE — C8929 TTE W OR WO FOL WCON,DOPPLER: HCPCS

## 2025-07-21 PROCEDURE — 97166 OT EVAL MOD COMPLEX 45 MIN: CPT | Mod: GO

## 2025-07-21 PROCEDURE — 71260 CT THORAX DX C+: CPT | Performed by: RADIOLOGY

## 2025-07-21 PROCEDURE — 87205 SMEAR GRAM STAIN: CPT

## 2025-07-21 PROCEDURE — 92610 EVALUATE SWALLOWING FUNCTION: CPT | Mod: GN | Performed by: SPEECH-LANGUAGE PATHOLOGIST

## 2025-07-21 PROCEDURE — 74177 CT ABD & PELVIS W/CONTRAST: CPT

## 2025-07-21 PROCEDURE — 97530 THERAPEUTIC ACTIVITIES: CPT | Mod: GP

## 2025-07-21 PROCEDURE — 2500000004 HC RX 250 GENERAL PHARMACY W/ HCPCS (ALT 636 FOR OP/ED): Performed by: PATHOLOGY

## 2025-07-21 PROCEDURE — 99291 CRITICAL CARE FIRST HOUR: CPT

## 2025-07-21 PROCEDURE — 00JU3ZZ INSPECTION OF SPINAL CANAL, PERCUTANEOUS APPROACH: ICD-10-PCS

## 2025-07-21 RX ORDER — CALCIUM GLUCONATE 20 MG/ML
1 INJECTION, SOLUTION INTRAVENOUS EVERY 6 HOURS PRN
Status: DISCONTINUED | OUTPATIENT
Start: 2025-07-21 | End: 2025-07-25

## 2025-07-21 RX ORDER — MAGNESIUM SULFATE HEPTAHYDRATE 40 MG/ML
2 INJECTION, SOLUTION INTRAVENOUS EVERY 6 HOURS PRN
Status: DISCONTINUED | OUTPATIENT
Start: 2025-07-21 | End: 2025-07-25

## 2025-07-21 RX ORDER — POLYETHYLENE GLYCOL 3350 17 G/17G
17 POWDER, FOR SOLUTION ORAL DAILY
Status: DISCONTINUED | OUTPATIENT
Start: 2025-07-22 | End: 2025-07-30 | Stop reason: HOSPADM

## 2025-07-21 RX ORDER — METOPROLOL TARTRATE 25 MG/1
12.5 TABLET, FILM COATED ORAL 2 TIMES DAILY
Status: DISCONTINUED | OUTPATIENT
Start: 2025-07-21 | End: 2025-07-25

## 2025-07-21 RX ORDER — POTASSIUM CHLORIDE 1.5 G/1.58G
20 POWDER, FOR SOLUTION ORAL EVERY 6 HOURS PRN
Status: DISCONTINUED | OUTPATIENT
Start: 2025-07-21 | End: 2025-07-25

## 2025-07-21 RX ORDER — POTASSIUM CHLORIDE 20 MEQ/1
20 TABLET, EXTENDED RELEASE ORAL EVERY 6 HOURS PRN
Status: DISCONTINUED | OUTPATIENT
Start: 2025-07-21 | End: 2025-07-25

## 2025-07-21 RX ORDER — POTASSIUM CHLORIDE 1.5 G/1.58G
40 POWDER, FOR SOLUTION ORAL EVERY 6 HOURS PRN
Status: DISCONTINUED | OUTPATIENT
Start: 2025-07-21 | End: 2025-07-25

## 2025-07-21 RX ORDER — ATORVASTATIN CALCIUM 40 MG/1
40 TABLET, FILM COATED ORAL NIGHTLY
Status: DISCONTINUED | OUTPATIENT
Start: 2025-07-21 | End: 2025-07-25

## 2025-07-21 RX ORDER — POTASSIUM CHLORIDE 29.8 MG/ML
40 INJECTION INTRAVENOUS ONCE
Status: COMPLETED | OUTPATIENT
Start: 2025-07-21 | End: 2025-07-21

## 2025-07-21 RX ORDER — HEPARIN SODIUM 5000 [USP'U]/ML
5000 INJECTION, SOLUTION INTRAVENOUS; SUBCUTANEOUS EVERY 8 HOURS
Status: DISCONTINUED | OUTPATIENT
Start: 2025-07-21 | End: 2025-07-22

## 2025-07-21 RX ORDER — CLONAZEPAM 0.5 MG/1
0.25 TABLET ORAL 2 TIMES DAILY
Status: CANCELLED | OUTPATIENT
Start: 2025-07-21

## 2025-07-21 RX ORDER — SILODOSIN 4 MG/1
4 CAPSULE ORAL
Status: DISCONTINUED | OUTPATIENT
Start: 2025-07-22 | End: 2025-07-25

## 2025-07-21 RX ORDER — FENTANYL CITRATE 50 UG/ML
INJECTION, SOLUTION INTRAMUSCULAR; INTRAVENOUS
Status: COMPLETED
Start: 2025-07-21 | End: 2025-07-21

## 2025-07-21 RX ORDER — MAGNESIUM SULFATE HEPTAHYDRATE 40 MG/ML
4 INJECTION, SOLUTION INTRAVENOUS EVERY 6 HOURS PRN
Status: DISCONTINUED | OUTPATIENT
Start: 2025-07-21 | End: 2025-07-25

## 2025-07-21 RX ORDER — VANCOMYCIN HYDROCHLORIDE 1 G/20ML
INJECTION, POWDER, LYOPHILIZED, FOR SOLUTION INTRAVENOUS DAILY PRN
Status: DISCONTINUED | OUTPATIENT
Start: 2025-07-21 | End: 2025-07-22

## 2025-07-21 RX ORDER — DIPHENHYDRAMINE HCL 25 MG
25 CAPSULE ORAL ONCE
Status: DISCONTINUED | OUTPATIENT
Start: 2025-07-21 | End: 2025-07-30

## 2025-07-21 RX ORDER — POTASSIUM CHLORIDE 20 MEQ/1
40 TABLET, EXTENDED RELEASE ORAL EVERY 6 HOURS PRN
Status: DISCONTINUED | OUTPATIENT
Start: 2025-07-21 | End: 2025-07-25

## 2025-07-21 RX ORDER — AMLODIPINE BESYLATE 10 MG/1
10 TABLET ORAL DAILY
Status: DISCONTINUED | OUTPATIENT
Start: 2025-07-22 | End: 2025-07-25

## 2025-07-21 RX ORDER — FENTANYL CITRATE 50 UG/ML
50 INJECTION, SOLUTION INTRAMUSCULAR; INTRAVENOUS ONCE
Status: COMPLETED | OUTPATIENT
Start: 2025-07-21 | End: 2025-07-21

## 2025-07-21 RX ORDER — DIPHENHYDRAMINE HYDROCHLORIDE 50 MG/ML
INJECTION, SOLUTION INTRAMUSCULAR; INTRAVENOUS
Status: COMPLETED
Start: 2025-07-21 | End: 2025-07-21

## 2025-07-21 RX ORDER — SENNOSIDES 8.6 MG/1
2 TABLET ORAL 2 TIMES DAILY
Status: DISCONTINUED | OUTPATIENT
Start: 2025-07-21 | End: 2025-07-30 | Stop reason: HOSPADM

## 2025-07-21 RX ORDER — HYDROXYZINE HYDROCHLORIDE 25 MG/1
25 TABLET, FILM COATED ORAL EVERY 8 HOURS PRN
Status: DISCONTINUED | OUTPATIENT
Start: 2025-07-21 | End: 2025-07-25

## 2025-07-21 RX ORDER — CALCIUM GLUCONATE 20 MG/ML
2 INJECTION, SOLUTION INTRAVENOUS EVERY 6 HOURS PRN
Status: DISCONTINUED | OUTPATIENT
Start: 2025-07-21 | End: 2025-07-25

## 2025-07-21 RX ORDER — ACETAMINOPHEN 325 MG/1
650 TABLET ORAL ONCE
Status: COMPLETED | OUTPATIENT
Start: 2025-07-21 | End: 2025-07-21

## 2025-07-21 RX ORDER — CETIRIZINE HYDROCHLORIDE 10 MG/1
10 TABLET ORAL DAILY
Status: DISCONTINUED | OUTPATIENT
Start: 2025-07-22 | End: 2025-07-25

## 2025-07-21 RX ORDER — VANCOMYCIN HYDROCHLORIDE 1 G/200ML
1000 INJECTION, SOLUTION INTRAVENOUS EVERY 12 HOURS
Status: DISCONTINUED | OUTPATIENT
Start: 2025-07-21 | End: 2025-07-21

## 2025-07-21 RX ORDER — ACETAMINOPHEN 325 MG/1
650 TABLET ORAL EVERY 6 HOURS PRN
Status: DISCONTINUED | OUTPATIENT
Start: 2025-07-21 | End: 2025-07-25

## 2025-07-21 RX ORDER — CLONAZEPAM 0.5 MG/1
0.25 TABLET ORAL 2 TIMES DAILY
Status: COMPLETED | OUTPATIENT
Start: 2025-07-21 | End: 2025-07-22

## 2025-07-21 RX ADMIN — SODIUM CHLORIDE 250 ML: 3 INJECTION, SOLUTION INTRAVENOUS at 16:40

## 2025-07-21 RX ADMIN — ATORVASTATIN CALCIUM 40 MG: 40 TABLET, FILM COATED ORAL at 20:08

## 2025-07-21 RX ADMIN — HUMAN ALBUMIN MICROSPHERES AND PERFLUTREN 0.5 ML: 10; .22 INJECTION, SOLUTION INTRAVENOUS at 15:55

## 2025-07-21 RX ADMIN — HYDROXYZINE HYDROCHLORIDE 25 MG: 25 TABLET, FILM COATED ORAL at 15:49

## 2025-07-21 RX ADMIN — POTASSIUM CHLORIDE 40 MEQ: 29.8 INJECTION, SOLUTION INTRAVENOUS at 05:50

## 2025-07-21 RX ADMIN — HYDRALAZINE HYDROCHLORIDE 10 MG: 20 INJECTION INTRAMUSCULAR; INTRAVENOUS at 21:24

## 2025-07-21 RX ADMIN — ACETAMINOPHEN 650 MG: 325 TABLET ORAL at 15:48

## 2025-07-21 RX ADMIN — CLONAZEPAM 0.25 MG: 0.5 TABLET ORAL at 20:08

## 2025-07-21 RX ADMIN — METOPROLOL TARTRATE 12.5 MG: 25 TABLET, FILM COATED ORAL at 11:52

## 2025-07-21 RX ADMIN — CALCIUM GLUCONATE 1 G: 20 INJECTION, SOLUTION INTRAVENOUS at 05:50

## 2025-07-21 RX ADMIN — AMLODIPINE BESYLATE 10 MG: 10 TABLET ORAL at 08:34

## 2025-07-21 RX ADMIN — METOPROLOL SUCCINATE 25 MG: 25 TABLET, FILM COATED, EXTENDED RELEASE ORAL at 08:34

## 2025-07-21 RX ADMIN — METOPROLOL TARTRATE 12.5 MG: 25 TABLET, FILM COATED ORAL at 20:08

## 2025-07-21 RX ADMIN — CETIRIZINE HYDROCHLORIDE 10 MG: 10 TABLET ORAL at 08:34

## 2025-07-21 RX ADMIN — LABETALOL HYDROCHLORIDE 10 MG: 5 INJECTION, SOLUTION INTRAVENOUS at 11:20

## 2025-07-21 RX ADMIN — FENTANYL CITRATE 50 MCG: 50 INJECTION INTRAMUSCULAR; INTRAVENOUS at 14:19

## 2025-07-21 RX ADMIN — IOHEXOL 90 ML: 350 INJECTION, SOLUTION INTRAVENOUS at 09:17

## 2025-07-21 RX ADMIN — PIPERACILLIN SODIUM AND TAZOBACTAM SODIUM 4.5 G: 4; .5 INJECTION, SOLUTION INTRAVENOUS at 16:40

## 2025-07-21 RX ADMIN — SENNOSIDES 17.2 MG: 8.6 TABLET, FILM COATED ORAL at 20:08

## 2025-07-21 RX ADMIN — VANCOMYCIN HYDROCHLORIDE 1000 MG: 1 INJECTION, SOLUTION INTRAVENOUS at 12:54

## 2025-07-21 RX ADMIN — IMMUNE GLOBULIN INFUSION (HUMAN) 30 G: 100 INJECTION, SOLUTION INTRAVENOUS; SUBCUTANEOUS at 16:08

## 2025-07-21 RX ADMIN — HYDRALAZINE HYDROCHLORIDE 10 MG: 20 INJECTION INTRAMUSCULAR; INTRAVENOUS at 15:33

## 2025-07-21 RX ADMIN — SODIUM CHLORIDE 250 ML: 3 INJECTION, SOLUTION INTRAVENOUS at 10:45

## 2025-07-21 RX ADMIN — FENTANYL CITRATE 50 MCG: 50 INJECTION, SOLUTION INTRAMUSCULAR; INTRAVENOUS at 14:19

## 2025-07-21 RX ADMIN — DIPHENHYDRAMINE HYDROCHLORIDE 25 MG: 50 INJECTION INTRAMUSCULAR; INTRAVENOUS at 16:03

## 2025-07-21 RX ADMIN — PIPERACILLIN SODIUM AND TAZOBACTAM SODIUM 4.5 G: 4; .5 INJECTION, SOLUTION INTRAVENOUS at 11:53

## 2025-07-21 RX ADMIN — MAGNESIUM SULFATE HEPTAHYDRATE 4 G: 40 INJECTION, SOLUTION INTRAVENOUS at 05:50

## 2025-07-21 RX ADMIN — SODIUM CHLORIDE 250 ML: 3 INJECTION, SOLUTION INTRAVENOUS at 21:25

## 2025-07-21 ASSESSMENT — COGNITIVE AND FUNCTIONAL STATUS - GENERAL
CLIMB 3 TO 5 STEPS WITH RAILING: TOTAL
PERSONAL GROOMING: A LITTLE
DRESSING REGULAR UPPER BODY CLOTHING: A LITTLE
DRESSING REGULAR LOWER BODY CLOTHING: A LITTLE
TOILETING: A LITTLE
DRESSING REGULAR LOWER BODY CLOTHING: A LOT
DRESSING REGULAR UPPER BODY CLOTHING: A LITTLE
STANDING UP FROM CHAIR USING ARMS: TOTAL
MOVING TO AND FROM BED TO CHAIR: TOTAL
EATING MEALS: TOTAL
CLIMB 3 TO 5 STEPS WITH RAILING: A LOT
MOVING FROM LYING ON BACK TO SITTING ON SIDE OF FLAT BED WITH BEDRAILS: A LOT
WALKING IN HOSPITAL ROOM: A LOT
WALKING IN HOSPITAL ROOM: TOTAL
DAILY ACTIVITIY SCORE: 19
MOBILITY SCORE: 7
HELP NEEDED FOR BATHING: A LOT
PERSONAL GROOMING: A LITTLE
DAILY ACTIVITIY SCORE: 13
TURNING FROM BACK TO SIDE WHILE IN FLAT BAD: TOTAL
STANDING UP FROM CHAIR USING ARMS: A LOT
MOVING TO AND FROM BED TO CHAIR: A LOT
MOBILITY SCORE: 16
TOILETING: A LOT
HELP NEEDED FOR BATHING: A LITTLE

## 2025-07-21 ASSESSMENT — PAIN - FUNCTIONAL ASSESSMENT
PAIN_FUNCTIONAL_ASSESSMENT: 0-10

## 2025-07-21 ASSESSMENT — PAIN SCALES - GENERAL
PAINLEVEL_OUTOF10: 0 - NO PAIN
PAINLEVEL_OUTOF10: 10 - WORST POSSIBLE PAIN
PAINLEVEL_OUTOF10: 0 - NO PAIN

## 2025-07-21 ASSESSMENT — ACTIVITIES OF DAILY LIVING (ADL)
BATHING_ASSISTANCE: MAXIMAL
ADL_ASSISTANCE: INDEPENDENT

## 2025-07-21 NOTE — PROGRESS NOTES
Communication Note    Patient Name: Ayla Paredes  MRN: [id number]  Today's Date: 7/21/2025   Room: 03/03-A    Discipline: Physical Therapy      PT Missed Visit: Yes  Missed Visit Reason:  (Pt undergoing lumbar puncture at bedside. Hold PT.)      07/21/25 at 2:06 PM   Marita Garza PT   Rehab Office: [phone]

## 2025-07-21 NOTE — PROGRESS NOTES
Physical Therapy    Physical Therapy Treatment    Patient Name: Ayla Paredes  MRN: [id number]  Today's Date: 7/21/2025  Time Calculation  Start Time: 1457  Stop Time: 1509  Time Calculation (min): 12 min       Assessment/Plan   PT Assessment  Rehab Prognosis: Good  Barriers to Discharge Home: Physical needs, Caregiver assistance  Caregiver Assistance: Caregiver assistance needed per identified barriers - however, level of patient's required assistance exceeds assistance available at home  Physical Needs: Stair navigation into home limited by function/safety  Evaluation/Treatment Tolerance: Patient limited by pain  End of Session Communication: Bedside nurse  Assessment Comment: Patient to benefit from ongoing PT services to address the above limitations and to facilitate timely return to prior level of function.  End of Session Patient Position: Bed, 3 rail up, Alarm off, not on at start of session  PT Plan  Inpatient/Swing Bed or Outpatient: Inpatient  PT Plan  Treatment/Interventions: Bed mobility, Transfer training, Gait training, Stair training, Balance training, Neuromuscular re-education, Strengthening, Endurance training, Therapeutic exercise, Therapeutic activity, Home exercise program, Postural re-education  PT Plan: Ongoing PT  PT Frequency: 5 times per week  PT Discharge Recommendations: High intensity level of continued care  Equipment Recommended upon Discharge:  (TBD)  PT Recommended Transfer Status: Assist x2  PT - OK to Discharge: Yes      General Visit Information:   PT  Visit  PT Received On: 07/21/25  Response to Previous Treatment: Patient with no complaints from previous session.  PT Missed Visit: Yes  Missed Visit Reason:  (Pt undergoing lumbar puncture at bedside. Hold PT.)  Reason for Referral: Referred from [facility] for B hand and foot numbness. Exam was notable for positive romberg sign and a wide gait with inability to perform tandem gait. Spinal imaging negative for acute abnormalities  in thoracic spine, but indicative of multilevel cervical spondylosis with posterior disc osteophyte complexes causing C5-C6 and C6-C7 central canal narrowing. Pending Paraneoplastic and autoimmune labs results and MRI with contrast. Pt with Sodium of 134 on admission which downtrended to 123 within 48 hours. Admitted to NSU for hypertonic saline.  Past Medical History Relevant to Rehab: HTN, DM, CKD  Prior to Session Communication: Bedside nurse  Patient Position Received: Bed, 3 rail up, Alarm off, not on at start of session  Family/Caregiver Present: No  General Comment: Pt initially agreeable to PT services, but then declines sitting up to edge of bed due to pain. Agreeable to repositioning in bed for comfort.     Subjective   Precautions:  Precautions  Hearing/Visual Limitations: WFL  Medical Precautions: Fall precautions  Precautions Comment: SBP <180    Vital Signs:   Date/Time Vitals Session Patient Position Pulse Resp SpO2 BP MAP (mmHg)    07/21/25 1400 --  --  81  20  97 %  --  --     07/21/25 1415 --  --  81  22  94 %  188/101  121     07/21/25 1457 Pre PT  Lying  71  18  94 %  170/99  116     07/21/25 1500 --  --  76  16  94 %  170/99  116     07/21/25 1509 Post PT  Lying  78  19  94 %  195/99  126        Objective   Pain:  Pain Assessment  Pain Assessment: 0-10  0-10 (Numeric) Pain Score: 10 - Worst possible pain  Pain Type: Acute pain  Pain Location: Back  Pain Interventions: Repositioned (RN notified)  Response to Interventions: No change in pain  Cognition:  Cognition  Overall Cognitive Status: Within Functional Limits  Arousal/Alertness: Appropriate responses to stimuli  Orientation Level: Oriented X4  Following Commands: Follows one step commands with repetition  Insight: Mild  Impulsive: Within functional limits  Processing Speed: Within funtional limits    Lines/Tubes/Drains:  CVC 07/20/25 Triple lumen Right Femoral (Active)   Number of days: 0       NG/OG/Feeding Tube Right nostril (Active)    Number of days: 1     Oxygen: room air   PT Treatments:       Therapeutic Activity  Therapeutic Activity Performed: Yes  Therapeutic Activity 1: Positoined pt in bed with HOB >/= 30 deg to optimize cardiopulmonary function while also reducing strain/pain in back. Supported BUE with pillows         Bed Mobility  Bed Mobility: Yes  Bed Mobility 1  Bed Mobility 1: Rolling left  Level of Assistance 1: Moderate assistance  Bed Mobility Comments 1: HOB 30 deg, cues for proper hand placement and using bed rail  Bed Mobility 2  Bed Mobility  2: Rolling right  Level of Assistance 2: Minimum assistance  Bed Mobility Comments 2: HOB 30 deg, cues for proper hand placement and using bed rail  Bed Mobility 3  Bed Mobility 3: Scooting (Boosting)  Level of Assistance 3: Dependent, +2  Bed Mobility Comments 3: HOB flat, used TAPS    Ambulation/Gait Training  Ambulation/Gait Training Performed: No    Transfers  Transfer: No (Pt declined.)    Stairs  Stairs: No              Outcome Measures:  [facility] Basic Mobility  Turning from your back to your side while in a flat bed without using bedrails: A lot  Moving from lying on your back to sitting on the side of a flat bed without using bedrails: Total  Moving to and from bed to chair (including a wheelchair): Total  Standing up from a chair using your arms (e.g. wheelchair or bedside chair): Total  To walk in hospital room: Total  Climbing 3-5 steps with railing: Total  Basic Mobility - Total Score: 7                   FSS-ICU  Ambulation: Unable to attempt due to weakness  Rolling: Moderate assistance (performs 50 - 74% of task)  Sitting: Unable to perform  Transfer Sit-to-Stand: Unable to perform  Transfer Supine-to-Sit: Unable to perform  Total Score: 3    ICU Mobility Screen  Early Mobility/Exercise Safety Screen: Proceed with mobilization - No exclusion criteria met  ICU Mobility Scale: Sitting in bed, exercises in bed                   Education:  Education  Documentation  Precautions, taught by Marita Garza PT at 7/21/2025  3:19 PM.  Learner: Patient  Readiness: Acceptance  Method: Explanation  Response: Verbalizes Understanding, Needs Reinforcement  Comment: PT expectations, mobility precautions, importance of daily mobility to prevent complications.    Mobility Training, taught by Marita Garza PT at 7/21/2025  3:19 PM.  Learner: Patient  Readiness: Acceptance  Method: Explanation  Response: Verbalizes Understanding, Needs Reinforcement  Comment: PT expectations, mobility precautions, importance of daily mobility to prevent complications.    Education Comments  No comments found.             Encounter Problems       Encounter Problems (Active)       Mobility       STG - Patient will ambulate >75ft, wheeled walker, CGA (Not Progressing)       Start:  07/19/25    Expected End:  08/02/25               PT Transfers       STG - Patient to transfer to and from sit to supine CGA (Not Progressing)       Start:  07/19/25    Expected End:  08/02/25            STG - Patient will transfer sit to and from stand CGA (Not Progressing)       Start:  07/19/25    Expected End:  08/02/25 07/21/25 at 3:20 PM   Marita Garza, PT   Rehab Office: [phone]

## 2025-07-21 NOTE — PROGRESS NOTES
[facility]  NEUROSCIENCE INTENSIVE CARE UNIT  DAILY PROGRESS NOTE       Patient Name: Ayla Paredes   MRN: [id number]     Admit Date: 2025     : 1970 AGE: 55 y.o. GENDER: male        Subjective  Ayla Paredes is a 55 y.o.  male with a PMHx of HTN, DM, CKD transferred from [facility] for bilateral hand and foot numbness. Symptoms began acutely on 7/15 and have not progressed beyond the hands and feet. Exam was notable for positive romberg sign and a wide gait with inability to perform tandem gait. Pt with no recent travel, weight changes or substance use to suggest a nutritional issue. MRI brain with no acute abnormalities. Given the diffuse hyperreflexia and positive romberg sign this picture localizes to a likely spinal cord etiology, possibly posterior spinal. Due to this, spinal imaging was conducted which showed no acute abnormalities in thoracic but cervical had multilevel cervical spondylosis with posterior disc osteophyte complexes causing C5-C6 and C6-C7 central canal narrowing. Cervical imaging is unremarkable. Pending Paraneoplastic and autoimmune lab results and MRI with contrast. Pt with Sodium of 134 on admission which downtrended to 123 within 48 hours.     Interval Events: Admitted to NSU for hypertonic saline.  Seen this am, reports that he is doing well overall, says that his breathing is okay, still has generalized weakness, hands and feet tingling.      Objective   VITALS (24H):  Temp:  [36.2 °C (97.2 °F)-36.6 °C (97.9 °F)] 36.3 °C (97.3 °F)  Heart Rate:  [] 104  Resp:  [16-28] 19  BP: (127-204)/() 184/107  INTAKE/OUTPUT:  Intake/Output Summary (Last 24 hours) at 2025 0647  Last data filed at 2025 0400  Gross per 24 hour   Intake 1791.25 ml   Output 1790 ml   Net 1.25 ml     VENT SETTINGS:      Vitals summary: tachycardic to 111 and 123 between 3 and 5 AM, otherwise hr <100; intermittently tachypneic throughout day; hypertensive  through most of yesterday (isolated reading of 129/73 at 130 AM)    PHYSICAL EXAM:  NEUROLOGIC EXAM:  MENTAL STATUS:  - Alert and oriented to person, place, and time  - Recall of recent events intact    CRANIAL NERVES:  - CN I: Not Assessed  - CN II: Pupils constrict to light bilaterally 3->2 mm, visual fields intact bilaterally  - CN III, IV, VI: Extraocular movements intact without nystagmus  - CN V: Facial sensation intact to light touch  - CN VII: No facial droop, closes eyes against resistance  - CN VIII: Hearing intact to voice  - CN IX, X: Palate elevates symmetrically  - CN XII: Tongue midline without atrophy or fasciculations    MOTOR:  - Pronator drift toward bed  - Ataxia on finger to nose bilaterally; unable to assess heel to shin because of pt-reported weakness bilaterally   - Normal bulk and tone throughout  - Strength: R                L  Shoulder Abd 4                4  Shoulder Add 5                5  Elbow Flex 4                4  Elbow Ext  4                4  Hip flexion 5                5  Knee Ext  5                5  Knee Flex           5                 5  DorsiFlex 5                5  PlantarFlex         5                5    REFLEXES:   R               L  Biceps              +2             +2  Brachioradialis   +2             +2  Patellar              +2             +2  Achilles  +2             +2  Plantar               Down        Down  Ankle Clonus      Absent      Absent    Sensation to light touch, temperature and vibration was intact in the bilateral lower extremities. Sporadically decreased temperature sensation in bilateral upper extremities, but intact to light touch.    CV:  - RRR on telemetry, NSR    RESP:  - Thick and copious secretions  - Coarse breath sounds bilaterally  - NIF 28  - Oxygen: RA    :  - No catheter in place    GI:  - Abdomen NT/ND, soft    SKIN:  - Intact    MEDICATIONS:  Scheduled: PRN: Continuous:   Scheduled Medications[1] PRN Medications[2] Continuous  Medications[3]     LAB RESULTS:  Results from last 72 hours   Lab Units 07/21/25  0010 07/20/25 2018 07/20/25  1400 07/20/25  0801   GLUCOSE mg/dL 152*  --   --  142*   SODIUM mmol/L 129*  129* 125*   < > 123*   POTASSIUM mmol/L 3.6  --   --  3.0*   CHLORIDE mmol/L 96*  --   --  81*   CO2 mmol/L 24  --   --  29   ANION GAP mmol/L 13  --   --  16   BUN mg/dL 12  --   --  12   CREATININE mg/dL 0.98  --   --  1.13   EGFR mL/min/1.73m*2 >90  --   --  77   CALCIUM mg/dL 8.6  --   --  10.5   PHOSPHORUS mg/dL 2.6  --   --  4.1   ALBUMIN g/dL 4.0  --   --  4.8   MAGNESIUM mg/dL 1.67  --   --  1.82   POCT CALCIUM IONIZED (MMOL/L) IN BLOOD mmol/L 1.10  --   --   --     < > = values in this interval not displayed.      7/21 Serum Osm  Component  Ref Range & Units 00:10 1 d ago 1 d ago   Osmolality, Serum  280 - 300 mOsm/kg 275 Low  266 Low  250 Low        Results from last 72 hours   Lab Units 07/21/25 0010 07/20/25  1400   WBC AUTO x10*3/uL 12.0* 8.1   NRBC AUTO /100 WBCs 0.0 0.0   RBC AUTO x10*6/uL 4.18* 4.11*   HEMOGLOBIN g/dL 11.9* 11.6*   HEMATOCRIT % 34.8* 32.1*   MCV fL 83 78*   MCH pg 28.5 28.2   MCHC g/dL 34.2 36.1*   RDW % 12.5 12.3   PLATELETS AUTO x10*3/uL 466* 365   NEUTROS PCT AUTO % 82.8 66.5   IG PCT AUTO % 0.3 0.4   LYMPHS PCT AUTO % 10.4 25.0   MONOS PCT AUTO % 6.3 6.7   EOS PCT AUTO % 0.0 0.9   BASOS PCT AUTO % 0.2 0.5   NEUTROS ABS x10*3/uL 9.92* 5.41   IG AUTO x10*3/uL 0.04 0.03   LYMPHS ABS AUTO x10*3/uL 1.25 2.03   MONOS ABS AUTO x10*3/uL 0.76 0.54   EOS ABS AUTO x10*3/uL 0.00 0.07   BASOS ABS AUTO x10*3/uL 0.02 0.04      Results from last 72 hours   Lab Units 07/20/25  1532   PROTIME seconds 11.8   INR  1.1   APTT seconds 21*      IMAGING RESULTS:  7/20 XR abdomen + XR chest  Indication: NG placement; SOB w/ secretions  IMPRESSION:  1. Trace left pleural effusion/left basilar atelectasis. No pneumothorax.  2. Enteric tube projects over the gastric antrum/pylorus. Stomach markedly distended w/  air.  3. Nonobstructive bowel-gas pattern.    7/19 MR cervical spine w and wo IV contrast + MR thoracic spine w and wo IV contrast  IMPRESSION:  ----->CERVICAL SPINE: Multilevel cervical spondylosis w/ posterior disc osteophyte complexes causing C5-C6 mild-to-moderate and C6-C7 moderate narrowing of the central canal. Addition, moderate-to-severe neural foramen narrowing bilaterally at these levels.   ----->THORACIC SPINE: No evidence of significant spinal canal stenosis or other acute abnormality in the thoracic spine.    7/18 MR brain wo IV contrast  Indication: signs/symptoms of cerebellar stroke  IMPRESSION:  1. No acute infarction or acute hemorrhage.  2. Probable mild chronic white matter small vessel ischemic changes.    7/18 CT angio head and neck w and wo IV contrast  Indication: bilateral UE nad LE numbness, unable to ambulate, symptoms started after bench pressing on monday. Has significant pain in base of neck.  IMPRESSION: No hemodynamically significant stenosis of the head and neck  vasculature.    7/18 XR chest 2 views  Indication: sob  IMPRESSION: No evidence of acute cardiopulmonary process.    7/16 CT head wo IV contrast  Indication: numbness  IMPRESSION: No acute intracranial pathology     Assessment/Plan    55 y.o. male with PMH HTN, DM, CKD.  Admitted 7/18/2025 with Ataxia, admitted to NSU on 07/20 d/t acute hyponatremia with neurologic exam change.    Originally presented for bilateral hand and foot numbness for which neurology was consulted. Symptoms began acutely on 7/15 and have not progressed beyond the hands and feet. Spinal imaging was conducted which showed no acute abnormalities in thoracic but cervical had multilevel cervical spondylosis with posterior disc osteophyte complexes causing C5-C6 and C6-C7 central canal narrowing. Not included in impression, but lesion seen in right lateral angelic on MRI as shown below. Laterality unable to explain bilateral symptoms and significant gait  issues. Sensory deficits are also stocking glove according to patient and on exam they appear in both lower extremities. Ordered paraneoplastic and autoimmune labs, awaiting. Will obtain MRI with contrast, and ordered CT abdomen/pelvis.      7/21 updates:  Unsuccessful LP; consulted IR  NaCl bolus and 1.5 L fluid restriction in setting of labile serum sodium likely 2/2 SIADH  Empiric vanc-zosyn given elevated WBC and consolidations on CT chest; vanc to be mixed in NaCl solution  Q4H neuro checks  Empiric IVIG given concern for GBS  SLP consult placed (pt wants NG tube removed)  Serum ganglioside panels ordered  CT abdomen showed adrenal gland enlargement -> adrenal insufficiency labs    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 MRI Brain, not included in the impression, unilateral  - B12 872, TSH 0.75, Hep Panel nonreactive, HIV nonreactive, Syphillis with reflex negative  Plan:  - NSU  - Pending B6, B1, Vitamin E, TSH, Hepatitis panel, Copper, Zinc, heavy metal screen, serum ganglioside panel    - Pending MRI with contrast    - IR consult for LP  - Neuro Checks: Q4H  - Pain: acetaminophen PRN  - PT/OT/SLP    CARDIOVASCULAR:  #HTN  #HLD  Assessment:  - Home meds: Losartan 100, metop succinate 50 daily, amlodipine 10 daily  - ECHO: 11/2023: Left ventricular systolic function is normal with a 55-60% estimated ejection fraction     Plan:  - Continue to monitor on telemetry  - BP Goal: Normotension   - amlodipine 10 mg daily  - metop succinate 25 mg  - c/w atorvastatin 40 mg daily   - labetalol and hydral prn for sbp >180  - BP goal: SBP < 180    --> PRN: Labetalol and Hydralazine   - TTE and ECG pending    RESPIRATORY:  #LAURA  Assessment:  - Subjective hypoxia, sating well on RA  Plan:  - Continuous pulse oximetry   - O2 PRN to maintain SpO2 > 94%, wean as  tolerated  - c/w NC prn    RENAL/:  #LAURA  Assessment:  - Baseline BUN/Cr: 12/1.1-2  Results from last 72 hours   Lab Units 25  0010 25  0801   BUN mg/dL 12 12   CREATININE mg/dL 0.98 1.13   Plan:  - Monitor with daily RFP    FEN/GI:  #LAURA  Plan:  - Monitor and replace electrolytes per protocol  - Diet: NPO , NG tube  - Bowel Regimen: Docusate-Senna BID and Miralax BID  - SLP consult    ENDOCRINE:  #Hyponatremia SIADH vs unknown etiology   #T2DM   Assessment:  Results from last 7 days   Lab Units 25  0010 25  2018 25  1923 25  1718 25  1533 25  1400 25  1127 25  0801 25  0751 25  1603 25  1138 25  0750   POCT GLUCOSE mg/dL  --   --  156*  --  138*  --  157*  --  164* 153* 153*  --    GLUCOSE mg/dL 152*  --   --   --   --   --   --  142*  --   --   --  124*   SODIUM mmol/L 129*  129* 125*  --  123*  --  116*  116*  --  123*  --   --   --  128*   - Acute hyponatremia correlating with worsening neuro exam and subjective fatigue/confusion   - No clear etiology at this time.  - Euvolemic  -  urine electrolytes: Na 145   Plan:  - Accuchecks & ISS AC&HS   - hold metformin   - Repeat sodium q4h  - Hypertonic Saline bolus prn, goal improvement 6-8 mmol/L in first 24 hours  - Strict I&Os  - Adrenal insufficiency labs (AM cortisol, ACTH)    HEMATOLOGY:  #LAURA  Assessment:  - Baseline Hgb: 13-14  - Baseline Plts: 350  Results from last 7 days   Lab Units 25  0010 25  1400 25  0801   HEMOGLOBIN g/dL 11.9* 11.6* 14.1   HEMATOCRIT % 34.8* 32.1* 40.6*   PLATELETS AUTO x10*3/uL 466* 365 498*   Plan:  - Continue to monitor with daily CBC and Coag panel    INFECTIOUS DISEASE:  #LAURA  Assessment:  Results from last 7 days   Lab Units 25  0010 25  1400 25  0801   WBC AUTO x10*3/uL 12.0* 8.1 7.8    - Temp (24hrs), Av.3 °C (97.4 °F), Min:36.2 °C (97.2 °F), Max:36.6 °C (97.9 °F)  - Consolidations on CT  chest  Plan:  - Continue to monitor for s/sx of infection  - Pan culture for temperature > 38.4 C  - Culture respiratory secretions  - Empiric vanc zosyn    MUSCULOSKELETAL:  - No acute issues    SKIN:  - No acute issues  - Turns and skin care per NSU protocol    ACCESS:  - PIVs  - R femoral CVC    PROPHYLAXIS:  - DVT Ppx: on hold for LP  - GI Ppx: not indicated    RESTRAINTS:  Not indicated/Patient does not meet criteria for restraints    Daily ICU Checklist:  - Restraint order/note  [] Yes [] No [x] N/A   - NIHSS Frequency appropriate? [] Yes [x] No   - Daily Labs Ordered? [x] Yes [] No [] N/A   - Medication Route? [x] Yes [] No  - (PO, NG, OG, G Tube)   - PPI ordered/needed? [] Yes [x] No [] N/A   - DVT PPx [] Yes [x] No   - Antibiotic stop date? [] Yes [] No [x] N/A   - Miner? [] Yes [x] No  DC? [] Y [] N [] N/A   - Central Line? [] Yes [x] No  LOC: n/a  DC? [] Y [] N [x] N/A   - Central  orders [] Yes [] No [x] N/A   - Vent weaning plan? [] Yes [] No [x] N/A           LEONELA BRONSON  Neuroscience Intensive Care       I participated and contributed to the above medical student note including the HPI, physical exam, assessment, and plan. I agree with the above information, and made addenda to the note to reflect my examination, assessment and plan.     Tramaine Nunn MD  PGY-2 Neurology        [1] amLODIPine, 10 mg, oral, Daily  atorvastatin, 40 mg, oral, Nightly  cetirizine, 10 mg, oral, Daily  ergocalciferol, 1.25 mg, oral, Every Sunday  fluticasone, 1 spray, Each Nostril, Daily  insulin lispro, 0-5 Units, subcutaneous, TID AC  lidocaine, 1 patch, transdermal, Daily  metoprolol succinate XL, 25 mg, oral, Daily  perflutren lipid microspheres, 0.5-10 mL of dilution, intravenous, Once in imaging  perflutren protein A microsphere, 0.5 mL, intravenous, Once in imaging  polyethylene glycol, 17 g, oral, Daily  potassium chloride, 40 mEq, intravenous, Once  sennosides, 2 tablet, oral, BID  sulfur  hexafluoride microsphr, 2 mL, intravenous, Once in imaging  tamsulosin, 0.4 mg, oral, Daily     [2] PRN medications: acetaminophen, calcium gluconate, calcium gluconate, dextrose, dextrose, glucagon, glucagon, hydrALAZINE, HYDROmorphone, hydrOXYzine HCL, labetaloL, lidocaine, magnesium sulfate, magnesium sulfate, potassium chloride CR **OR** potassium chloride, potassium chloride CR **OR** potassium chloride  [3] [Held by provider] sodium chloride, 75 mL/hr, Last Rate: Stopped (07/21/25 0131)

## 2025-07-21 NOTE — PROGRESS NOTES
SLP Adult Inpatient Speech-Language Pathology Clinical Swallow Evaluation    Patient Name: Ayla Paredes  MRN: [id number]  Today's Date: 7/21/2025   Time Calculation  Start Time: 1055  Stop Time: 1106  Time Calculation (min): 11 min           Assessment/Plan:  #dysphagia  - SLP consulted for BSE in setting of new neuro changes including Ataxia, Dysphagia, Hyporefflexia; and acute symmetric stocking and glove neuropathy. Work-up ongoing.   - Suspected swallow impairment though oral stage appeared WNL. Difficulty w/ secretions- pt consistently using oral suction during course of visit- and thin liquid presentations did result in immediate cough in addition to increased oral suctioning. pt also reporting reduced vocal volume. Suspected increased risk for aspiration and related complications considering the above findings.     Recommendations:  NPO; has corpak for TF  Frequent, aggressive oral care as tolerated to improve infection control, as well as to reduce dental plaque and bacteria on oropharyngeal surfaces which may increase the risk nosocomial infections, including pneumonia.  OK for small amounts of ice chips (one at a time, 10x/hour) for oral comfort and to prevent swallow disuse atrophy, but only after aggressive oral care to avoid colonization of bacteria within the oral cavity.  Will continue to follow during ICU stay. Additional recs w/ ongoing assessment       Inpatient/Swing Bed or Outpatient: Inpatient  SLP Plan: Skilled SLP  SLP Frequency: 2x per week  Duration: 30 days  SLP Discharge Recommendations:  (TBD)  SLP - OK to Discharge: Yes        Goals:  Encounter Problems       Encounter Problems (Active)       Swallowing       LTG - Patient will tolerate the least restrictive diet without overt difficulty by time of discharge.       Start:  07/21/25    Expected End:  07/28/25            SLP Goal 1       Start:  07/21/25    Expected End:  07/28/25       STG - Patient will tolerate therapeutic trials of  "recommended consistency without clinical signs and symptoms of aspiration on 100% of trials                      Subjective     Baseline Assessment:  Hearing: Within Functional Limits    History and Physical:    Per H&P:  \" donny Paredes is a 55 y.o.  male with a PMHx of  HTN, DM, CKD, cardiomyopathy, admitted on 7/18/2025 after developing acute onset stocking and glove paresthesias 3 days prior. On original exam no objective sensory changes, ataxia, diffuse hyperreflexia. However on repeat exam 7/21 he now continues to have ataxia but also now has diminished reflexes and dysphagia. He also has comorbid acute onset hyponatremia, etiology c/f SIADH, though unclear why  \"    Medical History[1]  Family History[2]    Allergies[3]      Relevant Results  XR chest 2 views 07/18/2025    Narrative  Interpreted By:  Arpan Bradford,  STUDY:  XR CHEST 2 VIEWS;  7/18/2025 12:50 pm    INDICATION:  Signs/Symptoms:sob.      COMPARISON:  None.    ACCESSION NUMBER(S):  [id number]    ORDERING CLINICIAN:  TERRI POON    FINDINGS:          CARDIOMEDIASTINAL SILHOUETTE:  Cardiomediastinal silhouette is normal in size and configuration.    LUNGS:  Lungs are clear.    ABDOMEN:  No remarkable upper abdominal findings.    BONES:  No acute osseous changes.    Impression  1.  No evidence of acute cardiopulmonary process.        MACRO:  None    Signed by: Arpan Bradford 7/18/2025 12:55 PM  Dictation workstation:   [id number]      Objective     Oral/Motor Assessment:  Oral Hygiene: WFL  Dentition: Adequate/Natural  Oral Motor: Within Functional Limits  Vocal Quality: Within Functional Limits (mild dysphonia)  Intelligibility: Intelligible  Hearing: Within Functional Limits      Clinical Observations:    The 3 oz sequential drinks of thin liquid water was utilized as a reliable, evidence based test to rule out silent aspiration and determine need for additional testing, such as the MBS or FEES (fiberoptic endoscopic evaluation of " swallow), if the test is equivocal, incomplete or pt shows s/sx of aspiration,  prior to recommending a oral diet    Patient Positioning: Upright in Bed  Management of Oral Secretions: Adequate  Was The 3 oz Swallow Protocol Completed: No    Consistencies Trialed: Yes  Consistencies Trialed: Ice Chips, Thin (IDDSI Level 0) - Straw    Oral phase:  Appeared WFL; mastication intact; no anterior spillage    Pharyngeal Phase: Timely swallow onset subjectively; repetitive swallow per single sips/bites of ice chips; ice chips tolerated w/o overt coughing; thin x straw followed by immediate coughing/overt s/s of aspiration.     Inpatient Education:  Extensive education provided to patient and/or Caregivers regarding current swallow function, recommendations/results, and POC. Demonstrated verbal understanding and were agreeable w/ the details/recommendations reviewed.                 [1]   Past Medical History:  Diagnosis Date    Hypertension    [2]   Family History  Problem Relation Name Age of Onset    Heart attack Father     [3] No Known Allergies

## 2025-07-21 NOTE — PROGRESS NOTES
Speech-Language Pathology                 Therapy Communication Note    Patient Name: Ayla Paredes  MRN: [id number]  Department: [facility]  Room: 03/03-A  Today's Date: 7/21/2025     Discipline: Speech Language Pathology    Missed Time: Attempt    Comment:  Pt in CT at this time. SLP will follow-up as able.

## 2025-07-21 NOTE — PROGRESS NOTES
Ayla Paredes is a 55 y.o. male on day 3 of admission presenting with Ataxia.    Subjective   Yesterday afternoon, patient was transferred to [facility]U due to hyponatremia with requirement for hypertonic saline. Femoral CVC was placed and dobhoff (for concern fo dysphagia). ABG showed concern for hyperventilation and NIF/VC started q4hrs. This morning, patient feels improved. No issues with headache, vision change, swallowing. Continues to have tingling and numbness in distal extremities and perioral numbness. Patient overall feels very calm compared to before and has no complaints at this time.       Objective    24 Hour Vitals  Temp:  [36.1 °C (97 °F)-36.6 °C (97.9 °F)] 36.1 °C (97 °F)  Heart Rate:  [] 69  Resp:  [15-28] 16  BP: (127-204)/() 153/82      Physical Exam      Mental State: Orientation was normal to time, place and person. Recent and remote memory was intact.  Attention span and concentration were normal.. General fund of knowledge intact     Cranial Nerves:   CN2 : Not tested  CN 3, 4, 6: Pupils round, 3 mm in diameter, equally reactive to light. Lids symmetric; no ptosis. EOMs normal alignment, full range with normal saccades, pursuit and convergence.   No nystagmus.   CN 5: Facial sensation intact bilaterally.   CN 7: Normal and symmetric facial strength. Nasolabial folds symmetric.   CN 8: Hearing intact to voice  CN 9: Palate elevates symmetrically.   CN 11: Normal strength of shoulder shrug and neck turning.   CN 12: Tongue midline, with normal bulk and strength; no fasciculations.      Motor: Muscle bulk and tone were normal in both upper and lower extremities. No fasciculations, tremor or other abnormal movements were present.  NF 4/5, NE 5/5     Strength                                             R          L  Shoulder abduction (C4-C5)                5          5  Elbow flexion (C5-C6)                        5      5  Elbow extension (C7-C8)                     5          5     Hip  flexion (IP, L2-L3)                          5          5  Knee extension (Fem, L2-L4)               5          5  Knee flexion (Sci, L5-S1)                      5          5  DorsiFlex (DePer, L5-S1l)                     5          5  PlantarFlex (Tibial, S1-S2)                    5          5  Inversion (Tibial L5 )                            5          5   Eversion (SupPer, L5-S1)                     5          5     Reflexes: Right/ Left:  Biceps 1/1, patellar 1/2. No clonus, frontal release signs or other pathologic reflexes present.      Sensory:   - Sensation to light touch and temperature was intact in bilateral upper and lower extremities. Vibration was diminished in right lower extremity below the knee.     Medications - per EMR       Assessment/Plan    Ayla Paredes is a 55 y.o.  male with a PMHx of  HTN, DM, CKD, cardiomyopathy, admitted on 7/18/2025 after developing acute onset stocking and glove paresthesias 3 days prior. On original exam no objective sensory changes, ataxia, diffuse hyperreflexia. However on repeat exam 7/21 he now continues to have ataxia but also now has diminished reflexes and dysphagia. He also has comorbid acute onset hyponatremia, etiology c/f SIADH, though unclear why.     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 MRI pan spine with evidence of multilevel degenerative disc disease but no hyper intensities or enhancement to suggest central demyelinating lesion.  Thus far  HIV, and nutritional labs normal. Pending additional neuropathy labs as below.  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      #Acute symmetric stocking and glove neuropathy   #Ataxia, Dysphagia, Hyporefflexia   #Hyponatremia    - Hyponatremia per NSU, consider adrenal insufficiency?   - Please start IVIG  - Please order Ganglioside panel  - Agree with lumbar puncture   - Pending  serum paraneoplastic panel, VLCF, B6, Vitamin E  - Pending MRI brain w contrast   - Pending formal read of CT chest abdomen pelvis   - PT/OT/SLP recs        Beth Molina MD  Neuro, PGY-1      ----------------------------------------------------------------------------------------------------------------------------------------  ATTENDING ATTESTATION    I saw and evaluated the patient. I personally obtained the key and critical portions of the history and physical exam or was physically present for key and critical portions performed by the resident/fellow. I reviewed the resident/fellow's documentation and discussed the patient with the resident/fellow. I agree with the resident/fellow's medical decision making as documented in the note with the exception/addition of the following:     His reflex testing has evolved--he is now hyporeflexic when he was prior hyperreflexic. Will plan for LP, evaluate for cells and protein. Symptoms started about 6 days ago an sudden in onset. On exam he has difficulty with secretions, change in voice, dysmetria, mild proximal weakness and reported ataxia--cannot walk him given his weakness and ataxia. He also has signs/symptoms concerning for some respiratory muscle weakness, following NIFs and Vcs. Overall, with work-up done so far and no clear diagnosis and now with change in reflexes we are wondering about a variant of GBS--he has ataxia and areflexia, partial Ocampo Prasad, does not have opthalmoparesis.    We would like to trial IVIG as we continue work-up.    Carmela Ashton MD  General Neurology Attending  [facility]    Hospital Subsequent Daily Care Admission and Consult CODING and DOCUMENTATION DETERMINATION  Total time including some or all of the following: preparing to see the patient and reviewing test, obtaining and reviewing separately obtained history, performing medically proper examination and/or evaluation, counseling and educating  one or more of the following---patient &family, independently interpreting results and communicating results to one or more of the following patient & family, and care coordination was equal to or greater than 35 minutes.

## 2025-07-21 NOTE — CARE PLAN
The patient's goals for the shift include   Problem: Pain - Adult  Goal: Verbalizes/displays adequate comfort level or baseline comfort level  Outcome: Progressing        The clinical goals for the shift include Pt will remain neurologically stable during shift    Over the shift, the patient did not make progress toward the following goals. Barriers to progression include   Problem: Chronic Conditions and Co-morbidities  Goal: Patient's chronic conditions and co-morbidity symptoms are monitored and maintained or improved  Outcome: Progressing     Problem: Nutrition  Goal: Nutrient intake appropriate for maintaining nutritional needs  Outcome: Progressing     Problem: Skin  Goal: Decreased wound size/increased tissue granulation at next dressing change  Outcome: Progressing  Goal: Participates in plan/prevention/treatment measures  Outcome: Progressing  Goal: Prevent/manage excess moisture  Outcome: Progressing  Goal: Prevent/minimize sheer/friction injuries  Outcome: Progressing  Goal: Promote/optimize nutrition  Outcome: Progressing  Goal: Promote skin healing  Outcome: Progressing

## 2025-07-21 NOTE — SIGNIFICANT EVENT
NEUROLOGY PROCEDURE NOTE    Procedure Name: Lumbar Puncture  Date: 7/21  Time of Procedure: 2:15 pm  Proceduralist(s) Ivory Dexter   Assistant(s):  Tramaine Nunn   Indication(s): to r/o paraneoplastic, GBS   Consented?: Yes  Pre-Procedure Verification?: Yes    I reviewed the patient's CBC, coagulation profile, and active meds prior to the procedure.    The patient was positioned in lateral decubitus. The lower back was prepped and draped in the usual sterile fashion. A solution of 1% lidocaine was used to numb the region. Using landmarks, a 22-gauge Quincke spinal needle was inserted between L3 and L4  innerspace and advanced into the subarachnoid space on 5 attempt(s), patient was not able to tolerate the procedure and the LP was unsuccessful. Will consult IR for Imaging guided LP.

## 2025-07-21 NOTE — PROGRESS NOTES
Communication Note    Patient Name: Ayla Paredes  MRN: [id number]  Today's Date: 7/21/2025   Room: 03/03-A    Discipline: Physical Therapy      PT Missed Visit: Yes  Missed Visit Reason:  (Pt off unit to scan now. Pending lumbar puncture later today.)      07/21/25 at 9:35 AM   Marita Garza, PT   Rehab Office: [phone]

## 2025-07-21 NOTE — PROGRESS NOTES
Communication Note    Patient Name: Ayla Paredes  MRN: [id number]  Today's Date: 7/21/2025   Room: 03/03-A    Discipline: Occupational Therapy      Missed Visit Reason:  (Pt off the floor at this time. WIll reattempt as appropriate/able.)      07/21/25 at 8:53 AM   Shelli Bryan, OT   Rehab Office: [phone]

## 2025-07-21 NOTE — CONSULTS
Vancomycin Dosing by Pharmacy- INITIAL    Ayla Paredes is a 55 y.o. year old male who Pharmacy has been consulted for vancomycin dosing for pneumonia. Based on the patient's indication and renal status this patient will be dosed based on a goal AUC of 400-600.     Renal function is currently improving.    Visit Vitals  BP (!) 182/95   Pulse 83   Temp 36.1 °C (97 °F) (Temporal)   Resp 15        Lab Results   Component Value Date    CREATININE 0.98 2025    CREATININE 1.13 2025    CREATININE 1.28 2025    CREATININE 1.39 (H) 2025    CREATININE 1.60 (H) 2025    CREATININE 1.59 (H) 2025    CREATININE 1.56 (H) 2025        Patient weight is as follows:   Vitals:    25 1234   Weight: 84.2 kg (185 lb 10 oz)       Cultures:  No results found for the encounter in last 14 days.        I/O last 3 completed shifts:  In: 1841.3 (21.9 mL/kg) [I.V.:711.3 (8.4 mL/kg); NG/GT:80; IV Piggyback:1050]  Out: 1890 (22.4 mL/kg) [Urine:1850 (0.6 mL/kg/hr); Emesis/NG output:40]  Weight: 84.2 kg   I/O during current shift:  I/O this shift:  In: 464.2 [I.V.:54.2; NG/GT:60; IV Piggyback:350]  Out: -     Temp (24hrs), Av.3 °C (97.3 °F), Min:36.1 °C (97 °F), Max:36.6 °C (97.9 °F)         Assessment/Plan     Patient will not be given a loading dose.  Will initiate vancomycin maintenance, 1000 mg every 12 hours.    This dosing regimen is predicted by [employer] to result in the following pharmacokinetic parameters:    Regimen: 1000 mg IV every 12 hours.  Start time: 11:12 on 2025  Exposure target: AUC24 (range) 400-600 mg/L.hr   [id number]: 390 mg/L.hr  AUC24,ss: 500 mg/L.hr  Probability of AUC24 > 400: 73 %  Ctrough,ss: 15.9 mg/L  Probability of Ctrough,ss > 20: 31 %      Follow-up level will be ordered on  at Am labs unless clinically indicated sooner.  Will continue to monitor renal function daily while on vancomycin and order serum creatinine at least every 48 hours if not already  ordered.  Follow for continued vancomycin needs, clinical response, and signs/symptoms of toxicity.       Jama Painting, PharmD

## 2025-07-21 NOTE — PROGRESS NOTES
Occupational Therapy    Evaluation and Treatment    Patient Name: Ayla Paredes  MRN: [id number]  Today's Date: 7/21/2025  Room: 03/03  Time Calculation  Start Time: 1202  Stop Time: 1238  Time Calculation (min): 36 min    Assessment  IP OT Assessment  OT Assessment: Patient demo decreased coordination, sensation, strength and ROM, endurance, balance, functional mobility, ADLs, and IADLs. Patient would benefit from skilled OT services to address deficits and improve functional performance.  Prognosis: Good  Barriers to Discharge Home: Physical needs  Physical Needs: 24hr mobility assistance needed, 24hr ADL assistance needed  Evaluation/Treatment Tolerance: Patient limited by fatigue  Medical Staff Made Aware: Yes  End of Session Communication: Bedside nurse  End of Session Patient Position: Bed, 3 rail up, Alarm on  Plan:  Inpatient Plan  Treatment Interventions: ADL retraining, Functional transfer training, UE strengthening/ROM, Endurance training, Patient/family training, Equipment evaluation/education, Neuromuscular reeducation, Fine motor coordination activities, Compensatory technique education  OT Frequency: 5 times per week  OT Discharge Recommendations: High intensity level of continued care  Equipment Recommended upon Discharge:  (TBD)  OT Recommended Transfer Status: Maximum assist  OT - OK to Discharge: Yes  OT Assessment  OT Assessment Results: Decreased ADL status, Decreased upper extremity range of motion, Decreased upper extremity strength, Decreased safe judgment during ADL, Decreased sensation, Decreased fine motor control, Decreased functional mobility, Decreased gross motor control, Decreased IADLs, Decreased trunk control for functional activities, Decreased endurance  Prognosis: Good  Evaluation/Treatment Tolerance: Patient limited by fatigue  Medical Staff Made Aware: Yes    Subjective   Current Problem:  1. Ataxia        2. Exertional shortness of breath  Transthoracic Echo Complete     Transthoracic Echo Complete        General:  Reason for Referral: presenting for bilateral hand and foot numbness, ataxia, and symmetrical stocking glove neuropathy. multilevel cervical spondylosis with posterior disc osteophyte complexes causing C5-C6 and C6-C7 central canal narrowing, possible trigeminal neuralgia d/t c/o lip numbness. 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  Past Medical History Relevant to Rehab: HTN, DM, CKD  Prior to Session Communication: Bedside nurse  Patient Position Received: Bed, 3 rail up, Alarm on  Family/Caregiver Present: No  General Comment: Patient agreeable to participate in therapy. Patient very anxiouos, impulsive with mobility, and easily irritable.   Precautions:  Hearing/Visual Limitations: WFL  Medical Precautions: Fall precautions  Precautions Comment: SBP < 180        Vital Signs       Vitals Session Pre OT During OT Post OT   Heart Rate 81   80   Resp  22   20   SpO2 98   95   /98  165/88     110             Pain:  Pain Assessment  Pain Assessment: 0-10  0-10 (Numeric) Pain Score: 0 - No pain  Lines/Tubes/Drains:  CVC 07/20/25 Triple lumen Right Femoral (Active)   Number of days: 1       NG/OG/Feeding Tube Right nostril (Active)   Number of days: 1     Tele, dobhoff    Objective   Cognition:  Overall Cognitive Status: Within Functional Limits  Arousal/Alertness: Appropriate responses to stimuli  Orientation Level: Oriented X4  Following Commands: Follows one step commands with repetition  Cognition Comments: Patient follows one step commands with 75% accuracy and increased verbal cueing. Patient self-limited and anxious throughout session.  Attention: Within Functional Limits  Memory: Within Funtional Limits  Insight: Within function limits  Impulsive: Mildly  Processing Speed: Within funtional limits  Cognition Test  Scores  Cognition Tests: Cognition Test Performed  SBT Test Score: Patient scored 4/28 on SBT indicating normal cognition. Patient made 1 error in stating the months in reverse order and 1 error in recalling the name and address.     Jimenes Agitation Sedation Scale  Jimenes Agitation Sedation Scale (RASS): Restless  Home Living:  Type of Home: House  Lives With: Spouse  Home Adaptive Equipment: None  Home Layout: One level  Home Access: Stairs to enter with rails  Entrance Stairs-Rails: Right  Entrance Stairs-Number of Steps: 4  Bathroom Shower/Tub: Tub/shower unit, Walk-in shower (Walk-in shower in basement, tub/shower on main floor)  Bathroom Toilet: Standard  Bathroom Equipment: Grab bars in shower (Grab bars in walk-in shower)   Prior Function:  Level of [place]: Independent with ADLs and functional transfers, Independent with homemaking with ambulation  ADL Assistance: Independent  Homemaking Assistance: Independent  Ambulatory Assistance: Independent  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[employer])  Leisure: Relaxing in [employer]  Hand Dominance: Right  Prior Function Comments: (+) drives, (-) falls    ADL:  Eating Assistance: Total  Eating Deficit:  (Dobhoff)  Grooming Assistance: Minimal  Grooming Deficit: Supervision/safety, Increased time to complete, Verbal cueing  Bathing Assistance: Maximal  Bathing Deficit: Supervision/safety, Increased time to complete , Verbal cueing  UE Dressing Assistance: Minimal  UE Dressing Deficit: Supervision/safety, Verbal cueing, Increased time to complete  LE Dressing Assistance: Maximal  LE Dressing Deficit: Verbal cueing, Supervision/safety, Increased time to complete  Toileting Assistance with Device: Maximal  Toileting Deficit: Supervison/safety, Increased time to complete, Verbal cueing  Activity Tolerance:  Endurance: Tolerates 10 - 20 min exercise with multiple rests  Early Mobility/Exercise Safety Screen: Proceed with mobilization - No exclusion criteria  met  Activity Tolerance Comments: Patient reports mild dizziness following sit<>stand. BP remained stable, subsided with rest and change to supine position.  Balance:  Dynamic Sitting Balance  Dynamic Sitting-Level of Assistance: Moderate assistance  Dynamic Sitting-Comments: Sadi pants  Dynamic Standing Balance  Dynamic Standing-Level of Assistance: Moderate assistance (x2)  Static Sitting Balance  Static Sitting-Level of Assistance: Minimum assistance  Static Sitting-Comment/Number of Minutes: x12 minutes EOB  Static Standing Balance  Static Standing-Level of Assistance: Moderate assistance (x2)  Bed Mobility/Transfers: Bed Mobility/Transfers: Bed Mobility  Bed Mobility: Yes  Bed Mobility 1  Bed Mobility 1: Supine to sitting  Level of Assistance 1: Moderate assistance, Maximum verbal cues  Bed Mobility Comments 1: HOB elevated, use of TAPs, max cueing for pacing and log roll technique  Bed Mobility 2  Bed Mobility  2: Sitting to supine  Level of Assistance 2: Moderate assistance  Bed Mobility Comments 2: HOB flat, max cues for log roll technique, Mod A for legs  Functional Mobility  Functional Mobility Performed: Yes  Functional Mobility 1  Surface 1: Level tile  Device 1: No device  Assistance 1: Moderate assistance (x2)  Comments 1: Mod A x2 arm in arm assist for side steps at EOB with max cues for sequencing and initiation.   and Transfers  Transfer: Yes  Transfer 1  Transfer From 1: Sit to, Stand to  Transfer to 1: Stand, Sit  Technique 1: Sit to stand, Stand to sit  Transfer Device 1: Walker  Transfer Level of Assistance 1: Moderate assistance, +2  Trials/Comments 1: Mod A x2 with max cues for pacing, hand placement, and safety.    Vision: Vision - Basic Assessment  Current Vision: Wears glasses only for reading   and Vision - Complex Assessment  Tracking: Able to track stimulus in all quads without difficulty  Vision Comments: Denies double/blurry vision  Sensation:  Light Touch: No apparent  deficits  Sensation Comment: Reports decreased sensation in bilateral hands and feet. Light touch intact.    Perception:  Inattention/Neglect: Appears intact  Initiation: Appears intact  Motor Planning: Appears intact  Perseveration: Not present  Coordination:  Movements are Fluid and Coordinated: No  Finger to Nose: Bradykinesia  Alternating Toe Taps: Bradykinesia  Coordination Comment: BUE bradykinesia   Hand Function:  Hand Function  Gross Grasp: Functional  Coordination: Impaired  Extremities:   RUE AROM (degrees)  RUE AROM Comment: AROM grossly WFL.  RUE Strength  RUE Overall Strength:  (Shoulder flex 3-/5, shoulder ext 4+/5, elbow flex/ext 5/5), LUE AROM (degrees)  LUE AROM Comment: AROM grossly WFL  LUE Strength  LUE Overall Strength:  (Shoulder flex 3-/5, shoulder ext 4/5, elbow flex/ext 5/5), RLE   RLE : Within Functional Limits, and LLE   LLE : Within Functional Limits      Outcome Measures: [facility] Daily Activity  Putting on and taking off regular lower body clothing: A lot  Bathing (including washing, rinsing, drying): A lot  Putting on and taking off regular upper body clothing: A little  Toileting, which includes using toilet, bedpan or urinal: A lot  Taking care of personal grooming such as brushing teeth: A little  Eating Meals: Total  Daily Activity - Total Score: 13    Confusion Assessment Method-ICU (CAM-ICU)  Feature 3: Altered Level of Consciousness: Positive   ICU Mobility Screen  Early Mobility/Exercise Safety Screen: Proceed with mobilization - No exclusion criteria met  ICU Mobility Scale: Marching on spot (at bedside),   Jimenes Agitation Sedation Scale  Jimenes Agitation Sedation Scale (RASS): Restless      Education Documentation  Body Mechanics, taught by KIM Fernández at 7/21/2025  1:56 PM.  Learner: Patient  Readiness: Acceptance  Method: Explanation, Demonstration  Response: Verbalizes Understanding, Demonstrated Understanding, Needs Reinforcement  Comment: OT POC, balance, ADL  retraining, sensation, endurance, strength    Precautions, taught by KIM Fernández at 7/21/2025  1:56 PM.  Learner: Patient  Readiness: Acceptance  Method: Explanation, Demonstration  Response: Verbalizes Understanding, Demonstrated Understanding, Needs Reinforcement  Comment: OT POC, balance, ADL retraining, sensation, endurance, strength    ADL Training, taught by KIM Fernández at 7/21/2025  1:56 PM.  Learner: Patient  Readiness: Acceptance  Method: Explanation, Demonstration  Response: Verbalizes Understanding, Demonstrated Understanding, Needs Reinforcement  Comment: OT POC, balance, ADL retraining, sensation, endurance, strength    Education Comments  No comments found.        Goals:   Encounter Problems       Encounter Problems (Active)       ADLs       Patient will perform UB and LB bathing with minimal assist  level of assistance in supported sitting.       Start:  07/21/25    Expected End:  08/04/25            Patient with complete upper body dressing with independent level of assistance donning and doffing all UE clothes while edge of bed        Start:  07/21/25    Expected End:  08/04/25            Patient with complete lower body dressing with contact guard assist level of assistance donning and doffing all LE clothes  with PRN adaptive equipment while edge of bed        Start:  07/21/25    Expected End:  08/04/25            Patient will complete daily grooming tasks with modified independent level of assistance and PRN adaptive equipment while standing.       Start:  07/21/25    Expected End:  08/04/25            Patient will complete toileting including hygiene clothing management/hygiene with minimal assist  level of assistance.       Start:  07/21/25    Expected End:  08/04/25               BALANCE       Pt will maintain dynamic sitting balance during ADL task with independent level of assistance in order to demonstrate decreased risk of falling and improved postural control.       Start:   07/21/25    Expected End:  08/04/25               BALANCE       Pt will maintain dynamic standing balance during ADL task with modified independent level of assistance in order to demonstrate decreased risk of falling and improved postural control.       Start:  07/21/25    Expected End:  08/04/25               EXERCISE/STRENGTHENING       Pt will improve FMC/GMC to WNL to complete ADLs independently using bilateral integration without increase in time.        Start:  07/21/25    Expected End:  08/04/25               MOBILITY       Patient will perform Functional mobility Household distances/Community Distances with Mod I level of assistance and least restrictive device in order to improve safety and functional mobility.       Start:  07/21/25    Expected End:  08/04/25               TRANSFERS       Patient will perform bed mobility modified independent level of assistance and bed rails in order to improve safety and independence with mobility       Start:  07/21/25    Expected End:  08/04/25            Patient will complete functional transfers with least restrictive device with Mod I level of assistance.       Start:  07/21/25    Expected End:  08/04/25                   Treatment Completed on Evaluation      Activities of Daily Living:                LE Dressing  LE Dressing: Yes  Pants Level of Assistance: Maximum assistance  Sock Level of Assistance: Dependent  LE Dressing Where Assessed:  (Socks bed level, pants EOB)  LE Dressing Comments: Patient req. Dep A to gentry socks at bed level. Patient educated on figure four technique but unable to acheive d/t decreased ROM and flexibility. Patient req. Max A to gentry pants at EOB with cueing for sequencing and participation in task. Patient req. Mod A x2 to stand and Max A to pull pants over hips.    Therapy/Activity:     Therapeutic Activity  Therapeutic Activity Performed: Yes  Therapeutic Activity 1: Progressive bed in chair position to promote hemodynamic  stability and skilled vitals management.  Therapeutic Activity 2: Patient sat EOB x12 minutes with Min A for trunk control and balance. Patient impulsive with balance and mobility, req. cues for safety.  Therapeutic Activity 3: Patient completed x1 additional sit<>stand trial with Mod A x2 and arm in arm assist. Bilateral knee blocking, cues for sequencing.  Therapeutic Activity 4: Patient completed side steps at EOB with Mod A x2 and arm in arm assist. Patient req. cueing to advance BLE, demo ataxic steps.     Completion of this session, clinical decision making, and documentation performed under the supervision/direction of Shelli Bryan.     07/21/25 at 7:04 PM   STEPHEN WILSON-OT   Rehab Office: [phone]

## 2025-07-21 NOTE — PROGRESS NOTES
07/21/25 1526   Discharge Planning   Living Arrangements Spouse/significant other   Support Systems Spouse/significant other   Type of Residence Private residence   Home or Post Acute Services Post acute facilities (Rehab/SNF/etc)   Type of Post Acute Facility Services Rehab   Expected Discharge Disposition Rehab   Does the patient need discharge transport arranged? Yes   [facility] coordination needed? Yes   Has discharge transport been arranged? No   Patient Choice   Provider Choice list and CMS website (https://medicare.gov/care-compare#search) for post-acute Quality and Resource Measure Data were provided and reviewed with: Patient     Social Work Discharge Planning note:    -Patient discussed during interdisciplinary rounds:  -Team members present: Resident, TCC, and SW  -Plan per medical team: Pt is not medically ready for discharge. Further work up is needed.   -Payer: [employer]  -Status: Inpatient  -Discharge disposition: Pt is currently with PT and OT recommendations for High intensity. SW met with Pt to further discuss discharge planning. Pt was agreeable with acute rehab placement and was given a printed list of providers. Pt gave [facility] as FOC and a referral has been sent via [facility]. SW will continue to follow to assist with discharge planning.    -Support to Pt/family: Pt reported that he lives with his significant other, Nicole. Pt reported that Nicole does not work, so she would be able to provide 24 hour assist in the home if needed after rehab placement completion. Pt reported no other issues or concerns at this time. SW will continue to follow for emotional/incidental support to Pt/family.    -Potential Barriers: none  -Anticipated Date of Discharge: 7/24/25    This SW   TERRIE Rivas, LSW    Office: [phone]  Secure chat via [employer]

## 2025-07-22 ENCOUNTER — APPOINTMENT (OUTPATIENT)
Dept: PRIMARY CARE | Facility: CLINIC | Age: 55
End: 2025-07-22
Payer: COMMERCIAL

## 2025-07-22 DIAGNOSIS — I42.8 OTHER CARDIOMYOPATHY: ICD-10-CM

## 2025-07-22 DIAGNOSIS — G62.9 NEUROPATHY: ICD-10-CM

## 2025-07-22 DIAGNOSIS — I10 PRIMARY HYPERTENSION: ICD-10-CM

## 2025-07-22 DIAGNOSIS — E55.9 VITAMIN D DEFICIENCY: ICD-10-CM

## 2025-07-22 DIAGNOSIS — E78.2 MIXED HYPERLIPIDEMIA: ICD-10-CM

## 2025-07-22 DIAGNOSIS — N18.31 STAGE 3A CHRONIC KIDNEY DISEASE (MULTI): ICD-10-CM

## 2025-07-22 DIAGNOSIS — N18.32 TYPE 2 DIABETES MELLITUS WITH STAGE 3B CHRONIC KIDNEY DISEASE, WITHOUT LONG-TERM CURRENT USE OF INSULIN (MULTI): ICD-10-CM

## 2025-07-22 DIAGNOSIS — E11.22 TYPE 2 DIABETES MELLITUS WITH STAGE 3B CHRONIC KIDNEY DISEASE, WITHOUT LONG-TERM CURRENT USE OF INSULIN (MULTI): ICD-10-CM

## 2025-07-22 DIAGNOSIS — Z09 HOSPITAL DISCHARGE FOLLOW-UP: Primary | ICD-10-CM

## 2025-07-22 DIAGNOSIS — R27.0 ATAXIA: ICD-10-CM

## 2025-07-22 DIAGNOSIS — R20.2 NUMBNESS AND TINGLING: ICD-10-CM

## 2025-07-22 DIAGNOSIS — R20.0 NUMBNESS AND TINGLING: ICD-10-CM

## 2025-07-22 DIAGNOSIS — Z71.85 IMMUNIZATION COUNSELING: ICD-10-CM

## 2025-07-22 LAB
ALBUMIN SERPL BCP-MCNC: 3.8 G/DL (ref 3.4–5)
ANION GAP SERPL CALC-SCNC: 11 MMOL/L (ref 10–20)
APPEARANCE CSF: ABNORMAL
APPEARANCE CSF: CLEAR
APTT PPP: 22 SECONDS (ref 26–36)
BASOPHILS # BLD AUTO: 0.04 X10*3/UL (ref 0–0.1)
BASOPHILS NFR BLD AUTO: 0.3 %
BASOPHILS NFR CSF MANUAL: 2 %
BASOPHILS NFR CSF MANUAL: 2 %
BUN SERPL-MCNC: 13 MG/DL (ref 6–23)
C GATTII+NEOFOR DNA CSF QL NAA+NON-PROBE: NOT DETECTED
CA-I BLD-SCNC: 1.11 MMOL/L (ref 1.1–1.33)
CALCIUM SERPL-MCNC: 8.5 MG/DL (ref 8.6–10.6)
CHLORIDE SERPL-SCNC: 98 MMOL/L (ref 98–107)
CMV DNA CSF QL NAA+NON-PROBE: NOT DETECTED
CO2 SERPL-SCNC: 26 MMOL/L (ref 21–32)
COLOR CSF: ABNORMAL
COLOR CSF: COLORLESS
COLOR SPUN CSF: COLORLESS
COLOR SPUN CSF: COLORLESS
COPPER SERPL-MCNC: 126.4 UG/DL (ref 70–140)
CORTIS AM PEAK SERPL-MSCNC: 45.1 UG/DL (ref 5–20)
CREAT SERPL-MCNC: 1.03 MG/DL (ref 0.5–1.3)
E COLI K1 DNA CSF QL NAA+NON-PROBE: NOT DETECTED
EGFRCR SERPLBLD CKD-EPI 2021: 86 ML/MIN/1.73M*2
EOSINOPHIL # BLD AUTO: 0.01 X10*3/UL (ref 0–0.7)
EOSINOPHIL NFR BLD AUTO: 0.1 %
ERYTHROCYTE [DISTWIDTH] IN BLOOD BY AUTOMATED COUNT: 13.3 % (ref 11.5–14.5)
EV RNA CSF QL NAA+NON-PROBE: NOT DETECTED
GLUCOSE BLD MANUAL STRIP-MCNC: 126 MG/DL (ref 74–99)
GLUCOSE BLD MANUAL STRIP-MCNC: 127 MG/DL (ref 74–99)
GLUCOSE BLD MANUAL STRIP-MCNC: 138 MG/DL (ref 74–99)
GLUCOSE BLD MANUAL STRIP-MCNC: 143 MG/DL (ref 74–99)
GLUCOSE CSF-MCNC: 82 MG/DL (ref 40–70)
GLUCOSE SERPL-MCNC: 154 MG/DL (ref 74–99)
GP B STREP DNA CSF QL NAA+NON-PROBE: NOT DETECTED
HAEM INFLU DNA CSF QL NAA+NON-PROBE: NOT DETECTED
HCT VFR BLD AUTO: 34 % (ref 41–52)
HGB BLD-MCNC: 11.4 G/DL (ref 13.5–17.5)
HHV6 DNA CSF QL NAA+NON-PROBE: NOT DETECTED
HSV1 DNA CSF QL NAA+NON-PROBE: NOT DETECTED
HSV2 DNA CSF QL NAA+NON-PROBE: NOT DETECTED
IMM GRANULOCYTES # BLD AUTO: 0.11 X10*3/UL (ref 0–0.7)
IMM GRANULOCYTES NFR BLD AUTO: 0.7 % (ref 0–0.9)
INR PPP: 1 (ref 0.9–1.1)
L MONOCYTOG DNA CSF QL NAA+NON-PROBE: NOT DETECTED
LDH CSF L TO P-CCNC: <25 U/L
LYMPHOCYTES # BLD AUTO: 0.54 X10*3/UL (ref 1.2–4.8)
LYMPHOCYTES NFR BLD AUTO: 3.6 %
LYMPHOCYTES NFR CSF MANUAL: 60 % (ref 28–96)
LYMPHOCYTES NFR CSF MANUAL: 69 % (ref 28–96)
MAGNESIUM SERPL-MCNC: 2.39 MG/DL (ref 1.6–2.4)
MCH RBC QN AUTO: 28.9 PG (ref 26–34)
MCHC RBC AUTO-ENTMCNC: 33.5 G/DL (ref 32–36)
MCV RBC AUTO: 86 FL (ref 80–100)
MONOCYTES # BLD AUTO: 0.6 X10*3/UL (ref 0.1–1)
MONOCYTES NFR BLD AUTO: 4 %
MONOS+MACROS NFR CSF MANUAL: 10 % (ref 16–56)
MONOS+MACROS NFR CSF MANUAL: 2 % (ref 16–56)
N MEN DNA CSF QL NAA+NON-PROBE: NOT DETECTED
NEUTROPHILS # BLD AUTO: 13.7 X10*3/UL (ref 1.2–7.7)
NEUTROPHILS NFR BLD AUTO: 91.3 %
NEUTS SEG NFR CSF MANUAL: 27 % (ref 0–5)
NEUTS SEG NFR CSF MANUAL: 27 % (ref 0–5)
NRBC BLD-RTO: 0 /100 WBCS (ref 0–0)
OSMOLALITY SERPL: 283 MOSM/KG (ref 280–300)
OSMOLALITY SERPL: 283 MOSM/KG (ref 280–300)
OSMOLALITY SERPL: 285 MOSM/KG (ref 280–300)
OSMOLALITY SERPL: 285 MOSM/KG (ref 280–300)
OSMOLALITY SERPL: 287 MOSM/KG (ref 280–300)
OSMOLALITY SERPL: 287 MOSM/KG (ref 280–300)
OTHER CELLS NFR CSF MANUAL: 1 %
PARECHOVIRUS A RNA CSF QL NAA+NON-PROBE: NOT DETECTED
PHOSPHATE SERPL-MCNC: 2.7 MG/DL (ref 2.5–4.9)
PLATELET # BLD AUTO: 409 X10*3/UL (ref 150–450)
POTASSIUM SERPL-SCNC: 3.8 MMOL/L (ref 3.5–5.3)
PROT CSF-MCNC: 111 MG/DL (ref 15–45)
PROTHROMBIN TIME: 11.4 SECONDS (ref 9.8–12.4)
RBC # BLD AUTO: 3.94 X10*6/UL (ref 4.5–5.9)
RBC # CSF AUTO: 1000 /UL (ref 0–5)
RBC # CSF AUTO: 88 /UL (ref 0–5)
RPR SER QL: NONREACTIVE
S PNEUM DNA CSF QL NAA+NON-PROBE: NOT DETECTED
SODIUM SERPL-SCNC: 130 MMOL/L (ref 136–145)
SODIUM SERPL-SCNC: 131 MMOL/L (ref 136–145)
SODIUM SERPL-SCNC: 132 MMOL/L (ref 136–145)
SODIUM SERPL-SCNC: 132 MMOL/L (ref 136–145)
TOTAL CELLS COUNTED CSF: 100
TOTAL CELLS COUNTED CSF: 100
TUBE # CSF: ABNORMAL
TUBE # CSF: ABNORMAL
VANCOMYCIN SERPL-MCNC: 4.5 UG/ML (ref 5–20)
VZV DNA CSF QL NAA+NON-PROBE: NOT DETECTED
WBC # BLD AUTO: 15 X10*3/UL (ref 4.4–11.3)
WBC # CSF AUTO: 3 /UL (ref 1–5)
WBC # CSF AUTO: 8 /UL (ref 1–5)
ZINC SERPL-MCNC: 73.6 UG/DL (ref 60–120)

## 2025-07-22 PROCEDURE — 83735 ASSAY OF MAGNESIUM: CPT

## 2025-07-22 PROCEDURE — 97530 THERAPEUTIC ACTIVITIES: CPT | Mod: GP

## 2025-07-22 PROCEDURE — 86255 FLUORESCENT ANTIBODY SCREEN: CPT

## 2025-07-22 PROCEDURE — 83930 ASSAY OF BLOOD OSMOLALITY: CPT

## 2025-07-22 PROCEDURE — 87483 CNS DNA AMP PROBE TYPE 12-25: CPT

## 2025-07-22 PROCEDURE — 85025 COMPLETE CBC W/AUTO DIFF WBC: CPT

## 2025-07-22 PROCEDURE — 2500000005 HC RX 250 GENERAL PHARMACY W/O HCPCS

## 2025-07-22 PROCEDURE — 87102 FUNGUS ISOLATION CULTURE: CPT | Performed by: PSYCHIATRY & NEUROLOGY

## 2025-07-22 PROCEDURE — 2500000004 HC RX 250 GENERAL PHARMACY W/ HCPCS (ALT 636 FOR OP/ED)

## 2025-07-22 PROCEDURE — 88187 FLOWCYTOMETRY/READ 2-8: CPT | Performed by: STUDENT IN AN ORGANIZED HEALTH CARE EDUCATION/TRAINING PROGRAM

## 2025-07-22 PROCEDURE — 88104 CYTOPATH FL NONGYN SMEARS: CPT | Performed by: STUDENT IN AN ORGANIZED HEALTH CARE EDUCATION/TRAINING PROGRAM

## 2025-07-22 PROCEDURE — 80202 ASSAY OF VANCOMYCIN: CPT | Performed by: PATHOLOGY

## 2025-07-22 PROCEDURE — 82945 GLUCOSE OTHER FLUID: CPT

## 2025-07-22 PROCEDURE — 82024 ASSAY OF ACTH: CPT

## 2025-07-22 PROCEDURE — 80069 RENAL FUNCTION PANEL: CPT

## 2025-07-22 PROCEDURE — 89051 BODY FLUID CELL COUNT: CPT

## 2025-07-22 PROCEDURE — 87205 SMEAR GRAM STAIN: CPT

## 2025-07-22 PROCEDURE — 82947 ASSAY GLUCOSE BLOOD QUANT: CPT

## 2025-07-22 PROCEDURE — 83825 ASSAY OF MERCURY: CPT

## 2025-07-22 PROCEDURE — 88185 FLOWCYTOMETRY/TC ADD-ON: CPT | Mod: TC

## 2025-07-22 PROCEDURE — 2500000001 HC RX 250 WO HCPCS SELF ADMINISTERED DRUGS (ALT 637 FOR MEDICARE OP)

## 2025-07-22 PROCEDURE — 2020000001 HC ICU ROOM DAILY

## 2025-07-22 PROCEDURE — 82533 TOTAL CORTISOL: CPT

## 2025-07-22 PROCEDURE — 2500000004 HC RX 250 GENERAL PHARMACY W/ HCPCS (ALT 636 FOR OP/ED): Mod: JZ

## 2025-07-22 PROCEDURE — 87799 DETECT AGENT NOS DNA QUANT: CPT

## 2025-07-22 PROCEDURE — 99231 SBSQ HOSP IP/OBS SF/LOW 25: CPT | Performed by: PSYCHIATRY & NEUROLOGY

## 2025-07-22 PROCEDURE — 83615 LACTATE (LD) (LDH) ENZYME: CPT

## 2025-07-22 PROCEDURE — 86592 SYPHILIS TEST NON-TREP QUAL: CPT

## 2025-07-22 PROCEDURE — 94640 AIRWAY INHALATION TREATMENT: CPT

## 2025-07-22 PROCEDURE — 84295 ASSAY OF SERUM SODIUM: CPT

## 2025-07-22 PROCEDURE — 37799 UNLISTED PX VASCULAR SURGERY: CPT

## 2025-07-22 PROCEDURE — 85610 PROTHROMBIN TIME: CPT

## 2025-07-22 PROCEDURE — 86403 PARTICLE AGGLUT ANTBDY SCRN: CPT

## 2025-07-22 PROCEDURE — 99291 CRITICAL CARE FIRST HOUR: CPT | Performed by: NEUROLOGICAL SURGERY

## 2025-07-22 PROCEDURE — 84157 ASSAY OF PROTEIN OTHER: CPT

## 2025-07-22 PROCEDURE — 82330 ASSAY OF CALCIUM: CPT

## 2025-07-22 PROCEDURE — 82784 ASSAY IGA/IGD/IGG/IGM EACH: CPT

## 2025-07-22 RX ORDER — ENOXAPARIN SODIUM 100 MG/ML
40 INJECTION SUBCUTANEOUS DAILY
Status: DISCONTINUED | OUTPATIENT
Start: 2025-07-22 | End: 2025-07-30 | Stop reason: HOSPADM

## 2025-07-22 RX ORDER — LOSARTAN POTASSIUM 100 MG/1
100 TABLET ORAL DAILY
Status: DISCONTINUED | OUTPATIENT
Start: 2025-07-22 | End: 2025-07-30 | Stop reason: HOSPADM

## 2025-07-22 RX ORDER — LIDOCAINE HYDROCHLORIDE 10 MG/ML
10 INJECTION, SOLUTION EPIDURAL; INFILTRATION; INTRACAUDAL; PERINEURAL ONCE
Status: COMPLETED | OUTPATIENT
Start: 2025-07-22 | End: 2025-07-22

## 2025-07-22 RX ORDER — CAFFEINE 200 MG
100 TABLET ORAL EVERY 4 HOURS PRN
Status: DISCONTINUED | OUTPATIENT
Start: 2025-07-22 | End: 2025-07-23

## 2025-07-22 RX ORDER — LIDOCAINE HYDROCHLORIDE 10 MG/ML
INJECTION, SOLUTION INFILTRATION; PERINEURAL
Status: COMPLETED
Start: 2025-07-22 | End: 2025-07-22

## 2025-07-22 RX ORDER — DIAZEPAM 5 MG/ML
5 INJECTION, SOLUTION INTRAMUSCULAR; INTRAVENOUS ONCE
Status: COMPLETED | OUTPATIENT
Start: 2025-07-22 | End: 2025-07-22

## 2025-07-22 RX ADMIN — HYDROXYZINE HYDROCHLORIDE 25 MG: 25 TABLET, FILM COATED ORAL at 00:21

## 2025-07-22 RX ADMIN — HYDRALAZINE HYDROCHLORIDE 10 MG: 20 INJECTION INTRAMUSCULAR; INTRAVENOUS at 06:08

## 2025-07-22 RX ADMIN — HYDRALAZINE HYDROCHLORIDE 10 MG: 20 INJECTION INTRAMUSCULAR; INTRAVENOUS at 02:51

## 2025-07-22 RX ADMIN — LIDOCAINE HYDROCHLORIDE: 10 INJECTION, SOLUTION INFILTRATION; PERINEURAL at 14:56

## 2025-07-22 RX ADMIN — AMLODIPINE BESYLATE 10 MG: 10 TABLET ORAL at 08:05

## 2025-07-22 RX ADMIN — SODIUM CHLORIDE 1000 MG: 0.9 INJECTION, SOLUTION INTRAVENOUS at 01:07

## 2025-07-22 RX ADMIN — PIPERACILLIN SODIUM AND TAZOBACTAM SODIUM 4.5 G: 4; .5 INJECTION, SOLUTION INTRAVENOUS at 12:11

## 2025-07-22 RX ADMIN — SILODOSIN 4 MG: 4 CAPSULE ORAL at 08:05

## 2025-07-22 RX ADMIN — PIPERACILLIN SODIUM AND TAZOBACTAM SODIUM 4.5 G: 4; .5 INJECTION, SOLUTION INTRAVENOUS at 18:37

## 2025-07-22 RX ADMIN — ATORVASTATIN CALCIUM 40 MG: 40 TABLET, FILM COATED ORAL at 20:48

## 2025-07-22 RX ADMIN — HYDROXYZINE HYDROCHLORIDE 25 MG: 25 TABLET, FILM COATED ORAL at 20:49

## 2025-07-22 RX ADMIN — HYDROMORPHONE HYDROCHLORIDE 0.2 MG: 1 INJECTION, SOLUTION INTRAMUSCULAR; INTRAVENOUS; SUBCUTANEOUS at 14:42

## 2025-07-22 RX ADMIN — POLYETHYLENE GLYCOL 3350 17 G: 17 POWDER, FOR SOLUTION ORAL at 08:05

## 2025-07-22 RX ADMIN — CETIRIZINE HYDROCHLORIDE 10 MG: 10 TABLET ORAL at 08:06

## 2025-07-22 RX ADMIN — SENNOSIDES 17.2 MG: 8.6 TABLET, FILM COATED ORAL at 20:48

## 2025-07-22 RX ADMIN — IMMUNE GLOBULIN INFUSION (HUMAN) 30 G: 100 INJECTION, SOLUTION INTRAVENOUS; SUBCUTANEOUS at 08:28

## 2025-07-22 RX ADMIN — PIPERACILLIN SODIUM AND TAZOBACTAM SODIUM 4.5 G: 4; .5 INJECTION, SOLUTION INTRAVENOUS at 05:38

## 2025-07-22 RX ADMIN — SENNOSIDES 17.2 MG: 8.6 TABLET, FILM COATED ORAL at 08:05

## 2025-07-22 RX ADMIN — ENOXAPARIN SODIUM 40 MG: 100 INJECTION SUBCUTANEOUS at 20:49

## 2025-07-22 RX ADMIN — PIPERACILLIN SODIUM AND TAZOBACTAM SODIUM 4.5 G: 4; .5 INJECTION, SOLUTION INTRAVENOUS at 00:15

## 2025-07-22 RX ADMIN — LOSARTAN POTASSIUM 100 MG: 100 TABLET, FILM COATED ORAL at 14:20

## 2025-07-22 RX ADMIN — METOPROLOL TARTRATE 12.5 MG: 25 TABLET, FILM COATED ORAL at 20:49

## 2025-07-22 RX ADMIN — METOPROLOL TARTRATE 12.5 MG: 25 TABLET, FILM COATED ORAL at 08:05

## 2025-07-22 RX ADMIN — DIAZEPAM 5 MG: 5 INJECTION INTRAMUSCULAR; INTRAVENOUS at 14:12

## 2025-07-22 RX ADMIN — ACETAMINOPHEN 650 MG: 325 TABLET ORAL at 20:49

## 2025-07-22 RX ADMIN — HYDRALAZINE HYDROCHLORIDE 10 MG: 20 INJECTION INTRAMUSCULAR; INTRAVENOUS at 01:07

## 2025-07-22 RX ADMIN — CLONAZEPAM 0.25 MG: 0.5 TABLET ORAL at 08:05

## 2025-07-22 RX ADMIN — LIDOCAINE HYDROCHLORIDE 100 MG: 10 SOLUTION INTRAVENOUS at 15:33

## 2025-07-22 ASSESSMENT — COGNITIVE AND FUNCTIONAL STATUS - GENERAL
STANDING UP FROM CHAIR USING ARMS: A LOT
MOVING FROM LYING ON BACK TO SITTING ON SIDE OF FLAT BED WITH BEDRAILS: A LOT
MOVING TO AND FROM BED TO CHAIR: A LOT
DRESSING REGULAR UPPER BODY CLOTHING: A LOT
DRESSING REGULAR UPPER BODY CLOTHING: A LOT
MOVING TO AND FROM BED TO CHAIR: A LOT
DRESSING REGULAR LOWER BODY CLOTHING: A LOT
CLIMB 3 TO 5 STEPS WITH RAILING: A LOT
PERSONAL GROOMING: A LOT
HELP NEEDED FOR BATHING: A LOT
MOVING FROM LYING ON BACK TO SITTING ON SIDE OF FLAT BED WITH BEDRAILS: A LOT
STANDING UP FROM CHAIR USING ARMS: A LOT
HELP NEEDED FOR BATHING: A LOT
DAILY ACTIVITIY SCORE: 12
CLIMB 3 TO 5 STEPS WITH RAILING: A LOT
PERSONAL GROOMING: A LOT
DRESSING REGULAR LOWER BODY CLOTHING: A LOT
WALKING IN HOSPITAL ROOM: A LOT
WALKING IN HOSPITAL ROOM: TOTAL
MOVING TO AND FROM BED TO CHAIR: A LOT
TOILETING: A LOT
EATING MEALS: A LOT
STANDING UP FROM CHAIR USING ARMS: A LOT
EATING MEALS: A LOT
DAILY ACTIVITIY SCORE: 12
MOBILITY SCORE: 12
PERSONAL GROOMING: A LOT
TURNING FROM BACK TO SIDE WHILE IN FLAT BAD: A LOT
STANDING UP FROM CHAIR USING ARMS: A LOT
DAILY ACTIVITIY SCORE: 12
EATING MEALS: A LOT
MOVING TO AND FROM BED TO CHAIR: A LOT
HELP NEEDED FOR BATHING: A LOT
DRESSING REGULAR UPPER BODY CLOTHING: A LOT
MOBILITY SCORE: 12
CLIMB 3 TO 5 STEPS WITH RAILING: TOTAL
MOBILITY SCORE: 12
MOVING FROM LYING ON BACK TO SITTING ON SIDE OF FLAT BED WITH BEDRAILS: A LOT
TURNING FROM BACK TO SIDE WHILE IN FLAT BAD: A LOT
TOILETING: A LOT
WALKING IN HOSPITAL ROOM: A LOT
TURNING FROM BACK TO SIDE WHILE IN FLAT BAD: A LOT
CLIMB 3 TO 5 STEPS WITH RAILING: A LOT
TOILETING: A LOT
MOBILITY SCORE: 10
DRESSING REGULAR LOWER BODY CLOTHING: A LOT
MOVING FROM LYING ON BACK TO SITTING ON SIDE OF FLAT BED WITH BEDRAILS: A LOT
TURNING FROM BACK TO SIDE WHILE IN FLAT BAD: A LOT
WALKING IN HOSPITAL ROOM: A LOT

## 2025-07-22 ASSESSMENT — PAIN - FUNCTIONAL ASSESSMENT
PAIN_FUNCTIONAL_ASSESSMENT: 0-10

## 2025-07-22 ASSESSMENT — PAIN SCALES - GENERAL
PAINLEVEL_OUTOF10: 0 - NO PAIN
PAINLEVEL_OUTOF10: 0 - NO PAIN
PAINLEVEL_OUTOF10: 10 - WORST POSSIBLE PAIN
PAINLEVEL_OUTOF10: 0 - NO PAIN

## 2025-07-22 NOTE — PROGRESS NOTES
Physical Therapy    Physical Therapy Treatment    Patient Name: Ayla Paredes  MRN: [id number]  Today's Date: 7/22/2025  Time Calculation  Start Time: 1125  Stop Time: 1151  Time Calculation (min): 26 min       Assessment/Plan   PT Assessment  Rehab Prognosis: Good  Barriers to Discharge Home: Physical needs, Caregiver assistance  Caregiver Assistance: Caregiver assistance needed per identified barriers - however, level of patient's required assistance exceeds assistance available at home  Physical Needs: High falls risk due to function or environment  Evaluation/Treatment Tolerance: Patient limited by pain  End of Session Communication: Bedside nurse  Assessment Comment: Patient to benefit from ongoing PT services to address the above limitations and to facilitate timely return to prior level of function.  End of Session Patient Position: Bed, 3 rail up, Alarm off, not on at start of session  PT Plan  Inpatient/Swing Bed or Outpatient: Inpatient  PT Plan  Treatment/Interventions: Bed mobility, Transfer training, Gait training, Stair training, Balance training, Neuromuscular re-education, Strengthening, Endurance training, Therapeutic exercise, Therapeutic activity, Home exercise program, Postural re-education  PT Plan: Ongoing PT  PT Frequency: 5 times per week  PT Discharge Recommendations: High intensity level of continued care  Equipment Recommended upon Discharge:  (TBD)  PT Recommended Transfer Status: Assist x2  PT - OK to Discharge: Yes      General Visit Information:   PT  Visit  PT Received On: 07/22/25  Response to Previous Treatment: Patient with no complaints from previous session.  Reason for Referral: Referred from [facility] for B hand and foot numbness. Exam was notable for positive romberg sign and a wide gait with inability to perform tandem gait. Spinal imaging negative for acute abnormalities in thoracic spine, but indicative of multilevel cervical spondylosis with posterior disc osteophyte  "complexes causing C5-C6 and C6-C7 central canal narrowing. Pending Paraneoplastic and autoimmune labs results and MRI with contrast. Pt with Sodium of 134 on admission which downtrended to 123 within 48 hours. Admitted to NSU for hypertonic saline.  Past Medical History Relevant to Rehab: HTN, DM, CKD  Prior to Session Communication: Bedside nurse  Patient Position Received: Bed, 3 rail up, Alarm off, not on at start of session  Family/Caregiver Present: Yes  Caregiver Feedback: Pt's mother and granddaughter present and supportive  General Comment: Pt agreeable to PT services with encouragement.     Subjective   Precautions:  Precautions  Hearing/Visual Limitations: WFL  Medical Precautions: Fall precautions  Precautions Comment: SBP <180    Vital Signs:     07/22/25 1125 07/22/25 1135 07/22/25 1151   Vital Signs   Vitals Session Pre PT During PT Post PT   Heart Rate 86 84 92   Resp 23 26 (!) 27   SpO2 93 % 96 % 96 %   BP (!) 154/100 127/87 (!) 151/99   MAP (mmHg) 116 99 116   BP Method Automatic Automatic Automatic   Patient Position Lying Sitting Lying         Objective   Pain:  Pain Assessment  Pain Assessment: 0-10  0-10 (Numeric) Pain Score: 10 - Worst possible pain  Pain Type: Acute pain  Pain Location: Back  Pain Interventions: Repositioned  Response to Interventions: No change in pain (RN aware.)  Cognition:  Cognition  Overall Cognitive Status: Impaired  Arousal/Alertness: Appropriate responses to stimuli  Orientation Level: Oriented X4  Following Commands: Follows one step commands with repetition  Cognition Comments: Needs extensive encouragement to participate in PT services. Frequently stating \"I can't\" in response to commands.  Insight: Moderate  Impulsive: Within functional limits  Processing Speed: Delayed    Lines/Tubes/Drains:  CVC 07/20/25 Triple lumen Right Femoral (Active)   Number of days: 1       NG/OG/Feeding Tube Right nostril (Active)   Number of days: 1     Oxygen: room air     Postural " Control:   Postural Control  Postural Control: Impaired  Head Control: Forward head posture  Trunk Control: Heavy posterior lean in standing  Righting Reactions: Intact  Protective Responses: Intact  Posture Comment: Rounded shoulders    Balance:   Static Sitting Balance  Static Sitting-Balance Support: Bilateral upper extremity supported, Feet supported  Static Sitting-Level of Assistance:  (MIN A x1 progressing to close sup)  Static Sitting-Comment/Number of Minutes: 10 min total  Dynamic Sitting Balance  Dynamic Sitting-Balance Support: Bilateral upper extremity supported, Feet supported  Dynamic Sitting-Level of Assistance: Minimum assistance  Dynamic Sitting-Balance: Forward lean    Static Standing Balance  Static Standing-Balance Support: Bilateral upper extremity supported  Static Standing-Level of Assistance: Maximum assistance  Static Standing-Comment/Number of Minutes: 2x30 sec  Dynamic Standing Balance  Dynamic Standing-Balance Support: Bilateral upper extremity supported  Dynamic Standing-Level of Assistance: Maximum assistance  Dynamic Standing-Balance: Turning    PT Treatments:       Therapeutic Activity  Therapeutic Activity Performed: Yes  Therapeutic Activity 1: Positioning with pillows, HOB >/= 30 deg at end of session to optimize cardiopulonary function and to prevent muscle contracture  Therapeutic Activity 2: Skilled vitals monitoring provided throughout session         Bed Mobility  Bed Mobility: Yes  Bed Mobility 1  Bed Mobility 1: Supine to sitting, Sitting to supine  Level of Assistance 1: Maximum assistance  Bed Mobility Comments 1: HOB 30 deg, cues for proper hand placement and task initiation/sequencing.    Ambulation/Gait Training  Ambulation/Gait Training Performed: Yes  Ambulation/Gait Training 1  Surface 1: Level tile  Device 1:  (BUE arm in arm assist)  Gait Support Devices: Gait belt  Assistance 1: Maximum assistance  Quality of Gait 1: Narrow base of support, Diminished heel  strike, Decreased step length, Soft knee(s), Forward flexed posture (Posterior lean, B knees blocked. Cues for increased B step length.)  Comments/Distance (ft) 1: 3 steps laterally to L.    Transfers  Transfer: Yes  Transfer 1  Transfer From 1: Bed to  Transfer to 1: Stand  Technique 1: Sit to stand, Stand to sit  Transfer Device 1: Gait belt (BUE arm in arm assist)  Transfer Level of Assistance 1: Maximum assistance  Trials/Comments 1: Cues for proper hand placement and controlling speed.  Transfers 2  Transfer From 2: Chair with arms to  Transfer to 2: Stand  Technique 2: Sit to stand, Stand to sit  Transfer Device 2: Gait belt (BUE arm in arm assist)  Transfer Level of Assistance 2: Maximum assistance  Trials/Comments 2: Cues for proper hand placement and controlling speed.    Stairs  Stairs: No              Outcome Measures:  [facility] Basic Mobility  Turning from your back to your side while in a flat bed without using bedrails: A lot  Moving from lying on your back to sitting on the side of a flat bed without using bedrails: A lot  Moving to and from bed to chair (including a wheelchair): A lot  Standing up from a chair using your arms (e.g. wheelchair or bedside chair): A lot  To walk in hospital room: Total  Climbing 3-5 steps with railing: Total  Basic Mobility - Total Score: 10                   FSS-ICU  Ambulation: Walks <50 feet with any assistance x1 or walks any distance with assistance x2 people  Rolling: Moderate assistance (performs 50 - 74% of task)  Sitting: Minimal assistance (performs 75% or more of task)  Transfer Sit-to-Stand: Maximal assistance (performs 25% - 49% of task)  Transfer Supine-to-Sit: Maximal assistance (performs 25% - 49% of task)  Total Score: 12    ICU Mobility Screen  Early Mobility/Exercise Safety Screen: Proceed with mobilization - No exclusion criteria met  ICU Mobility Scale: Transferring bed to chair                   Education:  Education Documentation  Precautions, taught  by Marita Garza PT at 7/22/2025  2:22 PM.  Learner: Patient  Readiness: Acceptance  Method: Explanation  Response: Verbalizes Understanding  Comment: PT expectations, mobility precautions    Mobility Training, taught by Marita Garza PT at 7/22/2025  2:22 PM.  Learner: Patient  Readiness: Acceptance  Method: Explanation  Response: Verbalizes Understanding  Comment: PT expectations, mobility precautions    Education Comments  No comments found.             Encounter Problems       Encounter Problems (Active)       Mobility       STG - Patient will ambulate >75ft, wheeled walker, CGA (Progressing)       Start:  07/19/25    Expected End:  08/02/25               PT Transfers       STG - Patient to transfer to and from sit to supine CGA (Progressing)       Start:  07/19/25    Expected End:  08/02/25            STG - Patient will transfer sit to and from stand CGA (Progressing)       Start:  07/19/25    Expected End:  08/02/25 07/22/25 at 2:22 PM   Marita Garza, PT   Rehab Office: [phone]

## 2025-07-22 NOTE — PROGRESS NOTES
Speech-Language Pathology                 Therapy Communication Note    Patient Name: Ayla Paredes  MRN: [id number]  Department: [facility]  Room: 03/03-A  Today's Date: 7/22/2025     Discipline: Speech Language Pathology    Missed Time: Attempt    Comment:  LP being completed at bedside. Will follow-up as able

## 2025-07-22 NOTE — PROGRESS NOTES
[facility]  NEUROSCIENCE INTENSIVE CARE UNIT  DAILY PROGRESS NOTE       Patient Name: Ayla Pardees   MRN: [id number]     Admit Date: 2025     : 1970 AGE: 55 y.o. GENDER: male        Subjective    Ayla Paredes is a 55 y.o. male with a PMHx of HTN, DM, CKD transferred from [facility] on  for bilateral hand and foot numbness (stocking-glove). Symptoms began acutely (post-coitally) 7/15 AM, starting with paresthesias in the dorsal surface of left left hand up to wrist and then the dorsal surface of right hand 1 hour later; the next day, tingling appeared in feet (bilaterally) and then periorally. They have not progressed since.  Pt with no recent travel, weight changes or substance use. Initial was notable for positive romberg sign and a wide gait with inability to perform tandem gait.     Lab work-up on floor included a urine drug screen, ethanol/folate/B12 levels, syphilis screen, hepatitis panel, HIV 1/2 screen, TSH and thyroid peroxidase levels, and troponins, all of which were unrevealing. CT head and CTA head and neck were similarly unrevealing, while MR cervical/thoracic spine showed mild-to-moderate spondylosis in C5-C7, likely an incidental finding.    Patient was transferred to NSU on  d/t (subjectively) worsening respiratory status, and while on the floor developed hyponatremia to 116 (sodium 134 on admission).    Interval Events:   - Overnight: NAEON  - Per patient: Endorses anxiety, pain in neck and upper shoulders bilaterally; paresthesias in feet up to ankles and hands up to wrists bilaterally, and kaylee-orally       Objective   VITALS (24H):  Temp:  [36.2 °C (97.2 °F)-36.4 °C (97.5 °F)] 36.2 °C (97.2 °F)  Heart Rate:  [] 72  Resp:  [15-30] 15  BP: (137-200)/() 182/84  INTAKE/OUTPUT:  Intake/Output Summary (Last 24 hours) at 2025 0920  Last data filed at 2025 0608  Gross per 24 hour   Intake 2120 ml   Output 1470 ml   Net 650 ml      VENT SETTINGS:      - Vitals summary: afebrile; isolated tachycardia at 1830, otherwise nontachycardic; intermittently tachypneic throughout the day; hypertensive throughout the day, up to max of 200 systolic and 128 diastolic    PHYSICAL EXAM:  NEURO:  - Alert, oriented x4, follows commands    CRANIAL NERVES:  - CN I: Not Assessed  - CN II: Pupils constrict to light bilaterally 3->2 mm  - CN III, IV, VI: Extraocular movements intact without nystagmus  - CN V: Facial sensation intact to light touch  - CN VII: No facial droop, closes eyes against resistance  - CN VIII: Hearing intact to voice  - CN IX, X: Palate elevates symmetrically  - CN XII: Tongue midline without atrophy or fasciculations     MOTOR:  - Ataxia on finger to nose bilaterally; unable to assess heel to shin because of pt-reported weakness bilaterally   - Normal bulk and tone throughout  - Strength:          R                L  Elbow Flex          4                4  Elbow Ext           4                4  Hip flexion         5                5  Knee Ext             5                5  Knee Flex           4                 4  DorsiFlex            5                 5  PlantarFlex         5                 5     REFLEXES:             R               L  Biceps                CNE*         CNE*  Brachioradialis    +1            CNE*  Patellar                +1             +1  Achilles              CNE*          CNE*    *CNE - could not elicit     SENSORY:  Sensation to light touch, temperature and vibration was intact in the bilateral upper and lower extremities.    CV:  - RRR on telemetry, NSR  - no m/r/g    RESP:  - Lungs clear to ascultation bilaterally  - Oxygen: RA and PRN NC    :  - Producing clear, yellow urine    GI:  - Abdomen NT/ND, soft    SKIN:  - Warm, intact    MEDICATIONS:  Scheduled: PRN: Continuous:   Scheduled Medications[1] PRN Medications[2] Continuous Medications[3]     LAB RESULTS:  7/22 RFP  Component  Ref Range & Units  00:28  (7/22/25) 00:28  (7/22/25) 1 d ago  (7/21/25) 1 d ago  (7/21/25) 1 d ago  (7/21/25) 1 d ago  (7/21/25) 1 d ago  (7/21/25) 1 d ago  (7/21/25)   Glucose  74 - 99 mg/dL 154 High        152 High    Sodium  136 - 145 mmol/L 131 Low  131 Low  129 Low  128 Low  128 Low  127 Low  129 Low  129 Low    Potassium  3.5 - 5.3 mmol/L 3.8       3.6   Chloride  98 - 107 mmol/L 98       96 Low    Bicarbonate  21 - 32 mmol/L 26       24   Anion Gap  10 - 20 mmol/L 11       13 R   Urea Nitrogen  6 - 23 mg/dL 13       12   Creatinine  0.50 - 1.30 mg/dL 1.03       0.98   eGFR  >60 mL/min/1.73m*2 86       >90 CM   Comment: Calculations of estimated GFR are performed using the 2021 CKD-EPI Study Refit equation without the race variable for the IDMS-Traceable creatinine methods.  https://jasn.asnjournals.org/content/early/2021/09/22/ASN.3238094856   Calcium  8.6 - 10.6 mg/dL 8.5 Low        8.6   Phosphorus  2.5 - 4.9 mg/dL 2.7       2.6   Albumin  3.4 - 5.0 g/dL 3.8       4.0     7/22 Ionized Ca  Component  Ref Range & Units 00:28 1 d ago   POCT Calcium, Ionized  1.1 - 1.33 mmol/L 1.11 1.10 CM     7/22 Mag  Component  Ref Range & Units 00:28 1 d ago 2 d ago 3 d ago 4 d ago 6 d ago   Magnesium  1.60 - 2.40 mg/dL 2.39 1.67 1.82 1.96 1.97 2.01     7/22 Serum Osm  Component  Ref Range & Units 04:11  (7/22/25) 00:28  (7/22/25) 1 d ago  (7/21/25) 1 d ago  (7/21/25) 1 d ago  (7/21/25) 1 d ago  (7/21/25) 1 d ago  (7/21/25)   Osmolality, Serum  280 - 300 mOsm/kg 287 283 283 275 Low  278 Low  277 Low  275 Low      7/22 AM Cortisol  Component  Ref Range & Units 00:28   Cortisol  A.M.  5.0 - 20.0 ug/dL 45.1 High      7/22 CBC  Component  Ref Range & Units 00:28  (7/22/25) 1 d ago  (7/21/25) 2 d ago  (7/20/25) 2 d ago  (7/20/25) 3 d ago  (7/19/25) 4 d ago  (7/18/25) 6 d ago  (7/16/25)   WBC  4.4 - 11.3 x10*3/uL 15.0 High  12.0 High  8.1 7.8 7.5 6.8 9.5   nRBC  0.0 - 0.0 /100 WBCs 0.0 0.0 0.0 0.0 0.0 0.0 0.0   RBC  4.50 - 5.90 x10*6/uL 3.94 Low   4.18 Low  4.11 Low  4.99 4.57 4.20 Low  3.92 Low    Hemoglobin  13.5 - 17.5 g/dL 11.4 Low  11.9 Low  11.6 Low  14.1 13.0 Low  11.6 Low  11.2 Low    Hematocrit  41.0 - 52.0 % 34.0 Low  34.8 Low  32.1 Low  40.6 Low  37.8 Low  34.7 Low  33.6 Low    MCV  80 - 100 fL 86 83 78 Low  81 83 83 86   MCH  26.0 - 34.0 pg 28.9 28.5 28.2 28.3 28.4 27.6 28.6   MCHC  32.0 - 36.0 g/dL 33.5 34.2 36.1 High  34.7 34.4 33.4 33.3   RDW  11.5 - 14.5 % 13.3 12.5 12.3 12.4 12.7 12.7 12.7   Platelets  150 - 450 x10*3/uL 409 466 High  365 498 High  488 High  422 350   Neutrophils %  40.0 - 80.0 % 91.3 82.8 66.5 69.4 55.6 59.5 40.9   Immature Granulocytes %, Automated  0.0 - 0.9 % 0.7 0.3 CM 0.4 CM 0.3 CM 0.3 CM 0.3 CM 0.2 CM   Comment: Immature Granulocyte Count (IG) includes promyelocytes, myelocytes and metamyelocytes but does not include bands. Percent differential counts (%) should be interpreted in the context of the absolute cell counts (cells/UL).   Lymphocytes %  13.0 - 44.0 % 3.6 10.4 25.0 23.0 36.7 32.4 46.6   Monocytes %  2.0 - 10.0 % 4.0 6.3 6.7 6.3 5.2 5.8 9.1   Eosinophils %  0.0 - 6.0 % 0.1 0.0 0.9 0.5 1.1 1.0 2.3   Basophils %  0.0 - 2.0 % 0.3 0.2 0.5 0.5 1.1 1.0 0.9   Neutrophils Absolute  1.20 - 7.70 x10*3/uL 13.70 High  9.92 High  CM 5.41 CM 5.44 CM 4.15 CM 4.02 CM 3.88 CM   Comment: Percent differential counts (%) should be interpreted in the context of the absolute cell counts (cells/uL).   Immature Granulocytes Absolute, Automated  0.00 - 0.70 x10*3/uL 0.11 0.04 0.03 0.02 0.02 0.02 0.02   Lymphocytes Absolute  1.20 - 4.80 x10*3/uL 0.54 Low  1.25 2.03 1.80 2.74 2.19 4.43   Monocytes Absolute  0.10 - 1.00 x10*3/uL 0.60 0.76 0.54 0.49 0.39 0.39 0.86   Eosinophils Absolute  0.00 - 0.70 x10*3/uL 0.01 0.00 0.07 0.04 0.08 0.07 0.22   Basophils Absolute  0.00 - 0.10 x10*3/uL 0.04 0.02 0.04 0.04 0.08 0.07 0.09     7/22 Coagulation Screen       Component  Ref Range & Units 00:28 2 d ago   Protime  9.8 - 12.4 seconds 11.4 11.8    INR  0.9 - 1.1 1.0 1.1   aPTT  26 - 36 seconds 22 Low  21 Low      IMAGING RESULTS:  7/21 CT chest abdomen  Indication: to r/o malignancy  IMPRESSION:  CHEST:  1.  Collapsed consolidations involving bilateral lower lung lobes  with trace airway debris. Most likely represents sequelae of  recent aspiration event(s).  2. 6 mm right subpleural upper lobe nodule. Per Fleischner  guidelines, follow-up with routine outpatient CT chest in 6-12 months.  3. Mild cardiomegaly.  ABDOMEN-PELVIS:  1.  Bilateral adrenal gland enlargement with surrounding fat  stranding raises the possibility of a adrenalitis versus hemorrhage  if patient is on anticoagulation. Clinical correlation for adrenal  insufficiency and further workup to rule out underlying autoimmune  disorder is strongly recommended. Discrete right adrenal gland nodule  likely represents a functioning or nonfunctioning adenoma    7/20 XR abdomen + XR chest  Indication: NG placement; SOB w/ secretions  IMPRESSION:  1. Trace left pleural effusion/left basilar atelectasis. No pneumothorax.  2. Enteric tube projects over the gastric antrum/pylorus. Stomach markedly distended w/ air.  3. Nonobstructive bowel-gas pattern.     7/19 MR cervical spine w and wo IV contrast + MR thoracic spine w and wo IV contrast  IMPRESSION:  ----->CERVICAL SPINE: Multilevel cervical spondylosis w/ posterior disc osteophyte complexes causing C5-C6 mild-to-moderate and C6-C7 moderate narrowing of the central canal. Addition, moderate-to-severe neural foramen narrowing bilaterally at these levels.   ----->THORACIC SPINE: No evidence of significant spinal canal stenosis or other acute abnormality in the thoracic spine.     7/18 MR brain wo IV contrast  Indication: signs/symptoms of cerebellar stroke  IMPRESSION:  1. No acute infarction or acute hemorrhage.  2. Probable mild chronic white matter small vessel ischemic changes.     7/18 CT angio head and neck w and wo IV contrast  Indication:  bilateral UE nad LE numbness, unable to ambulate, symptoms started after bench pressing on monday. Has significant pain in base of neck.  IMPRESSION: No hemodynamically significant stenosis of the head and neck  vasculature.     7/18 XR chest 2 views  Indication: sob  IMPRESSION: No evidence of acute cardiopulmonary process.     7/16 CT head wo IV contrast  Indication: numbness  IMPRESSION: No acute intracranial pathology       Assessment/Plan    55 y.o. male with PMH HTN, DM, CKD.  Admitted 7/18/2025 with Ataxia, admitted to NSU on 07/20 d/t acute hyponatremia with neurologic exam change.     07/22/25  Updates:  Re-attempt LP, if unsuccessful consult IR  NaCl bolus and 1.5 L fluid restriction in setting of labile serum sodium likely 2/2 SIADH  Discontinue vanc since MRSA nares neg  Q4H neuro checks  Day 2/5 IVIG  Serum ganglioside panels ordered  CT abdomen showed adrenal gland enlargement -> adrenal insufficiency labs     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 MRI Brain, not included in the impression, unilateral  - B12 872, TSH 0.75, Hep Panel nonreactive, HIV nonreactive, Syphillis with reflex negative, urine drug screen negative  Plan:  - NSU  - Pending B6, B1, Vitamin E, TSH, Hepatitis panel, Copper, Zinc, heavy metal screen, serum ganglioside panel    - Pending MRI with contrast    - Re-attempt LP, if unsuccessful consult IR  - Neuro Checks: Q4H  - Pain: acetaminophen PRN  - PT/OT/SLP     CARDIOVASCULAR:  #HTN  #HLD  Assessment:  - Home meds: Losartan 100, metop succinate 50 daily, amlodipine 10 daily  - 7/21 ECHO: LVEF 65-70%; no LV hypertrophy, normal LV systolic function  Plan:  - Continue to monitor on telemetry  - BP Goal: Normotension   - amlodipine 10 mg daily  - metop succinate 25 mg  - losartan 100 mg daily  - c/w atorvastatin 40 mg  daily   - labetalol and hydral prn for sbp >180  - BP goal: SBP < 180    --> PRN: Labetalol and Hydralazine      RESPIRATORY:  #LAURA  Assessment:  - Subjective hypoxia, sating well on RA  Plan:  - Continuous pulse oximetry   - O2 PRN to maintain SpO2 > 94%, wean as tolerated  - c/w NC prn    RENAL/:  #No active issues  Assessment:  - Baseline BUN/Cr: 12/1.1-2  Results from last 72 hours   Lab Units 07/22/25  0028 07/21/25  0010   BUN mg/dL 13 12   CREATININE mg/dL 1.03 0.98   Plan:  - Monitor with daily RFP    FEN/GI:  #LAURA  Plan:  - Monitor and replace electrolytes per protocol  - Diet: NPO , NG tube  - Bowel Regimen: Docusate-Senna BID and Miralax BID  - SLP recs     ENDOCRINE:  #Hyponatremia SIADH vs unknown etiology   #T2DM   Assessment:  Results from last 7 days   Lab Units 07/22/25  0739 07/22/25  0411 07/22/25  0028 07/21/25  2012 07/21/25  1946 07/21/25  1620 07/21/25  1237 07/21/25  1113 07/21/25  0827 07/21/25  0753 07/21/25  0010 07/20/25  2018 07/20/25  1923 07/20/25  1718 07/20/25  1533   POCT GLUCOSE mg/dL 143*  --   --   --  149*  --   --  143*  --  142*  --   --  156*  --  138*   GLUCOSE mg/dL  --   --  154*  --   --   --   --   --   --   --  152*  --   --   --   --    SODIUM mmol/L  --  132* 131*  131* 129*  --  128* 128*  --  127*  --  129*  129*   < >  --    < >  --     < > = values in this interval not displayed.   - Acute hyponatremia correlating with worsening neuro exam and subjective fatigue/confusion   - No clear etiology at this time.  - Euvolemic  - 7/20 urine electrolytes: Na 145  - 7/22 urine osm: 283 (from 266 on 7/20/25)  - 7/21 AM cortisol: 45.1  Plan:  - Accuchecks & ISS AC&HS   - hold metformin   - NaCl bolus and 1.5 L fluid restriction in setting of labile serum sodium likely 2/2 SIADH  - Repeat sodium q4h  - Hypertonic Saline bolus prn, goal improvement 6-8 mmol/L in first 24 hours  - Strict I&Os  - ACTH levels pending    HEMATOLOGY:  #No active issues  Assessment:  - Baseline  Hgb: 13-14  - Baseline Plts: 350  Results from last 7 days   Lab Units 25  0028 25  0010 25  1400   HEMOGLOBIN g/dL 11.4* 11.9* 11.6*   HEMATOCRIT % 34.0* 34.8* 32.1*   PLATELETS AUTO x10*3/uL 409 466* 365   Plan:  - Continue to monitor with daily CBC and Coag panel    INFECTIOUS DISEASE:  #Leukocytosis  #PNA  Assessment:  Results from last 7 days   Lab Units 25  0028 25  0010 25  1400   WBC AUTO x10*3/uL 15.0* 12.0* 8.1    - Temp (24hrs), Av.3 °C (97.4 °F), Min:36.2 °C (97.2 °F), Max:36.4 °C (97.5 °F)   - Consolidations on CT chest   -  respiratory specimen not cultured (salivary contamination)   -  vanc level sub-therapeutic   -  MRSA nares negative  Plan:  - Continue to monitor for s/sx of infection  - Pan culture for temperature > 38.4 C  - Discontinue vanc since MRSA nares neg  - Zosyn 7 day course ( - )    MUSCULOSKELETAL:  - No acute issues    SKIN:  - No acute issues  - Turns and skin care per NSU protocol    ACCESS:  - PIVs  - R femoral CVC    PROPHYLAXIS:  - DVT Ppx: SCDs and SQH (hold heparin for LP today)  - GI Ppx: not indicated    RESTRAINTS:  Not indicated/Patient does not meet criteria for restraints    Daily ICU Checklist:  - Restraint order/note  [] Yes [] No [x] N/A   - NIHSS Frequency appropriate? [] Yes [x] No   - Daily Labs Ordered? [x] Yes [] No [] N/A   - Medication Route? [x] Yes [] No  - (PO, NG, OG, G Tube)   - PPI ordered/needed? [] Yes [x] No [] N/A   - DVT PPx [x] Yes [] No   - Antibiotic stop date? [] Yes [] No [x] N/A   - Miner? [] Yes [x] No  DC? [] Y [] N [x] N/A   - Central Line? [x] Yes [] No  LOC: R. femoral  DC? [] Y [] N [x] N/A   - Central  orders [x] Yes [] No [] N/A   - Vent weaning plan? [] Yes [] No [x] N/A         LEONELA BRONSON, MS4  Neuroscience Intensive Care     Ivory Dexter MD  Neurology PGY-2    The patient is critically ill with tetraparesis and concern for acute inflammatory demyelinating  disease  Neurologically stable  Cont IVIg treatment  Plan for LP today  SIADH: Sodium stable, cont fluid restriction, as needed HTS  Chronic kidney disease: renal function stable  Antibiotics for aspiration pneumonia  DM: Cont BG control    I have seen and examined the patient.  I have reviewed the patient's laboratory, radiographic, and clinical data.  I have spent 30 minutes providing critical care for the patient.      ADRI Henderson M.D.          [1] amLODIPine, 10 mg, nasogastric tube, Daily  atorvastatin, 40 mg, nasogastric tube, Nightly  cetirizine, 10 mg, nasogastric tube, Daily  diphenhydrAMINE, 25 mg, nasogastric tube, Once  [Held by provider] ergocalciferol, 1.25 mg, oral, Every Sunday  fluticasone, 1 spray, Each Nostril, Daily  immune globulin (human), 30 g, intravenous, Daily  insulin lispro, 0-5 Units, subcutaneous, TID AC  lidocaine, 1 patch, transdermal, Daily  metoprolol tartrate, 12.5 mg, nasogastric tube, BID  perflutren lipid microspheres, 0.5-10 mL of dilution, intravenous, Once in imaging  piperacillin-tazobactam, 4.5 g, intravenous, q6h  polyethylene glycol, 17 g, nasogastric tube, Daily  sennosides, 2 tablet, nasogastric tube, BID  silodosin, 4 mg, nasogastric tube, Daily with breakfast  sulfur hexafluoride microsphr, 2 mL, intravenous, Once in imaging  vancomycin (Vancocin) 1,000 mg in sodium chloride 0.9% 250 mL IV, 1,000 mg, intravenous, q12h     [2] PRN medications: acetaminophen, calcium gluconate, calcium gluconate, dextrose, dextrose, glucagon, glucagon, hydrALAZINE, HYDROmorphone, hydrOXYzine HCL, labetaloL, lidocaine, magnesium sulfate, magnesium sulfate, potassium chloride CR **OR** potassium chloride, potassium chloride CR **OR** potassium chloride, vancomycin  [3]

## 2025-07-22 NOTE — PROGRESS NOTES
Communication Note    Patient Name: Ayla Paredes  MRN: [id number]  Today's Date: 7/22/2025   Room: 03/03-A    Discipline: Occupational Therapy      Missed Visit Reason:  (Pt s/p LP, on bedrest, will hold OT at this time.)      07/22/25 at 3:45 PM   Shelli Bryan, OT   Rehab Office: [phone]

## 2025-07-22 NOTE — PROGRESS NOTES
Subjective   Ayla Paredes is a 55 y.o. male who presents for  for transitional care management and hospital follow-up visit for ataxia, acute onset of stocking-glove paraesthesia, and pneumonia; admit 7/18/25-  .     HPI:       55 y.o. male who presents for transitional care management and hospital follow-up visit for ataxia, acute onset of stocking-glove paraesthesia, and pneumonia; admit 7/18/25-        EMR records reviewed.     Last seen by Lauro Jurado PA-C 7/18/2025 for paresthesias and ataxia and sent to the emergency room for further evaluation and to rule out cerebellar stroke.        PMHx:       HTN       Prediabetes       Hyperlipidemia       CKD stage 3A       Exertional shortness of breath  Heart murmur  Incomplete bladder emptying  Nocturia  Lumbar radiculitis  Lumbar spondylosis  Muscle spasm of back  Cardiomyopathy; EF of 45% by echocardiogram 5/2022.  Stress test 5/2022 showed no evidence of ischemia at a high workload. Coronary CT angiogram 12/2023 showed no significant atherosclerotic changes or stenotic disease with a total CACS of 20.3.    Vitamin D deficiency  ataxia          Healthcare Providers:  Cardiology: UGO Sarabia-CNP and Dr. Banks  Nephrology: Diane Bush MD   GI/colorectal surgery (colonoscopy): Darren Warren MD   [facility]     Preventive Health Services:  -Last physical: 4/8/25  -last PSA: 0.76 (4/9/25)   -last colonoscopy: 8/15/24==> polyp==>REPEAT 5 YEARS (DUE 8/2029)  -last STI screening: negative  GC/CT/trich/HIV/syphilis 7/26/24  -Hep C screening: negative 7/26/24     Immunizations:   -Childhood vaccines: completed per patient    -COVID vaccine and booster:  -updated COVID spike vaccine: NOW DUE  -TDAP:   NOW DUE  -Prevnar 20: NOW DUE  -shingrix: NOW DUE     Immunization History   Administered Date(s) Administered    Td (adult), unspecified 11/07/2011           INTERVAL HISTORY:     CT Heart Ca scoring 6/28/25  Interpreted By: Janneth  "Sy   IMPRESSION:  1. Coronary artery calcium score of 42        - Seen in ED and admitted  7/18/25  for ataxia and bilateral hand and foot numbness.  Emergency department and inpatient and neurology consult provider notes, labs, and imaging and discharge summary personally reviewed.  CT head neck angio 7/18/2025 per radiology report showed \"No hemodynamically significant stenosis of the head and neck vasculature, \" MRI brain without contrast 7/18/2025 per radiology report showed\" 1. No acute infarction or acute hemorrhage. 2. Probable mild chronic white matter small vessel ischemic changes, \"MRI cervical and thoracic spine with and without contrast 7/18/2025 per radiology report showed \" CERVICAL SPINE: Multilevel cervical spondylosis with posterior disc osteophyte complexes causing C5-C6 mild-to-moderate and C6-C7 moderate narrowing of the central canal. Addition, there is moderate-to-severe neural foramen narrowing bilaterally at these levels. THORACIC SPINE: No evidence of significant spinal canal stenosis or other acute abnormality in the thoracic spine.     Per provider hospital discharge summary:              Today Ayla reports:        - Ongoing bilateral hand and foot paresthesias since hospital discharge, rated as severity as 7/10, not worsening over time.  He expressed interest in starting gabapentin for possible diabetic neuropathy and referral to neurology for further evaluation.     - Otherwise doing and feeling well since hospital discharge      -taking all medications as prescribed with no reported adverse medication side effects           Today he has no other reported complaints, issues, or problems.     ROS is NEG for HEADACHE, NAUSEA, VOMITING, DIARRHEA, CHEST PAIN, SOB, and BLEEDING.  Review of systems (12 point) is negative for all systems except for any identified issues in HPI above.         Objective     There were no vitals taken for this visit.     Physical Examination:       GENERAL     "       General Appearance: well-appearing, well-developed, well-hydrated, well-nourished, no acute distress.        HEENT           NECK supple, no masses or thyromegaly, no carotid bruit.        EYES           Extraocular Movements: normal, bilateral eyes SVETA, no conjunctival injection.        HEART           Rate and Rhythm regular rate and rhythm. Heart sounds: normal S1S2, no S3 or S4. Murmurs: none.        CHEST           Breath sounds: Clear to IPPA, RR<16 no use of accessory muscles.        ABDOMEN           General: Neg for LKKS or masses, no scleral icterus or jaundice.        MUSCULOSKELETAL           Joints Demonstration: Neg for erythema, swelling or joint deformities. gross abnormalities no gross abnormalities.        EXTREMITIES           Lower Extremities: Neg for cyanosis, clubbing or edema.       Assessment/Plan   Problem List Items Addressed This Visit    None      Transitional care management and Hospital follow-up for ataxia, neuropathy, hyponatremia, and pneumonia (admit 7/18/25-   ): No red flag signs or symptoms today  -CMP ordered  -Referral to neurology ordered for further evaluation and management  - Start gabapentin 300 mg twice daily for possible diabetic neuropathy  - Emergency department and 911/EMS precautions discussed and reviewed with patient     HTN: mildly elevated, asymptomatic. Follows with cardiology and nephrology. EKG 6/25/24: 56 bpm, sinus bradycardia, LVH, no acute ischemic changes, abnormal EKG.   -cont amlodipine 10 mg daily, chlorthalidone 37.5 mg daily, and losartan 100 mg daily  -cont metoprolol XL from 25 to  50 mg daily  -cont hydralazine hydralazine 10 mg TID   -diabetic, low salt, low cholesterol diet, regularly exercise, and limit alcohol intake  -referral to cardiology previously ordered; patent advised to schedule follow up with cardiology   -Emergency Dept and 911/EMS precautions discussed and reviewed with patient        DM2 with CKD stage 3a: HgBA1c: 6.5   7/2/25<  6.0 (4/8/25)  -cont metformin 500 mg XR bid   -cont statin   -referral to ophthalmology and podiatry ordered 7/2/25 for diabetic eye exam and foot care  -referral to clinical pharmacy ordered 7/2/25 for assistance with diabetes management  -referral to endocrinology ordered 7/2/25  -referral to nutrition and diabetes education ordered 7/2/25  -diabetic,  low salt, low cholesterol diet, regularly exercise, and limit alcohol intake  -HgBa1c NEXT DUE 10/2/2025        CKD Stage 3A: followed by nephrology  -diabetic, low salt, low cholesterol diet, regularly exercise, and limit alcohol intake  -BP and glycemic control  -patient counseled to avoid NSAIDS that can worsen renal function  -cont management by nephrology     Hyperlipidemia: LDL not at goal  -cont atorvastatin 40 mg daily  -diabetic, low salt, low cholesterol diet, regularly exercise, and limit alcohol intake     Cardiac murmur, HFrEF, cardiomyopathy: followed by cardiology  -cont management by cardiology  -emergency Dept  and 911/EMS precautions discussed and reviewed by patient     LUTS/BPH:  -cont tamsulosin 0.4 mg at bedtime  -referral to urology previously ordered 6/25/24; patient advised to call to see urology     Vitamin D deficiency: Vit D WNL 7/3/25  -cont Vit D 50,000 units once weekly; patient advised after completing Vit D 50,000 units once weekly to start OTC Vit D3 2,000 units daily         Heart Disease Screening:  -CT Heart Ca scoring ordered 4/8/25     Counseling:       Medication education:         Education:  The patient is counseled regarding potential side-effects of all new medications        Understanding:  Patient expressed understanding        Adherence:  No barriers to adherence identified        Immunizations Counseling  -TDAP, Prevnar 20, shingrix now due==> declined today  -recommend updated COVID spike vaccine that can be obtained at local pharmacy     FOLLOW-UP: 12 weeks HgBA1c check      I have personally reviewed all  available pertinent labs, imaging, and consult notes with the patient.     Discussed recommended plan of care with patient. Patient expressed understanding and agreement with plan of care. All of patient's  questions were answered at the time. Patient had no additional questions at the time.                Prudence Clayton MD, PhD

## 2025-07-22 NOTE — PROGRESS NOTES
Ayla Paredes is a 55 y.o. male on day 4 of admission presenting with Ataxia.    Subjective   No acute events overnight. Patient's BP peaked at 193/90. Unable to obtain LP yesterday in [facility] so they are planning to repeat today with IR. Patient overall feeling weak and tired this morning. No acute complaints at this time.       Objective    24 Hour Vitals  Temp:  [36.1 °C (97 °F)-36.4 °C (97.5 °F)] 36.4 °C (97.5 °F)  Heart Rate:  [] 82  Resp:  [15-27] 24  BP: (137-200)/() 172/73      Physical Exam      Mental State: Awoken from sleep. Drowsy and minimally conversational. Follows commands.    Cranial Nerves:   CN2 : Tracking appropriately.   CN 3, 4, 6: Bilateral end gaze restriction without nystagmus in any direction. Smooth pursuit intact.   CN 5: Not tested.  CN 7: Normal and symmetric facial strength. Nasolabial folds symmetric.   CN 8: Hearing intact to voice.  CN 9/10/12: Evidence of pharyngeal dysfunction given low amplitude speech changes.      Motor: Muscle bulk and tone were normal in both upper and lower extremities. No fasciculations, tremor or other abnormal movements were present.      Strength                                             R          L  Neck Flexion                                             5   Neck Extension          5  Shoulder abduction (C4-C5)                5          5  Elbow flexion (C5-C6)                          5          5  Elbow extension (C7-C8)                     5          5     Bilateral lower extremities spontaneous antigravity.      Reflexes: Right/ Left:  Biceps 1/1, patellar 0/0. Down going toes bilaterally.     Sensory:   Not tested.     Medications - per EMR       Assessment/Plan    Ayla Paredes is a 55 y.o.  male with a PMHx of  HTN, DM, CKD, cardiomyopathy, admitted on 7/18/2025 after developing acute onset stocking and glove paresthesias 3 days prior. On original exam no objective sensory changes, ataxia, diffuse hyperreflexia. However on repeat  exam 7/21 he now continues to have ataxia but also now has diminished reflexes and dysphagia. He also has comorbid acute onset hyponatremia, etiology c/f SIADH, though unclear why.     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 MRI pan spine with evidence of multilevel degenerative disc disease but no hyper intensities or enhancement to suggest central demyelinating lesion.  Thus far HIV, and nutritional labs normal. Pending additional neuropathy labs as below.  At this time clinical picture with LE areflexia is becoming more concerning for GBS.  Currently on day 2 of IVIG. Hyponatremia improved at 132.    #Acute symmetric stocking and glove neuropathy   #Ataxia, Dysphagia, Hyporefflexia   #Hyponatremia    - Hyponatremia per NSU   - Please continue IVIG for five days total  - Agree with lumbar puncture with IR  - Continue to monitor bilateral end gaze restriction.  - Pending serum paraneoplastic panel, VLCF, B6, Vitamin E, Ganglioside panel   - Pending MRI brain w contrast   - Appreciate PT/OT/SLP recs        Beth Molina MD  Neuro, PGY-1    Patient seen and discussed with attending, Dr. Ashton.     ----------------------------------------------------------------------------------------------------------------------------------------  ATTENDING ATTESTATION    I saw and evaluated the patient. I personally obtained the key and critical portions of the history and physical exam or was physically present for key and critical portions performed by the resident/fellow. I reviewed the resident/fellow's documentation and discussed the patient with the resident/fellow. I agree with the resident/fellow's medical decision making as documented in the note with the exception/addition of the following:     Has numbness in foot and fingers, ataxia, change in voice--sounds hoarse, likely has aspiration pneumonia, hyponatermia, dysmetria on exam, may have  mild respiratory failure, and evolving reflex exam of hyperreflexia which changed to hyporeflexia. Concerning for GBS variant. Awaiting LP.    Carmela Ashton MD  General Neurology Attending  [facility]    Hospital Subsequent Daily Care Admission and Consult CODING and DOCUMENTATION DETERMINATION  Total time including some or all of the following: preparing to see the patient and reviewing test, obtaining and reviewing separately obtained history, performing medically proper examination and/or evaluation, counseling and educating one or more of the following---patient &family, and care coordination was equal to or greater than 25 minutes.

## 2025-07-22 NOTE — PROGRESS NOTES
Vancomycin Dosing by Pharmacy- Cessation of Therapy    Consult to pharmacy for vancomycin dosing has been discontinued by the prescriber, pharmacy will sign off at this time.    Please call pharmacy if there are further questions or re-enter a consult if vancomycin is resumed.     Jama Painting, SalasD

## 2025-07-22 NOTE — PROGRESS NOTES
Vancomycin Dosing by Pharmacy- FOLLOW UP    Ayla Paredes is a 55 y.o. year old male who Pharmacy has been consulted for vancomycin dosing for pneumonia. Based on the patient's indication and renal status this patient is being dosed based on a goal AUC of 400-600.     Renal function is currently stable.    Current vancomycin dose: 1000 mg given every 12 hours    Estimated vancomycin AUC on current dose: 480 mg/L.hr     Visit Vitals  /73   Pulse 85   Temp 36.4 °C (97.5 °F) (Temporal)   Resp 17        Lab Results   Component Value Date    CREATININE 1.03 2025    CREATININE 0.98 2025    CREATININE 1.13 2025    CREATININE 1.28 2025    CREATININE 1.60 (H) 2025    CREATININE 1.59 (H) 2025    CREATININE 1.56 (H) 2025        Patient weight is as follows:   Vitals:    25 1234   Weight: 84.2 kg (185 lb 10 oz)       Cultures:  No results found for the encounter in last 14 days.       I/O last 3 completed shifts:  In: 3255.4 (38.7 mL/kg) [I.V.:765.4 (9.1 mL/kg); NG/GT:140; IV Piggyback:2350]  Out: 2685 (31.9 mL/kg) [Urine:2645 (0.9 mL/kg/hr); Emesis/NG output:40]  Weight: 84.2 kg   I/O during current shift:  I/O this shift:  In: 820 [NG/GT:200; IV Piggyback:620]  Out: 600 [Urine:600]    Temp (24hrs), Av.3 °C (97.3 °F), Min:36.1 °C (97 °F), Max:36.4 °C (97.5 °F)      Assessment/Plan    Within goal AUC range. Continue current vancomycin regimen.    This dosing regimen is predicted by [employer] to result in the following pharmacokinetic parameters:  Loading dose: N/A  Regimen: 1000 mg IV every 12 hours.  Start time: 13:07 on 2025  Exposure target: AUC24 (range) 400-600 mg/L.hr   GHW05-53: 419 mg/L.hr  AUC24,ss: 480 mg/L.hr  Probability of AUC24 > 400: 76 %  Ctrough,ss: 15.9 mg/L  Probability of Ctrough,ss > 20: 26 %    The next level will be obtained on  at 1000. May be obtained sooner if clinically indicated.   Will continue to monitor renal function daily  while on vancomycin and order serum creatinine at least every 48 hours if not already ordered.  Follow for continued vancomycin needs, clinical response, and signs/symptoms of toxicity.       GENESIS STUART, PharmD

## 2025-07-23 ENCOUNTER — APPOINTMENT (OUTPATIENT)
Dept: RADIOLOGY | Facility: HOSPITAL | Age: 55
End: 2025-07-23
Payer: COMMERCIAL

## 2025-07-23 LAB
A-TOCOPHEROL VIT E SERPL-MCNC: 9.5 MG/L (ref 5.5–18)
ACTH PLAS-MCNC: 58.4 PG/ML (ref 7.2–63.3)
ALBUMIN CSF-MCNC: 60.9 MG/DL
ALBUMIN SERPL BCP-MCNC: 3.3 G/DL (ref 3.4–5)
ALBUMIN SERPL BCP-MCNC: 3.5 G/DL (ref 3.4–5)
ALBUMIN SERPL BCP-MCNC: 3600 MG/DL
ANION GAP SERPL CALC-SCNC: 11 MMOL/L (ref 10–20)
ANION GAP SERPL CALC-SCNC: 12 MMOL/L (ref 10–20)
BASOPHILS # BLD AUTO: 0.11 X10*3/UL (ref 0–0.1)
BASOPHILS NFR BLD AUTO: 1.4 %
BETA+GAMMA TOCOPHEROL SERPL-MCNC: 0.8 MG/L (ref 0–6)
BUN SERPL-MCNC: 16 MG/DL (ref 6–23)
BUN SERPL-MCNC: 18 MG/DL (ref 6–23)
CA-I BLD-SCNC: 1.11 MMOL/L (ref 1.1–1.33)
CALCIUM SERPL-MCNC: 8.4 MG/DL (ref 8.6–10.6)
CALCIUM SERPL-MCNC: 8.5 MG/DL (ref 8.6–10.6)
CHLORIDE SERPL-SCNC: 98 MMOL/L (ref 98–107)
CHLORIDE SERPL-SCNC: 98 MMOL/L (ref 98–107)
CO2 SERPL-SCNC: 27 MMOL/L (ref 21–32)
CO2 SERPL-SCNC: 28 MMOL/L (ref 21–32)
CREAT SERPL-MCNC: 1.07 MG/DL (ref 0.5–1.3)
CREAT SERPL-MCNC: 1.29 MG/DL (ref 0.5–1.3)
CRYPTOC AG SPEC QL LA: NEGATIVE
EGFRCR SERPLBLD CKD-EPI 2021: 65 ML/MIN/1.73M*2
EGFRCR SERPLBLD CKD-EPI 2021: 82 ML/MIN/1.73M*2
EOSINOPHIL # BLD AUTO: 0.01 X10*3/UL (ref 0–0.7)
EOSINOPHIL NFR BLD AUTO: 0.1 %
ERYTHROCYTE [DISTWIDTH] IN BLOOD BY AUTOMATED COUNT: 13.5 % (ref 11.5–14.5)
GD1B GANGL IGG SER IA-ACNC: 15 IV (ref 0–50)
GD1B GANGL IGM SER IA-ACNC: 3 IV (ref 0–50)
GLUCOSE BLD MANUAL STRIP-MCNC: 110 MG/DL (ref 74–99)
GLUCOSE BLD MANUAL STRIP-MCNC: 114 MG/DL (ref 74–99)
GLUCOSE BLD MANUAL STRIP-MCNC: 117 MG/DL (ref 74–99)
GLUCOSE SERPL-MCNC: 108 MG/DL (ref 74–99)
GLUCOSE SERPL-MCNC: 111 MG/DL (ref 74–99)
GM1 GANGL IGG SER IA-ACNC: 9 IV (ref 0–50)
GM1 GANGL IGM SER IA-ACNC: 5 IV (ref 0–50)
GQ1B GANGL IGG SER IA-ACNC: 11 IV (ref 0–50)
GQ1B GANGL IGM SER IA-ACNC: 3 IV (ref 0–50)
HCT VFR BLD AUTO: 34.4 % (ref 41–52)
HGB BLD-MCNC: 11.5 G/DL (ref 13.5–17.5)
IGG CSF/SERPL: 21.8 MG/DL
IGG/ALB CSF: 0.36 {RATIO}
IGG/ALBUMIN INDEX: 1.07
IMM GRANULOCYTES # BLD AUTO: 0.06 X10*3/UL (ref 0–0.7)
IMM GRANULOCYTES NFR BLD AUTO: 0.8 % (ref 0–0.9)
LYMPHOCYTES # BLD AUTO: 1.24 X10*3/UL (ref 1.2–4.8)
LYMPHOCYTES NFR BLD AUTO: 15.6 %
MAGNESIUM SERPL-MCNC: 2.52 MG/DL (ref 1.6–2.4)
MCH RBC QN AUTO: 28.9 PG (ref 26–34)
MCHC RBC AUTO-ENTMCNC: 33.4 G/DL (ref 32–36)
MCV RBC AUTO: 86 FL (ref 80–100)
MONOCYTES # BLD AUTO: 0.58 X10*3/UL (ref 0.1–1)
MONOCYTES NFR BLD AUTO: 7.3 %
NEUTROPHILS # BLD AUTO: 5.94 X10*3/UL (ref 1.2–7.7)
NEUTROPHILS NFR BLD AUTO: 74.8 %
NRBC BLD-RTO: 0 /100 WBCS (ref 0–0)
OSMOLALITY SERPL: 289 MOSM/KG (ref 280–300)
OSMOLALITY SERPL: 289 MOSM/KG (ref 280–300)
OSMOLALITY SERPL: 290 MOSM/KG (ref 280–300)
OSMOLALITY SERPL: 290 MOSM/KG (ref 280–300)
OSMOLALITY SERPL: 291 MOSM/KG (ref 280–300)
OSMOLALITY SERPL: 292 MOSM/KG (ref 280–300)
OSMOLALITY SERPL: 297 MOSM/KG (ref 280–300)
PATH REVIEW-CELL CT,CSF: NORMAL
PHOSPHATE SERPL-MCNC: 1.9 MG/DL (ref 2.5–4.9)
PHOSPHATE SERPL-MCNC: 2.6 MG/DL (ref 2.5–4.9)
PLATELET # BLD AUTO: 400 X10*3/UL (ref 150–450)
POTASSIUM SERPL-SCNC: 3.2 MMOL/L (ref 3.5–5.3)
POTASSIUM SERPL-SCNC: 3.3 MMOL/L (ref 3.5–5.3)
PYRIDOXAL PHOS SERPL-SCNC: 69.2 NMOL/L (ref 20–125)
RBC # BLD AUTO: 3.98 X10*6/UL (ref 4.5–5.9)
SODIUM SERPL-SCNC: 134 MMOL/L (ref 136–145)
SODIUM SERPL-SCNC: 135 MMOL/L (ref 136–145)
SODIUM SERPL-SCNC: 137 MMOL/L (ref 136–145)
VDRL CSF-ACNC: NONREACTIVE
VIT B1 PYROPHOSHATE BLD-SCNC: 133 NMOL/L (ref 70–180)
WBC # BLD AUTO: 7.9 X10*3/UL (ref 4.4–11.3)

## 2025-07-23 PROCEDURE — 92526 ORAL FUNCTION THERAPY: CPT | Mod: GN | Performed by: SPEECH-LANGUAGE PATHOLOGIST

## 2025-07-23 PROCEDURE — 83735 ASSAY OF MAGNESIUM: CPT

## 2025-07-23 PROCEDURE — 2500000001 HC RX 250 WO HCPCS SELF ADMINISTERED DRUGS (ALT 637 FOR MEDICARE OP)

## 2025-07-23 PROCEDURE — 83930 ASSAY OF BLOOD OSMOLALITY: CPT

## 2025-07-23 PROCEDURE — 2500000004 HC RX 250 GENERAL PHARMACY W/ HCPCS (ALT 636 FOR OP/ED)

## 2025-07-23 PROCEDURE — 2550000001 HC RX 255 CONTRASTS: Performed by: PSYCHIATRY & NEUROLOGY

## 2025-07-23 PROCEDURE — 2500000004 HC RX 250 GENERAL PHARMACY W/ HCPCS (ALT 636 FOR OP/ED): Mod: JW

## 2025-07-23 PROCEDURE — 31720 CLEARANCE OF AIRWAYS: CPT

## 2025-07-23 PROCEDURE — 84295 ASSAY OF SERUM SODIUM: CPT

## 2025-07-23 PROCEDURE — A9575 INJ GADOTERATE MEGLUMI 0.1ML: HCPCS | Performed by: PSYCHIATRY & NEUROLOGY

## 2025-07-23 PROCEDURE — 99291 CRITICAL CARE FIRST HOUR: CPT | Performed by: NEUROLOGICAL SURGERY

## 2025-07-23 PROCEDURE — 94640 AIRWAY INHALATION TREATMENT: CPT

## 2025-07-23 PROCEDURE — 84100 ASSAY OF PHOSPHORUS: CPT

## 2025-07-23 PROCEDURE — 97110 THERAPEUTIC EXERCISES: CPT | Mod: GO

## 2025-07-23 PROCEDURE — 37799 UNLISTED PX VASCULAR SURGERY: CPT

## 2025-07-23 PROCEDURE — 85025 COMPLETE CBC W/AUTO DIFF WBC: CPT

## 2025-07-23 PROCEDURE — 82330 ASSAY OF CALCIUM: CPT

## 2025-07-23 PROCEDURE — 99231 SBSQ HOSP IP/OBS SF/LOW 25: CPT | Performed by: PSYCHIATRY & NEUROLOGY

## 2025-07-23 PROCEDURE — 97530 THERAPEUTIC ACTIVITIES: CPT | Mod: GO

## 2025-07-23 PROCEDURE — 70552 MRI BRAIN STEM W/DYE: CPT

## 2025-07-23 PROCEDURE — 2020000001 HC ICU ROOM DAILY

## 2025-07-23 PROCEDURE — 2500000001 HC RX 250 WO HCPCS SELF ADMINISTERED DRUGS (ALT 637 FOR MEDICARE OP): Performed by: STUDENT IN AN ORGANIZED HEALTH CARE EDUCATION/TRAINING PROGRAM

## 2025-07-23 PROCEDURE — 2500000005 HC RX 250 GENERAL PHARMACY W/O HCPCS

## 2025-07-23 PROCEDURE — 70553 MRI BRAIN STEM W/O & W/DYE: CPT | Performed by: RADIOLOGY

## 2025-07-23 PROCEDURE — 94669 MECHANICAL CHEST WALL OSCILL: CPT

## 2025-07-23 PROCEDURE — 82947 ASSAY GLUCOSE BLOOD QUANT: CPT

## 2025-07-23 PROCEDURE — 80069 RENAL FUNCTION PANEL: CPT

## 2025-07-23 RX ORDER — GADOTERATE MEGLUMINE 376.9 MG/ML
16 INJECTION INTRAVENOUS
Status: COMPLETED | OUTPATIENT
Start: 2025-07-23 | End: 2025-07-23

## 2025-07-23 RX ORDER — POTASSIUM CHLORIDE 14.9 MG/ML
20 INJECTION INTRAVENOUS
Status: COMPLETED | OUTPATIENT
Start: 2025-07-23 | End: 2025-07-24

## 2025-07-23 RX ORDER — POTASSIUM CHLORIDE 1.5 G/1.58G
40 POWDER, FOR SOLUTION ORAL ONCE
Status: COMPLETED | OUTPATIENT
Start: 2025-07-23 | End: 2025-07-23

## 2025-07-23 RX ORDER — DIAZEPAM 5 MG/ML
10 INJECTION, SOLUTION INTRAMUSCULAR; INTRAVENOUS EVERY 8 HOURS PRN
Status: DISCONTINUED | OUTPATIENT
Start: 2025-07-23 | End: 2025-07-23

## 2025-07-23 RX ORDER — PANTOPRAZOLE SODIUM 40 MG/10ML
40 INJECTION, POWDER, LYOPHILIZED, FOR SOLUTION INTRAVENOUS DAILY
Status: DISCONTINUED | OUTPATIENT
Start: 2025-07-23 | End: 2025-07-30 | Stop reason: HOSPADM

## 2025-07-23 RX ORDER — LANOLIN ALCOHOL/MO/W.PET/CERES
100 CREAM (GRAM) TOPICAL DAILY
Status: DISCONTINUED | OUTPATIENT
Start: 2025-07-23 | End: 2025-07-25

## 2025-07-23 RX ORDER — DIAZEPAM 5 MG/1
5 TABLET ORAL EVERY 8 HOURS PRN
Status: DISCONTINUED | OUTPATIENT
Start: 2025-07-23 | End: 2025-07-25

## 2025-07-23 RX ORDER — DIAZEPAM 5 MG/1
5 TABLET ORAL EVERY 8 HOURS PRN
Status: DISCONTINUED | OUTPATIENT
Start: 2025-07-23 | End: 2025-07-23

## 2025-07-23 RX ORDER — DIAZEPAM 5 MG/ML
5 INJECTION, SOLUTION INTRAMUSCULAR; INTRAVENOUS ONCE
Status: DISCONTINUED | OUTPATIENT
Start: 2025-07-23 | End: 2025-07-30

## 2025-07-23 RX ORDER — CHLORTHALIDONE 25 MG/1
25 TABLET ORAL DAILY
Status: DISCONTINUED | OUTPATIENT
Start: 2025-07-23 | End: 2025-07-28

## 2025-07-23 RX ADMIN — SENNOSIDES 17.2 MG: 8.6 TABLET, FILM COATED ORAL at 08:20

## 2025-07-23 RX ADMIN — SENNOSIDES 17.2 MG: 8.6 TABLET, FILM COATED ORAL at 21:02

## 2025-07-23 RX ADMIN — ATORVASTATIN CALCIUM 40 MG: 40 TABLET, FILM COATED ORAL at 21:02

## 2025-07-23 RX ADMIN — HYDRALAZINE HYDROCHLORIDE 10 MG: 20 INJECTION INTRAMUSCULAR; INTRAVENOUS at 03:16

## 2025-07-23 RX ADMIN — PIPERACILLIN SODIUM AND TAZOBACTAM SODIUM 4.5 G: 4; .5 INJECTION, SOLUTION INTRAVENOUS at 12:04

## 2025-07-23 RX ADMIN — SILODOSIN 4 MG: 4 CAPSULE ORAL at 08:20

## 2025-07-23 RX ADMIN — AMLODIPINE BESYLATE 10 MG: 10 TABLET ORAL at 08:20

## 2025-07-23 RX ADMIN — LABETALOL HYDROCHLORIDE 10 MG: 5 INJECTION, SOLUTION INTRAVENOUS at 17:04

## 2025-07-23 RX ADMIN — POTASSIUM CHLORIDE 40 MEQ: 1.5 POWDER, FOR SOLUTION ORAL at 21:01

## 2025-07-23 RX ADMIN — PIPERACILLIN SODIUM AND TAZOBACTAM SODIUM 4.5 G: 4; .5 INJECTION, SOLUTION INTRAVENOUS at 05:07

## 2025-07-23 RX ADMIN — IMMUNE GLOBULIN INFUSION (HUMAN) 30 G: 100 INJECTION, SOLUTION INTRAVENOUS; SUBCUTANEOUS at 08:33

## 2025-07-23 RX ADMIN — LOSARTAN POTASSIUM 100 MG: 100 TABLET, FILM COATED ORAL at 08:20

## 2025-07-23 RX ADMIN — POTASSIUM CHLORIDE 40 MEQ: 1.5 POWDER, FOR SOLUTION ORAL at 05:07

## 2025-07-23 RX ADMIN — CETIRIZINE HYDROCHLORIDE 10 MG: 10 TABLET ORAL at 08:20

## 2025-07-23 RX ADMIN — SODIUM PHOSPHATE, MONOBASIC, MONOHYDRATE AND SODIUM PHOSPHATE, DIBASIC, ANHYDROUS 21 MMOL: 142; 276 INJECTION, SOLUTION INTRAVENOUS at 09:46

## 2025-07-23 RX ADMIN — POTASSIUM CHLORIDE 20 MEQ: 14.9 INJECTION, SOLUTION INTRAVENOUS at 22:03

## 2025-07-23 RX ADMIN — ENOXAPARIN SODIUM 40 MG: 100 INJECTION SUBCUTANEOUS at 21:01

## 2025-07-23 RX ADMIN — METOPROLOL TARTRATE 12.5 MG: 25 TABLET, FILM COATED ORAL at 21:02

## 2025-07-23 RX ADMIN — PIPERACILLIN SODIUM AND TAZOBACTAM SODIUM 4.5 G: 4; .5 INJECTION, SOLUTION INTRAVENOUS at 23:21

## 2025-07-23 RX ADMIN — PANTOPRAZOLE SODIUM 40 MG: 40 INJECTION, POWDER, LYOPHILIZED, FOR SOLUTION INTRAVENOUS at 12:04

## 2025-07-23 RX ADMIN — METOPROLOL TARTRATE 12.5 MG: 25 TABLET, FILM COATED ORAL at 08:20

## 2025-07-23 RX ADMIN — POLYETHYLENE GLYCOL 3350 17 G: 17 POWDER, FOR SOLUTION ORAL at 08:20

## 2025-07-23 RX ADMIN — GADOTERATE MEGLUMINE 16 ML: 376.9 INJECTION INTRAVENOUS at 14:30

## 2025-07-23 RX ADMIN — PIPERACILLIN SODIUM AND TAZOBACTAM SODIUM 4.5 G: 4; .5 INJECTION, SOLUTION INTRAVENOUS at 00:03

## 2025-07-23 RX ADMIN — PIPERACILLIN SODIUM AND TAZOBACTAM SODIUM 4.5 G: 4; .5 INJECTION, SOLUTION INTRAVENOUS at 17:27

## 2025-07-23 RX ADMIN — CHLORTHALIDONE 25 MG: 25 TABLET ORAL at 12:04

## 2025-07-23 RX ADMIN — THIAMINE HCL TAB 100 MG 100 MG: 100 TAB at 14:58

## 2025-07-23 ASSESSMENT — COGNITIVE AND FUNCTIONAL STATUS - GENERAL
WALKING IN HOSPITAL ROOM: A LOT
TURNING FROM BACK TO SIDE WHILE IN FLAT BAD: A LOT
CLIMB 3 TO 5 STEPS WITH RAILING: A LOT
DRESSING REGULAR LOWER BODY CLOTHING: A LOT
STANDING UP FROM CHAIR USING ARMS: A LOT
DRESSING REGULAR UPPER BODY CLOTHING: A LOT
MOVING FROM LYING ON BACK TO SITTING ON SIDE OF FLAT BED WITH BEDRAILS: A LOT
EATING MEALS: A LOT
CLIMB 3 TO 5 STEPS WITH RAILING: A LOT
TOILETING: A LOT
TOILETING: A LOT
HELP NEEDED FOR BATHING: A LOT
DAILY ACTIVITIY SCORE: 12
TURNING FROM BACK TO SIDE WHILE IN FLAT BAD: A LOT
MOVING TO AND FROM BED TO CHAIR: A LOT
PERSONAL GROOMING: A LOT
TOILETING: A LOT
DRESSING REGULAR LOWER BODY CLOTHING: A LOT
DRESSING REGULAR LOWER BODY CLOTHING: A LOT
HELP NEEDED FOR BATHING: A LOT
MOVING TO AND FROM BED TO CHAIR: A LOT
HELP NEEDED FOR BATHING: A LOT
DAILY ACTIVITIY SCORE: 12
DRESSING REGULAR UPPER BODY CLOTHING: A LOT
EATING MEALS: A LOT
MOVING FROM LYING ON BACK TO SITTING ON SIDE OF FLAT BED WITH BEDRAILS: A LOT
DAILY ACTIVITIY SCORE: 12
DAILY ACTIVITIY SCORE: 12
PERSONAL GROOMING: A LITTLE
DRESSING REGULAR UPPER BODY CLOTHING: A LITTLE
MOVING TO AND FROM BED TO CHAIR: A LOT
EATING MEALS: A LOT
STANDING UP FROM CHAIR USING ARMS: A LOT
PERSONAL GROOMING: A LOT
DRESSING REGULAR LOWER BODY CLOTHING: TOTAL
MOBILITY SCORE: 12
TURNING FROM BACK TO SIDE WHILE IN FLAT BAD: A LOT
DRESSING REGULAR UPPER BODY CLOTHING: A LOT
CLIMB 3 TO 5 STEPS WITH RAILING: A LOT
HELP NEEDED FOR BATHING: A LOT
PERSONAL GROOMING: A LOT
TOILETING: A LOT
WALKING IN HOSPITAL ROOM: A LOT
MOBILITY SCORE: 12
MOBILITY SCORE: 12
EATING MEALS: TOTAL
STANDING UP FROM CHAIR USING ARMS: A LOT
WALKING IN HOSPITAL ROOM: A LOT
MOVING FROM LYING ON BACK TO SITTING ON SIDE OF FLAT BED WITH BEDRAILS: A LOT

## 2025-07-23 ASSESSMENT — PAIN SCALES - GENERAL
PAINLEVEL_OUTOF10: 0 - NO PAIN
PAINLEVEL_OUTOF10: 4

## 2025-07-23 ASSESSMENT — PAIN - FUNCTIONAL ASSESSMENT
PAIN_FUNCTIONAL_ASSESSMENT: 0-10

## 2025-07-23 ASSESSMENT — PAIN DESCRIPTION - DESCRIPTORS: DESCRIPTORS: ACHING

## 2025-07-23 NOTE — PROGRESS NOTES
Communication Note    Patient Name: Ayla Paredes  MRN: [id number]  Today's Date: 7/23/2025   Room: 03/03A    Discipline: Physical Therapy      PT Missed Visit: Yes  Missed Visit Reason:  (Pt off unit.)      07/23/25 at 2:26 PM   Marita Garza PT   Rehab Office: [phone]

## 2025-07-23 NOTE — CARE PLAN
Interprofessional Rounds    Summary:  Hand and leg weakness.  Work up for GBS.  On IVIG for several more days.  Monitoring respiratory status.  Mom and significant other involved.  Will work with social work to establish formal POA.     Participants: Advance Practice Provider, Occupational Therapist, Pharmacist, Physical Therapist, Physician, RN, and ,     Care Plan Reviewed with:  Interdisciplinary Team

## 2025-07-23 NOTE — PROGRESS NOTES
Inpatient  Speech-Language Pathology Treatment     Patient Name: Ayla Paredes  MRN: [id number]  Today's Date: 7/23/2025  Time Calculation  Start Time: 1101  Stop Time: 1111  Time Calculation (min): 10 min           Assessment/Plan:  #dysphagia  - SLP consulted for BSE in setting of new neuro changes including Ataxia, Dysphagia, Hyporefflexia; and acute symmetric stocking and glove neuropathy. Day 3 of IVIG  - Improvement vs previous evaluation- less frequent need for suctioning, improved management of secretions, however, does continue to exhibit overt coughing w/ water challenges suggestive of increased aspiration risk at this time. Reasonable to continue TF via DHT, allowing supervised ice chips, and SLP follow-up on 7/24.        Recommendations:  NPO  Frequent, aggressive oral care as tolerated to improve infection control, as well as to reduce dental plaque and bacteria on oropharyngeal surfaces which may increase the risk nosocomial infections, including pneumonia.   OK for small amounts of ice chips (one at a time, 10x/hour) for oral comfort and to prevent swallow disuse atrophy, but only after aggressive oral care to avoid colonization of bacteria within the oral cavity.  Ongoing assessment 7/24; may need instrumental testing via MBS or FEES pending assessment.     Plan:  Inpatient/Swing Bed or Outpatient: Inpatient  SLP TX Plan: Continue Plan of Care  SLP Plan: Skilled SLP  SLP Frequency: 2x per week  Duration: 30 days  SLP Discharge Recommendations:  (TBD)  SLP - OK to Discharge: Yes      Subjective   Resting in bed. A/O x 3. DHT in nares.  Room air   Prior to Session Communication: Bedside nurse        Objective     - oral-motor exam grossly intact. Suctioning secretions independently    The 3 oz sequential drinks of thin liquid water was utilized as a reliable, evidence based test to rule out silent aspiration and determine need for additional testing, such as the MBS or FEES (fiberoptic endoscopic  evaluation of swallow), if the test is equivocal, incomplete or pt shows s/sx of aspiration,  prior to recommending a oral diet    Pt tolerated x 5 presentations of ice chips- no obvious impairments in mastication and bolus prep; single sips x straw showing subjectively timely swallow onset and no overt indicators of aspiration  Performed 3 oz successive sips that resulted in coughing 20 seconds post.        Encounter Problems       Encounter Problems (Active)       Swallowing       LTG - Patient will tolerate the least restrictive diet without overt difficulty by time of discharge. (Progressing)       Start:  07/21/25    Expected End:  07/28/25            SLP Goal 1 (Progressing)       Start:  07/21/25    Expected End:  07/28/25       STG - Patient will tolerate therapeutic trials of recommended consistency without clinical signs and symptoms of aspiration on 100% of trials                    Inpatient Education:  Extensive education provided to patient and/or Caregiver regarding current swallow function, recommendations/results, and POC. Demonstrated verbal understanding and were agreeable w/ the details/recommendations reviewed.

## 2025-07-23 NOTE — CONSULTS
"Nutrition Initial Assessment:   Nutrition Assessment    Reason for Assessment: Initiate and manage tube feeding    Patient is a 55 y.o. male transferred from OSH on 7/18 with bilateral hand and foot numbness (symptoms starting on 7/15).  Transferred to NSU with worsening respiratory status, hyponatremia and neuro change.   Hyponatremia noted to be 2/2 SIADH.     PMH HTN, DM, CKD.       Nutrition History:  Energy Intake: Poor < 50 % (x 5 days)  Food and Nutrient History: Pt sleeping upon visit - able to open eyes briefly and shake/nod head \"yes\", \"no\". He shook head no when asked if appetite decreased prior to admission at OSH. Pt had diet active from 7/18-7/20 - no documented PO intake this admission (NPO since 7/21). Cortrak in place (tip at gastric antrum/pylorus)       Anthropometrics:  Height: 165.1 cm (5' 5\")   Weight: 84.2 kg (185 lb 10 oz)   BMI (Calculated): 30.89  IBW/kg (Dietitian Calculated): 61.82 kg  Percent of IBW: 136 %  Adjusted Body Weight (kg): 67.4 kg    Weight History:   Wt Readings from Last 10 Encounters:   07/20/25 84.2 kg (185 lb 10 oz)   07/18/25 81.9 kg (180 lb 9.6 oz)   07/16/25 81.6 kg (180 lb)   07/02/25 84.5 kg (186 lb 3.2 oz)   06/12/25 84.7 kg (186 lb 12.8 oz)   04/24/25 84.8 kg (187 lb)   04/08/25 85 kg (187 lb 6.4 oz)   12/12/24 83.5 kg (184 lb)   08/15/24 79.6 kg (175 lb 7.8 oz)   08/12/24 81.6 kg (179 lb 12.8 oz)     Weight Change %:  Significant Weight Loss: No    Nutrition Focused Physical Exam Findings:    Subcutaneous Fat Loss:   Orbital Fat Pads: Well nourished (slightly bulging fat pads)  Buccal Fat Pads: Well nourished (full, rounded cheeks)  Triceps: Well nourished (ample fat tissue)  Muscle Wasting:  Temporalis: Well nourished (well-defined muscle)  Pectoralis (Clavicular Region): Well nourished (clavicle not visible)  Deltoid/Trapezius: Well nourished (rounded appearance at arm, shoulder, neck)  Interosseous: Well nourished (muscle bulges)  Quadriceps: Well nourished " (well developed, well rounded)  Physical Findings:  Hair: Negative  Nails: Negative    Nutrition Significant Labs:  CBC Trend:   Results from last 7 days   Lab Units 07/23/25  0200 07/22/25  0028 07/21/25  0010 07/20/25  1400   WBC AUTO x10*3/uL 7.9 15.0* 12.0* 8.1   RBC AUTO x10*6/uL 3.98* 3.94* 4.18* 4.11*   HEMOGLOBIN g/dL 11.5* 11.4* 11.9* 11.6*   HEMATOCRIT % 34.4* 34.0* 34.8* 32.1*   MCV fL 86 86 83 78*   PLATELETS AUTO x10*3/uL 400 409 466* 365    , BMP Trend:   Results from last 7 days   Lab Units 07/23/25  0840 07/23/25  0512 07/23/25  0200 07/22/25  0411 07/22/25  0028 07/21/25  0827 07/21/25  0010 07/20/25  1400 07/20/25  0801   GLUCOSE mg/dL  --   --  111*  --  154*  --  152*  --  142*   CALCIUM mg/dL  --   --  8.5*  --  8.5*  --  8.6  --  10.5   SODIUM mmol/L 137   < > 134*  134*   < > 131*  131*   < > 129*  129*   < > 123*   POTASSIUM mmol/L  --   --  3.3*  --  3.8  --  3.6  --  3.0*   CO2 mmol/L  --   --  28  --  26  --  24  --  29   CHLORIDE mmol/L  --   --  98  --  98  --  96*  --  81*   BUN mg/dL  --   --  16  --  13  --  12  --  12   CREATININE mg/dL  --   --  1.07  --  1.03  --  0.98  --  1.13    < > = values in this interval not displayed.    , A1C:  Lab Results   Component Value Date    HGBA1C 6.5 07/02/2025   , BG POCT trend:   Results from last 7 days   Lab Units 07/23/25  1121 07/23/25  0734 07/22/25  1935 07/22/25  1555 07/22/25  1118   POCT GLUCOSE mg/dL 117* 110* 138* 126* 127*    , Renal Lab Trend:   Results from last 7 days   Lab Units 07/23/25  0840 07/23/25  0512 07/23/25  0200 07/22/25  0411 07/22/25  0028 07/21/25  0827 07/21/25  0010 07/20/25  1400 07/20/25  0801   POTASSIUM mmol/L  --   --  3.3*  --  3.8  --  3.6  --  3.0*   PHOSPHORUS mg/dL  --   --  1.9*  --  2.7  --  2.6  --  4.1   SODIUM mmol/L 137   < > 134*  134*   < > 131*  131*   < > 129*  129*   < > 123*   MAGNESIUM mg/dL  --   --  2.52*  --  2.39  --  1.67  --  1.82   EGFR mL/min/1.73m*2  --   --  82  --  86  --   >90  --  77   BUN mg/dL  --   --  16  --  13  --  12  --  12   CREATININE mg/dL  --   --  1.07  --  1.03  --  0.98  --  1.13    < > = values in this interval not displayed.      Scheduled medications  Scheduled Medications[1]      Dietary Orders (From admission, onward)       Start     Ordered    07/23/25 0800  Enteral feeding with NPO Glucerna 1.5; NG (nasogastric tube); 15; 200; Saline; Every 6 hours  Diet effective now        Comments: Fluid restriction 1.5 L   Question Answer Comment   Tube feeding formula: Glucerna 1.5    Feeding route: NG (nasogastric tube)    Tube feeding continuous rate (mL/hr): 15    Tube feeding flush (mL): 200    Flush type: Saline    Flush frequency: Every 6 hours        07/23/25 0759    07/20/25 1746  May Participate in Room Service  ( ROOM SERVICE MAY PARTICIPATE)  Once        Question:  .  Answer:  Yes    07/20/25 1745                     Estimated Needs:   Total Energy Estimated Needs in 24 hours (kCal): 1700 kCal  Method for Estimating Needs: 25kcal/kg per adj wt vs 20kcal/kg per act wt  Total Protein Estimated Needs in 24 Hours (g): 80 g  Method for Estimating 24 Hour Protein Needs: 1.2g/kg per adj wt  Total Fluid Estimated Needs in 24 Hours (mL):  (per team)        Nutrition Diagnosis   Malnutrition Diagnosis  Patient has Malnutrition Diagnosis:  (Not suspected; however, low threshold given inadequate intake over the past week)    Nutrition Diagnosis  Patient has Nutrition Diagnosis: Yes  Diagnosis Status (1): New  Nutrition Diagnosis 1: Increased nutrient needs  Related to (1): increased metabolic demand  As Evidenced by (1): stroke  Additional Nutrition Diagnosis: Diagnosis 2  Diagnosis Status (2): New  Nutrition Diagnosis 2: Inadequate protein energy intake  Related to (2): hospital course  As Evidenced by (2): intake meeting <50% of needs for >/=5 days       Nutrition Interventions/Recommendations        Nutrition Recommendations:  Individualized Nutrition Prescription  Provided for enteral nutrition:   Please do not start TF until phos >/=2.0    Once phos >/= 2.0:  Start Isosource 1.5 @ 10ml/hr, increase by 10mls every 12hrs to reach goal of 50ml/hr.   Additional water flushes per team  (TF provides 916mls free H2O).   If BG challenging to control on this regimen, despite insulin adjustment, then can transition to Glucerna 1.5 with the above regimen.     Recommend 100mg thiamine daily.    Nutrition Interventions/Goals:   Enteral Intake: Management of delivery rate of enteral nutrition  Goal: TF provides 1800kcals and 82g pro      Education Documentation  No documentation found.            Nutrition Monitoring and Evaluation   Enteral and Parenteral Nutrition Intake Determination: Enteral nutrition intake - Tolerate TF at goal rate         Electrolyte and Renal Panel: Electrolytes within normal limits         Goal Status: New goal(s) identified    Time Spent (min): 60 minutes              [1] amLODIPine, 10 mg, nasogastric tube, Daily  atorvastatin, 40 mg, nasogastric tube, Nightly  cetirizine, 10 mg, nasogastric tube, Daily  chlorthalidone, 25 mg, oral, Daily  diphenhydrAMINE, 25 mg, nasogastric tube, Once  enoxaparin, 40 mg, subcutaneous, Daily  [Held by provider] ergocalciferol, 1.25 mg, oral, Every Sunday  fluticasone, 1 spray, Each Nostril, Daily  immune globulin (human), 30 g, intravenous, Daily  insulin lispro, 0-5 Units, subcutaneous, TID AC  lidocaine, 1 patch, transdermal, Daily  losartan, 100 mg, oral, Daily  metoprolol tartrate, 12.5 mg, nasogastric tube, BID  pantoprazole, 40 mg, intravenous, Daily  perflutren lipid microspheres, 0.5-10 mL of dilution, intravenous, Once in imaging  piperacillin-tazobactam, 4.5 g, intravenous, q6h  polyethylene glycol, 17 g, nasogastric tube, Daily  sennosides, 2 tablet, nasogastric tube, BID  silodosin, 4 mg, nasogastric tube, Daily with breakfast  sodium phosphate, 21 mmol, intravenous, Once  sulfur hexafluoride microsphr, 2 mL,  intravenous, Once in imaging

## 2025-07-23 NOTE — PROGRESS NOTES
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 MRI Brain, not included in the impression, unilateral  - B1/B9/B12 wnl, TSH wnl, Hep/HIV/Syphilis/RPR nonreactive, urine drug screen negative, ethanol <10  - AM cortisol 45 (high) -> less concern for adrenal insufficiency   - Meningitis Pathogen Panel PCR: all negative (7/22)  - Cryptococcal Antigen: negative (7/22)  - CSF (7/22): 111 protein, 3-6 WBC  Plan:  - NSU  - Neuro Checks: Q6H  - Pending B6, Vitamin E, heavy metal screen, serum ganglioside panel    - Pending MRI with contrast    - IVIG Dose 3  - Pain: acetaminophen PRN, lidocaine patch PRN, diazepam PRN (5 mg q8h)  - PT/OT/SLP     CARDIOVASCULAR:  #HTN  #HLD  Assessment:  - Chronic htn (baseline 150-170 systolic)  - 7/21 ECHO: LVEF 65-70%; no LV hypertrophy, normal LV systolic function  Plan:  - Continue to monitor on telemetry  - c/w atorvastatin 40 mg daily   - BP goal: SBP < 180    -->home amlodipine (10 mg daily)    -->home metop tartrate (12.5 mg, BID)    -->home losartan (100 mg daily)    -->home chlorthalidone (25 mg)    --> PRN: Labetalol and Hydralazine      RESPIRATORY:  #No active issues  Assessment:  - Satting well on room air  - No increased work of breathing  - Copious secretions  - 7/20 ABG: paO2 73  - 7/22 NIF (-30), VC 0.5L   Plan:  - Continuous pulse oximetry   - O2 PRN to maintain SpO2 > 94%, wean as tolerated  - Q6H NIFs  - c/w NC prn  - c/w home fluticasone, cetirizine, diphenhydramine     RENAL/:  #No active issues  Assessment:  - Baseline BUN/Cr: 12/1.1-2        Results from last 72 hours   Lab Units 07/23/25  0200 07/22/25  0028   BUN mg/dL 16 13   CREATININE mg/dL 1.07 1.03   Plan:  - Monitor with daily RFP  - Silodosin 4 mg daily (for known BPH)     FEN/GI:  #Dysphagia  Assessment  - Last BM: none since admission  - 7/21 SLP  eval: Suspected increased risk for aspiration and related complications   Plan:  - Monitor and replace electrolytes per protocol  - Diet: NPO enteral feeding w/ Glucerna; through NG tube  --->as per nutrition rects (7/23):  - Appreciate SLP recs: aggressive oral care as tolerated  - Bowel Regimen: Docusate-Senna BID and Miralax BID  - Thiamine 100mg     ENDOCRINE:  #Hyponatremia SIADH vs unknown etiology   #T2DM   Assessment:                     Results from last 7 days   Lab Units 07/23/25  0200 07/22/25  2021 07/22/25  1935 07/22/25  1617 07/22/25  1555 07/22/25  1210 07/22/25  1118 07/22/25  0834 07/22/25  0739 07/22/25  0411 07/22/25  0028 07/21/25 2012 07/21/25  1946 07/21/25  1237 07/21/25  1113   POCT GLUCOSE mg/dL  --   --  138*  --  126*  --  127*  --  143*  --   --   --  149*  --  143*   GLUCOSE mg/dL 111*  --   --   --   --   --   --   --   --   --  154*  --   --   --   --    SODIUM mmol/L 134*  134* 131*  --  131*  --  130*  --  132*  --  132* 131*  131*   < >  --    < >  --     < > = values in this interval not displayed.   - 7/20: acute hyponatremia w/ worsening neuro exam and confusion (resolved)  - Euvolemia  - 7/20 urine electrolytes: Na 145  - 7/23 serum osm: 289 (from 266 on 7/20/25)  - 7/21 AM cortisol: 45.1  Plan:  - Accuchecks & ISS AC&HS   - hold metformin   - Insulin lispro, 0-5 U, sub-q, TID AC  - 1.5 L fluid restriction in setting of labile serum sodium likely 2/2 SIADH  --->Repeat serum sodium/osmolality q4h  - Strict I&Os  - ACTH levels pending     HEMATOLOGY:  #Anemia  Assessment:  - Baseline Hgb: 13-14  - Baseline Plts: 350         Results from last 7 days   Lab Units 07/23/25  0200 07/22/25  0028 07/21/25  0010   HEMOGLOBIN g/dL 11.5* 11.4* 11.9*   HEMATOCRIT % 34.4* 34.0* 34.8*   PLATELETS AUTO x10*3/uL 400 409 466*   Plan:  - Continue to monitor with daily CBC     INFECTIOUS DISEASE:  #Leukocytosis  #PNA  Assessment:         Results from last 7 days   Lab Units 07/23/25  0200  25  0028 25  0010   WBC AUTO x10*3/uL 7.9 15.0* 12.0*    - Temp (24hrs), Av.3 °C (97.3 °F), Min:36.1 °C (97 °F), Max:36.4 °C (97.5 °F)   - Consolidations on CT chest   -  respiratory specimen not cultured (salivary contamination)   -  vanc level sub-therapeutic   -  MRSA nares negative  Plan:  - Continue to monitor for s/sx of infection  - Pan culture for temperature > 38.4 C  - Zosyn 7 day course ( - )     MUSCULOSKELETAL:  - No acute issues     SKIN:  - No acute issues  - Turns and skin care per NSU protocol     ACCESS:  - PIVs  - R femoral CVC     PROPHYLAXIS:  - DVT Ppx: SCDs and enoxaparin  - GI Ppx: Pantropazole

## 2025-07-23 NOTE — PROGRESS NOTES
Occupational Therapy    Occupational Therapy Treatment    Name: Ayla Paredes  MRN: [id number]  Department: [facility]  Room: 03/03-A  Date: 07/23/25    First Session:   Start time: 1021  Stop time: 1101  Time Calculation: 40 minutes    Second Session (returned to assist patient back to bed):  Time Calculation  Start Time: 1203  Stop Time: 1216  Time Calculation (min): 13 min    Total Treatment Time: 53 minutes      Assessment:  Evaluation/Treatment Tolerance: Patient limited by fatigue  Medical Staff Made Aware: Yes  End of Session Communication: Bedside nurse  End of Session Patient Position:  (End of first session: up in chair with alarm. End of second session: bed, 3 rail up, alarm on.)  Plan:  Treatment Interventions: ADL retraining, Functional transfer training, UE strengthening/ROM, Endurance training, Patient/family training, Equipment evaluation/education, Neuromuscular reeducation, Fine motor coordination activities, Compensatory technique education  OT Frequency: 5 times per week  OT Discharge Recommendations: High intensity level of continued care  Equipment Recommended upon Discharge:  (TBD)  OT Recommended Transfer Status: Maximum assist  OT - OK to Discharge: Yes    Subjective     OT Visit Info:  OT Received On: 07/23/25  General:  General  Reason for Referral: presenting for bilateral hand and foot numbness and ataxia. multilevel cervical spondylosis with posterior disc osteophyte complexes causing C5-C6 and C6-C7 central canal narrowing. 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  Past Medical History Relevant to Rehab: HTN, DM, CKD  Family/Caregiver Present: No  Prior to Session Communication: Bedside nurse  Patient Position Received: Bed, 3 rail up, Alarm on  General Comment: Patient agreeable to OT services. Patient req. increased encouragement to participate  in therapy. Patient noted new tingling in LLE from foot to knee. Resident present and aware of tingling.  Precautions:  Hearing/Visual Limitations: WFL  Medical Precautions: Fall precautions  Precautions Comment: SBP <180          Vital Signs       Vitals Session Pre OT During OT Post OT   Heart Rate 80   85   Resp  18   20   SpO2 99   97   /97  160/108     110           Lines:   Tele, dobhoff    Pain Assessment:  Pain Assessment  Pain Assessment: 0-10  0-10 (Numeric) Pain Score: 0 - No pain    Objective   Cognition:  Overall Cognitive Status: Within Functional Limits  Arousal/Alertness: Appropriate responses to stimuli  Orientation Level: Oriented X4  Following Commands: Follows one step commands with repetition  Cognition Comments: Patient follows one step commands with % accuracy with incr cueing and encouragement. Patient self-limiting and anxious throughout sesison.  Attention: Within Functional Limits  Memory: Within Funtional Limits  Insight: Mild  Impulsive: Mildly  Processing Speed: Within funtional limits  Activities of Daily Living: LE Dressing  LE Dressing: Yes  Sock Level of Assistance: Dependent  LE Dressing Where Assessed: Bed level  LE Dressing Comments: Patient req. Dep A to gentry socks at bed level. Patient unable to achieve figure four technique to gentry socks.      Bed Mobility/Transfers: Bed Mobility  Bed Mobility: Yes  Bed Mobility 1  Bed Mobility 1: Supine to sitting  Level of Assistance 1: Moderate assistance  Bed Mobility Comments 1: HOB elevated. Mod A for trunk and legs. Patient able to reach RUE to bed rail and push through RUE to sit up with Mod A. Cueing for pacing and hand placement.  Bed Mobility 2  Bed Mobility  2: Sitting to supine  Level of Assistance 2: Moderate assistance  Bed Mobility Comments 2: HOB flat, cues for log roll technique. Mod A for legs.    Transfers  Transfer: Yes  Transfer 1  Transfer From 1: Sit to, Stand to  Transfer to 1: Stand, Sit  Technique  1: Sit to stand, Stand to sit  Transfer Device 1: Walker  Transfer Level of Assistance 1: Minimum assistance (x2)  Trials/Comments 1: x3 sit<> stand trials. 1 trial with walker with Min A x2. 2 trials with Min A x2 and arm in arm assist. Min A x2 with with max cues for pacing, hand placement, and safety. Patient demo postural sway and instability, req. bilateral knee blocking.  Transfers 2  Transfer From 2: Bed to  Transfer to 2: Chair with arms  Technique 2: Sit to stand, Stand to sit  Transfer Level of Assistance 2: Moderate assistance (x2)  Trials/Comments 2: Mod A x2 with arm in arm assist and bilateral knee blocking to take side steps from bed to chair. Req. cues for sequencing and initiation of steps.  Transfers 3  Transfer From 3: Chair with arms to  Transfer to 3: Bed  Technique 3: Sit to stand, Stand to sit  Transfer Level of Assistance 3: Moderate assistance, +2  Trials/Comments 3: Mod A x2 with arm in arm assist to take steps from chair to bed with bilateral knee blocking and cues for sequencing.      Functional Mobility:  Functional Mobility  Functional Mobility Performed: Yes  Functional Mobility 1  Surface 1: Level tile  Device 1: No device  Assistance 1: Moderate assistance (x2)  Comments 1: Mod A x2 with arm in arm assist for side steps at EOB with cues for sequencing and initiation. Bilateral knee blocking d/t postural sway and imbalance.  Sitting Balance:  Static Sitting Balance  Static Sitting-Level of Assistance: Contact guard, Minimum assistance  Static Sitting-Comment/Number of Minutes: x10 minutes EOB  Dynamic Sitting Balance  Dynamic Sitting-Level of Assistance: Minimum assistance  Standing Balance:  Static Standing Balance  Static Standing-Level of Assistance: Moderate assistance (x2)  Dynamic Standing Balance  Dynamic Standing-Level of Assistance: Moderate assistance (x2)    Therapy/Activity: Therapeutic Exercise  Therapeutic Exercise Performed: Yes  Therapeutic Exercise Activity 1: x5 LUE  shoulder flexion and abduction exercises paired with active breathing while sitting in chair. Cues for proper form and breathing with active movement.  Therapeutic Exercise Activity 2: x5 RUE shoulder flexion and abduction exercises with paired active breathing while sitting in chair. Cues for proper form and breathing with active movement.  Therapeutic Exercise Activity 3: x5 BUE shoulder shrugs with 5 second hold and paired active breathing while sitting in chair. Cues for proper form and breathing with active movement.  Therapeutic Exercise Activity 4: Patient educated on BUE exercises to complete while in bed. Educated on shoulder shrugs with 10 sec. hold, shoulder flexion, shoulder abduction, elbow flexion. Educated 10 reps 2 sets each arm.    Therapeutic Activity  Therapeutic Activity Performed: Yes  Therapeutic Activity 1: Patient sat EOB x10 minutes with CGA to brief Min A d/t postural and core instability.  Therapeutic Activity 2: Patient completed x3 sit<>stand trials with Min A x2 and bilateral knee blocking d/t instability and impaired balance.  Therapeutic Activity 3: Patient transferred bed to chair with Mod A x2 and arm in arm assist with side steps and cues for sequencing and initiation.  Therapeutic Activity 4: Patient transferred chair to bed with Mod A x2 with arm in arm assist and cues for sequencing and initiation of steps.    Outcome Measures:  [facility] Daily Activity  Putting on and taking off regular lower body clothing: Total  Bathing (including washing, rinsing, drying): A lot  Putting on and taking off regular upper body clothing: A little  Toileting, which includes using toilet, bedpan or urinal: A lot  Taking care of personal grooming such as brushing teeth: A little  Eating Meals: Total  Daily Activity - Total Score: 12         and Early Mobility/Exercise Safety Screen: Proceed with mobilization - No exclusion criteria met  ICU Mobility Scale: Marching on spot (at bedside) [6]    Education  Documentation  Body Mechanics, taught by KIM Fernández at 7/23/2025 12:41 PM.  Learner: Patient  Readiness: Acceptance  Method: Explanation, Demonstration  Response: Verbalizes Understanding, Demonstrated Understanding, Needs Reinforcement  Comment: OT POC, ADL retraining, strength, ROM, exercises    Precautions, taught by KIM Fernández at 7/23/2025 12:41 PM.  Learner: Patient  Readiness: Acceptance  Method: Explanation, Demonstration  Response: Verbalizes Understanding, Demonstrated Understanding, Needs Reinforcement  Comment: OT POC, ADL retraining, strength, ROM, exercises    ADL Training, taught by KIM Fernández at 7/23/2025 12:41 PM.  Learner: Patient  Readiness: Acceptance  Method: Explanation, Demonstration  Response: Verbalizes Understanding, Demonstrated Understanding, Needs Reinforcement  Comment: OT POC, ADL retraining, strength, ROM, exercises    Education Comments  No comments found.      Goals:  Encounter Problems       Encounter Problems (Active)       ADLs       Patient will perform UB and LB bathing with minimal assist  level of assistance in supported sitting. (Progressing)       Start:  07/21/25    Expected End:  08/04/25            Patient with complete upper body dressing with independent level of assistance donning and doffing all UE clothes while edge of bed  (Progressing)       Start:  07/21/25    Expected End:  08/04/25            Patient with complete lower body dressing with contact guard assist level of assistance donning and doffing all LE clothes  with PRN adaptive equipment while edge of bed  (Progressing)       Start:  07/21/25    Expected End:  08/04/25            Patient will complete daily grooming tasks with modified independent level of assistance and PRN adaptive equipment while standing. (Progressing)       Start:  07/21/25    Expected End:  08/04/25            Patient will complete toileting including hygiene clothing management/hygiene with minimal assist  level  of assistance. (Progressing)       Start:  07/21/25    Expected End:  08/04/25               BALANCE       Pt will maintain dynamic sitting balance during ADL task with independent level of assistance in order to demonstrate decreased risk of falling and improved postural control. (Progressing)       Start:  07/21/25    Expected End:  08/04/25               BALANCE       Pt will maintain dynamic standing balance during ADL task with modified independent level of assistance in order to demonstrate decreased risk of falling and improved postural control. (Progressing)       Start:  07/21/25    Expected End:  08/04/25               EXERCISE/STRENGTHENING       Pt will improve FMC/GMC to WNL to complete ADLs independently using bilateral integration without increase in time.  (Progressing)       Start:  07/21/25    Expected End:  08/04/25               MOBILITY       Patient will perform Functional mobility Household distances/Community Distances with Mod I level of assistance and least restrictive device in order to improve safety and functional mobility. (Progressing)       Start:  07/21/25    Expected End:  08/04/25               TRANSFERS       Patient will perform bed mobility modified independent level of assistance and bed rails in order to improve safety and independence with mobility (Progressing)       Start:  07/21/25    Expected End:  08/04/25            Patient will complete functional transfers with least restrictive device with Mod I level of assistance. (Progressing)       Start:  07/21/25    Expected End:  08/04/25                 Completion of this session, clinical decision making, and documentation performed under the supervision/direction of Shelli Bryan.     Mireya Ortega S/OT  Occupational therapy student  Rehab Office: [phone]

## 2025-07-23 NOTE — PROGRESS NOTES
[facility]  NEUROSCIENCE INTENSIVE CARE UNIT  DAILY PROGRESS NOTE       Patient Name: Ayla Paredes   MRN: [id number]     Admit Date: 2025     : 1970 AGE: 55 y.o. GENDER: male        Subjective    Ayla Paredes is a 55 y.o. male with a PMHx of HTN, DM, CKD transferred from [facility] on  for bilateral hand and foot numbness (stocking-glove). Symptoms began acutely (post-coitally) 7/15 AM, starting with paresthesias in the dorsal surface of left left hand up to wrist and then the dorsal surface of right hand 1 hour later; the next day, tingling appeared in feet (bilaterally) and then periorally. They have not progressed since.  Pt with no recent travel, weight changes or substance use. Initial was notable for positive romberg sign and a wide gait with inability to perform tandem gait.     Lab work-up on floor included a urine drug screen, ethanol/folate/B12 levels, syphilis screen, hepatitis panel, HIV 1/2 screen, TSH and thyroid peroxidase levels, and troponins, all of which were unrevealing. CT head and CTA head and neck were similarly unrevealing, while MR cervical/thoracic spine showed mild-to-moderate spondylosis in C5-C7, likely an incidental finding.    Patient was transferred to NSU on  d/t worsening respiratory status (per patient), and while on the floor developed hyponatremia to 116 (sodium 134 on admission) with concomitant encephalopathy that resolved after administration of hypertonic saline.    Interval Events:   - Overnight: NAEON  - Per patient: Endorses anxiety, pain in neck and upper shoulders bilaterally; paresthesias in feet up to ankles and hands up to wrists bilaterally, and kaylee-orally       Objective   VITALS (24H):  Temp:  [36.1 °C (97 °F)-36.4 °C (97.5 °F)] 36.4 °C (97.5 °F)  Heart Rate:  [] 75  Resp:  [13-31] 13  BP: (113-209)/() 149/86  INTAKE/OUTPUT:  Intake/Output Summary (Last 24 hours) at 2025 0620  Last data  filed at 7/23/2025 0600  Gross per 24 hour   Intake 1160 ml   Output 1575 ml   Net -415 ml     VENT SETTINGS:      - Vitals summary: afebrile; isolated tachycardia at 2100, otherwise nontachycardic; sporadically tachypneic throughout the day; bp at goal <180 most of the day, but did spike above goal occasionally up to 209 systolic    PHYSICAL EXAM:  NEURO:  - Alert, oriented x4, follows commands    CRANIAL NERVES:  - CN I: Not Assessed  - CN II: Pupils constrict to light bilaterally 3->2 mm  - CN III, IV, VI: Extraocular movements intact without nystagmus  - CN V: Facial sensation intact to light touch  - CN VII: No facial droop, closes eyes against resistance  - CN VIII: Hearing intact to voice  - CN IX, X: Palate elevates symmetrically  - CN XII: Tongue midline without atrophy or fasciculations     MOTOR:  - Ataxia on finger to nose bilaterally; unable to assess heel to shin because of pt-reported weakness bilaterally   - Normal bulk and tone throughout  - Strength:          R                L  Elbow Flex          4                4  Elbow Ext           4                4  Hip flexion         5                5  Knee Ext             5                5  Knee Flex           4                 4  DorsiFlex            5                 5  PlantarFlex         5                 5     REFLEXES:             R               L  Biceps                CNE*         CNE*  Brachioradialis    CNE*         CNE*  Patellar                +1             +1  Achilles              CNE*          CNE*    *CNE - could not elicit     SENSORY:  Sensation to light touch, temperature and vibration was intact in the bilateral upper and lower extremities.    CV:  - RRR on telemetry, NSR  - no m/r/g    RESP:  -rochonous on both lungs   - Oxygen: RA and PRN NC    :  - Producing clear, yellow urine    GI:  - Abdomen NT/ND, soft    SKIN:  - Warm, intact    MEDICATIONS:  Scheduled: PRN: Continuous:   Scheduled Medications[1] PRN Medications[2]  Continuous Medications[3]     LAB RESULTS:  7/23 RFP            Component  Ref Range & Units 02:00  (7/23/25) 02:00  (7/23/25) 1 d ago  (7/22/25) 1 d ago  (7/22/25) 1 d ago  (7/22/25) 1 d ago  (7/22/25) 1 d ago  (7/22/25)   Glucose  74 - 99 mg/dL 111 High          Sodium  136 - 145 mmol/L 134 Low  134 Low  131 Low  131 Low  130 Low  132 Low  132 Low    Potassium  3.5 - 5.3 mmol/L 3.3 Low          Chloride  98 - 107 mmol/L 98         Bicarbonate  21 - 32 mmol/L 28         Anion Gap  10 - 20 mmol/L 11         Urea Nitrogen  6 - 23 mg/dL 16         Creatinine  0.50 - 1.30 mg/dL 1.07         eGFR  >60 mL/min/1.73m*2 82         Comment: Calculations of estimated GFR are performed using the 2021 CKD-EPI Study Refit equation without the race variable for the IDMS-Traceable creatinine methods.  https://jasn.asnjournals.org/content/early/2021/09/22/ASN.5253440429   Calcium  8.6 - 10.6 mg/dL 8.5 Low          Phosphorus  2.5 - 4.9 mg/dL 1.9 Low          Albumin  3.4 - 5.0 g/dL 3.5           7/23 Ionized Ca  Component  Ref Range & Units 00:28 1 d ago   POCT Calcium, Ionized  1.1 - 1.33 mmol/L 1.11 1.10 CM     7/23 Mag  Component  Ref Range & Units 02:00 1 d ago 2 d ago 3 d ago 4 d ago 5 d ago 7 d ago   Magnesium  1.60 - 2.40 mg/dL 2.52 High  2.39 1.67 1.82 1.96 1.97 2.01     7/23 Serum Osm  Component  Ref Range & Units 02:00  (7/23/25) 1 d ago  (7/22/25) 1 d ago  (7/22/25) 1 d ago  (7/22/25) 1 d ago  (7/22/25) 1 d ago  (7/22/25) 1 d ago  (7/22/25)   Osmolality, Serum  280 - 300 mOsm/kg 290 287 285 285 283 287 283     7/23 CBC  Component  Ref Range & Units 02:00  (7/23/25) 1 d ago  (7/22/25) 2 d ago  (7/21/25) 3 d ago  (7/20/25) 3 d ago  (7/20/25) 4 d ago  (7/19/25) 5 d ago  (7/18/25)   WBC  4.4 - 11.3 x10*3/uL 7.9 15.0 High  12.0 High  8.1 7.8 7.5 6.8   nRBC  0.0 - 0.0 /100 WBCs 0.0 0.0 0.0 0.0 0.0 0.0 0.0   RBC  4.50 - 5.90 x10*6/uL 3.98 Low  3.94 Low  4.18 Low  4.11 Low  4.99 4.57 4.20 Low    Hemoglobin  13.5 - 17.5 g/dL 11.5  Low  11.4 Low  11.9 Low  11.6 Low  14.1 13.0 Low  11.6 Low    Hematocrit  41.0 - 52.0 % 34.4 Low  34.0 Low  34.8 Low  32.1 Low  40.6 Low  37.8 Low  34.7 Low    MCV  80 - 100 fL 86 86 83 78 Low  81 83 83   MCH  26.0 - 34.0 pg 28.9 28.9 28.5 28.2 28.3 28.4 27.6   MCHC  32.0 - 36.0 g/dL 33.4 33.5 34.2 36.1 High  34.7 34.4 33.4   RDW  11.5 - 14.5 % 13.5 13.3 12.5 12.3 12.4 12.7 12.7   Platelets  150 - 450 x10*3/uL 400 409 466 High  365 498 High  488 High  422   Neutrophils %  40.0 - 80.0 % 74.8 91.3 82.8 66.5 69.4 55.6 59.5   Immature Granulocytes %, Automated  0.0 - 0.9 % 0.8 0.7 CM 0.3 CM 0.4 CM 0.3 CM 0.3 CM 0.3 CM   Comment: Immature Granulocyte Count (IG) includes promyelocytes, myelocytes and metamyelocytes but does not include bands. Percent differential counts (%) should be interpreted in the context of the absolute cell counts (cells/UL).   Lymphocytes %  13.0 - 44.0 % 15.6 3.6 10.4 25.0 23.0 36.7 32.4   Monocytes %  2.0 - 10.0 % 7.3 4.0 6.3 6.7 6.3 5.2 5.8   Eosinophils %  0.0 - 6.0 % 0.1 0.1 0.0 0.9 0.5 1.1 1.0   Basophils %  0.0 - 2.0 % 1.4 0.3 0.2 0.5 0.5 1.1 1.0   Neutrophils Absolute  1.20 - 7.70 x10*3/uL 5.94 13.70 High  CM 9.92 High  CM 5.41 CM 5.44 CM 4.15 CM 4.02 CM   Comment: Percent differential counts (%) should be interpreted in the context of the absolute cell counts (cells/uL).   Immature Granulocytes Absolute, Automated  0.00 - 0.70 x10*3/uL 0.06 0.11 0.04 0.03 0.02 0.02 0.02   Lymphocytes Absolute  1.20 - 4.80 x10*3/uL 1.24 0.54 Low  1.25 2.03 1.80 2.74 2.19   Monocytes Absolute  0.10 - 1.00 x10*3/uL 0.58 0.60 0.76 0.54 0.49 0.39 0.39   Eosinophils Absolute  0.00 - 0.70 x10*3/uL 0.01 0.01 0.00 0.07 0.04 0.08 0.07   Basophils Absolute  0.00 - 0.10 x10*3/uL 0.11 High  0.04 0.02 0.04 0.04 0.08 0.07     7/22 Coagulation Screen       Component  Ref Range & Units 00:28 2 d ago   Protime  9.8 - 12.4 seconds 11.4 11.8   INR  0.9 - 1.1 1.0 1.1   aPTT  26 - 36 seconds 22 Low  21 Low      CSF Studies  (sample collected 7/22)  --->Meningitis Pathogen Panel PCR: all negative (7/22)  --->Cryptococcal Antigen: negative (7/22)  --->Gram stain: (1+)rare PMLs, no orgs  --->LDH: <25 (7/22)  ---->Glucose: 82 (7/22)  Component  Ref Range & Units 1 d ago   Glucose, CSF  40 - 70 mg/dL 82 High      ---->Total  protein (7/22)  Component  Ref Range & Units 1 d ago   Total Protein, CSF  15 - 45 mg/dL 111 High      --->Cell Count w/ Diff; Tubes 1 and 4 likely switched (7/22)  Component  Ref Range & Units 1 d ago  (7/22/25) 1 d ago  (7/22/25)   Tube Number, CSF     Tube 1 Tube 4   Color, CSF  Colorless Colorless Pink Abnormal    Clarity, CSF  Clear Clear Hazy Abnormal    Color, Supernatant CSF    Colorless Colorless   WBC, CSF  1 - 5 /uL 3 8 High    RBC, CSF  0 - 5 /uL 88 High  1,000 High  CM     Other tests:  --->ACTH (7/22):                     PENDING  --->TSH (7/18):                     0.75 (normal)  --->Thyroid-stimulating globulin (7/20):    PENDING  --->Long chain fatty acids (7/20):              PENDING  --->Paraneoplastic panel (7/20):               PENDING  --->Ganglioside panel (7/21):                   PENDING  ---->Metals          Serum copper (7/20): 126.4 (normal)          Serum zinc (7/20):        73.6 (normal)          Heavy metals (7/20):        PENDING  ---->Vitamins          B1 (7/20):                      133 (normal)          B6 (7/20):                          PENDING          Folate (7/18):                13.7 (normal)          B12 (7/18):                     872 (normal)          E (7/18):                             PENDING  --->Infections         Syphilis screen:               non-reactive         HIV 1/2 (7/18):                non-reactive         Hepatitis panel (7/18):    non-reactive         RPR monitoring (7/18):   non-reactive  --->Substances         Urine drug screen (7/16):        normal         Ethanol (7/18):                <10 (normal)    IMAGING RESULTS:  7/21 CT chest abdomen  Indication:  to r/o malignancy  IMPRESSION:  CHEST:  1.  Collapsed consolidations involving bilateral lower lung lobes  with trace airway debris. Most likely represents sequelae of  recent aspiration event(s).  2. 6 mm right subpleural upper lobe nodule. Per Fleischner  guidelines, follow-up with routine outpatient CT chest in 6-12 months.  3. Mild cardiomegaly.  ABDOMEN-PELVIS:  1.  Bilateral adrenal gland enlargement with surrounding fat  stranding raises the possibility of a adrenalitis versus hemorrhage  if patient is on anticoagulation. Clinical correlation for adrenal  insufficiency and further workup to rule out underlying autoimmune  disorder is strongly recommended. Discrete right adrenal gland nodule  likely represents a functioning or nonfunctioning adenoma    7/20 XR abdomen + XR chest  Indication: NG placement; SOB w/ secretions  IMPRESSION:  1. Trace left pleural effusion/left basilar atelectasis. No pneumothorax.  2. Enteric tube projects over the gastric antrum/pylorus. Stomach markedly distended w/ air.  3. Nonobstructive bowel-gas pattern.     7/19 MR cervical spine w and wo IV contrast + MR thoracic spine w and wo IV contrast  IMPRESSION:  ----->CERVICAL SPINE: Multilevel cervical spondylosis w/ posterior disc osteophyte complexes causing C5-C6 mild-to-moderate and C6-C7 moderate narrowing of the central canal. Addition, moderate-to-severe neural foramen narrowing bilaterally at these levels.   ----->THORACIC SPINE: No evidence of significant spinal canal stenosis or other acute abnormality in the thoracic spine.     7/18 MR brain wo IV contrast  Indication: signs/symptoms of cerebellar stroke  IMPRESSION:  1. No acute infarction or acute hemorrhage.  2. Probable mild chronic white matter small vessel ischemic changes.     7/18 CT angio head and neck w and wo IV contrast  Indication: bilateral UE nad LE numbness, unable to ambulate, symptoms started after bench pressing on monday. Has significant pain in base  of neck.  IMPRESSION: No hemodynamically significant stenosis of the head and neck  vasculature.     7/18 XR chest 2 views  Indication: sob  IMPRESSION: No evidence of acute cardiopulmonary process.     7/16 CT head wo IV contrast  Indication: numbness  IMPRESSION: No acute intracranial pathology     Assessment/Plan    55 y.o. male with PMH HTN, DM, CKD.  Admitted 7/18/2025 with Ataxia, admitted to NSU on 07/20 d/t acute hyponatremia with neurologic exam change.     07/23/25  Updates:  Day 3 of IVIG  Will consider CXR     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 MRI Brain, not included in the impression, unilateral  - B1/B9/B12 wnl, TSH wnl, Hep/HIV/Syphilis/RPR nonreactive, urine drug screen negative, ethanol <10  - AM cortisol 45 (high) -> less concern for adrenal insufficiency   - Meningitis Pathogen Panel PCR: all negative (7/22)  - Cryptococcal Antigen: negative (7/22)  - CSF (7/22): 111 protein, 3-6 WBC  Plan:  - NSU  - Pending B6, Vitamin E, heavy metal screen, serum ganglioside panel    - Pending MRI with contrast    - EMG?  - IVIG Dose 3  - Neuro Checks: Q4H  - Pain: acetaminophen PRN  - PT/OT/SLP     CARDIOVASCULAR:  #HTN  #HLD  Assessment:  - Home meds: Losartan 100, metop succinate 50 daily, amlodipine 10 daily  - 7/21 ECHO: LVEF 65-70%; no LV hypertrophy, normal LV systolic function  Plan:  - Continue to monitor on telemetry  - c/w atorvastatin 40 mg daily   - BP goal: SBP < 180    -->home amlodipine (10 mg daily)    -->home metop tartrate (12.5 mg, BID)    -->home losartan (100 mg daily)    -->home chlorthalidone (25 mg)    --> PRN: Labetalol and Hydralazine      RESPIRATORY:  #No active issues  Assessment:  - Satting well on room air  - No increased work of breathing  - Copious secretions  - 7/20 ABG: paO2 73  - 7/22 NIF, VC: -30,    Plan:  - Continuous pulse oximetry   - O2 PRN to maintain SpO2 > 94%, wean as tolerated  - Q6H NIFs  - c/w NC prn  - ABG?    RENAL/:  #No active issues  Assessment:  - Baseline BUN/Cr: 12/1.1-2  Results from last 72 hours   Lab Units 07/23/25  0200 07/22/25  0028   BUN mg/dL 16 13   CREATININE mg/dL 1.07 1.03   Plan:  - Monitor with daily RFP    FEN/GI:  #Dysphagia  Assessment  - Last BM: none since admission  - 7/21 SLP eval: Suspected increased risk for aspiration and related complications   Plan:  - Monitor and replace electrolytes per protocol  - Diet: NPO , NG tube  - Appreciate SLP recs: aggressive oral care as tolerated  - Bowel Regimen: Docusate-Senna BID and Miralax BID     ENDOCRINE:  #Hyponatremia SIADH vs unknown etiology   #T2DM   Assessment:  Results from last 7 days   Lab Units 07/23/25  0200 07/22/25  2021 07/22/25  1935 07/22/25  1617 07/22/25  1555 07/22/25  1210 07/22/25  1118 07/22/25  0834 07/22/25  0739 07/22/25  0411 07/22/25  0028 07/21/25  2012 07/21/25  1946 07/21/25  1237 07/21/25  1113   POCT GLUCOSE mg/dL  --   --  138*  --  126*  --  127*  --  143*  --   --   --  149*  --  143*   GLUCOSE mg/dL 111*  --   --   --   --   --   --   --   --   --  154*  --   --   --   --    SODIUM mmol/L 134*  134* 131*  --  131*  --  130*  --  132*  --  132* 131*  131*   < >  --    < >  --     < > = values in this interval not displayed.   - 7/20: acute hyponatremia w/ worsening neuro exam and confusion (no clear etiology at this time)  - Euvolemia  - 7/20 urine electrolytes: Na 145  - 7/22 serum osm: 287 (from 266 on 7/20/25)  - 7/21 AM cortisol: 45.1  Plan:  - Accuchecks & ISS AC&HS   - hold metformin   - 1.5 L fluid restriction in setting of labile serum sodium likely 2/2 SIADH  --->Repeat serum sodium/osmolality q4h  - Strict I&Os  - ACTH levels pending    HEMATOLOGY:  #Anemia  Assessment:  - Baseline Hgb: 13-14  - Baseline Plts: 350  Results from last 7 days   Lab Units 07/23/25  0206  25  0028 25  0010   HEMOGLOBIN g/dL 11.5* 11.4* 11.9*   HEMATOCRIT % 34.4* 34.0* 34.8*   PLATELETS AUTO x10*3/uL 400 409 466*   Plan:  - Continue to monitor with daily CBC    INFECTIOUS DISEASE:  #Leukocytosis  #PNA  Assessment:  Results from last 7 days   Lab Units 25  0200 25  0028 25  0010   WBC AUTO x10*3/uL 7.9 15.0* 12.0*    - Temp (24hrs), Av.3 °C (97.3 °F), Min:36.1 °C (97 °F), Max:36.4 °C (97.5 °F)   - Consolidations on CT chest   -  respiratory specimen not cultured (salivary contamination)   -  vanc level sub-therapeutic   -  MRSA nares negative  Plan:  - Continue to monitor for s/sx of infection  - Pan culture for temperature > 38.4 C  - Zosyn 7 day course ( - )    MUSCULOSKELETAL:  - No acute issues    SKIN:  - No acute issues  - Turns and skin care per NSU protocol    ACCESS:  - PIVs  - R femoral CVC    PROPHYLAXIS:  - DVT Ppx: SCDs and SQH (hold heparin for LP today)  - GI Ppx: not indicated    RESTRAINTS:  Not indicated/Patient does not meet criteria for restraints    Daily ICU Checklist:  - Restraint order/note  [] Yes [] No [x] N/A   - NIHSS Frequency appropriate? [] Yes [x] No   - Daily Labs Ordered? [x] Yes [] No [] N/A   - Medication Route? [x] Yes [] No  - (PO, NG, OG, G Tube)   - PPI ordered/needed? [] Yes [x] No [] N/A   - DVT PPx [x] Yes [] No   - Antibiotic stop date? [] Yes [] No [x] N/A   - Miner? [] Yes [x] No  DC? [] Y [] N [x] N/A   - Central Line? [x] Yes [] No  LOC: R. femoral  DC? [] Y [] N [x] N/A   - Central  orders [x] Yes [] No [] N/A   - Vent weaning plan? [] Yes [] No [x] N/A         LEONELA BRONSON, MS4  Neuroscience Intensive Care     I participated and contributed to the above medical student note including the HPI, physical exam, assessment, and plan. I agree with the above information, and made addenda to the note to reflect my examination, assessment and plan.     Tramaine Nunn MD  PGY-2 Neurology      The patient is critically ill with acute inflammatory demyelinating disease And SIADH  Neurologically stable  Cont IVIg treatment  SIADH: Sodium improved, cont fluid restriction  Chronic kidney disease: renal function stable  Antibiotics for aspiration pneumonia (stop 7/28)  DM: Cont BG control    I have seen and examined the patient.  I have reviewed the patient's laboratory, radiographic, and clinical data.  I have spent 30 minutes providing critical care for the patient.       ADRI Henderson M.D.        [1] amLODIPine, 10 mg, nasogastric tube, Daily  atorvastatin, 40 mg, nasogastric tube, Nightly  cetirizine, 10 mg, nasogastric tube, Daily  diphenhydrAMINE, 25 mg, nasogastric tube, Once  enoxaparin, 40 mg, subcutaneous, Daily  [Held by provider] ergocalciferol, 1.25 mg, oral, Every Sunday  fluticasone, 1 spray, Each Nostril, Daily  immune globulin (human), 30 g, intravenous, Daily  insulin lispro, 0-5 Units, subcutaneous, TID AC  lidocaine, 1 patch, transdermal, Daily  losartan, 100 mg, oral, Daily  metoprolol tartrate, 12.5 mg, nasogastric tube, BID  perflutren lipid microspheres, 0.5-10 mL of dilution, intravenous, Once in imaging  piperacillin-tazobactam, 4.5 g, intravenous, q6h  polyethylene glycol, 17 g, nasogastric tube, Daily  sennosides, 2 tablet, nasogastric tube, BID  silodosin, 4 mg, nasogastric tube, Daily with breakfast  sulfur hexafluoride microsphr, 2 mL, intravenous, Once in imaging     [2] PRN medications: acetaminophen, caffeine, calcium gluconate, calcium gluconate, dextrose, dextrose, glucagon, glucagon, hydrALAZINE, HYDROmorphone, hydrOXYzine HCL, labetaloL, lidocaine, magnesium sulfate, magnesium sulfate, potassium chloride CR **OR** potassium chloride, potassium chloride CR **OR** potassium chloride  [3]

## 2025-07-24 ENCOUNTER — APPOINTMENT (OUTPATIENT)
Dept: PRIMARY CARE | Facility: CLINIC | Age: 55
End: 2025-07-24
Payer: COMMERCIAL

## 2025-07-24 DIAGNOSIS — I10 PRIMARY HYPERTENSION: ICD-10-CM

## 2025-07-24 DIAGNOSIS — R27.0 ATAXIA: ICD-10-CM

## 2025-07-24 DIAGNOSIS — N18.31 STAGE 3A CHRONIC KIDNEY DISEASE (MULTI): ICD-10-CM

## 2025-07-24 DIAGNOSIS — E11.22 TYPE 2 DIABETES MELLITUS WITH STAGE 3A CHRONIC KIDNEY DISEASE, WITHOUT LONG-TERM CURRENT USE OF INSULIN (MULTI): ICD-10-CM

## 2025-07-24 DIAGNOSIS — E87.1 HYPONATREMIA: ICD-10-CM

## 2025-07-24 DIAGNOSIS — Z09 HOSPITAL DISCHARGE FOLLOW-UP: Primary | ICD-10-CM

## 2025-07-24 DIAGNOSIS — G62.9 NEUROPATHY: ICD-10-CM

## 2025-07-24 DIAGNOSIS — R01.1 HEART MURMUR: ICD-10-CM

## 2025-07-24 DIAGNOSIS — N18.31 TYPE 2 DIABETES MELLITUS WITH STAGE 3A CHRONIC KIDNEY DISEASE, WITHOUT LONG-TERM CURRENT USE OF INSULIN (MULTI): ICD-10-CM

## 2025-07-24 DIAGNOSIS — R35.0 BENIGN PROSTATIC HYPERPLASIA WITH URINARY FREQUENCY: ICD-10-CM

## 2025-07-24 DIAGNOSIS — N40.1 BENIGN PROSTATIC HYPERPLASIA WITH URINARY FREQUENCY: ICD-10-CM

## 2025-07-24 LAB
ALBUMIN SERPL BCP-MCNC: 3.1 G/DL (ref 3.4–5)
ANION GAP SERPL CALC-SCNC: 11 MMOL/L (ref 10–20)
BASOPHILS # BLD AUTO: 0.1 X10*3/UL (ref 0–0.1)
BASOPHILS NFR BLD AUTO: 1.3 %
BUN SERPL-MCNC: 19 MG/DL (ref 6–23)
CA-I BLD-SCNC: 1.11 MMOL/L (ref 1.1–1.33)
CALCIUM SERPL-MCNC: 8.4 MG/DL (ref 8.6–10.6)
CELL POPULATIONS: NORMAL
CHLORIDE SERPL-SCNC: 101 MMOL/L (ref 98–107)
CO2 SERPL-SCNC: 26 MMOL/L (ref 21–32)
CREAT SERPL-MCNC: 1.36 MG/DL (ref 0.5–1.3)
DIAGNOSIS: NORMAL
EGFRCR SERPLBLD CKD-EPI 2021: 61 ML/MIN/1.73M*2
EOSINOPHIL # BLD AUTO: 0.01 X10*3/UL (ref 0–0.7)
EOSINOPHIL NFR BLD AUTO: 0.1 %
ERYTHROCYTE [DISTWIDTH] IN BLOOD BY AUTOMATED COUNT: 13.5 % (ref 11.5–14.5)
FLOW DIFFERENTIAL: NORMAL
FLOW TEST ORDERED: NORMAL
GLUCOSE BLD MANUAL STRIP-MCNC: 113 MG/DL (ref 74–99)
GLUCOSE BLD MANUAL STRIP-MCNC: 115 MG/DL (ref 74–99)
GLUCOSE BLD MANUAL STRIP-MCNC: 116 MG/DL (ref 74–99)
GLUCOSE BLD MANUAL STRIP-MCNC: 129 MG/DL (ref 74–99)
GLUCOSE BLD MANUAL STRIP-MCNC: 144 MG/DL (ref 74–99)
GLUCOSE SERPL-MCNC: 104 MG/DL (ref 74–99)
HCT VFR BLD AUTO: 32.5 % (ref 41–52)
HGB BLD-MCNC: 10.7 G/DL (ref 13.5–17.5)
IMM GRANULOCYTES # BLD AUTO: 0.03 X10*3/UL (ref 0–0.7)
IMM GRANULOCYTES NFR BLD AUTO: 0.4 % (ref 0–0.9)
LAB TEST METHOD: NORMAL
LYMPHOCYTES # BLD AUTO: 1.82 X10*3/UL (ref 1.2–4.8)
LYMPHOCYTES NFR BLD AUTO: 22.8 %
MAGNESIUM SERPL-MCNC: 2.38 MG/DL (ref 1.6–2.4)
MCH RBC QN AUTO: 28.8 PG (ref 26–34)
MCHC RBC AUTO-ENTMCNC: 32.9 G/DL (ref 32–36)
MCV RBC AUTO: 87 FL (ref 80–100)
MONOCYTES # BLD AUTO: 0.75 X10*3/UL (ref 0.1–1)
MONOCYTES NFR BLD AUTO: 9.4 %
NEUTROPHILS # BLD AUTO: 5.29 X10*3/UL (ref 1.2–7.7)
NEUTROPHILS NFR BLD AUTO: 66 %
NRBC BLD-RTO: 0 /100 WBCS (ref 0–0)
NUMBER OF CELLS COLLECTED: NORMAL
OSMOLALITY SERPL: 287 MOSM/KG (ref 280–300)
OSMOLALITY SERPL: 289 MOSM/KG (ref 280–300)
PATH REPORT.TOTAL CANCER: NORMAL
PHOSPHATE SERPL-MCNC: 2.2 MG/DL (ref 2.5–4.9)
PLATELET # BLD AUTO: 373 X10*3/UL (ref 150–450)
POTASSIUM SERPL-SCNC: 3.7 MMOL/L (ref 3.5–5.3)
RBC # BLD AUTO: 3.72 X10*6/UL (ref 4.5–5.9)
SIGNATURE COMMENT: NORMAL
SODIUM SERPL-SCNC: 134 MMOL/L (ref 136–145)
SODIUM SERPL-SCNC: 135 MMOL/L (ref 136–145)
SPECIMEN VIABILITY: NORMAL
THYROID STIMULATING IMMUNOGLOBULIN: <89 % BASELINE
VARICELLA ZOSTER VIRUS DNA PCR, QUANTITATIVE (NON-BLOOD: NOT DETECTED COPIES/ML
WBC # BLD AUTO: 8 X10*3/UL (ref 4.4–11.3)

## 2025-07-24 PROCEDURE — 2500000004 HC RX 250 GENERAL PHARMACY W/ HCPCS (ALT 636 FOR OP/ED)

## 2025-07-24 PROCEDURE — 2500000005 HC RX 250 GENERAL PHARMACY W/O HCPCS

## 2025-07-24 PROCEDURE — 37799 UNLISTED PX VASCULAR SURGERY: CPT

## 2025-07-24 PROCEDURE — 83930 ASSAY OF BLOOD OSMOLALITY: CPT

## 2025-07-24 PROCEDURE — 99232 SBSQ HOSP IP/OBS MODERATE 35: CPT | Performed by: PSYCHIATRY & NEUROLOGY

## 2025-07-24 PROCEDURE — 84295 ASSAY OF SERUM SODIUM: CPT

## 2025-07-24 PROCEDURE — 94669 MECHANICAL CHEST WALL OSCILL: CPT

## 2025-07-24 PROCEDURE — 92526 ORAL FUNCTION THERAPY: CPT | Mod: GN | Performed by: SPEECH-LANGUAGE PATHOLOGIST

## 2025-07-24 PROCEDURE — 2500000002 HC RX 250 W HCPCS SELF ADMINISTERED DRUGS (ALT 637 FOR MEDICARE OP, ALT 636 FOR OP/ED)

## 2025-07-24 PROCEDURE — 2060000001 HC INTERMEDIATE ICU ROOM DAILY

## 2025-07-24 PROCEDURE — 2500000001 HC RX 250 WO HCPCS SELF ADMINISTERED DRUGS (ALT 637 FOR MEDICARE OP)

## 2025-07-24 PROCEDURE — 82947 ASSAY GLUCOSE BLOOD QUANT: CPT

## 2025-07-24 PROCEDURE — 94150 VITAL CAPACITY TEST: CPT

## 2025-07-24 PROCEDURE — 94640 AIRWAY INHALATION TREATMENT: CPT

## 2025-07-24 PROCEDURE — 82330 ASSAY OF CALCIUM: CPT

## 2025-07-24 PROCEDURE — 85025 COMPLETE CBC W/AUTO DIFF WBC: CPT

## 2025-07-24 PROCEDURE — 80069 RENAL FUNCTION PANEL: CPT

## 2025-07-24 PROCEDURE — 83735 ASSAY OF MAGNESIUM: CPT

## 2025-07-24 RX ORDER — HYDRALAZINE HYDROCHLORIDE 20 MG/ML
10 INJECTION INTRAMUSCULAR; INTRAVENOUS
Status: ACTIVE | OUTPATIENT
Start: 2025-07-24 | End: 2025-07-28

## 2025-07-24 RX ORDER — LABETALOL HYDROCHLORIDE 5 MG/ML
10 INJECTION, SOLUTION INTRAVENOUS EVERY 10 MIN PRN
Status: ACTIVE | OUTPATIENT
Start: 2025-07-24 | End: 2025-07-28

## 2025-07-24 RX ADMIN — SODIUM PHOSPHATE, MONOBASIC, MONOHYDRATE AND SODIUM PHOSPHATE, DIBASIC, ANHYDROUS 15 MMOL: 142; 276 INJECTION, SOLUTION INTRAVENOUS at 17:40

## 2025-07-24 RX ADMIN — ATORVASTATIN CALCIUM 40 MG: 40 TABLET, FILM COATED ORAL at 20:07

## 2025-07-24 RX ADMIN — METOPROLOL TARTRATE 12.5 MG: 25 TABLET, FILM COATED ORAL at 09:23

## 2025-07-24 RX ADMIN — CETIRIZINE HYDROCHLORIDE 10 MG: 10 TABLET ORAL at 09:23

## 2025-07-24 RX ADMIN — FLUTICASONE PROPIONATE 1 SPRAY: 50 SPRAY, METERED NASAL at 09:24

## 2025-07-24 RX ADMIN — PIPERACILLIN SODIUM AND TAZOBACTAM SODIUM 4.5 G: 4; .5 INJECTION, SOLUTION INTRAVENOUS at 11:40

## 2025-07-24 RX ADMIN — PANTOPRAZOLE SODIUM 40 MG: 40 INJECTION, POWDER, LYOPHILIZED, FOR SOLUTION INTRAVENOUS at 09:23

## 2025-07-24 RX ADMIN — LOSARTAN POTASSIUM 100 MG: 100 TABLET, FILM COATED ORAL at 09:24

## 2025-07-24 RX ADMIN — ENOXAPARIN SODIUM 40 MG: 100 INJECTION SUBCUTANEOUS at 20:07

## 2025-07-24 RX ADMIN — SENNOSIDES 17.2 MG: 8.6 TABLET, FILM COATED ORAL at 21:17

## 2025-07-24 RX ADMIN — LIDOCAINE 4% 2 PATCH: 40 PATCH TOPICAL at 09:47

## 2025-07-24 RX ADMIN — POLYETHYLENE GLYCOL 3350 17 G: 17 POWDER, FOR SOLUTION ORAL at 09:24

## 2025-07-24 RX ADMIN — CHLORTHALIDONE 25 MG: 25 TABLET ORAL at 09:24

## 2025-07-24 RX ADMIN — METOPROLOL TARTRATE 12.5 MG: 25 TABLET, FILM COATED ORAL at 20:07

## 2025-07-24 RX ADMIN — THIAMINE HCL TAB 100 MG 100 MG: 100 TAB at 09:24

## 2025-07-24 RX ADMIN — PIPERACILLIN SODIUM AND TAZOBACTAM SODIUM 4.5 G: 4; .5 INJECTION, SOLUTION INTRAVENOUS at 05:21

## 2025-07-24 RX ADMIN — ACETAMINOPHEN 650 MG: 325 TABLET ORAL at 23:11

## 2025-07-24 RX ADMIN — SENNOSIDES 17.2 MG: 8.6 TABLET, FILM COATED ORAL at 09:24

## 2025-07-24 RX ADMIN — POTASSIUM CHLORIDE 20 MEQ: 14.9 INJECTION, SOLUTION INTRAVENOUS at 01:04

## 2025-07-24 RX ADMIN — SILODOSIN 4 MG: 4 CAPSULE ORAL at 09:24

## 2025-07-24 RX ADMIN — PIPERACILLIN SODIUM AND TAZOBACTAM SODIUM 4.5 G: 4; .5 INJECTION, SOLUTION INTRAVENOUS at 23:00

## 2025-07-24 RX ADMIN — AMLODIPINE BESYLATE 10 MG: 10 TABLET ORAL at 09:23

## 2025-07-24 RX ADMIN — PIPERACILLIN SODIUM AND TAZOBACTAM SODIUM 4.5 G: 4; .5 INJECTION, SOLUTION INTRAVENOUS at 18:09

## 2025-07-24 RX ADMIN — IMMUNE GLOBULIN INFUSION (HUMAN) 30 G: 100 INJECTION, SOLUTION INTRAVENOUS; SUBCUTANEOUS at 09:22

## 2025-07-24 ASSESSMENT — PAIN - FUNCTIONAL ASSESSMENT
PAIN_FUNCTIONAL_ASSESSMENT: 0-10

## 2025-07-24 ASSESSMENT — PAIN DESCRIPTION - DESCRIPTORS: DESCRIPTORS: ACHING

## 2025-07-24 ASSESSMENT — PAIN SCALES - GENERAL
PAINLEVEL_OUTOF10: 0 - NO PAIN
PAINLEVEL_OUTOF10: 5 - MODERATE PAIN
PAINLEVEL_OUTOF10: 0 - NO PAIN

## 2025-07-24 ASSESSMENT — PAIN DESCRIPTION - ORIENTATION: ORIENTATION: RIGHT;LEFT

## 2025-07-24 ASSESSMENT — PAIN DESCRIPTION - LOCATION: LOCATION: ARM

## 2025-07-24 NOTE — PROGRESS NOTES
Ayla Paredes is a 55 y.o. male on day 5 of admission presenting with Ataxia.    Subjective   No acute events overnight. Patient's BP peaked at 209/107. NSU able to perform LP yesterday. Patient overall feeling weak and tired this morning. No acute complaints at this time.       Objective    24 Hour Vitals  Temp:  [36.2 °C (97.2 °F)-36.7 °C (98.1 °F)] 36.7 °C (98.1 °F)  Heart Rate:  [] 91  Resp:  [13-26] 19  BP: (128-209)/() 161/94      Physical Exam      General: Patient continues to have increased secretions and hoarse voice, requiring repeated suction.    Mental State: Alert and oriented x4. Answers questions appropriately.     Cranial Nerves:   CN2 : Tracking appropriately.   CN 3, 4, 6: Bilateral end gaze restriction without nystagmus in any direction. Smooth pursuit intact.   CN 7: Normal and symmetric facial strength. Nasolabial folds symmetric.   CN 8: Hearing intact to voice.  CN 9/10/12: Evidence of pharyngeal dysfunction given low amplitude speech changes.      Motor: Muscle bulk and tone were normal in both upper and lower extremities. No fasciculations, tremor or other abnormal movements were present.      Strength                                             R          L  Hand                                             4         4  Hip Flexion                                          4+      4+  Knee Flexion    4+      4+  Knee Extension   4+      4+      Reflexes: Right/ Left:  Triceps 0/0, Brachioradialis 0/0, Biceps 1-/1-, patellar 0/0. No clonus.     Sensory:   Intact to crude touch in bilateral lower extremities.    Medications - per EMR      Assessment/Plan    Ayla Paredes is a 55 y.o.  male with a PMHx of  HTN, DM, CKD, cardiomyopathy, admitted on 7/18/2025 after developing acute onset stocking and glove paresthesias 3 days prior. On original exam no objective sensory changes, ataxia, diffuse hyperreflexia. However on repeat exam 7/21 he continues to have ataxia but also  diminished reflexes and dysphagia which has continued on subsequent examination. He also had comorbid acute onset hyponatremia, etiology c/f SIADH, though unclear why, which is now improved.    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 MRI pan spine with evidence of multilevel degenerative disc disease but no hyper intensities or enhancement to suggest central demyelinating lesion. Thus far HIV, and nutritional labs normal. LP conducted 7/22/25 with albuminocytologic dissociation. Pending additional neuropathy and LP labs as below. At this time clinical picture most consistent with AIDP.  On day 3/5 of IVIG.     #Acute symmetric stocking and glove neuropathy   #Ataxia, Dysphagia, Hyporefflexia   #Hyponatremia - improved  - Please continue IVIG for five days total  - No indication for EMG at this time  - Continue to monitor bilateral end gaze restriction.  - Pending serum paraneoplastic panel, VLCF, B6, Vitamin E, Ganglioside panel, LP labs  - Appreciate PT/OT/SLP recs      Note authored by:   Beth Molina MD  Neuro, PGY-1    Patient seen and discussed with attending, Dr. Ashton.       Yamilex Benites MD  Department of Neurology, PGY-4  [facility] [id number]     ----------------------------------------------------------------------------------------------------------------------------------------  ATTENDING ATTESTATION    I saw and evaluated the patient. I personally obtained the key and critical portions of the history and physical exam or was physically present for key and critical portions performed by the resident/fellow. I reviewed the resident/fellow's documentation and discussed the patient with the resident/fellow. I agree with the resident/fellow's medical decision making as documented in the note with the exception/addition of the following:     May consider EMG farther out from onset of symptoms which was around 7/15/2023.    Carmela  Daisha ZAYAS  General Neurology Attending  [facility]    Hospital Subsequent Daily Care Admission and Consult CODING and DOCUMENTATION DETERMINATION  Total time including some or all of the following: preparing to see the patient and reviewing test, obtaining and reviewing separately obtained history, performing medically proper examination and/or evaluation, counseling and educating one or more of the following---patient, and care coordination was equal to or greater than 25 minutes.

## 2025-07-24 NOTE — PROGRESS NOTES
Inpatient  Speech-Language Pathology Treatment     Patient Name: Ayla Paredes  MRN: [id number]  Today's Date: 7/24/2025  Time Calculation  Start Time: 1425  Stop Time: 1437  Time Calculation (min): 12 min           Assessment/Plan:  #dysphagia  - Pt demonstrated functional oropharyngeal swallow tolerating all PO challenges w/o overt s/s of aspiration. Reasonable to advance to PO diet- updated RN and primary team on results.        Recommendations:  Easy to chew solids & thin liquids  Upright for all PO intake  SLP to continue to follow to ensure diet tolerance/determine readiness for repeat assessment as pt appropriate/schedule permits  If pt presents with any changes to mental, medical, or respiratory status, please make NPO and alert SLP          Plan:  Inpatient/Swing Bed or Outpatient: Inpatient  SLP TX Plan: Continue Plan of Care  SLP Plan: Skilled SLP  SLP Frequency: 2x per week  Duration: 30 days  SLP Discharge Recommendations:  (TBD)  SLP - OK to Discharge: Yes      Subjective   Pt resting in bed. DHT in nares. On Room air. Pleasant during exam.   Prior to Session Communication: Bedside nurse        Objective     The 3 oz sequential drinks of thin liquid water was utilized as a reliable, evidence based test to rule out silent aspiration and determine need for additional testing, such as the MBS or FEES (fiberoptic endoscopic evaluation of swallow), if the test is equivocal, incomplete or pt shows s/sx of aspiration,  prior to recommending a oral diet    Presented several sips of water presenting to 3 ounces successive sips, that was successfully completed on first attempt, and then progressed to 4 ounces of pudding and pack of crackers.  Pt consumed the above independently, demonstrated efficient mastication and oral clearance and did not exhibit overt s/sx of aspiration during course of visit. Phonation was mildly weak however no wetness noted.        Encounter Problems       Encounter Problems (Active)        Swallowing       LTG - Patient will tolerate the least restrictive diet without overt difficulty by time of discharge. (Progressing)       Start:  07/21/25    Expected End:  07/28/25            SLP Goal 1 (Progressing)       Start:  07/21/25    Expected End:  07/28/25       STG - Patient will tolerate therapeutic trials of recommended consistency without clinical signs and symptoms of aspiration on 100% of trials                    Inpatient Education:  Extensive education provided to patient and/or Caregiver regarding current swallow function, recommendations/results, and POC. Demonstrated verbal understanding and were agreeable w/ the details/recommendations reviewed.

## 2025-07-24 NOTE — PROGRESS NOTES
Occupational Therapy                 Therapy Communication Note    Patient Name: Ayla Paredes  MRN: [id number]  Department: [facility]  Room: 03/03-A  Today's Date: 7/24/2025     Discipline: Occupational Therapy    Missed Visit: OT Missed Visit: Yes     Missed Visit Reason: Missed Visit Reason: Patient placed on medical hold (Pt with [facility]. Awaiting PPM. Currently with TVP (not screw in). Hold OT at this time.)    Missed Time: Attempt 0953    Comment:

## 2025-07-24 NOTE — PROGRESS NOTES
Ayla Paredes is a 55 y.o. male on day 6 of admission presenting with Ataxia.    Subjective   No acute events overnight. Downgraded from the NSU. Patient was tachycardic overnight ~110s, no chest pain and normalized. Patient overall feeling better this morning. Continues to have paresthesias and weakness, but swallows better. No acute complaints at this time.       Objective    24 Hour Vitals  Temp:  [36.2 °C (97.2 °F)-36.8 °C (98.2 °F)] 36.4 °C (97.5 °F)  Heart Rate:  [] 102  Resp:  [13-26] 22  BP: (148-195)/() 193/104      Physical Exam      General: Patient has decreased secretions and voice is improved from yesterday.    Mental State: Alert and oriented x4. Answers questions appropriately.     Cranial Nerves:   CN2 : Tracking appropriately.   CN 3, 4, 6: Bilateral end gaze restriction without nystagmus in any direction. Smooth pursuit intact.   CN 7: Normal and symmetric facial strength. Nasolabial folds symmetric.   CN 8: Hearing intact to voice.  CN 9/10/12: Evidence of pharyngeal dysfunction given low amplitude speech changes.      Motor: Muscle bulk and tone were normal in both upper and lower extremities. No fasciculations, tremor or other abnormal movements were present.      Strength                                             R          L  Hand                                             4         4  Hip Flexion                                          4+      4+  Knee Flexion    4+      4+  Knee Extension   4+      4+      Reflexes: Right/ Left:  Triceps 0/0, Brachioradialis 0/0, Biceps 1-/1-, patellar 0/0. No clonus.     Sensory:   Intact to crude touch in bilateral upper and lower extremities.    Medications - per EMR    Results from last 72 hours   Lab Units 07/24/25  1213 07/24/25  0524 07/24/25  0103 07/23/25  2108 07/23/25  1830 07/23/25  0512 07/23/25  0200   GLUCOSE mg/dL  --   --  104*  --  108*  --  111*   SODIUM mmol/L 134* 135* 134*  134*   < > 134*   < > 134*  134*    POTASSIUM mmol/L  --   --  3.7  --  3.2*  --  3.3*   CHLORIDE mmol/L  --   --  101  --  98  --  98   CO2 mmol/L  --   --  26  --  27  --  28   ANION GAP mmol/L  --   --  11  --  12  --  11   BUN mg/dL  --   --  19  --  18  --  16   CREATININE mg/dL  --   --  1.36*  --  1.29  --  1.07   EGFR mL/min/1.73m*2  --   --  61  --  65  --  82   CALCIUM mg/dL  --   --  8.4*  --  8.4*  --  8.5*   PHOSPHORUS mg/dL  --   --  2.2*  --  2.6  --  1.9*   ALBUMIN g/dL  --   --  3.1*  --  3.3*  --  3.5   MAGNESIUM mg/dL  --   --  2.38  --   --   --  2.52*   POCT CALCIUM IONIZED (MMOL/L) IN BLOOD mmol/L  --   --  1.11  --   --   --  1.11    < > = values in this interval not displayed.       Results from last 72 hours   Lab Units 07/24/25  0103 07/23/25  0200   WBC AUTO x10*3/uL 8.0 7.9   NRBC AUTO /100 WBCs 0.0 0.0   RBC AUTO x10*6/uL 3.72* 3.98*   HEMOGLOBIN g/dL 10.7* 11.5*   HEMATOCRIT % 32.5* 34.4*   MCV fL 87 86   MCH pg 28.8 28.9   MCHC g/dL 32.9 33.4   RDW % 13.5 13.5   PLATELETS AUTO x10*3/uL 373 400   NEUTROS PCT AUTO % 66.0 74.8   IG PCT AUTO % 0.4 0.8   LYMPHS PCT AUTO % 22.8 15.6   MONOS PCT AUTO % 9.4 7.3   EOS PCT AUTO % 0.1 0.1   BASOS PCT AUTO % 1.3 1.4   NEUTROS ABS x10*3/uL 5.29 5.94   IG AUTO x10*3/uL 0.03 0.06   LYMPHS ABS AUTO x10*3/uL 1.82 1.24   MONOS ABS AUTO x10*3/uL 0.75 0.58   EOS ABS AUTO x10*3/uL 0.01 0.01   BASOS ABS AUTO x10*3/uL 0.10 0.11*       Imaging  MR brain w IV contrast  Result Date: 7/23/2025  Limited MRI of the brain with only axial T1 as well as post gadolinium fat saturated axial T1 and post gadolinium volumetric T1 weighted MRI images of the brain obtained.   There is a small 3-4 mm focus of bright signal on the precontrast T1 images noted along the right dorsal margin of the midbrain as seen on axial T1 slice 15 and 16 of 38 which corresponds to a focus of bright signal on the axial FLAIR as well as noncontrast coronal and sagittal T1 weighted MRI images dated 07/18/2025. There is signal  dropout noted within this region on the current post gadolinium fat saturated T1 weighted images. There is no associated abnormal enhancement on the post gadolinium images. The finding corresponds to a small 3-4 mm focus of focal hypodensity relative to CSF measuring approximately -21 Hounsfield units on the prior CT study dated 07/18/2025 series 204, axial slice 35 of 93. 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   The ventricular system remains nondilated.   Mild nonspecific white matter changes are again noted within cerebral hemispheres bilaterally better appreciated on the prior FLAIR and T2 weighted images dated 07/18/2025.   MACRO: None   Signed by: Alex Velasco 7/23/2025 3:11 PM Dictation workstation:   [id number]      Assessment/Plan    Ayla Paredes is a 55 y.o.  male with a PMHx of  HTN, DM, CKD, cardiomyopathy, admitted on 7/18/2025 after developing acute onset stocking and glove paresthesias 3 days prior. On original exam no objective sensory changes, ataxia, diffuse hyperreflexia. However on repeat exam 7/21 he continues to have ataxia but also diminished reflexes and dysphagia which has continued on subsequent examination. He also had comorbid acute onset hyponatremia, etiology c/f SIADH, though unclear why, which is now improved.    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 MRI pan spine with evidence of multilevel degenerative disc disease but no hyper intensities or enhancement to suggest central demyelinating lesion. Thus far HIV, nutritional labs, ganglioside panel was normal. LP conducted 7/22/25 with albuminocytologic dissociation, CSF Igg and IGG/albumin index elevation. Pending additional neuropathy and LP labs as below. At this time clinical picture most consistent with GBS.  On day 4/5 of IVIG.     #Acute symmetric stocking and  "glove neuropathy   #Ataxia, Dysphagia, Hyporefflexia   #Hyponatremia - improved  - continue IVIG for five days total  - Considering EMG tomorrow or more likely on Monday  - Continue to monitor bilateral end gaze restriction.  - Pending serum paraneoplastic panel, VLCF,  LP labs  - Appreciate continued PT/OT/SLP recs, will transition pt from TF to TF + diet    #Concern for Aspiration Pneumonia   #Dysphagia  - Evidence on CT 7/21/25   - Plan:  - Complete empiric zosyn 7/28/25    #HTN  - BP Goal: Normotension   - c/w prn labetalol and hydralazine alternation   - c/w Clorthalidone 25 mg daily   - c/w losartan 100 mg daily   - c/w metoprolol tartrate 12.5 mg daily   - c/w amlodipine 10 mg daily      #HLD   - c/w atorvastatin 40 mg daily     #T2DM  -Glucoses within range   - hold metformin   - Accuchecks & ISS AC&HS     F: PRN  E: PRN  N: Tube feeds + \"easy to chew, thin liquid diet\"  A: PIV, CVC     O2: RA, PRN to maintain spO2>94%  Bowel Regimen: miralax, senokot  DVT ppx: Lovenox      Code Status: Presumed full code        Patient seen and discussed with attending, Dr. Ashton.    Beth Molina MD  PGY-1, Neuro    ----------------------------------------------------------------------------------------------------------------------------------------  ATTENDING ATTESTATION    I saw and evaluated the patient. I personally obtained the key and critical portions of the history and physical exam or was physically present for key and critical portions performed by the resident/fellow. I reviewed the resident/fellow's documentation and discussed the patient with the resident/fellow. I agree with the resident/fellow's medical decision making as documented in the note       Carmela Ashton MD  General Neurology Attending  [facility] Subsequent Daily Care Admission and Consult CODING and DOCUMENTATION DETERMINATION  Total time including some or all of the following: preparing to see the " patient and reviewing test, obtaining and reviewing separately obtained history, performing medically proper examination and/or evaluation, counseling and educating one or more of the following---patient, independently interpreting results and communicating results to one or more of the following patient, and care coordination was equal to or greater than 25 minutes.

## 2025-07-24 NOTE — PROGRESS NOTES
07/24/25 1439   Discharge Planning   Home or Post Acute Services Post acute facilities (Rehab/SNF/etc)   Type of Post Acute Facility Services Rehab   Expected Discharge Disposition Rehab   Patient Choice   Provider Choice list and CMS website (https://medicare.gov/care-compare#search) for post-acute Quality and Resource Measure Data were provided and reviewed with: Patient     Social Work Discharge Planning note:    -Plan per medical team: Pt is not medically ready for discharge. ADOD may be in the next day or two, so it is ok to have [facility] start precert.   -Payer: [employer]  -Status: Inpatient  -Discharge disposition: [facility] is starting insurance precert.  -Potential Barriers: none  -Anticipated Date of Discharge: 7/26/25    This SW   TERRIE Rivas, LSW    Office: [phone]  Secure chat via Haiku

## 2025-07-25 LAB
ALBUMIN SERPL BCP-MCNC: 3.3 G/DL (ref 3.4–5)
ANION GAP SERPL CALC-SCNC: 11 MMOL/L (ref 10–20)
ARSENIC BLD-MCNC: 10.2 UG/L
BACTERIA CSF CULT: NORMAL
BASOPHILS # BLD AUTO: 0.11 X10*3/UL (ref 0–0.1)
BASOPHILS NFR BLD AUTO: 1.1 %
BUN SERPL-MCNC: 20 MG/DL (ref 6–23)
CA-I BLD-SCNC: 1.16 MMOL/L (ref 1.1–1.33)
CALCIUM SERPL-MCNC: 9 MG/DL (ref 8.6–10.6)
CHLORIDE SERPL-SCNC: 96 MMOL/L (ref 98–107)
CO2 SERPL-SCNC: 29 MMOL/L (ref 21–32)
CREAT SERPL-MCNC: 1.47 MG/DL (ref 0.5–1.3)
EGFRCR SERPLBLD CKD-EPI 2021: 56 ML/MIN/1.73M*2
EOSINOPHIL # BLD AUTO: 0.06 X10*3/UL (ref 0–0.7)
EOSINOPHIL NFR BLD AUTO: 0.6 %
ERYTHROCYTE [DISTWIDTH] IN BLOOD BY AUTOMATED COUNT: 13.7 % (ref 11.5–14.5)
FUNGUS SPEC CULT: NORMAL
FUNGUS SPEC FUNGUS STN: NORMAL
GLUCOSE BLD MANUAL STRIP-MCNC: 100 MG/DL (ref 74–99)
GLUCOSE BLD MANUAL STRIP-MCNC: 101 MG/DL (ref 74–99)
GLUCOSE BLD MANUAL STRIP-MCNC: 113 MG/DL (ref 74–99)
GLUCOSE BLD MANUAL STRIP-MCNC: 91 MG/DL (ref 74–99)
GLUCOSE SERPL-MCNC: 107 MG/DL (ref 74–99)
GRAM STN SPEC: NORMAL
GRAM STN SPEC: NORMAL
HCT VFR BLD AUTO: 30.3 % (ref 41–52)
HGB BLD-MCNC: 10.2 G/DL (ref 13.5–17.5)
IMM GRANULOCYTES # BLD AUTO: 0.04 X10*3/UL (ref 0–0.7)
IMM GRANULOCYTES NFR BLD AUTO: 0.4 % (ref 0–0.9)
LEAD BLDV-MCNC: <2 UG/DL
LYMPHOCYTES # BLD AUTO: 2.61 X10*3/UL (ref 1.2–4.8)
LYMPHOCYTES NFR BLD AUTO: 26.7 %
MAGNESIUM SERPL-MCNC: 2.29 MG/DL (ref 1.6–2.4)
MCH RBC QN AUTO: 29 PG (ref 26–34)
MCHC RBC AUTO-ENTMCNC: 33.7 G/DL (ref 32–36)
MCV RBC AUTO: 86 FL (ref 80–100)
MERCURY BLD-MCNC: <2.5 UG/L
MONOCYTES # BLD AUTO: 0.86 X10*3/UL (ref 0.1–1)
MONOCYTES NFR BLD AUTO: 8.8 %
NEUTROPHILS # BLD AUTO: 6.09 X10*3/UL (ref 1.2–7.7)
NEUTROPHILS NFR BLD AUTO: 62.4 %
NRBC BLD-RTO: 0 /100 WBCS (ref 0–0)
OSMOLALITY SERPL: 290 MOSM/KG (ref 280–300)
OSMOLALITY SERPL: 294 MOSM/KG (ref 280–300)
OSMOLALITY SERPL: 294 MOSM/KG (ref 280–300)
PHOSPHATE SERPL-MCNC: 3.5 MG/DL (ref 2.5–4.9)
PLATELET # BLD AUTO: 385 X10*3/UL (ref 150–450)
POTASSIUM SERPL-SCNC: 3.3 MMOL/L (ref 3.5–5.3)
RBC # BLD AUTO: 3.52 X10*6/UL (ref 4.5–5.9)
SODIUM SERPL-SCNC: 133 MMOL/L (ref 136–145)
WBC # BLD AUTO: 9.8 X10*3/UL (ref 4.4–11.3)

## 2025-07-25 PROCEDURE — 36415 COLL VENOUS BLD VENIPUNCTURE: CPT

## 2025-07-25 PROCEDURE — 83930 ASSAY OF BLOOD OSMOLALITY: CPT

## 2025-07-25 PROCEDURE — 84295 ASSAY OF SERUM SODIUM: CPT

## 2025-07-25 PROCEDURE — 2500000002 HC RX 250 W HCPCS SELF ADMINISTERED DRUGS (ALT 637 FOR MEDICARE OP, ALT 636 FOR OP/ED)

## 2025-07-25 PROCEDURE — 83735 ASSAY OF MAGNESIUM: CPT

## 2025-07-25 PROCEDURE — 2500000001 HC RX 250 WO HCPCS SELF ADMINISTERED DRUGS (ALT 637 FOR MEDICARE OP)

## 2025-07-25 PROCEDURE — 1200000002 HC GENERAL ROOM WITH TELEMETRY DAILY

## 2025-07-25 PROCEDURE — 97116 GAIT TRAINING THERAPY: CPT | Mod: GP

## 2025-07-25 PROCEDURE — 2500000004 HC RX 250 GENERAL PHARMACY W/ HCPCS (ALT 636 FOR OP/ED): Mod: JZ

## 2025-07-25 PROCEDURE — 37799 UNLISTED PX VASCULAR SURGERY: CPT

## 2025-07-25 PROCEDURE — 86901 BLOOD TYPING SEROLOGIC RH(D): CPT

## 2025-07-25 PROCEDURE — 82330 ASSAY OF CALCIUM: CPT

## 2025-07-25 PROCEDURE — 2500000004 HC RX 250 GENERAL PHARMACY W/ HCPCS (ALT 636 FOR OP/ED)

## 2025-07-25 PROCEDURE — 80069 RENAL FUNCTION PANEL: CPT

## 2025-07-25 PROCEDURE — 85025 COMPLETE CBC W/AUTO DIFF WBC: CPT

## 2025-07-25 PROCEDURE — 97530 THERAPEUTIC ACTIVITIES: CPT | Mod: GP

## 2025-07-25 PROCEDURE — 2500000005 HC RX 250 GENERAL PHARMACY W/O HCPCS

## 2025-07-25 PROCEDURE — 94640 AIRWAY INHALATION TREATMENT: CPT

## 2025-07-25 PROCEDURE — 99231 SBSQ HOSP IP/OBS SF/LOW 25: CPT | Performed by: PSYCHIATRY & NEUROLOGY

## 2025-07-25 PROCEDURE — 82947 ASSAY GLUCOSE BLOOD QUANT: CPT

## 2025-07-25 RX ORDER — AMLODIPINE BESYLATE 10 MG/1
10 TABLET ORAL DAILY
Status: DISCONTINUED | OUTPATIENT
Start: 2025-07-26 | End: 2025-07-30 | Stop reason: HOSPADM

## 2025-07-25 RX ORDER — WATER
1500 LIQUID (ML) MISCELLANEOUS DAILY
Status: DISCONTINUED | OUTPATIENT
Start: 2025-07-25 | End: 2025-07-30 | Stop reason: HOSPADM

## 2025-07-25 RX ORDER — METOPROLOL TARTRATE 25 MG/1
12.5 TABLET, FILM COATED ORAL 2 TIMES DAILY
Status: DISCONTINUED | OUTPATIENT
Start: 2025-07-25 | End: 2025-07-30 | Stop reason: HOSPADM

## 2025-07-25 RX ORDER — LANOLIN ALCOHOL/MO/W.PET/CERES
100 CREAM (GRAM) TOPICAL DAILY
Status: DISCONTINUED | OUTPATIENT
Start: 2025-07-26 | End: 2025-07-30 | Stop reason: HOSPADM

## 2025-07-25 RX ORDER — SILODOSIN 4 MG/1
4 CAPSULE ORAL
Status: DISCONTINUED | OUTPATIENT
Start: 2025-07-26 | End: 2025-07-30 | Stop reason: HOSPADM

## 2025-07-25 RX ORDER — POTASSIUM CHLORIDE 1.5 G/1.58G
40 POWDER, FOR SOLUTION ORAL ONCE
Status: COMPLETED | OUTPATIENT
Start: 2025-07-25 | End: 2025-07-25

## 2025-07-25 RX ORDER — POTASSIUM CHLORIDE 1.5 G/1.58G
40 POWDER, FOR SOLUTION ORAL EVERY 6 HOURS PRN
Status: DISCONTINUED | OUTPATIENT
Start: 2025-07-25 | End: 2025-07-25

## 2025-07-25 RX ORDER — POTASSIUM CHLORIDE 20 MEQ/1
20 TABLET, EXTENDED RELEASE ORAL EVERY 6 HOURS PRN
Status: DISCONTINUED | OUTPATIENT
Start: 2025-07-25 | End: 2025-07-25

## 2025-07-25 RX ORDER — ATORVASTATIN CALCIUM 40 MG/1
40 TABLET, FILM COATED ORAL NIGHTLY
Status: DISCONTINUED | OUTPATIENT
Start: 2025-07-25 | End: 2025-07-30 | Stop reason: HOSPADM

## 2025-07-25 RX ORDER — POTASSIUM CHLORIDE 20 MEQ/1
40 TABLET, EXTENDED RELEASE ORAL EVERY 6 HOURS PRN
Status: DISCONTINUED | OUTPATIENT
Start: 2025-07-25 | End: 2025-07-25

## 2025-07-25 RX ORDER — DIAZEPAM 5 MG/1
5 TABLET ORAL EVERY 8 HOURS PRN
Status: DISCONTINUED | OUTPATIENT
Start: 2025-07-25 | End: 2025-07-30 | Stop reason: HOSPADM

## 2025-07-25 RX ORDER — CETIRIZINE HYDROCHLORIDE 10 MG/1
10 TABLET ORAL DAILY
Status: DISCONTINUED | OUTPATIENT
Start: 2025-07-26 | End: 2025-07-30 | Stop reason: HOSPADM

## 2025-07-25 RX ORDER — POTASSIUM CHLORIDE 1.5 G/1.58G
20 POWDER, FOR SOLUTION ORAL EVERY 6 HOURS PRN
Status: DISCONTINUED | OUTPATIENT
Start: 2025-07-25 | End: 2025-07-25

## 2025-07-25 RX ORDER — HYDROXYZINE HYDROCHLORIDE 25 MG/1
25 TABLET, FILM COATED ORAL EVERY 8 HOURS PRN
Status: DISCONTINUED | OUTPATIENT
Start: 2025-07-25 | End: 2025-07-30 | Stop reason: HOSPADM

## 2025-07-25 RX ORDER — ACETAMINOPHEN 325 MG/1
650 TABLET ORAL EVERY 6 HOURS PRN
Status: DISCONTINUED | OUTPATIENT
Start: 2025-07-25 | End: 2025-07-30 | Stop reason: HOSPADM

## 2025-07-25 RX ADMIN — THIAMINE HCL TAB 100 MG 100 MG: 100 TAB at 08:10

## 2025-07-25 RX ADMIN — SILODOSIN 4 MG: 4 CAPSULE ORAL at 08:09

## 2025-07-25 RX ADMIN — PIPERACILLIN SODIUM AND TAZOBACTAM SODIUM 4.5 G: 4; .5 INJECTION, SOLUTION INTRAVENOUS at 18:21

## 2025-07-25 RX ADMIN — PIPERACILLIN SODIUM AND TAZOBACTAM SODIUM 4.5 G: 4; .5 INJECTION, SOLUTION INTRAVENOUS at 04:52

## 2025-07-25 RX ADMIN — ENOXAPARIN SODIUM 40 MG: 100 INJECTION SUBCUTANEOUS at 20:32

## 2025-07-25 RX ADMIN — FLUTICASONE PROPIONATE 1 SPRAY: 50 SPRAY, METERED NASAL at 08:10

## 2025-07-25 RX ADMIN — PIPERACILLIN SODIUM AND TAZOBACTAM SODIUM 4.5 G: 4; .5 INJECTION, SOLUTION INTRAVENOUS at 10:35

## 2025-07-25 RX ADMIN — LOSARTAN POTASSIUM 100 MG: 100 TABLET, FILM COATED ORAL at 08:09

## 2025-07-25 RX ADMIN — PIPERACILLIN SODIUM AND TAZOBACTAM SODIUM 4.5 G: 4; .5 INJECTION, SOLUTION INTRAVENOUS at 23:25

## 2025-07-25 RX ADMIN — PIPERACILLIN SODIUM AND TAZOBACTAM SODIUM 4.5 G: 4; .5 INJECTION, SOLUTION INTRAVENOUS at 18:16

## 2025-07-25 RX ADMIN — IMMUNE GLOBULIN INFUSION (HUMAN) 30 G: 100 INJECTION, SOLUTION INTRAVENOUS; SUBCUTANEOUS at 08:25

## 2025-07-25 RX ADMIN — ACETAMINOPHEN 650 MG: 325 TABLET ORAL at 23:24

## 2025-07-25 RX ADMIN — POTASSIUM CHLORIDE 40 MEQ: 1.5 POWDER, FOR SOLUTION ORAL at 10:36

## 2025-07-25 RX ADMIN — CHLORTHALIDONE 25 MG: 25 TABLET ORAL at 08:10

## 2025-07-25 RX ADMIN — AMLODIPINE BESYLATE 10 MG: 10 TABLET ORAL at 08:10

## 2025-07-25 RX ADMIN — CETIRIZINE HYDROCHLORIDE 10 MG: 10 TABLET ORAL at 08:10

## 2025-07-25 RX ADMIN — METOPROLOL TARTRATE 12.5 MG: 25 TABLET, FILM COATED ORAL at 20:32

## 2025-07-25 RX ADMIN — ATORVASTATIN CALCIUM 40 MG: 40 TABLET, FILM COATED ORAL at 20:32

## 2025-07-25 RX ADMIN — METOPROLOL TARTRATE 12.5 MG: 25 TABLET, FILM COATED ORAL at 08:09

## 2025-07-25 RX ADMIN — PANTOPRAZOLE SODIUM 40 MG: 40 INJECTION, POWDER, LYOPHILIZED, FOR SOLUTION INTRAVENOUS at 08:13

## 2025-07-25 ASSESSMENT — PAIN SCALES - GENERAL
PAINLEVEL_OUTOF10: 0 - NO PAIN

## 2025-07-25 ASSESSMENT — PAIN - FUNCTIONAL ASSESSMENT
PAIN_FUNCTIONAL_ASSESSMENT: 0-10

## 2025-07-25 ASSESSMENT — COGNITIVE AND FUNCTIONAL STATUS - GENERAL
MOBILITY SCORE: 12
CLIMB 3 TO 5 STEPS WITH RAILING: TOTAL
MOVING TO AND FROM BED TO CHAIR: A LOT
MOVING FROM LYING ON BACK TO SITTING ON SIDE OF FLAT BED WITH BEDRAILS: A LITTLE
STANDING UP FROM CHAIR USING ARMS: A LOT
WALKING IN HOSPITAL ROOM: TOTAL
TURNING FROM BACK TO SIDE WHILE IN FLAT BAD: A LITTLE

## 2025-07-25 NOTE — PROGRESS NOTES
Ayla Paredes is a 55 y.o. male on day 7 of admission presenting with Ataxia.    Subjective   No acute events overnight. Patient has been having increased BM 4-6x within last 24 hours. Soft brown. Denies chest pain, dyspnea, dizziness, vision changes. Patient overall feeling okay this morning. Continues to have paresthesias and weakness, but swallows better. No acute complaints at this time.       Objective    24 Hour Vitals  Temp:  [36.2 °C (97.2 °F)-37 °C (98.6 °F)] 36.2 °C (97.2 °F)  Heart Rate:  [] 100  Resp:  [10-21] 20  BP: (118-157)/() 138/104      Physical Exam      General: Patient has decreased secretions and voice is improved from yesterday.    Respiratory: Lungs clear to auscultation bilaterally. SBT 25    Mental State: Alert and oriented x4. Answers questions appropriately.     Cranial Nerves:   CN2 : Tracking appropriately.   CN 3, 4, 6: Improved bilateral end gaze restriction without nystagmus in any direction. Smooth pursuit intact.   CN 7: Normal and symmetric facial strength. Nasolabial folds symmetric.   CN 8: Hearing intact to voice.  CN 9/10/12: Evidence of pharyngeal dysfunction given low amplitude speech changes.      Motor: Muscle bulk and tone were normal in both upper and lower extremities. No fasciculations, tremor or other abnormal movements were present.      Strength                                             R          L  Neck Flexion                       5  Neck Extension          5  Shoulder abduction (C4-C5)                5          5  Elbow flexion (C5-C6)                          5          5  Elbow extension (C7-C8)                     5          5  Hand     4+       4+  Wrist extension (C6-C7)                      5          5  Finger extension (C7-C8)                     4          4  Finger flexion Median (C8-T1)             4          4  Finger flexion Ulnar (C8-T1)                 4         4     Hip flexion (IP, L2-L3)                          3           3  Knee extension (Fem, L2-L4)               5          5  Knee flexion (Sci, L5-S1)                      5          5  DorsiFlex (DePer, L5-S1l)                     5          5  PlantarFlex (Tibial, S1-S2)                    5          5      Reflexes: Right/ Left:  Triceps 0/0, Brachioradialis 0/0, Biceps 0/0, patellar 0/0. No clonus.     Sensory:   Intact to crude touch in bilateral upper and lower extremities.    Medications - per EMR    Results from last 72 hours   Lab Units 07/25/25 0315 07/24/25  2316 07/24/25  0524 07/24/25  0103   GLUCOSE mg/dL 107*  --   --  104*   SODIUM mmol/L 133*  133* 133*   < > 134*  134*   POTASSIUM mmol/L 3.3*  --   --  3.7   CHLORIDE mmol/L 96*  --   --  101   CO2 mmol/L 29  --   --  26   ANION GAP mmol/L 11  --   --  11   BUN mg/dL 20  --   --  19   CREATININE mg/dL 1.47*  --   --  1.36*   EGFR mL/min/1.73m*2 56*  --   --  61   CALCIUM mg/dL 9.0  --   --  8.4*   PHOSPHORUS mg/dL 3.5  --   --  2.2*   ALBUMIN g/dL 3.3*  --   --  3.1*   MAGNESIUM mg/dL 2.29  --   --  2.38   POCT CALCIUM IONIZED (MMOL/L) IN BLOOD mmol/L 1.16  --   --  1.11    < > = values in this interval not displayed.       Results from last 72 hours   Lab Units 07/25/25 0315 07/24/25  0103   WBC AUTO x10*3/uL 9.8 8.0   NRBC AUTO /100 WBCs 0.0 0.0   RBC AUTO x10*6/uL 3.52* 3.72*   HEMOGLOBIN g/dL 10.2* 10.7*   HEMATOCRIT % 30.3* 32.5*   MCV fL 86 87   MCH pg 29.0 28.8   MCHC g/dL 33.7 32.9   RDW % 13.7 13.5   PLATELETS AUTO x10*3/uL 385 373   NEUTROS PCT AUTO % 62.4 66.0   IG PCT AUTO % 0.4 0.4   LYMPHS PCT AUTO % 26.7 22.8   MONOS PCT AUTO % 8.8 9.4   EOS PCT AUTO % 0.6 0.1   BASOS PCT AUTO % 1.1 1.3   NEUTROS ABS x10*3/uL 6.09 5.29   IG AUTO x10*3/uL 0.04 0.03   LYMPHS ABS AUTO x10*3/uL 2.61 1.82   MONOS ABS AUTO x10*3/uL 0.86 0.75   EOS ABS AUTO x10*3/uL 0.06 0.01   BASOS ABS AUTO x10*3/uL 0.11* 0.10       Imaging  MR brain w IV contrast  Result Date: 7/23/2025  Limited MRI of the brain with only axial T1  as well as post gadolinium fat saturated axial T1 and post gadolinium volumetric T1 weighted MRI images of the brain obtained.   There is a small 3-4 mm focus of bright signal on the precontrast T1 images noted along the right dorsal margin of the midbrain as seen on axial T1 slice 15 and 16 of 38 which corresponds to a focus of bright signal on the axial FLAIR as well as noncontrast coronal and sagittal T1 weighted MRI images dated 07/18/2025. There is signal dropout noted within this region on the current post gadolinium fat saturated T1 weighted images. There is no associated abnormal enhancement on the post gadolinium images. The finding corresponds to a small 3-4 mm focus of focal hypodensity relative to CSF measuring approximately -21 Hounsfield units on the prior CT study dated 07/18/2025 series 204, axial slice 35 of 93. 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   The ventricular system remains nondilated.   Mild nonspecific white matter changes are again noted within cerebral hemispheres bilaterally better appreciated on the prior FLAIR and T2 weighted images dated 07/18/2025.   MACRO: None   Signed by: Alex Velasco 7/23/2025 3:11 PM Dictation workstation:   [id number]      Assessment/Plan    Ayla Paredes is a 55 y.o.  male with a PMHx of  HTN, DM, CKD, cardiomyopathy, admitted on 7/18/2025 after developing acute onset stocking and glove paresthesias 3 days prior. On original exam no objective sensory changes, ataxia, diffuse hyperreflexia. However on repeat exam 7/21 he continues to have ataxia but also diminished reflexes and dysphagia which has continued on subsequent examination. He also had comorbid acute onset hyponatremia, etiology c/f SIADH, though unclear why, which is now improved.    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 MRI pan  spine with evidence of multilevel degenerative disc disease but no hyper intensities or enhancement to suggest central demyelinating lesion. Thus far HIV, nutritional labs, ganglioside panel was normal. LP conducted 7/22/25 with albuminocytologic dissociation, CSF Igg and IGG/albumin index elevation. Pending additional neuropathy and LP labs as below. At this time clinical picture most consistent with GBS.  On day 5/5 of IVIG.     #Acute symmetric stocking and glove neuropathy   #Ataxia, Dysphagia, Hyporefflexia   #Hyponatremia - improved  - Planning for EMG Monday  - Continue to monitor bilateral end gaze restriction.  - Pending serum paraneoplastic panel, VLCF, LP labs  - Stopped tube feeds, Removed NG tube, now on po diet.  - Continue fluid restriction 1500 mL for SIADH --> hyponatremia, but now with Gatorade     #Concern for Aspiration Pneumonia   #Dysphagia  - Evidence on CT 7/21/25   - Plan:  - Complete empiric zosyn 7/28/25    #HTN  - BP Goal: Normotension   - c/w prn labetalol and hydralazine alternation   - c/w Clorthalidone 25 mg daily   - c/w losartan 100 mg daily   - c/w metoprolol tartrate 12.5 mg daily   - c/w amlodipine 10 mg daily      #HLD   - c/w atorvastatin 40 mg daily     #T2DM  -Glucoses within range   - hold metformin   - Accuchecks & ISS AC&HS     F: PRN  E: PRN  N: CCD 90, Easy to Chew, Thin Liquid, 1500 mL fluid restriction   A: PIV, CVC     O2: RA, PRN to maintain spO2>94%  Bowel Regimen: miralax, senokot (holding due to Bms)  DVT ppx: Lovenox      Code Status: Presumed full code        Patient seen and discussed with attending, Dr. Ashton.    Beth Molina MD  PGY-1, Neuro    ----------------------------------------------------------------------------------------------------------------------------------------  ATTENDING ATTESTATION    I saw and evaluated the patient. I personally obtained the key and critical portions of the history and physical exam or was physically present for key and  critical portions performed by the resident/fellow. I reviewed the resident/fellow's documentation and discussed the patient with the resident/fellow. I agree with the resident/fellow's medical decision making as documented in the note with the exception/addition of the following:     Finger to nose with improved dysmetria; he has passed his swallow test and seems to be improving mildly. We will finish full IVIG. May consider EMG once he is beyond 12 days. Working diagnosis with sensory involvement in feet and fingertips, mild weakness, loss of reflexes and ataxia is a form of GBS.     Carmela Ashton MD  General Neurology Attending  [facility]    Hospital Subsequent Daily Care Admission and Consult CODING and DOCUMENTATION DETERMINATION  Total time including some or all of the following: preparing to see the patient and reviewing test, obtaining and reviewing separately obtained history, performing medically proper examination and/or evaluation, counseling and educating one or more of the following---patient, independently interpreting results and communicating results to one or more of the following patient & family, and care coordination was equal to or greater than 25 minutes.

## 2025-07-25 NOTE — PROGRESS NOTES
07/25/25 1553   Discharge Planning   Home or Post Acute Services Post acute facilities (Rehab/SNF/etc)   Type of Post Acute Facility Services Rehab   Expected Discharge Disposition Rehab   Patient Choice   Provider Choice list and CMS website (https://medicare.gov/care-compare#search) for post-acute Quality and Resource Measure Data were provided and reviewed with: Patient     Social Work Discharge Planning note:    -Plan per medical team: Medical team still rounding and will let us know ADOD. Pt's sodium is low, but he otherwise may be medically ready for discharge soon.    -Payer: [employer]  -Status: Inpatient  -Discharge disposition: As of 16:11, [facility] is still awaiting insurance precert. If precert is not received by 17:00 today,  Rehab will have their weekend staff follow up. SW will continue to follow to assist with discharge planning.    -Anticipated Date of Discharge: 7/26/25    Update (16:20): Per medical team, ADOD is now Monday, 7/28/25.     This TERRIE Johnson, LSW    Office: [phone]  Secure chat via Haiku

## 2025-07-25 NOTE — PROGRESS NOTES
Physical Therapy    Physical Therapy Treatment    Patient Name: Ayla Paredes  MRN: [id number]  Today's Date: 7/25/2025  Time Calculation  Start Time: 0934  Stop Time: 1002  Time Calculation (min): 28 min       Assessment/Plan   PT Assessment  Rehab Prognosis: Excellent  Barriers to Discharge Home: Physical needs, Caregiver assistance  Caregiver Assistance: Caregiver assistance needed per identified barriers - however, level of patient's required assistance exceeds assistance available at home  Physical Needs: High falls risk due to function or environment  Evaluation/Treatment Tolerance: Patient tolerated treatment well  End of Session Communication: Bedside nurse  Assessment Comment: Patient to benefit from ongoing PT services to address the above limitations and to facilitate timely return to prior level of function.  End of Session Patient Position: Up in chair, Alarm on  PT Plan  Inpatient/Swing Bed or Outpatient: Inpatient  PT Plan  Treatment/Interventions: Bed mobility, Transfer training, Gait training, Balance training, Neuromuscular re-education, Endurance training, Therapeutic exercise, Therapeutic activity, Home exercise program, Postural re-education, Strengthening, Stair training  PT Plan: Ongoing PT  PT Frequency: 5 times per week  PT Discharge Recommendations: High intensity level of continued care  Equipment Recommended upon Discharge: Wheeled walker  PT Recommended Transfer Status: Assist x2, Assistive device  PT - OK to Discharge: Yes      General Visit Information:   PT  Visit  PT Received On: 07/25/25  Response to Previous Treatment: Patient with no complaints from previous session.  Reason for Referral: presenting for bilateral hand and foot numbness and ataxia. multilevel cervical spondylosis with posterior disc osteophyte complexes causing C5-C6 and C6-C7 central canal narrowing. 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 At time clinical picture is  becoming more concerning for an AIDP picture possibly Terrence Prasad.  Past Medical History Relevant to Rehab: HTN, DM, CKD  Prior to Session Communication: Bedside nurse  Patient Position Received: Bed, 3 rail up, Alarm off, not on at start of session  Family/Caregiver Present: No  Caregiver Feedback: n/a  General Comment: Pt cooperative and agreeable to PT services. Very motivated to mobilize today.     Subjective   Precautions:  Precautions  Hearing/Visual Limitations: WFL  Medical Precautions: Fall precautions  Precautions Comment: SBP <180    Vital Signs:   Date/Time Vitals Session Patient Position Pulse Resp SpO2 BP MAP (mmHg)    07/25/25 0934 Pre PT  Lying  82  18  100 %  140/88  99     07/25/25 0944 During PT  Sitting  122  24  99 %  143/102  115     07/25/25 1000 --  --  113  18  100 %  155/96  112     07/25/25 1002 Post PT  Sitting  101  23  99 %  155/96  112     07/25/25 1100 --  --  79  17  99 %  107/83  91        Objective   Pain:  Pain Assessment  Pain Assessment: 0-10  0-10 (Numeric) Pain Score: 0 - No pain  Cognition:  Cognition  Overall Cognitive Status: Within Functional Limits  Arousal/Alertness: Appropriate responses to stimuli  Orientation Level: Oriented X4  Following Commands: Follows multistep commands consistently  Insight: Mild  Impulsive: Mildly  Processing Speed: Within funtional limits    Lines/Tubes/Drains:  CVC 07/20/25 Triple lumen Right Femoral (Active)   Number of days: 4       NG/OG/Feeding Tube Right nostril (Active)   Number of days: 4     Oxygen: room air     Postural Control:   Postural Control  Postural Control: Within Functional Limits  Head Control: WFL  Trunk Control: WFL  Righting Reactions: Intact  Protective Responses: Intact  Posture Comment: Rounded shoulders    Balance:   Static Sitting Balance  Static Sitting-Balance Support: Bilateral upper extremity supported, Feet supported  Static Sitting-Level of Assistance: Close supervision  Static Sitting-Comment/Number of  Minutes: 10 min total  Dynamic Sitting Balance  Dynamic Sitting-Balance Support: Bilateral upper extremity supported, Feet supported  Dynamic Sitting-Level of Assistance: Close supervision  Dynamic Sitting-Balance: Forward lean    Static Standing Balance  Static Standing-Balance Support: Bilateral upper extremity supported  Static Standing-Level of Assistance: Minimum assistance  Static Standing-Comment/Number of Minutes: .  Dynamic Standing Balance  Dynamic Standing-Balance Support: Bilateral upper extremity supported  Dynamic Standing-Level of Assistance: Moderate assistance  Dynamic Standing-Balance: Turning    Coordination:  Coordination  Movements are Fluid and Coordinated: No  Lower Body Coordination: Ataxia with stepping during ambulation training    Extremity/Trunk Assessments:  Strength:                 RLE   RLE : Exceptions to WFL  Strength RLE  R Knee Flexion: 5/5  R Knee Extension: 5/5  R Ankle Dorsiflexion: 5/5  R Ankle Plantar Flexion: 5/5    LLE   LLE : Exceptions to WFL  Strength LLE  L Knee Flexion: 5/5  L Knee Extension: 5/5  L Ankle Dorsiflexion: 5/5  L Ankle Plantar Flexion: 5/5    PT Treatments:       Therapeutic Activity  Therapeutic Activity Performed: Yes  Therapeutic Activity 1: Skilled vitals monitoring provided throughout session to ensure pt activity tolerance.         Bed Mobility  Bed Mobility: Yes  Bed Mobility 1  Bed Mobility 1: Supine to sitting  Level of Assistance 1: Minimum assistance  Bed Mobility Comments 1: HOB flat, cues for proper hand placement    Ambulation/Gait Training  Ambulation/Gait Training Performed: Yes  Ambulation/Gait Training 1  Surface 1: Level tile  Device 1: Rolling walker  Gait Support Devices: Gait belt  Assistance 1: Moderate assistance (Plus chair follow)  Quality of Gait 1: Narrow base of support, Inconsistent stride length, Ataxic, Soft knee(s) (Increased B lateral sway. Cues for pathfinding and increased B step length)  Comments/Distance (ft) 1: 3 ft  laterally with B arm in arm assist, then 10 ft fwd in room with walker and chair follow.    Transfers  Transfer: Yes  Transfer 1  Transfer From 1: Bed to  Transfer to 1: Stand  Technique 1: Sit to stand, Stand to sit  Transfer Device 1: Gait belt  Transfer Level of Assistance 1: Moderate assistance  Trials/Comments 1: Cues for proper hand placement and controlling speed.  Transfers 2  Transfer From 2: Stand to  Transfer to 2: Chair with drop arm  Technique 2: Stand to sit  Transfer Device 2: Gait belt  Transfer Level of Assistance 2: Moderate assistance  Trials/Comments 2: Cues for controlling speed  Transfers 3  Transfer From 3: Chair with arms to  Transfer to 3: Stand  Technique 3: Sit to stand, Stand to sit  Transfer Device 3: Walker, Gait belt  Transfer Level of Assistance 3: Minimum assistance  Trials/Comments 3: Cues for proper hand placement and controlling speed.    Stairs  Stairs: No              Outcome Measures:  [facility] Basic Mobility  Turning from your back to your side while in a flat bed without using bedrails: A little  Moving from lying on your back to sitting on the side of a flat bed without using bedrails: A little  Moving to and from bed to chair (including a wheelchair): A lot  Standing up from a chair using your arms (e.g. wheelchair or bedside chair): A lot  To walk in hospital room: Total  Climbing 3-5 steps with railing: Total  Basic Mobility - Total Score: 12                   FSS-ICU  Ambulation: Walks <50 feet with any assistance x1 or walks any distance with assistance x2 people  Rolling: Minimal assistance (performs 75% or more of task)  Sitting: Supervision or set-up only  Transfer Sit-to-Stand: Moderate assistance (performs 50 - 74% of task)  Transfer Supine-to-Sit: Minimal assistance (performs 75% or more of task)  Total Score: 17    ICU Mobility Screen  Early Mobility/Exercise Safety Screen: Proceed with mobilization - No exclusion criteria met  ICU Mobility Scale: Walking with  assistance of 2 or more people                   Education:  Education Documentation  Precautions, taught by Marita Garza PT at 7/25/2025 11:12 AM.  Learner: Patient  Readiness: Acceptance  Method: Explanation  Response: Verbalizes Understanding  Comment: PT expectations, mobility precautions, d/c recs    Mobility Training, taught by Marita aGrza PT at 7/25/2025 11:12 AM.  Learner: Patient  Readiness: Acceptance  Method: Explanation  Response: Verbalizes Understanding  Comment: PT expectations, mobility precautions, d/c recs    Education Comments  No comments found.             Encounter Problems       Encounter Problems (Active)       Mobility       STG - Patient will ambulate >75ft, wheeled walker, CGA (Progressing)       Start:  07/19/25    Expected End:  08/02/25               PT Transfers       STG - Patient to transfer to and from sit to supine CGA (Progressing)       Start:  07/19/25    Expected End:  08/02/25            STG - Patient will transfer sit to and from stand CGA (Progressing)       Start:  07/19/25    Expected End:  08/02/25 07/25/25 at 11:12 AM   Marita Garza, PT   Rehab Office: [phone]

## 2025-07-25 NOTE — CARE PLAN
The patient's goals for the shift include      The clinical goals for the shift include pt will remain HDS throughout shift      Problem: Pain - Adult  Goal: Verbalizes/displays adequate comfort level or baseline comfort level  Outcome: Progressing     Problem: Safety - Adult  Goal: Free from fall injury  Outcome: Progressing     Problem: Discharge Planning  Goal: Discharge to home or other facility with appropriate resources  Outcome: Progressing     Problem: Chronic Conditions and Co-morbidities  Goal: Patient's chronic conditions and co-morbidity symptoms are monitored and maintained or improved  Outcome: Progressing     Problem: Nutrition  Goal: Nutrient intake appropriate for maintaining nutritional needs  Outcome: Progressing     Problem: Skin  Goal: Decreased wound size/increased tissue granulation at next dressing change  Outcome: Progressing  Flowsheets (Taken 7/24/2025 2335)  Decreased wound size/increased tissue granulation at next dressing change:   Promote sleep for wound healing   Protective dressings over bony prominences   Utilize specialty bed per algorithm  Goal: Participates in plan/prevention/treatment measures  Outcome: Progressing  Flowsheets (Taken 7/24/2025 2335)  Participates in plan/prevention/treatment measures:   Elevate heels   Discuss with provider PT/OT consult   Increase activity/out of bed for meals  Goal: Prevent/manage excess moisture  Outcome: Progressing  Flowsheets (Taken 7/24/2025 2335)  Prevent/manage excess moisture:   Cleanse incontinence/protect with barrier cream   Monitor for/manage infection if present   Follow provider orders for dressing changes   Moisturize dry skin  Goal: Prevent/minimize sheer/friction injuries  Outcome: Progressing  Flowsheets (Taken 7/24/2025 2335)  Prevent/minimize sheer/friction injuries:   Use pull sheet   Increase activity/out of bed for meals   Complete micro-shifts as needed if patient unable. Adjust patient position to relieve pressure  points, not a full turn   Turn/reposition every 2 hours/use positioning/transfer devices   HOB 30 degrees or less   Utilize specialty bed per algorithm  Goal: Promote/optimize nutrition  Outcome: Progressing  Flowsheets (Taken 7/24/2025 2335)  Promote/optimize nutrition: Discuss with provider if NPO > 2 days  Goal: Promote skin healing  Outcome: Progressing  Flowsheets (Taken 7/24/2025 2335)  Promote skin healing:   Turn/reposition every 2 hours/use positioning/transfer devices   Protective dressings over bony prominences   Assess skin/pad under line(s)/device(s)   Ensure correct size (line/device) and apply per  instructions   Rotate device position/do not position patient on device

## 2025-07-25 NOTE — CARE PLAN
The patient's goals for the shift include      Problem: Pain - Adult  Goal: Verbalizes/displays adequate comfort level or baseline comfort level  Outcome: Progressing     Problem: Safety - Adult  Goal: Free from fall injury  Outcome: Progressing     Problem: Skin  Goal: Participates in plan/prevention/treatment measures  Outcome: Progressing  Flowsheets (Taken 7/25/2025 1452)  Participates in plan/prevention/treatment measures:   Discuss with provider PT/OT consult   Elevate heels   Increase activity/out of bed for meals  Goal: Prevent/manage excess moisture  Outcome: Progressing  Flowsheets (Taken 7/25/2025 1452)  Prevent/manage excess moisture:   Cleanse incontinence/protect with barrier cream   Monitor for/manage infection if present   Follow provider orders for dressing changes   Moisturize dry skin  Goal: Prevent/minimize sheer/friction injuries  Outcome: Progressing  Flowsheets (Taken 7/25/2025 1452)  Prevent/minimize sheer/friction injuries:   Complete micro-shifts as needed if patient unable. Adjust patient position to relieve pressure points, not a full turn   Increase activity/out of bed for meals   Use pull sheet   HOB 30 degrees or less   Turn/reposition every 2 hours/use positioning/transfer devices  Goal: Promote/optimize nutrition  Outcome: Progressing  Flowsheets (Taken 7/25/2025 1452)  Promote/optimize nutrition:   Monitor/record intake including meals   Consume > 50% meals/supplements  Goal: Promote skin healing  Outcome: Progressing  Flowsheets (Taken 7/25/2025 1452)  Promote skin healing:   Assess skin/pad under line(s)/device(s)   Protective dressings over bony prominences   Turn/reposition every 2 hours/use positioning/transfer devices   Ensure correct size (line/device) and apply per  instructions   Rotate device position/do not position patient on device     The clinical goals for the shift include Pt will be hemodynamically stable during shift.  Pt hemodynamically stable and is  awaiting RIGOBERTO bed.

## 2025-07-26 LAB
ABO GROUP (TYPE) IN BLOOD: NORMAL
ALBUMIN SERPL BCP-MCNC: 3.1 G/DL (ref 3.4–5)
ANION GAP SERPL CALC-SCNC: 12 MMOL/L (ref 10–20)
ANTIBODY SCREEN: NORMAL
BASOPHILS # BLD AUTO: 0.12 X10*3/UL (ref 0–0.1)
BASOPHILS NFR BLD AUTO: 1.3 %
BUN SERPL-MCNC: 21 MG/DL (ref 6–23)
CA-I BLD-SCNC: 1.14 MMOL/L (ref 1.1–1.33)
CALCIUM SERPL-MCNC: 8.7 MG/DL (ref 8.6–10.6)
CHLORIDE SERPL-SCNC: 96 MMOL/L (ref 98–107)
CO2 SERPL-SCNC: 29 MMOL/L (ref 21–32)
CREAT SERPL-MCNC: 1.53 MG/DL (ref 0.5–1.3)
EGFRCR SERPLBLD CKD-EPI 2021: 53 ML/MIN/1.73M*2
EOSINOPHIL # BLD AUTO: 0.12 X10*3/UL (ref 0–0.7)
EOSINOPHIL NFR BLD AUTO: 1.3 %
ERYTHROCYTE [DISTWIDTH] IN BLOOD BY AUTOMATED COUNT: 13.6 % (ref 11.5–14.5)
GLUCOSE BLD MANUAL STRIP-MCNC: 109 MG/DL (ref 74–99)
GLUCOSE BLD MANUAL STRIP-MCNC: 116 MG/DL (ref 74–99)
GLUCOSE BLD MANUAL STRIP-MCNC: 118 MG/DL (ref 74–99)
GLUCOSE BLD MANUAL STRIP-MCNC: 142 MG/DL (ref 74–99)
GLUCOSE BLD MANUAL STRIP-MCNC: 155 MG/DL (ref 74–99)
GLUCOSE BLD MANUAL STRIP-MCNC: 95 MG/DL (ref 74–99)
GLUCOSE SERPL-MCNC: 105 MG/DL (ref 74–99)
HCT VFR BLD AUTO: 26.7 % (ref 41–52)
HGB BLD-MCNC: 9.2 G/DL (ref 13.5–17.5)
IMM GRANULOCYTES # BLD AUTO: 0.04 X10*3/UL (ref 0–0.7)
IMM GRANULOCYTES NFR BLD AUTO: 0.4 % (ref 0–0.9)
LYMPHOCYTES # BLD AUTO: 2.89 X10*3/UL (ref 1.2–4.8)
LYMPHOCYTES NFR BLD AUTO: 31 %
MAGNESIUM SERPL-MCNC: 2.27 MG/DL (ref 1.6–2.4)
MCH RBC QN AUTO: 29.2 PG (ref 26–34)
MCHC RBC AUTO-ENTMCNC: 34.5 G/DL (ref 32–36)
MCV RBC AUTO: 85 FL (ref 80–100)
MONOCYTES # BLD AUTO: 0.97 X10*3/UL (ref 0.1–1)
MONOCYTES NFR BLD AUTO: 10.4 %
NEUTROPHILS # BLD AUTO: 5.19 X10*3/UL (ref 1.2–7.7)
NEUTROPHILS NFR BLD AUTO: 55.6 %
NRBC BLD-RTO: 0.3 /100 WBCS (ref 0–0)
OSMOLALITY SERPL: 288 MOSM/KG (ref 280–300)
OSMOLALITY SERPL: 291 MOSM/KG (ref 280–300)
PHOSPHATE SERPL-MCNC: 4.7 MG/DL (ref 2.5–4.9)
PLATELET # BLD AUTO: 382 X10*3/UL (ref 150–450)
POTASSIUM SERPL-SCNC: 4 MMOL/L (ref 3.5–5.3)
RBC # BLD AUTO: 3.15 X10*6/UL (ref 4.5–5.9)
RH FACTOR (ANTIGEN D): NORMAL
SODIUM SERPL-SCNC: 133 MMOL/L (ref 136–145)
WBC # BLD AUTO: 9.3 X10*3/UL (ref 4.4–11.3)

## 2025-07-26 PROCEDURE — 99231 SBSQ HOSP IP/OBS SF/LOW 25: CPT | Performed by: PSYCHIATRY & NEUROLOGY

## 2025-07-26 PROCEDURE — 83735 ASSAY OF MAGNESIUM: CPT

## 2025-07-26 PROCEDURE — 2500000001 HC RX 250 WO HCPCS SELF ADMINISTERED DRUGS (ALT 637 FOR MEDICARE OP)

## 2025-07-26 PROCEDURE — 84100 ASSAY OF PHOSPHORUS: CPT

## 2025-07-26 PROCEDURE — 1200000002 HC GENERAL ROOM WITH TELEMETRY DAILY

## 2025-07-26 PROCEDURE — 2500000004 HC RX 250 GENERAL PHARMACY W/ HCPCS (ALT 636 FOR OP/ED)

## 2025-07-26 PROCEDURE — 84295 ASSAY OF SERUM SODIUM: CPT

## 2025-07-26 PROCEDURE — 83930 ASSAY OF BLOOD OSMOLALITY: CPT

## 2025-07-26 PROCEDURE — 2500000005 HC RX 250 GENERAL PHARMACY W/O HCPCS

## 2025-07-26 PROCEDURE — 36415 COLL VENOUS BLD VENIPUNCTURE: CPT

## 2025-07-26 PROCEDURE — 82947 ASSAY GLUCOSE BLOOD QUANT: CPT

## 2025-07-26 PROCEDURE — 82330 ASSAY OF CALCIUM: CPT

## 2025-07-26 PROCEDURE — 85025 COMPLETE CBC W/AUTO DIFF WBC: CPT

## 2025-07-26 PROCEDURE — 37799 UNLISTED PX VASCULAR SURGERY: CPT

## 2025-07-26 RX ORDER — GABAPENTIN 100 MG/1
100 CAPSULE ORAL 2 TIMES DAILY
Status: DISCONTINUED | OUTPATIENT
Start: 2025-07-26 | End: 2025-07-29

## 2025-07-26 RX ORDER — GABAPENTIN 300 MG/1
300 CAPSULE ORAL 2 TIMES DAILY
Status: DISCONTINUED | OUTPATIENT
Start: 2025-07-26 | End: 2025-07-26

## 2025-07-26 RX ADMIN — SODIUM CHLORIDE 500 ML: 0.9 INJECTION, SOLUTION INTRAVENOUS at 10:57

## 2025-07-26 RX ADMIN — ACETAMINOPHEN 650 MG: 325 TABLET ORAL at 04:12

## 2025-07-26 RX ADMIN — LIDOCAINE 4% 2 PATCH: 40 PATCH TOPICAL at 01:38

## 2025-07-26 RX ADMIN — THIAMINE HCL TAB 100 MG 100 MG: 100 TAB at 08:27

## 2025-07-26 RX ADMIN — PIPERACILLIN SODIUM AND TAZOBACTAM SODIUM 4.5 G: 4; .5 INJECTION, SOLUTION INTRAVENOUS at 23:27

## 2025-07-26 RX ADMIN — PANTOPRAZOLE SODIUM 40 MG: 40 INJECTION, POWDER, LYOPHILIZED, FOR SOLUTION INTRAVENOUS at 08:26

## 2025-07-26 RX ADMIN — METOPROLOL TARTRATE 12.5 MG: 25 TABLET, FILM COATED ORAL at 20:14

## 2025-07-26 RX ADMIN — LOSARTAN POTASSIUM 100 MG: 100 TABLET, FILM COATED ORAL at 08:28

## 2025-07-26 RX ADMIN — LIDOCAINE 4% 1 PATCH: 40 PATCH TOPICAL at 01:31

## 2025-07-26 RX ADMIN — PIPERACILLIN SODIUM AND TAZOBACTAM SODIUM 4.5 G: 4; .5 INJECTION, SOLUTION INTRAVENOUS at 18:42

## 2025-07-26 RX ADMIN — GABAPENTIN 100 MG: 100 CAPSULE ORAL at 10:57

## 2025-07-26 RX ADMIN — CETIRIZINE HYDROCHLORIDE 10 MG: 10 TABLET ORAL at 08:27

## 2025-07-26 RX ADMIN — AMLODIPINE BESYLATE 10 MG: 10 TABLET ORAL at 08:26

## 2025-07-26 RX ADMIN — PIPERACILLIN SODIUM AND TAZOBACTAM SODIUM 4.5 G: 4; .5 INJECTION, SOLUTION INTRAVENOUS at 10:57

## 2025-07-26 RX ADMIN — CHLORTHALIDONE 25 MG: 25 TABLET ORAL at 08:27

## 2025-07-26 RX ADMIN — PIPERACILLIN SODIUM AND TAZOBACTAM SODIUM 4.5 G: 4; .5 INJECTION, SOLUTION INTRAVENOUS at 04:16

## 2025-07-26 RX ADMIN — SILODOSIN 4 MG: 4 CAPSULE ORAL at 08:27

## 2025-07-26 RX ADMIN — ENOXAPARIN SODIUM 40 MG: 100 INJECTION SUBCUTANEOUS at 20:15

## 2025-07-26 RX ADMIN — ATORVASTATIN CALCIUM 40 MG: 40 TABLET, FILM COATED ORAL at 20:15

## 2025-07-26 RX ADMIN — METOPROLOL TARTRATE 12.5 MG: 25 TABLET, FILM COATED ORAL at 08:26

## 2025-07-26 RX ADMIN — GABAPENTIN 100 MG: 100 CAPSULE ORAL at 20:15

## 2025-07-26 ASSESSMENT — PAIN SCALES - GENERAL
PAINLEVEL_OUTOF10: 0 - NO PAIN
PAINLEVEL_OUTOF10: 0 - NO PAIN
PAINLEVEL_OUTOF10: 6
PAINLEVEL_OUTOF10: 0 - NO PAIN
PAINLEVEL_OUTOF10: 3
PAINLEVEL_OUTOF10: 0 - NO PAIN
PAINLEVEL_OUTOF10: 0 - NO PAIN

## 2025-07-26 ASSESSMENT — PAIN - FUNCTIONAL ASSESSMENT
PAIN_FUNCTIONAL_ASSESSMENT: 0-10

## 2025-07-26 ASSESSMENT — COGNITIVE AND FUNCTIONAL STATUS - GENERAL
HELP NEEDED FOR BATHING: A LOT
MOVING TO AND FROM BED TO CHAIR: TOTAL
MOVING TO AND FROM BED TO CHAIR: TOTAL
WALKING IN HOSPITAL ROOM: TOTAL
MOBILITY SCORE: 7
MOVING FROM LYING ON BACK TO SITTING ON SIDE OF FLAT BED WITH BEDRAILS: A LOT
CLIMB 3 TO 5 STEPS WITH RAILING: TOTAL
TURNING FROM BACK TO SIDE WHILE IN FLAT BAD: TOTAL
DRESSING REGULAR LOWER BODY CLOTHING: TOTAL
HELP NEEDED FOR BATHING: A LOT
DRESSING REGULAR UPPER BODY CLOTHING: A LITTLE
TOILETING: TOTAL
DAILY ACTIVITIY SCORE: 15
CLIMB 3 TO 5 STEPS WITH RAILING: TOTAL
STANDING UP FROM CHAIR USING ARMS: TOTAL
DRESSING REGULAR LOWER BODY CLOTHING: TOTAL
DAILY ACTIVITIY SCORE: 15
DRESSING REGULAR UPPER BODY CLOTHING: A LITTLE
WALKING IN HOSPITAL ROOM: TOTAL
MOVING FROM LYING ON BACK TO SITTING ON SIDE OF FLAT BED WITH BEDRAILS: A LOT
TURNING FROM BACK TO SIDE WHILE IN FLAT BAD: TOTAL
MOBILITY SCORE: 7
TOILETING: TOTAL
STANDING UP FROM CHAIR USING ARMS: TOTAL

## 2025-07-26 NOTE — CARE PLAN
Problem: Pain - Adult  Goal: Verbalizes/displays adequate comfort level or baseline comfort level  Outcome: Progressing     Problem: Safety - Adult  Goal: Free from fall injury  Outcome: Progressing     Problem: Discharge Planning  Goal: Discharge to home or other facility with appropriate resources  Outcome: Progressing     Problem: Chronic Conditions and Co-morbidities  Goal: Patient's chronic conditions and co-morbidity symptoms are monitored and maintained or improved  Outcome: Progressing     Problem: Nutrition  Goal: Nutrient intake appropriate for maintaining nutritional needs  Outcome: Progressing     Problem: Skin  Goal: Decreased wound size/increased tissue granulation at next dressing change  Outcome: Progressing  Goal: Participates in plan/prevention/treatment measures  Outcome: Progressing  Goal: Prevent/manage excess moisture  Outcome: Progressing  Goal: Prevent/minimize sheer/friction injuries  Outcome: Progressing  Goal: Promote/optimize nutrition  Outcome: Progressing  Goal: Promote skin healing  Outcome: Progressing     Problem: Fall/Injury  Goal: Not fall by end of shift  Outcome: Progressing  Goal: Be free from injury by end of the shift  Outcome: Progressing  Goal: Verbalize understanding of personal risk factors for fall in the hospital  Outcome: Progressing  Goal: Verbalize understanding of risk factor reduction measures to prevent injury from fall in the home  Outcome: Progressing  Goal: Use assistive devices by end of the shift  Outcome: Progressing  Goal: Pace activities to prevent fatigue by end of the shift  Outcome: Progressing     Problem: Pain  Goal: Takes deep breaths with improved pain control throughout the shift  Outcome: Progressing  Goal: Turns in bed with improved pain control throughout the shift  Outcome: Progressing  Goal: Walks with improved pain control throughout the shift  Outcome: Progressing  Goal: Performs ADL's with improved pain control throughout shift  Outcome:  Progressing  Goal: Participates in PT with improved pain control throughout the shift  Outcome: Progressing  Goal: Free from opioid side effects throughout the shift  Outcome: Progressing  Goal: Free from acute confusion related to pain meds throughout the shift  Outcome: Progressing   The patient's goals for the shift include      The clinical goals for the shift include Pt will be hemodynamically stable during shift.

## 2025-07-26 NOTE — PROGRESS NOTES
Ayla Paredes is a 55 y.o. male on day 8 of admission presenting with Ataxia.    Subjective   No acute events overnight.   Patient endorsed improvement in diarrhea.  Stating that he is able to swallow, denies any aspiration.  Continues to have numbness and tingling in the finger and feet.  Denies any worsening weakness.  Endorsed soreness in the arm especially when he is moving around.     Objective    24 Hour Vitals  Temp:  [36.2 °C (97.2 °F)-36.6 °C (97.9 °F)] 36.3 °C (97.3 °F)  Heart Rate:  [64-94] 69  Resp:  [11-24] 13  BP: (100-125)/(63-87) 117/69      Physical Exam      General: Patient has decreased secretions and voice is improved from yesterday.    Mental State: Alert and oriented x4. Answers questions appropriately.     Cranial Nerves:   CN2 : Tracking appropriately.   CN 3, 4, 6: Normal EOM  CN 7: Normal and symmetric facial strength. Nasolabial folds symmetric.   CN 8: Hearing intact to voice.  CN 9/10/12: Evidence of pharyngeal dysfunction given low amplitude speech changes, hoarseness.  CN 12: Normal strength of the tongue      Motor: Muscle bulk and tone were normal in both upper and lower extremities. No fasciculations, tremor or other abnormal movements were present.      Strength                                             R          L  Shoulder abduction (C4-C5)                5          5  Elbow flexion (C5-C6)                          5          5  Elbow extension (C7-C8)                     5          5  Hand     5        5   Hip flexion (IP, L2-L3)                          4         4  Knee extension (Fem, L2-L4)               5          5  Knee flexion (Sci, L5-S1)                      5          5  DorsiFlex (DePer, L5-S1l)                     5          5  PlantarFlex (Tibial, S1-S2)                    5          5    Reflexes: Right/ Left:  Triceps 0/0, Brachioradialis 0/0, Biceps 0/0, patellar 0/0. No clonus.     Sensory:   Intact to crude touch in bilateral upper and lower  extremities.    Coordination: Intact FNF BUE    Medications - per EMR    Results from last 72 hours   Lab Units 07/26/25  0407 07/25/25  2324 07/25/25  1821 07/25/25  0315   GLUCOSE mg/dL 105*  --   --  107*   SODIUM mmol/L 133*  133* 133*   < > 133*  133*   POTASSIUM mmol/L 4.0  --   --  3.3*   CHLORIDE mmol/L 96*  --   --  96*   CO2 mmol/L 29  --   --  29   ANION GAP mmol/L 12  --   --  11   BUN mg/dL 21  --   --  20   CREATININE mg/dL 1.53*  --   --  1.47*   EGFR mL/min/1.73m*2 53*  --   --  56*   CALCIUM mg/dL 8.7  --   --  9.0   PHOSPHORUS mg/dL 4.7  --   --  3.5   ALBUMIN g/dL 3.1*  --   --  3.3*   MAGNESIUM mg/dL 2.27  --   --  2.29   POCT CALCIUM IONIZED (MMOL/L) IN BLOOD mmol/L 1.14  --   --  1.16    < > = values in this interval not displayed.       Results from last 72 hours   Lab Units 07/26/25  0407 07/25/25  0315   WBC AUTO x10*3/uL 9.3 9.8   NRBC AUTO /100 WBCs 0.3* 0.0   RBC AUTO x10*6/uL 3.15* 3.52*   HEMOGLOBIN g/dL 9.2* 10.2*   HEMATOCRIT % 26.7* 30.3*   MCV fL 85 86   MCH pg 29.2 29.0   MCHC g/dL 34.5 33.7   RDW % 13.6 13.7   PLATELETS AUTO x10*3/uL 382 385   NEUTROS PCT AUTO % 55.6 62.4   IG PCT AUTO % 0.4 0.4   LYMPHS PCT AUTO % 31.0 26.7   MONOS PCT AUTO % 10.4 8.8   EOS PCT AUTO % 1.3 0.6   BASOS PCT AUTO % 1.3 1.1   NEUTROS ABS x10*3/uL 5.19 6.09   IG AUTO x10*3/uL 0.04 0.04   LYMPHS ABS AUTO x10*3/uL 2.89 2.61   MONOS ABS AUTO x10*3/uL 0.97 0.86   EOS ABS AUTO x10*3/uL 0.12 0.06   BASOS ABS AUTO x10*3/uL 0.12* 0.11*       Imaging  No results found.      Assessment/Plan    Ayla Paredes is a 55 y.o.  male with a PMHx of  HTN, DM, CKD, cardiomyopathy, admitted on 7/18/2025 after developing acute onset stocking and glove paresthesias 3 days prior. On original exam no objective sensory changes, ataxia, diffuse hyperreflexia. However on repeat exam 7/21 he continues to have ataxia but also diminished reflexes and dysphagia which has continued on subsequent examination. He also had comorbid  acute onset hyponatremia, etiology c/f SIADH, though unclear why, which is now improved.    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 MRI pan spine with evidence of multilevel degenerative disc disease but no hyper intensities or enhancement to suggest central demyelinating lesion. Thus far HIV, nutritional labs, ganglioside panel was normal. LP conducted 7/22/25 with albuminocytologic dissociation, CSF Igg and IGG/albumin index elevation. Pending additional neuropathy and LP labs as below. 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  S/P 5 doses of IVIG. Pending EMG on Monday and coordinate transfer to acute rehab.    # c/f Ocampo bennett syndrome  #Acute symmetric stocking and glove neuropathy   #Ataxia, Dysphagia, Hyporefflexia   :: S/P IVIG 5 dose (7/21-7/25)  - Continue to monitor bilateral end gaze restriction.  - Pending serum paraneoplastic panel, VLCF, LP labs  - Stopped tube feeds, Removed NG tube, now on po diet.  - Continue fluid restriction 1500 mL for SIADH --> hyponatremia, but now with Gatorade   - Planning for EMG Monday   - Start gabapentin 100mg BID for pain  - Remove femoral line    #RENATO  #Hyponatremia, likely SIADH   :: Baseline Cr 1.4-1.5  :: However, Cr on admission 1.28  :: Cause likely pre-renal from diarrhea  - Will give gentle bolus of NSS 500ml  - Now with sodium and osm check BID    #Concern for Aspiration Pneumonia   #Dysphagia  - Evidence on CT 7/21/25   - Plan:  - Complete empiric zosyn 7/28/25 (follow for renally dosing)    #HTN  - BP Goal: Normotension   - c/w prn labetalol and hydralazine alternation   - c/w Clorthalidone 25 mg daily   - c/w losartan 100 mg daily   - c/w metoprolol tartrate 12.5 mg daily   - c/w amlodipine 10 mg daily      #HLD   - c/w atorvastatin 40 mg daily     #T2DM  -Glucoses within range   - hold  metformin   - Accuchecks & ISS AC&HS     F: PRN  E: PRN  N: CCD 90, Easy to Chew, Thin Liquid, 1500 mL fluid restriction   A: PIV, CVC     O2: RA, PRN to maintain spO2>94%  Bowel Regimen: miralax, senokot (holding due to Bms)  DVT ppx: Lovenox      Code Status: Presumed full code        Patient seen and discussed with attending, Dr. Ashton.    Jose C Laureano MD PhD  Neurology resident, PGY-3    ----------------------------------------------------------------------------------------------------------------------------------------  ATTENDING ATTESTATION    I saw and evaluated the patient. I personally obtained the key and critical portions of the history and physical exam or was physically present for key and critical portions performed by the resident/fellow. I reviewed the resident/fellow's documentation and discussed the patient with the resident/fellow. I agree with the resident/fellow's medical decision making as documented in the note with the exception/addition of the following:     Dysmetria continues to be improved. He is eating by mouth. Working diagnosis of GBS, has improved with IVIG. Other work-up pending results -- autoimmune encephalopathy panel. Will consider EMG study on Monday. Plan is for discharge to acute rehab. We are following his sodium.    Carmela Ashton MD  General Neurology Attending  [facility]    Hospital Subsequent Daily Care Admission and Consult CODING and DOCUMENTATION DETERMINATION  Total time including some or all of the following: preparing to see the patient and reviewing test, obtaining and reviewing separately obtained history, performing medically proper examination and/or evaluation, counseling and educating one or more of the following---patient, independently interpreting results and communicating results to one or more of the following patient, and care coordination was equal to or greater than 25 minutes.

## 2025-07-27 ENCOUNTER — DOCUMENTATION (OUTPATIENT)
Dept: NEUROLOGY | Facility: HOSPITAL | Age: 55
End: 2025-07-27
Payer: COMMERCIAL

## 2025-07-27 VITALS
WEIGHT: 181.66 LBS | RESPIRATION RATE: 18 BRPM | BODY MASS INDEX: 30.27 KG/M2 | OXYGEN SATURATION: 97 % | TEMPERATURE: 98.4 F | HEART RATE: 82 BPM | HEIGHT: 65 IN | SYSTOLIC BLOOD PRESSURE: 108 MMHG | DIASTOLIC BLOOD PRESSURE: 70 MMHG

## 2025-07-27 LAB
ALBUMIN SERPL BCP-MCNC: 3.3 G/DL (ref 3.4–5)
ANION GAP SERPL CALC-SCNC: 8 MMOL/L (ref 10–20)
BASOPHILS # BLD AUTO: 0.1 X10*3/UL (ref 0–0.1)
BASOPHILS NFR BLD AUTO: 1 %
BUN SERPL-MCNC: 20 MG/DL (ref 6–23)
CA-I BLD-SCNC: 1.12 MMOL/L (ref 1.1–1.33)
CALCIUM SERPL-MCNC: 8.7 MG/DL (ref 8.6–10.6)
CHLORIDE SERPL-SCNC: 96 MMOL/L (ref 98–107)
CO2 SERPL-SCNC: 33 MMOL/L (ref 21–32)
CREAT SERPL-MCNC: 1.4 MG/DL (ref 0.5–1.3)
EGFRCR SERPLBLD CKD-EPI 2021: 59 ML/MIN/1.73M*2
EOSINOPHIL # BLD AUTO: 0.15 X10*3/UL (ref 0–0.7)
EOSINOPHIL NFR BLD AUTO: 1.6 %
ERYTHROCYTE [DISTWIDTH] IN BLOOD BY AUTOMATED COUNT: 14.3 % (ref 11.5–14.5)
GLUCOSE BLD MANUAL STRIP-MCNC: 117 MG/DL (ref 74–99)
GLUCOSE BLD MANUAL STRIP-MCNC: 143 MG/DL (ref 74–99)
GLUCOSE BLD MANUAL STRIP-MCNC: 148 MG/DL (ref 74–99)
GLUCOSE SERPL-MCNC: 115 MG/DL (ref 74–99)
HCT VFR BLD AUTO: 28.8 % (ref 41–52)
HGB BLD-MCNC: 9.1 G/DL (ref 13.5–17.5)
IMM GRANULOCYTES # BLD AUTO: 0.03 X10*3/UL (ref 0–0.7)
IMM GRANULOCYTES NFR BLD AUTO: 0.3 % (ref 0–0.9)
LYMPHOCYTES # BLD AUTO: 3.07 X10*3/UL (ref 1.2–4.8)
LYMPHOCYTES NFR BLD AUTO: 31.7 %
MAGNESIUM SERPL-MCNC: 2.09 MG/DL (ref 1.6–2.4)
MCH RBC QN AUTO: 29 PG (ref 26–34)
MCHC RBC AUTO-ENTMCNC: 31.6 G/DL (ref 32–36)
MCV RBC AUTO: 92 FL (ref 80–100)
MONOCYTES # BLD AUTO: 1.09 X10*3/UL (ref 0.1–1)
MONOCYTES NFR BLD AUTO: 11.3 %
NEUTROPHILS # BLD AUTO: 5.23 X10*3/UL (ref 1.2–7.7)
NEUTROPHILS NFR BLD AUTO: 54.1 %
NRBC BLD-RTO: 0.4 /100 WBCS (ref 0–0)
OSMOLALITY SERPL: 291 MOSM/KG (ref 280–300)
OSMOLALITY SERPL: 295 MOSM/KG (ref 280–300)
OSMOLALITY SERPL: 295 MOSM/KG (ref 280–300)
PHOSPHATE SERPL-MCNC: 3.4 MG/DL (ref 2.5–4.9)
PLATELET # BLD AUTO: 435 X10*3/UL (ref 150–450)
POTASSIUM SERPL-SCNC: 3.3 MMOL/L (ref 3.5–5.3)
RBC # BLD AUTO: 3.14 X10*6/UL (ref 4.5–5.9)
SODIUM SERPL-SCNC: 134 MMOL/L (ref 136–145)
SODIUM SERPL-SCNC: 134 MMOL/L (ref 136–145)
WBC # BLD AUTO: 9.7 X10*3/UL (ref 4.4–11.3)

## 2025-07-27 PROCEDURE — 2500000001 HC RX 250 WO HCPCS SELF ADMINISTERED DRUGS (ALT 637 FOR MEDICARE OP)

## 2025-07-27 PROCEDURE — 83930 ASSAY OF BLOOD OSMOLALITY: CPT

## 2025-07-27 PROCEDURE — 82947 ASSAY GLUCOSE BLOOD QUANT: CPT

## 2025-07-27 PROCEDURE — 2500000004 HC RX 250 GENERAL PHARMACY W/ HCPCS (ALT 636 FOR OP/ED)

## 2025-07-27 PROCEDURE — 2500000005 HC RX 250 GENERAL PHARMACY W/O HCPCS

## 2025-07-27 PROCEDURE — 80069 RENAL FUNCTION PANEL: CPT

## 2025-07-27 PROCEDURE — 1200000002 HC GENERAL ROOM WITH TELEMETRY DAILY

## 2025-07-27 PROCEDURE — 36415 COLL VENOUS BLD VENIPUNCTURE: CPT

## 2025-07-27 PROCEDURE — 84295 ASSAY OF SERUM SODIUM: CPT

## 2025-07-27 PROCEDURE — 85025 COMPLETE CBC W/AUTO DIFF WBC: CPT

## 2025-07-27 PROCEDURE — 83735 ASSAY OF MAGNESIUM: CPT

## 2025-07-27 PROCEDURE — 82330 ASSAY OF CALCIUM: CPT

## 2025-07-27 PROCEDURE — 99231 SBSQ HOSP IP/OBS SF/LOW 25: CPT | Performed by: PSYCHIATRY & NEUROLOGY

## 2025-07-27 RX ORDER — SODIUM CHLORIDE 1000 MG
1000 TABLET, SOLUBLE MISCELLANEOUS DAILY
Status: DISCONTINUED | OUTPATIENT
Start: 2025-07-27 | End: 2025-07-27

## 2025-07-27 RX ORDER — SODIUM CHLORIDE 1000 MG
500 TABLET, SOLUBLE MISCELLANEOUS DAILY
Status: DISCONTINUED | OUTPATIENT
Start: 2025-07-28 | End: 2025-07-30 | Stop reason: HOSPADM

## 2025-07-27 RX ORDER — RILUZOLE 50 MG/1
50 TABLET, FILM COATED ORAL DAILY
Status: DISCONTINUED | OUTPATIENT
Start: 2025-07-27 | End: 2025-07-27

## 2025-07-27 RX ADMIN — PANTOPRAZOLE SODIUM 40 MG: 40 INJECTION, POWDER, LYOPHILIZED, FOR SOLUTION INTRAVENOUS at 09:05

## 2025-07-27 RX ADMIN — LOSARTAN POTASSIUM 100 MG: 100 TABLET, FILM COATED ORAL at 09:05

## 2025-07-27 RX ADMIN — PIPERACILLIN SODIUM AND TAZOBACTAM SODIUM 4.5 G: 4; .5 INJECTION, SOLUTION INTRAVENOUS at 23:13

## 2025-07-27 RX ADMIN — CHLORTHALIDONE 25 MG: 25 TABLET ORAL at 09:04

## 2025-07-27 RX ADMIN — PIPERACILLIN SODIUM AND TAZOBACTAM SODIUM 4.5 G: 4; .5 INJECTION, SOLUTION INTRAVENOUS at 11:19

## 2025-07-27 RX ADMIN — METOPROLOL TARTRATE 12.5 MG: 25 TABLET, FILM COATED ORAL at 09:05

## 2025-07-27 RX ADMIN — THIAMINE HCL TAB 100 MG 100 MG: 100 TAB at 09:05

## 2025-07-27 RX ADMIN — METOPROLOL TARTRATE 12.5 MG: 25 TABLET, FILM COATED ORAL at 22:00

## 2025-07-27 RX ADMIN — GABAPENTIN 100 MG: 100 CAPSULE ORAL at 09:04

## 2025-07-27 RX ADMIN — ATORVASTATIN CALCIUM 40 MG: 40 TABLET, FILM COATED ORAL at 22:00

## 2025-07-27 RX ADMIN — ENOXAPARIN SODIUM 40 MG: 100 INJECTION SUBCUTANEOUS at 22:00

## 2025-07-27 RX ADMIN — PIPERACILLIN SODIUM AND TAZOBACTAM SODIUM 4.5 G: 4; .5 INJECTION, SOLUTION INTRAVENOUS at 17:08

## 2025-07-27 RX ADMIN — PIPERACILLIN SODIUM AND TAZOBACTAM SODIUM 4.5 G: 4; .5 INJECTION, SOLUTION INTRAVENOUS at 04:19

## 2025-07-27 RX ADMIN — CETIRIZINE HYDROCHLORIDE 10 MG: 10 TABLET ORAL at 09:04

## 2025-07-27 RX ADMIN — SILODOSIN 4 MG: 4 CAPSULE ORAL at 09:04

## 2025-07-27 RX ADMIN — SODIUM CHLORIDE 1 G: 1 TABLET ORAL at 11:19

## 2025-07-27 ASSESSMENT — PAIN - FUNCTIONAL ASSESSMENT
PAIN_FUNCTIONAL_ASSESSMENT: 0-10
PAIN_FUNCTIONAL_ASSESSMENT: 0-10

## 2025-07-27 ASSESSMENT — COGNITIVE AND FUNCTIONAL STATUS - GENERAL
HELP NEEDED FOR BATHING: A LOT
DRESSING REGULAR LOWER BODY CLOTHING: A LOT
CLIMB 3 TO 5 STEPS WITH RAILING: A LOT
DRESSING REGULAR UPPER BODY CLOTHING: A LOT
TURNING FROM BACK TO SIDE WHILE IN FLAT BAD: A LOT
MOBILITY SCORE: 12
STANDING UP FROM CHAIR USING ARMS: A LOT
DAILY ACTIVITIY SCORE: 13
DAILY ACTIVITIY SCORE: 13
PERSONAL GROOMING: A LOT
WALKING IN HOSPITAL ROOM: A LOT
MOVING FROM LYING ON BACK TO SITTING ON SIDE OF FLAT BED WITH BEDRAILS: A LOT
TOILETING: A LOT
MOVING FROM LYING ON BACK TO SITTING ON SIDE OF FLAT BED WITH BEDRAILS: A LOT
MOVING TO AND FROM BED TO CHAIR: A LOT
STANDING UP FROM CHAIR USING ARMS: A LOT
WALKING IN HOSPITAL ROOM: A LOT
HELP NEEDED FOR BATHING: A LOT
TURNING FROM BACK TO SIDE WHILE IN FLAT BAD: A LOT
EATING MEALS: A LITTLE
MOVING TO AND FROM BED TO CHAIR: A LOT
DRESSING REGULAR UPPER BODY CLOTHING: A LOT
MOBILITY SCORE: 12
CLIMB 3 TO 5 STEPS WITH RAILING: A LOT
PERSONAL GROOMING: A LOT
TOILETING: A LOT
EATING MEALS: A LITTLE
DRESSING REGULAR LOWER BODY CLOTHING: A LOT

## 2025-07-27 ASSESSMENT — PAIN SCALES - GENERAL
PAINLEVEL_OUTOF10: 10 - WORST POSSIBLE PAIN
PAINLEVEL_OUTOF10: 0 - NO PAIN

## 2025-07-27 NOTE — SIGNIFICANT EVENT
Neurology Disposition Update:     Patient to receive EMG Monday 7/28 and will be medically ready for discharge afterwards.     Yamilex Benites MD  Department of Neurology, PGY-4  [facility] [id number]

## 2025-07-27 NOTE — CARE PLAN
The patient's goals for the shift include      The clinical goals for the shift include pt will remain safe and free from falls/injury    Over the shift, the patient did not have any falls or injury. Pt aable to stand and pivot to bedside commode, using walker and x1 staff assist.  Problem: Pain - Adult  Goal: Verbalizes/displays adequate comfort level or baseline comfort level  Outcome: Progressing     Problem: Safety - Adult  Goal: Free from fall injury  Outcome: Progressing     Problem: Discharge Planning  Goal: Discharge to home or other facility with appropriate resources  Outcome: Progressing     Problem: Chronic Conditions and Co-morbidities  Goal: Patient's chronic conditions and co-morbidity symptoms are monitored and maintained or improved  Outcome: Progressing     Problem: Fall/Injury  Goal: Not fall by end of shift  Outcome: Progressing  Goal: Be free from injury by end of the shift  Outcome: Progressing

## 2025-07-27 NOTE — PROGRESS NOTES
Ayla Paredes is a 55 y.o. male on day 9 of admission presenting with Ataxia.    Subjective   No acute events overnight. Patient states that his strength and sensation is the same as yesterday. Is inquiring about getting up and walking around.        Objective    24 Hour Vitals  Temp:  [36.1 °C (97 °F)-37.4 °C (99.3 °F)] 36.5 °C (97.7 °F)  Heart Rate:  [69-87] 71  Resp:  [13-20] 18  BP: (100-126)/(69-85) 117/81      Physical Exam     Mental State: Alert and oriented x4. Answers questions appropriately.     Cranial Nerves:   CN2 : Tracking appropriately.   CN 3, 4, 6: Normal EOM  CN 7: Normal and symmetric facial strength. Nasolabial folds symmetric.   CN 8: Hearing intact to voice.  CN 9/10/12: Evidence of pharyngeal dysfunction given low amplitude speech changes, hoarseness.  CN 12: Normal strength of the tongue      Motor: Muscle bulk and tone were normal in both upper and lower extremities. No fasciculations, tremor or other abnormal movements were present.      Strength                                             R          L  Shoulder abduction (C4-C5)                5          5  Elbow flexion (C5-C6)                          5          5  Elbow extension (C7-C8)                     5          5  Hand     5        5   Hip flexion (IP, L2-L3)                          5         5  Knee extension (Fem, L2-L4)               5          5  Knee flexion (Sci, L5-S1)                      5          5  DorsiFlex (DePer, L5-S1l)                     5          5  PlantarFlex (Tibial, S1-S2)                    5          5     Sensory:   Intact to crude touch in bilateral upper and lower extremities.    Medications - per EMR    Results from last 72 hours   Lab Units 07/27/25  0659 07/26/25 2038 07/26/25  0407   GLUCOSE mg/dL 115*  --  105*   SODIUM mmol/L 134* 133* 133*  133*   POTASSIUM mmol/L 3.3*  --  4.0   CHLORIDE mmol/L 96*  --  96*   CO2 mmol/L 33*  --  29   ANION GAP mmol/L 8*  --  12   BUN mg/dL 20  --   21   CREATININE mg/dL 1.40*  --  1.53*   EGFR mL/min/1.73m*2 59*  --  53*   CALCIUM mg/dL 8.7  --  8.7   PHOSPHORUS mg/dL 3.4  --  4.7   ALBUMIN g/dL 3.3*  --  3.1*   MAGNESIUM mg/dL 2.09  --  2.27   POCT CALCIUM IONIZED (MMOL/L) IN BLOOD mmol/L 1.12  --  1.14       Results from last 72 hours   Lab Units 07/27/25  0659 07/26/25  0407   WBC AUTO x10*3/uL 9.7 9.3   NRBC AUTO /100 WBCs 0.4* 0.3*   RBC AUTO x10*6/uL 3.14* 3.15*   HEMOGLOBIN g/dL 9.1* 9.2*   HEMATOCRIT % 28.8* 26.7*   MCV fL 92 85   MCH pg 29.0 29.2   MCHC g/dL 31.6* 34.5   RDW % 14.3 13.6   PLATELETS AUTO x10*3/uL 435 382   NEUTROS PCT AUTO % 54.1 55.6   IG PCT AUTO % 0.3 0.4   LYMPHS PCT AUTO % 31.7 31.0   MONOS PCT AUTO % 11.3 10.4   EOS PCT AUTO % 1.6 1.3   BASOS PCT AUTO % 1.0 1.3   NEUTROS ABS x10*3/uL 5.23 5.19   IG AUTO x10*3/uL 0.03 0.04   LYMPHS ABS AUTO x10*3/uL 3.07 2.89   MONOS ABS AUTO x10*3/uL 1.09* 0.97   EOS ABS AUTO x10*3/uL 0.15 0.12   BASOS ABS AUTO x10*3/uL 0.10 0.12*       Imaging  No results found.      Assessment/Plan    Ayla Paredes is a 55 y.o.  male with a PMHx of  HTN, DM, CKD, cardiomyopathy, admitted on 7/18/2025 after developing acute onset stocking and glove paresthesias 3 days prior. On original exam no objective sensory changes, ataxia, diffuse hyperreflexia. However on repeat exam 7/21 he continues to have ataxia but also diminished reflexes and dysphagia which has continued on subsequent examination. He also had comorbid acute onset hyponatremia, etiology c/f SIADH, though unclear why, which is now improved.    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 MRI pan spine with evidence of multilevel degenerative disc disease but no hyper intensities or enhancement to suggest central demyelinating lesion. Thus far HIV, nutritional labs, ganglioside panel was normal. LP conducted 7/22/25 with  albuminocytologic dissociation, CSF Igg and IGG/albumin index elevation. Pending additional neuropathy and LP labs as below. 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  S/P 5 doses of IVIG. Pending EMG on Monday and coordinate transfer to acute rehab.    Updates 7/27:  - Pending EMG on Monday 7/28  - Started sodium chloride tablet 1 g daily, sodium uptrending now 134  - Planning for transfer to acute rehabilitation 7/28    # c/f Ocampo bennett syndrome  #Acute symmetric stocking and glove neuropathy   #Ataxia, Dysphagia, Hyporefflexia   :: S/P IVIG 5 dose (7/21-7/25)  - Continue to monitor bilateral end gaze restriction.  - Pending serum paraneoplastic panel, VLCF, LP labs  - Stopped tube feeds, Removed NG tube, now on po diet.  - Continue fluid restriction 1500 mL for SIADH --> hyponatremia, but now with Gatorade   - Planning for EMG Monday   - Start gabapentin 100mg BID for pain  - Remove femoral line    #RENATO  #Hyponatremia, likely SIADH   :: Baseline Cr 1.4-1.5  :: However, Cr on admission 1.28  :: Cause likely pre-renal from diarrhea  - Now with sodium and osm check BID    #Concern for Aspiration Pneumonia   #Dysphagia  - Evidence on CT 7/21/25   - Plan:  - Complete empiric zosyn 7/28/25 (follow for renally dosing)    #HTN  - BP Goal: Normotension   - c/w prn labetalol and hydralazine alternation   - c/w Clorthalidone 25 mg daily   - c/w losartan 100 mg daily   - c/w metoprolol tartrate 12.5 mg daily   - c/w amlodipine 10 mg daily      #HLD   - c/w atorvastatin 40 mg daily     #T2DM  -Glucoses within range   - hold metformin   - Accuchecks & ISS AC&HS     F: PRN  E: PRN  N: CCD 90, Easy to Chew, Thin Liquid, 1500 mL fluid restriction   A: PIV, CVC     O2: RA, PRN to maintain spO2>94%  Bowel Regimen: miralax, senokot (holding due to Bms)  DVT ppx: Lovenox      Code Status: Presumed full code        Patient seen and discussed with attending, Dr. Ashton.    Ronnell Palm DO   Neurology  PGY-2

## 2025-07-28 ENCOUNTER — APPOINTMENT (OUTPATIENT)
Dept: NEUROLOGY | Facility: HOSPITAL | Age: 55
End: 2025-07-28
Payer: COMMERCIAL

## 2025-07-28 ENCOUNTER — APPOINTMENT (OUTPATIENT)
Dept: ENDOCRINOLOGY | Facility: CLINIC | Age: 55
End: 2025-07-28
Payer: COMMERCIAL

## 2025-07-28 LAB
ALBUMIN SERPL BCP-MCNC: 3.3 G/DL (ref 3.4–5)
ANION GAP SERPL CALC-SCNC: 11 MMOL/L (ref 10–20)
BASOPHILS # BLD AUTO: 0.11 X10*3/UL (ref 0–0.1)
BASOPHILS NFR BLD AUTO: 0.9 %
BUN SERPL-MCNC: 27 MG/DL (ref 6–23)
CA-I BLD-SCNC: 1.11 MMOL/L (ref 1.1–1.33)
CALCIUM SERPL-MCNC: 8.7 MG/DL (ref 8.6–10.6)
CHLORIDE SERPL-SCNC: 96 MMOL/L (ref 98–107)
CO2 SERPL-SCNC: 29 MMOL/L (ref 21–32)
CREAT SERPL-MCNC: 1.69 MG/DL (ref 0.5–1.3)
EGFRCR SERPLBLD CKD-EPI 2021: 47 ML/MIN/1.73M*2
EOSINOPHIL # BLD AUTO: 0.21 X10*3/UL (ref 0–0.7)
EOSINOPHIL NFR BLD AUTO: 1.8 %
ERYTHROCYTE [DISTWIDTH] IN BLOOD BY AUTOMATED COUNT: 14.7 % (ref 11.5–14.5)
FUNGUS SPEC CULT: NORMAL
FUNGUS SPEC FUNGUS STN: NORMAL
GLUCOSE BLD MANUAL STRIP-MCNC: 120 MG/DL (ref 74–99)
GLUCOSE BLD MANUAL STRIP-MCNC: 127 MG/DL (ref 74–99)
GLUCOSE BLD MANUAL STRIP-MCNC: 152 MG/DL (ref 74–99)
GLUCOSE SERPL-MCNC: 136 MG/DL (ref 74–99)
HCT VFR BLD AUTO: 27.2 % (ref 41–52)
HGB BLD-MCNC: 8.9 G/DL (ref 13.5–17.5)
IMM GRANULOCYTES # BLD AUTO: 0.12 X10*3/UL (ref 0–0.7)
IMM GRANULOCYTES NFR BLD AUTO: 1 % (ref 0–0.9)
LYMPHOCYTES # BLD AUTO: 3.43 X10*3/UL (ref 1.2–4.8)
LYMPHOCYTES NFR BLD AUTO: 28.8 %
MAGNESIUM SERPL-MCNC: 2.06 MG/DL (ref 1.6–2.4)
MCH RBC QN AUTO: 30.2 PG (ref 26–34)
MCHC RBC AUTO-ENTMCNC: 32.7 G/DL (ref 32–36)
MCV RBC AUTO: 92 FL (ref 80–100)
MONOCYTES # BLD AUTO: 1.21 X10*3/UL (ref 0.1–1)
MONOCYTES NFR BLD AUTO: 10.2 %
NEUTROPHILS # BLD AUTO: 6.82 X10*3/UL (ref 1.2–7.7)
NEUTROPHILS NFR BLD AUTO: 57.3 %
NRBC BLD-RTO: 0.4 /100 WBCS (ref 0–0)
OSMOLALITY SERPL: 295 MOSM/KG (ref 280–300)
PHOSPHATE SERPL-MCNC: 3.4 MG/DL (ref 2.5–4.9)
PLATELET # BLD AUTO: 449 X10*3/UL (ref 150–450)
POTASSIUM SERPL-SCNC: 3 MMOL/L (ref 3.5–5.3)
RBC # BLD AUTO: 2.95 X10*6/UL (ref 4.5–5.9)
RBC MORPH BLD: NORMAL
SODIUM SERPL-SCNC: 133 MMOL/L (ref 136–145)
WBC # BLD AUTO: 11.9 X10*3/UL (ref 4.4–11.3)

## 2025-07-28 PROCEDURE — 97530 THERAPEUTIC ACTIVITIES: CPT | Mod: GP

## 2025-07-28 PROCEDURE — 2500000001 HC RX 250 WO HCPCS SELF ADMINISTERED DRUGS (ALT 637 FOR MEDICARE OP)

## 2025-07-28 PROCEDURE — 80069 RENAL FUNCTION PANEL: CPT

## 2025-07-28 PROCEDURE — 95912 NRV CNDJ TEST 11-12 STUDIES: CPT | Performed by: PSYCHIATRY & NEUROLOGY

## 2025-07-28 PROCEDURE — 85025 COMPLETE CBC W/AUTO DIFF WBC: CPT

## 2025-07-28 PROCEDURE — 82947 ASSAY GLUCOSE BLOOD QUANT: CPT

## 2025-07-28 PROCEDURE — 83930 ASSAY OF BLOOD OSMOLALITY: CPT

## 2025-07-28 PROCEDURE — 82330 ASSAY OF CALCIUM: CPT

## 2025-07-28 PROCEDURE — 36415 COLL VENOUS BLD VENIPUNCTURE: CPT

## 2025-07-28 PROCEDURE — 2500000004 HC RX 250 GENERAL PHARMACY W/ HCPCS (ALT 636 FOR OP/ED)

## 2025-07-28 PROCEDURE — 1200000002 HC GENERAL ROOM WITH TELEMETRY DAILY

## 2025-07-28 PROCEDURE — 95885 MUSC TST DONE W/NERV TST LIM: CPT | Performed by: PSYCHIATRY & NEUROLOGY

## 2025-07-28 PROCEDURE — 97110 THERAPEUTIC EXERCISES: CPT | Mod: GP

## 2025-07-28 PROCEDURE — 83735 ASSAY OF MAGNESIUM: CPT

## 2025-07-28 PROCEDURE — 2500000005 HC RX 250 GENERAL PHARMACY W/O HCPCS

## 2025-07-28 PROCEDURE — 97116 GAIT TRAINING THERAPY: CPT | Mod: GP

## 2025-07-28 PROCEDURE — 2500000002 HC RX 250 W HCPCS SELF ADMINISTERED DRUGS (ALT 637 FOR MEDICARE OP, ALT 636 FOR OP/ED)

## 2025-07-28 PROCEDURE — 99232 SBSQ HOSP IP/OBS MODERATE 35: CPT

## 2025-07-28 RX ORDER — POTASSIUM CHLORIDE 20 MEQ/1
40 TABLET, EXTENDED RELEASE ORAL ONCE
Status: COMPLETED | OUTPATIENT
Start: 2025-07-28 | End: 2025-07-28

## 2025-07-28 RX ORDER — POTASSIUM CHLORIDE 14.9 MG/ML
20 INJECTION INTRAVENOUS ONCE
Status: COMPLETED | OUTPATIENT
Start: 2025-07-28 | End: 2025-07-28

## 2025-07-28 RX ADMIN — ACETAMINOPHEN 650 MG: 325 TABLET ORAL at 09:35

## 2025-07-28 RX ADMIN — AMLODIPINE BESYLATE 10 MG: 10 TABLET ORAL at 09:31

## 2025-07-28 RX ADMIN — ATORVASTATIN CALCIUM 40 MG: 40 TABLET, FILM COATED ORAL at 21:17

## 2025-07-28 RX ADMIN — METOPROLOL TARTRATE 12.5 MG: 25 TABLET, FILM COATED ORAL at 21:17

## 2025-07-28 RX ADMIN — POTASSIUM CHLORIDE 20 MEQ: 14.9 INJECTION, SOLUTION INTRAVENOUS at 14:24

## 2025-07-28 RX ADMIN — CHLORTHALIDONE 25 MG: 25 TABLET ORAL at 09:31

## 2025-07-28 RX ADMIN — Medication 1500 ML: at 14:29

## 2025-07-28 RX ADMIN — SILODOSIN 4 MG: 4 CAPSULE ORAL at 09:31

## 2025-07-28 RX ADMIN — METOPROLOL TARTRATE 12.5 MG: 25 TABLET, FILM COATED ORAL at 09:31

## 2025-07-28 RX ADMIN — GABAPENTIN 100 MG: 100 CAPSULE ORAL at 21:17

## 2025-07-28 RX ADMIN — PANTOPRAZOLE SODIUM 40 MG: 40 INJECTION, POWDER, LYOPHILIZED, FOR SOLUTION INTRAVENOUS at 09:31

## 2025-07-28 RX ADMIN — GABAPENTIN 100 MG: 100 CAPSULE ORAL at 09:31

## 2025-07-28 RX ADMIN — LOSARTAN POTASSIUM 100 MG: 100 TABLET, FILM COATED ORAL at 09:31

## 2025-07-28 RX ADMIN — POTASSIUM CHLORIDE 40 MEQ: 1500 TABLET, EXTENDED RELEASE ORAL at 14:24

## 2025-07-28 RX ADMIN — ACETAMINOPHEN 650 MG: 325 TABLET ORAL at 17:27

## 2025-07-28 RX ADMIN — CETIRIZINE HYDROCHLORIDE 10 MG: 10 TABLET ORAL at 09:31

## 2025-07-28 RX ADMIN — THIAMINE HCL TAB 100 MG 100 MG: 100 TAB at 09:31

## 2025-07-28 RX ADMIN — FLUTICASONE PROPIONATE 1 SPRAY: 50 SPRAY, METERED NASAL at 09:32

## 2025-07-28 RX ADMIN — ENOXAPARIN SODIUM 40 MG: 100 INJECTION SUBCUTANEOUS at 21:17

## 2025-07-28 RX ADMIN — SODIUM CHLORIDE 0.5 G: 1 TABLET ORAL at 09:30

## 2025-07-28 RX ADMIN — PIPERACILLIN SODIUM AND TAZOBACTAM SODIUM 4.5 G: 4; .5 INJECTION, SOLUTION INTRAVENOUS at 06:18

## 2025-07-28 ASSESSMENT — PAIN SCALES - GENERAL
PAINLEVEL_OUTOF10: 0 - NO PAIN

## 2025-07-28 ASSESSMENT — COGNITIVE AND FUNCTIONAL STATUS - GENERAL
MOVING TO AND FROM BED TO CHAIR: A LOT
STANDING UP FROM CHAIR USING ARMS: A LITTLE
CLIMB 3 TO 5 STEPS WITH RAILING: TOTAL
TURNING FROM BACK TO SIDE WHILE IN FLAT BAD: A LOT
WALKING IN HOSPITAL ROOM: A LOT
MOVING FROM LYING ON BACK TO SITTING ON SIDE OF FLAT BED WITH BEDRAILS: A LITTLE
MOBILITY SCORE: 13

## 2025-07-28 ASSESSMENT — PAIN - FUNCTIONAL ASSESSMENT
PAIN_FUNCTIONAL_ASSESSMENT: 0-10

## 2025-07-28 NOTE — PROGRESS NOTES
Care Transitions Progress Note: Per medical team patient is medically ready for discharge to [facility].  Dispo: [facility] is awaiting Precert.

## 2025-07-28 NOTE — CARE PLAN
Problem: Pain - Adult  Goal: Verbalizes/displays adequate comfort level or baseline comfort level  Outcome: Met     Problem: Safety - Adult  Goal: Free from fall injury  Outcome: Met     Problem: Discharge Planning  Goal: Discharge to home or other facility with appropriate resources  Outcome: Progressing     Problem: Nutrition  Goal: Nutrient intake appropriate for maintaining nutritional needs  Outcome: Progressing   The patient's goals for the shift include  to ambulate to bedside commode    The clinical goals for the shift include Patient will remain free from falls and unintentional injuries.    Over the shift, the patient met both above goals.

## 2025-07-28 NOTE — CARE PLAN
Problem: Pain - Adult  Goal: Verbalizes/displays adequate comfort level or baseline comfort level  Outcome: Progressing     Problem: Safety - Adult  Goal: Free from fall injury  Outcome: Progressing     Problem: Discharge Planning  Goal: Discharge to home or other facility with appropriate resources  Outcome: Progressing     Problem: Chronic Conditions and Co-morbidities  Goal: Patient's chronic conditions and co-morbidity symptoms are monitored and maintained or improved  Outcome: Progressing     Problem: Nutrition  Goal: Nutrient intake appropriate for maintaining nutritional needs  Outcome: Progressing     Problem: Skin  Goal: Decreased wound size/increased tissue granulation at next dressing change  Outcome: Progressing  Goal: Participates in plan/prevention/treatment measures  Outcome: Progressing  Goal: Prevent/manage excess moisture  Outcome: Progressing  Goal: Prevent/minimize sheer/friction injuries  Outcome: Progressing  Goal: Promote/optimize nutrition  Outcome: Progressing  Goal: Promote skin healing  Outcome: Progressing     Problem: Fall/Injury  Goal: Not fall by end of shift  Outcome: Progressing  Goal: Be free from injury by end of the shift  Outcome: Progressing  Goal: Verbalize understanding of personal risk factors for fall in the hospital  Outcome: Progressing  Goal: Verbalize understanding of risk factor reduction measures to prevent injury from fall in the home  Outcome: Progressing  Goal: Use assistive devices by end of the shift  Outcome: Progressing  Goal: Pace activities to prevent fatigue by end of the shift  Outcome: Progressing     Problem: Pain  Goal: Takes deep breaths with improved pain control throughout the shift  Outcome: Progressing  Goal: Turns in bed with improved pain control throughout the shift  Outcome: Progressing  Goal: Walks with improved pain control throughout the shift  Outcome: Progressing  Goal: Performs ADL's with improved pain control throughout shift  Outcome:  Progressing  Goal: Participates in PT with improved pain control throughout the shift  Outcome: Progressing  Goal: Free from opioid side effects throughout the shift  Outcome: Progressing  Goal: Free from acute confusion related to pain meds throughout the shift  Outcome: Progressing   The patient's goals for the shift include  rest and adequate pain management    The clinical goals for the shift include Patient will remain free from falls and unintentional injuries.

## 2025-07-28 NOTE — PROGRESS NOTES
Physical Therapy    Physical Therapy Treatment    Patient Name: Ayla Paredes  MRN: [id number]  Department: [address] NEURO  Room: 223/223-A  Today's Date: 7/28/2025  Time Calculation  Start Time: 0930  Stop Time: 1009  Time Calculation (min): 39 min    Assessment/Plan   PT Assessment  Barriers to Discharge Home: Physical needs, Caregiver assistance  Caregiver Assistance: Caregiver assistance needed per identified barriers - however, level of patient's required assistance exceeds assistance available at home  Physical Needs: High falls risk due to function or environment  Evaluation/Treatment Tolerance: Patient tolerated treatment well  Medical Staff Made Aware: Yes  End of Session Communication: Bedside nurse, Care Coordinator  Assessment Comment: Pt motivated with excellent effort.  Able to ambulate twice in room today, and increase ambulation distance.  Remains appropriate for high intensity PT at time of d/c.  End of Session Patient Position: Bed, 3 rail up, Alarm on  PT Plan  Inpatient/Swing Bed or Outpatient: Inpatient  PT Plan  Treatment/Interventions: Bed mobility, Transfer training, Gait training, Balance training, Neuromuscular re-education, Endurance training, Therapeutic exercise, Therapeutic activity, Home exercise program, Postural re-education, Strengthening, Stair training  PT Plan: Ongoing PT  PT Frequency: 5 times per week  PT Discharge Recommendations: High intensity level of continued care  Equipment Recommended upon Discharge: Wheeled walker  PT Recommended Transfer Status: Assist x2, Assistive device (x1 to chair or BSC, x2 to bathroom)  PT - OK to Discharge: Yes    PT Visit Info:  PT Received On: 07/28/25     General Visit Information:   General  Family/Caregiver Present: Yes  Caregiver Feedback: Fiance and friend present and supportive  Prior to Session Communication: Bedside nurse  Patient Position Received: Bed, 3 rail up, Alarm on  General Comment: Cooperative and agreeable to PT.  Pt  able to ambulate twice today with Mod A and FWW.  Tolerated well.    Subjective   Precautions:  Precautions  Medical Precautions: Fall precautions    Objective   Pain:  Pain Assessment  Pain Assessment:  (Pt apprehensive about pain prior to mobilizing, but then did not report pain during session.  RN had premedicated)  Cognition:  Cognition  Overall Cognitive Status: Within Functional Limits  Arousal/Alertness: Appropriate responses to stimuli  Orientation Level: Oriented X4  Following Commands: Follows one step commands with repetition    Activity Tolerance:  Activity Tolerance  Endurance: Endurance does not limit participation in activity  Treatments:  Therapeutic Exercise  Therapeutic Exercise Activity 1: Supine: AP 2x10, HS x10.  Sitting in chair: LAQ 3x10, cues for eccentric control.  LAQ 2x10 with targets across midline    Bed Mobility 1  Bed Mobility 1: Supine to sitting  Level of Assistance 1: Moderate assistance, Moderate verbal cues, Moderate tactile cues  Bed Mobility 2  Bed Mobility  2: Sitting to supine  Level of Assistance 2: Contact guard, Moderate assistance, Moderate verbal cues    Ambulation/Gait Training 1  Surface 1: Level tile  Device 1: Rolling walker  Assistance 1: Moderate assistance, Moderate verbal cues, Minimal tactile cues (Primarily Min A, Mod A for LOB with ocasional scissoring and during turns)  Quality of Gait 1: Narrow base of support, Inconsistent stride length, Ataxic, Soft knee(s)  Comments/Distance (ft) 1: 3 feet to chair, seated break, 2x 15 feet, seated break, 2x 15 feet  Transfer 1  Transfer From 1: Sit to, Stand to  Transfer to 1: Sit  Transfer Level of Assistance 1: Minimum assistance, Moderate verbal cues, Minimal tactile cues  Transfers 2  Transfer From 2: Bed to, Chair with arms to  Transfer to 2: Bed  Transfer Device 2: Walker  Transfer Level of Assistance 2: Moderate assistance, Moderate verbal cues, Minimal tactile cues    Outcome Measures:     [facility] Basic  Mobility  Turning from your back to your side while in a flat bed without using bedrails: A little  Moving from lying on your back to sitting on the side of a flat bed without using bedrails: A lot  Moving to and from bed to chair (including a wheelchair): A lot  Standing up from a chair using your arms (e.g. wheelchair or bedside chair): A little  To walk in hospital room: A lot  Climbing 3-5 steps with railing: Total  Basic Mobility - Total Score: 13    Education Documentation  Precautions, taught by Alex Rmoero, PT at 7/28/2025 11:34 AM.  Learner: Patient  Readiness: Acceptance  Method: Explanation  Response: Verbalizes Understanding    Mobility Training, taught by Alex Romero, PT at 7/28/2025 11:34 AM.  Learner: Patient  Readiness: Acceptance  Method: Explanation  Response: Verbalizes Understanding    Education Comments  No comments found.      OP EDUCATION:       Encounter Problems       Encounter Problems (Active)       Mobility       STG - Patient will ambulate >75ft, wheeled walker, CGA (Progressing)       Start:  07/19/25    Expected End:  08/02/25               PT Transfers       STG - Patient to transfer to and from sit to supine CGA (Progressing)       Start:  07/19/25    Expected End:  08/02/25            STG - Patient will transfer sit to and from stand CGA (Progressing)       Start:  07/19/25    Expected End:  08/02/25

## 2025-07-28 NOTE — PROGRESS NOTES
Occupational Therapy    Therapy Communication Note    Patient Name: Ayla Paredes  MRN: [id number]  Today's Date: 7/28/2025     Missed Visit Reason: Patient in a medical procedure      07/28/25 at 1:34 PM   Mere [id number]/L, OTD  Rehab Office: [phone]

## 2025-07-28 NOTE — PROGRESS NOTES
"Ayla Paredes is a 55 y.o. male on day 10 of admission presenting with Ataxia.    Subjective   No acute events overnight. Patient reports having a bowel movement in bed this morning. Patient denies any fecal or urinary incontinence or lumbar back pain.     Patient notes persistent numbness and tingling to bilateral hands and feet as well as \"pain everywhere\" when attempting to sit up and stand from sitting. Per patient, pain is at a \"15.\" Per chart review, gabapentin was refused last night. Patient explains he did not know what the medication was for. Reviewed MOA, uses, and potential side effects of gabapentin, and patient agreed to take medication this morning. Patient otherwise denies dizziness, lightheadedness, chills, difficulty swallowing, SOB, CP, palpitations, and abdominal pain.       Objective    24 Hour Vitals  Temp:  [36.8 °C (98.2 °F)-37.2 °C (99 °F)] 36.8 °C (98.2 °F)  Heart Rate:  [73-82] 78  Resp:  [18] 18  BP: ()/(61-70) 98/67      Physical Exam     Mental State: Alert and oriented x4. Answers questions appropriately.     Cranial Nerves:   CN 2 : Tracking appropriately, no visual field deficits, peripheral vision intact throughout  CN 3, 4, 6: PERRL, EOMI without gaze restriction but fatigable left-beating nystagmus, no ptosis, accomodation reflex intact  CN 5: Sensation to light and sharp intact bilaterally, muscles of mastication intact bilaterally  CN 7: Symmetric facial strength. Nasolabial folds symmetric.   CN 8: Hearing intact to voice and finger rub bilaterally  CN 9/10/12: Evidence of pharyngeal dysfunction given low amplitude speech changes, hoarseness. Otherwise symmetric palate elevation.  CN 11: Symmetric strength with shoulder shrug and head turn bilaterally  CN 12: Normal strength of the tongue      Motor: Muscle bulk and tone were normal in both upper and lower extremities. No fasciculations, tremor or other abnormal movements were present.      Strength                       "                       R          L  Shoulder abduction (C4-C5)                5          5  Elbow flexion (C5-C6)                          5          5  Elbow extension (C7-C8)                     5          5  Hand     5        5   Hip flexion (IP, L2-L3)                          5         5  Knee extension (Fem, L2-L4)               5          5  Knee flexion (Sci, L5-S1)                      5          5  DorsiFlex (DePer, L5-S1l)                     5          5  PlantarFlex (Tibial, S1-S2)                    5          5    Reflexes:  R/L: Biceps 1+/1+, brachioradialis 1+/1+, patellar 1+/1+, Achilles 0/0, plantar down bilaterally, ankle clonus absent bilaterally     Sensory:   Intact to crude and sharp touch in bilateral upper and lower extremities.    Coordination:  No dysmetria on finger-to-nose and heel-to-shin bilaterally, joint proprioception to digits bilaterally, Romberg deferred given pain with getting out of bed    Gait:  Deferred as patient reported pain when attempting to get out of bed    Medications - per EMR    Results from last 72 hours   Lab Units 07/28/25  0716 07/27/25  1823 07/27/25  0659   GLUCOSE mg/dL 136*  --  115*   SODIUM mmol/L 133* 134* 134*   POTASSIUM mmol/L 3.0*  --  3.3*   CHLORIDE mmol/L 96*  --  96*   CO2 mmol/L 29  --  33*   ANION GAP mmol/L 11  --  8*   BUN mg/dL 27*  --  20   CREATININE mg/dL 1.69*  --  1.40*   EGFR mL/min/1.73m*2 47*  --  59*   CALCIUM mg/dL 8.7  --  8.7   PHOSPHORUS mg/dL 3.4  --  3.4   ALBUMIN g/dL 3.3*  --  3.3*   MAGNESIUM mg/dL 2.06  --  2.09   POCT CALCIUM IONIZED (MMOL/L) IN BLOOD mmol/L 1.11  --  1.12       Results from last 72 hours   Lab Units 07/28/25  0716 07/27/25  0659 07/26/25  0407   WBC AUTO x10*3/uL 11.9* 9.7 9.3   NRBC AUTO /100 WBCs 0.4* 0.4* 0.3*   RBC AUTO x10*6/uL 2.95* 3.14* 3.15*   HEMOGLOBIN g/dL 8.9* 9.1* 9.2*   HEMATOCRIT % 27.2* 28.8* 26.7*   MCV fL 92 92 85   MCH pg 30.2 29.0 29.2   MCHC g/dL 32.7 31.6* 34.5   RDW % 14.7*  14.3 13.6   PLATELETS AUTO x10*3/uL 449 435 382   NEUTROS PCT AUTO %  --  54.1 55.6   IG PCT AUTO %  --  0.3 0.4   LYMPHS PCT AUTO %  --  31.7 31.0   MONOS PCT AUTO %  --  11.3 10.4   EOS PCT AUTO %  --  1.6 1.3   BASOS PCT AUTO %  --  1.0 1.3   NEUTROS ABS x10*3/uL  --  5.23 5.19   IG AUTO x10*3/uL  --  0.03 0.04   LYMPHS ABS AUTO x10*3/uL  --  3.07 2.89   MONOS ABS AUTO x10*3/uL  --  1.09* 0.97   EOS ABS AUTO x10*3/uL  --  0.15 0.12   BASOS ABS AUTO x10*3/uL  --  0.10 0.12*       Imaging  No results found.      Assessment/Plan    Ayla Paredes is a 55 y.o.  male with a PMHx of  HTN, DM, CKD, cardiomyopathy, admitted on 7/18/2025 after developing acute onset stocking and glove paresthesias 3 days prior. On original exam no objective sensory changes, ataxia, diffuse hyperreflexia. However on repeat exam 7/21 he continues to have ataxia but also diminished reflexes and dysphagia which has continued on subsequent examination. Patient also developed bilateral abducens palsy. S/p 5 day course of IVIG (completed 7/25/25), dysphagia, abducens palsy, and ataxia have improved, reflexes 1+ throughout except for Achilles. Patient also had comorbid acute onset hyponatremia, etiology c/f SIADH, which may occur secondary to dysautonomia seen with AIDP, which is improving.    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 MRI pan spine with evidence of multilevel degenerative disc disease but no hyper intensities or enhancement to suggest central demyelinating lesion. Thus far HIV, nutritional labs, VDRL, cryptococcal antigen, CSF culture/smear, meningitis pathogen panel, ganglioside panel were normal. LP conducted 7/22/25 with albuminocytologic dissociation, CSF Igg and IGG/albumin index elevation. Pending additional neuropathy and LP labs as below. 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  S/P 5 doses of IVIG. EMG performed 7/28/25 demonstrated demyelinating process, consistent with AIDP, Ocampo Bennett variant.    Updates 7/28:  - Continue sodium chloride tablet 1 g daily, sodium down-trending at 133  - Planning for transfer to acute rehabilitation pending pre-cert    # c/f Ocampo bennett syndrome  #Acute symmetric stocking and glove neuropathy   #Ataxia, Dysphagia, Hyporeflexia   :: Albuminocytologic dissociation noted on CSF cytology, culture negative  :: S/P IVIG 5 dose (7/21-7/25)  :: Bilateral end gaze restriction resolved  - Pending serum paraneoplastic panel, VLCF, encephalopathy-autoimmune CSF evaluation  - Stopped tube feeds, Removed NG tube, now on po diet.  - Continue fluid restriction 1500 mL for SIADH --> hyponatremia, but now with Gatorade   - EMG 7/28/25 consistent with demyelinating process  - Start gabapentin 100 mg BID for pain    #RENATO  #Hyponatremia, likely SIADH   :: Baseline Cr 1.4-1.5  :: However, Cr on admission 1.28  :: Cause likely pre-renal from diarrhea  - Now with sodium and osm check BID    #Concern for Aspiration Pneumonia   #Dysphagia  :: Evidence on CT 7/21/25   Plan:  - Complete empiric zosyn 7/28/25 (follow for renally dosing)    #HTN  :: BP trending /61-85 7/26/25-7/28/25 with fluid restriction of 1500 mL, Na holding at 133 from 134 yesterday  :: Chlorthalidone 25 mg daily added 7/23/25 in setting of uncontrolled hypertension on home meds; chlorthalidone can also contribute to hyponatremia  :: BP goal: normotension  Plan:   - d/c Clorthalidone 25 mg daily  - c/w prn labetalol and hydralazine alternation   - c/w losartan 100 mg daily   - c/w metoprolol tartrate 12.5 mg daily   - c/w amlodipine 10 mg daily      #HLD   - c/w atorvastatin 40 mg daily     #T2DM  -Glucoses within range   - hold metformin   - Accuchecks & ISS AC&HS     F: PRN  E: PRN  N: CCD 90, Easy to Chew, Thin Liquid, 1500 mL fluid restriction   A: PIV, CVC     O2: RA, PRN to  maintain spO2>94%  Bowel Regimen: miralax, senokot (holding due to Bms)  DVT ppx: Lovenox      Code Status: Presumed full code        Patient seen and discussed with attending, Dr. Alston.    Sharri Reid, DO   Neurology PGY-1

## 2025-07-29 PROBLEM — E22.2 SIADH (SYNDROME OF INAPPROPRIATE ADH PRODUCTION) (MULTI): Status: ACTIVE | Noted: 2025-07-29

## 2025-07-29 PROBLEM — G61.0 AIDP (ACUTE INFLAMMATORY DEMYELINATING POLYNEUROPATHY) (MULTI): Status: ACTIVE | Noted: 2025-07-29

## 2025-07-29 PROBLEM — R27.0 ATAXIA: Status: RESOLVED | Noted: 2025-07-18 | Resolved: 2025-07-29

## 2025-07-29 LAB
ALBUMIN SERPL BCP-MCNC: 3.5 G/DL (ref 3.4–5)
ANION GAP SERPL CALC-SCNC: 11 MMOL/L (ref 10–20)
BACTERIA CSF CULT: NORMAL
BUN SERPL-MCNC: 23 MG/DL (ref 6–23)
CALCIUM SERPL-MCNC: 9.4 MG/DL (ref 8.6–10.6)
CHLORIDE SERPL-SCNC: 97 MMOL/L (ref 98–107)
CO2 SERPL-SCNC: 31 MMOL/L (ref 21–32)
CREAT SERPL-MCNC: 1.33 MG/DL (ref 0.5–1.3)
EGFRCR SERPLBLD CKD-EPI 2021: 63 ML/MIN/1.73M*2
GLUCOSE SERPL-MCNC: 106 MG/DL (ref 74–99)
GRAM STN SPEC: NORMAL
GRAM STN SPEC: NORMAL
PHOSPHATE SERPL-MCNC: 3.5 MG/DL (ref 2.5–4.9)
POTASSIUM SERPL-SCNC: 3.7 MMOL/L (ref 3.5–5.3)
SODIUM SERPL-SCNC: 135 MMOL/L (ref 136–145)

## 2025-07-29 PROCEDURE — 2500000001 HC RX 250 WO HCPCS SELF ADMINISTERED DRUGS (ALT 637 FOR MEDICARE OP)

## 2025-07-29 PROCEDURE — 1200000002 HC GENERAL ROOM WITH TELEMETRY DAILY

## 2025-07-29 PROCEDURE — 82947 ASSAY GLUCOSE BLOOD QUANT: CPT

## 2025-07-29 PROCEDURE — 36415 COLL VENOUS BLD VENIPUNCTURE: CPT

## 2025-07-29 PROCEDURE — 80069 RENAL FUNCTION PANEL: CPT

## 2025-07-29 PROCEDURE — 99232 SBSQ HOSP IP/OBS MODERATE 35: CPT

## 2025-07-29 RX ORDER — GABAPENTIN 300 MG/1
300 CAPSULE ORAL 3 TIMES DAILY
Status: DISCONTINUED | OUTPATIENT
Start: 2025-07-29 | End: 2025-07-30 | Stop reason: HOSPADM

## 2025-07-29 RX ORDER — SODIUM CHLORIDE 1000 MG
500 TABLET, SOLUBLE MISCELLANEOUS DAILY
Start: 2025-07-30

## 2025-07-29 RX ORDER — GABAPENTIN 100 MG/1
200 CAPSULE ORAL ONCE
Status: COMPLETED | OUTPATIENT
Start: 2025-07-29 | End: 2025-07-29

## 2025-07-29 RX ORDER — GABAPENTIN 300 MG/1
300 CAPSULE ORAL 3 TIMES DAILY
Start: 2025-07-29

## 2025-07-29 RX ADMIN — SODIUM CHLORIDE 0.5 G: 1 TABLET ORAL at 08:55

## 2025-07-29 RX ADMIN — LOSARTAN POTASSIUM 100 MG: 100 TABLET, FILM COATED ORAL at 08:55

## 2025-07-29 RX ADMIN — CETIRIZINE HYDROCHLORIDE 10 MG: 10 TABLET ORAL at 08:56

## 2025-07-29 RX ADMIN — ATORVASTATIN CALCIUM 40 MG: 40 TABLET, FILM COATED ORAL at 20:11

## 2025-07-29 RX ADMIN — GABAPENTIN 100 MG: 100 CAPSULE ORAL at 08:56

## 2025-07-29 RX ADMIN — GABAPENTIN 300 MG: 300 CAPSULE ORAL at 20:11

## 2025-07-29 RX ADMIN — METOPROLOL TARTRATE 12.5 MG: 25 TABLET, FILM COATED ORAL at 08:55

## 2025-07-29 RX ADMIN — AMLODIPINE BESYLATE 10 MG: 10 TABLET ORAL at 08:55

## 2025-07-29 RX ADMIN — GABAPENTIN 300 MG: 300 CAPSULE ORAL at 16:05

## 2025-07-29 RX ADMIN — Medication 1500 ML: at 09:02

## 2025-07-29 RX ADMIN — THIAMINE HCL TAB 100 MG 100 MG: 100 TAB at 08:56

## 2025-07-29 RX ADMIN — METOPROLOL TARTRATE 12.5 MG: 25 TABLET, FILM COATED ORAL at 20:11

## 2025-07-29 RX ADMIN — GABAPENTIN 200 MG: 100 CAPSULE ORAL at 11:08

## 2025-07-29 NOTE — PROGRESS NOTES
Physical Therapy                 Therapy Communication Note    Patient Name: Ayla Paredes  MRN: [id number]  Department: [facility] [place] [id number]  Room: [address] [place] [address]  Today's Date: 7/29/2025     Discipline: Physical Therapy    PT Missed Visit:   Yes    Missed Visit Reason:  Other (Pt reports had been up to bathroom several times and had been up in chair for a few hours, and politely asking for PT to hold at this time. )    Missed Time: 1055 Attempt

## 2025-07-29 NOTE — CARE PLAN
The patient's goals for the shift include  SAFETY    The clinical goals for the shift include Patient will remain free from falls and unintentional injuries.      Problem: Discharge Planning  Goal: Discharge to home or other facility with appropriate resources  Outcome: Progressing     Problem: Chronic Conditions and Co-morbidities  Goal: Patient's chronic conditions and co-morbidity symptoms are monitored and maintained or improved  Outcome: Progressing     Problem: Nutrition  Goal: Nutrient intake appropriate for maintaining nutritional needs  Outcome: Progressing     Problem: Skin  Goal: Decreased wound size/increased tissue granulation at next dressing change  Outcome: Progressing  Goal: Participates in plan/prevention/treatment measures  Outcome: Progressing  Goal: Prevent/manage excess moisture  Outcome: Progressing  Goal: Prevent/minimize sheer/friction injuries  Outcome: Progressing  Goal: Promote/optimize nutrition  Outcome: Progressing  Goal: Promote skin healing  Outcome: Progressing     Problem: Fall/Injury  Goal: Not fall by end of shift  Outcome: Progressing  Goal: Be free from injury by end of the shift  Outcome: Progressing  Goal: Verbalize understanding of personal risk factors for fall in the hospital  Outcome: Progressing  Goal: Verbalize understanding of risk factor reduction measures to prevent injury from fall in the home  Outcome: Progressing  Goal: Use assistive devices by end of the shift  Outcome: Progressing  Goal: Pace activities to prevent fatigue by end of the shift  Outcome: Progressing     Problem: Pain  Goal: Takes deep breaths with improved pain control throughout the shift  Outcome: Progressing  Goal: Turns in bed with improved pain control throughout the shift  Outcome: Progressing  Goal: Walks with improved pain control throughout the shift  Outcome: Progressing  Goal: Performs ADL's with improved pain control throughout shift  Outcome: Progressing  Goal: Participates in PT with  improved pain control throughout the shift  Outcome: Progressing  Goal: Free from opioid side effects throughout the shift  Outcome: Progressing  Goal: Free from acute confusion related to pain meds throughout the shift  Outcome: Progressing

## 2025-07-29 NOTE — PROGRESS NOTES
Occupational Therapy    Therapy Communication Note    Patient Name: Ayla Paredes  MRN: [id number]  Today's Date: 7/29/2025     Missed Visit Reason: Patient refused at 15:29      07/29/25 at 4:02 PM   Mere [id number], OTD  Rehab Office: [phone]

## 2025-07-29 NOTE — PROGRESS NOTES
"Ayla Paredes is a 55 y.o. male on day 11 of admission presenting with Ataxia.    Subjective   No acute events overnight. Patient notes persistent numbness and tingling to bilateral hands and feet as well as \"pain everywhere\" when attempting to sit up and stand from sitting. Patient otherwise denies dizziness, lightheadedness, chills, difficulty swallowing, SOB, CP, palpitations, and abdominal pain.       Objective    24 Hour Vitals  Temp:  [36.6 °C (97.9 °F)-37 °C (98.6 °F)] 36.7 °C (98.1 °F)  Heart Rate:  [78-85] 85  Resp:  [18] 18  BP: ()/(65-78) 111/78      Physical Exam     Mental State: Alert and oriented x4. Answers questions appropriately.     Cranial Nerves:   CN 2 : Tracking appropriately, no visual field deficits, peripheral vision intact throughout  CN 3, 4, 6: PERRL, EOMI without gaze restriction but fatigable left-beating nystagmus, no ptosis, accomodation reflex intact  CN 5: Sensation to light and sharp intact bilaterally, muscles of mastication intact bilaterally  CN 7: Symmetric facial strength. Nasolabial folds symmetric.   CN 8: Hearing intact to voice and finger rub bilaterally  CN 9/10/12: Evidence of pharyngeal dysfunction given low amplitude speech changes, hoarseness. Otherwise symmetric palate elevation.  CN 11: Symmetric strength with shoulder shrug and head turn bilaterally  CN 12: Normal strength of the tongue      Motor: Muscle bulk and tone were normal in both upper and lower extremities. No fasciculations, tremor or other abnormal movements were present.      Strength                                             R          L  Shoulder abduction (C4-C5)                5          5  Elbow flexion (C5-C6)                          5          5  Elbow extension (C7-C8)                     5          5  Hand     5        5   Hip flexion (IP, L2-L3)                          5         5  Knee extension (Fem, L2-L4)               5          5  Knee flexion (Sci, L5-S1)               "        5          5  DorsiFlex (DePer, L5-S1l)                     5          5  PlantarFlex (Tibial, S1-S2)                    5          5    Reflexes:  R/L: Biceps 1+/1+, brachioradialis 1+/1+, patellar 1+/1+, Achilles 0/0, plantar down bilaterally, ankle clonus absent bilaterally     Sensory:   Intact to crude and sharp touch in bilateral upper and lower extremities.    Coordination:  No dysmetria on finger-to-nose and heel-to-shin bilaterally, joint proprioception to digits bilaterally, Romberg deferred given pain with getting out of bed    Gait:  Deferred as patient reported pain when attempting to get out of bed    Medications - per EMR    Results from last 72 hours   Lab Units 07/29/25  0854 07/28/25  0716 07/27/25  1823 07/27/25  0659   GLUCOSE mg/dL 106* 136*  --  115*   SODIUM mmol/L 135* 133*   < > 134*   POTASSIUM mmol/L 3.7 3.0*  --  3.3*   CHLORIDE mmol/L 97* 96*  --  96*   CO2 mmol/L 31 29  --  33*   ANION GAP mmol/L 11 11  --  8*   BUN mg/dL 23 27*  --  20   CREATININE mg/dL 1.33* 1.69*  --  1.40*   EGFR mL/min/1.73m*2 63 47*  --  59*   CALCIUM mg/dL 9.4 8.7  --  8.7   PHOSPHORUS mg/dL 3.5 3.4  --  3.4   ALBUMIN g/dL 3.5 3.3*  --  3.3*   MAGNESIUM mg/dL  --  2.06  --  2.09   POCT CALCIUM IONIZED (MMOL/L) IN BLOOD mmol/L  --  1.11  --  1.12    < > = values in this interval not displayed.       Results from last 72 hours   Lab Units 07/28/25  0716 07/27/25  0659   WBC AUTO x10*3/uL 11.9* 9.7   NRBC AUTO /100 WBCs 0.4* 0.4*   RBC AUTO x10*6/uL 2.95* 3.14*   HEMOGLOBIN g/dL 8.9* 9.1*   HEMATOCRIT % 27.2* 28.8*   MCV fL 92 92   MCH pg 30.2 29.0   MCHC g/dL 32.7 31.6*   RDW % 14.7* 14.3   PLATELETS AUTO x10*3/uL 449 435   NEUTROS PCT AUTO % 57.3 54.1   IG PCT AUTO % 1.0* 0.3   LYMPHS PCT AUTO % 28.8 31.7   MONOS PCT AUTO % 10.2 11.3   EOS PCT AUTO % 1.8 1.6   BASOS PCT AUTO % 0.9 1.0   NEUTROS ABS x10*3/uL 6.82 5.23   IG AUTO x10*3/uL 0.12 0.03   LYMPHS ABS AUTO x10*3/uL 3.43 3.07   MONOS ABS AUTO  x10*3/uL 1.21* 1.09*   EOS ABS AUTO x10*3/uL 0.21 0.15   BASOS ABS AUTO x10*3/uL 0.11* 0.10       Imaging  EMG & nerve conduction  Result Date: 7/28/2025  Impression: This is a markedly abnormal study.  EMG of left upper and lower extremities showed electrophysiologic evidence of an acquired sensory and motor demyelinating neuropathy as can be seen in [place] Syndrome or an Acute form of Chronic Inflammatory Demyelinating Polyneuropathy (CIDP). Demyelinating features include: Temporal dispersion in all motor waveforms distally and proximally except for the peroneal study recording the tibialis anterior, a prolonged ulnar motor distal latency, and impersistent and/or absent F-waves. In addition, there is notable conduction block of the ulnar nerve CMAP across the elbow which could be due to either the underlying demyelinating neuropathy or compressive ulnar neuropathy at the elbow. The median motor distal latency was prolonged which  could be due to either the underlying demyelinating neuropathy or there may be a component of a median neuropathy across the wrist as well. A repeat EMG study can be considered after resolution of acute symptoms to reassess for entrapment of the ulnar neuropathy across the elbow and median neuropathy across the wrist if indicated. Study was completed 13 days after symptoms onset. No significant axonal loss was seen; however, a repeat study in a few weeks if clinically indicated can further assess for axonal loss which can take time to develop on EMG study. Supriya Andrea MD (Neuromuscular Fellow) Carmela Ashton MD (Neuromuscular Attending) --------------------------------------------------------------------------------------------------------------------------------------------------------------------------------------------------------------------------------------------------------------  -------------------------------------------------------------------------------------------------------------------------------------------------------------------------------------------------------------------------------------------------------------- -------------------------------------------------------------------------------------------------------------------------------------------------------------------------------------------------------------------------------------------------------------- -------------------------------------------------------------------------------------------------------------------------------------------------------------------------------------------------------------------------------------------------------------- -------------------------------------------------------------------------------------------------------------------------------------------------------------------------------------------------------------------------------------------------------------- -----------------------------------------------------------------------------------------------------------------------------------------------------------------------------------------------         Assessment/Plan    Ayla Paredes is a 55 y.o.  male with a PMHx of  HTN, DM, CKD, cardiomyopathy, admitted on 7/18/2025 after developing acute onset stocking and glove paresthesias 3 days prior. On original exam no objective sensory changes, ataxia, diffuse hyperreflexia. However on repeat exam 7/21 he continues to have ataxia but also diminished reflexes and dysphagia which has continued on subsequent examination. Patient also developed bilateral abducens palsy. S/p 5 day course of IVIG (completed 7/25/25), dysphagia, abducens palsy, and ataxia have improved, reflexes 1+ throughout except for Achilles. Patient also had comorbid acute onset hyponatremia, etiology c/f SIADH, which may occur secondary to dysautonomia  seen with AIDP, which is improving.    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 MRI pan spine with evidence of multilevel degenerative disc disease but no hyper intensities or enhancement to suggest central demyelinating lesion. Thus far HIV, nutritional labs, VDRL, cryptococcal antigen, CSF culture/smear, meningitis pathogen panel, ganglioside panel were normal. LP conducted 7/22/25 with albuminocytologic dissociation, CSF Igg and IGG/albumin index elevation. Pending additional neuropathy and LP labs as below. 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  S/P 5 doses of IVIG. EMG performed 7/28/25 demonstrated demyelinating process, consistent with AIDP, Ocampo Bennett variant.    Updates 7/29:  - Continue sodium chloride tablet 1 g daily, sodium up-trending at 135  - Planning for transfer to acute rehabilitation pending pre-cert    # c/f Ocampo bennett syndrome  #Acute symmetric stocking and glove neuropathy   #Ataxia, Dysphagia, Hyporeflexia   :: Albuminocytologic dissociation noted on CSF cytology, culture negative  :: S/P IVIG 5 dose (7/21-7/25)  :: Bilateral end gaze restriction resolved  - Pending serum paraneoplastic panel, VLCF, encephalopathy-autoimmune CSF evaluation  - Stopped tube feeds, Removed NG tube, now on po diet.  - Continue fluid restriction 1500 mL for SIADH --> hyponatremia, but now with Gatorade   - EMG 7/28/25 consistent with demyelinating process  - Start gabapentin 100 mg BID for pain    #RENATO  #Hyponatremia, likely SIADH   :: Baseline Cr 1.4-1.5  :: However, Cr on admission 1.28  :: Cause likely pre-renal from diarrhea  - Now with sodium and osm check BID    #Concern for Aspiration Pneumonia   #Dysphagia  :: Evidence on CT 7/21/25   Plan:  - Complete empiric zosyn 7/28/25 (follow for renally dosing)    #HTN  :: BP trending  /65-78 past 24 hours, increased from prior to days, Na up-trending to 135 from 133 yesterday  :: Chlorthalidone d/c'd 7/28/25 secondary to episodes of hypotension and continued hyponatremia in setting of SIADH  :: BP goal: normotension  Plan:   - c/w prn labetalol and hydralazine alternation   - c/w losartan 100 mg daily   - c/w metoprolol tartrate 12.5 mg daily   - c/w amlodipine 10 mg daily      #HLD   - c/w atorvastatin 40 mg daily     #T2DM  -Glucoses within range   - hold metformin   - Accuchecks & ISS AC&HS     F: PRN  E: PRN  N: CCD 90, Easy to Chew, Thin Liquid, 1500 mL fluid restriction   A: PIV, CVC     O2: RA, PRN to maintain spO2>94%  Bowel Regimen: miralax, senokot (holding due to Bms)  DVT ppx: Lovenox      Code Status: Presumed full code        Patient seen and discussed with attending, Dr. Alston.    Sharri Reid, DO   Neurology PGY-1

## 2025-07-29 NOTE — DISCHARGE SUMMARY
Discharge Diagnosis  AIDP (acute inflammatory demyelinating polyneuropathy) (Multi)    Issues Requiring Follow-Up  - Neurology 2-6 weeks after discharge for AIDP follow-up  - PCP 2 to 6 weeks after discharge for post hospital follow-up for SIADH and sodium check  - Needs routine outpatient CT chest in 6-12 months due to subpleural upper lobe nodule seen on CT C/A/P 7/21/25    Test Results Pending At Discharge  Pending Labs       Order Current Status    Encephalopathy-Autoimmune Evaluation,CSF In process    Fatty acids, long chain In process    Paraneoplastic Autoantibodies Evaluation In process    Fungal Culture/Smear Preliminary result            Hospital Course  Ayla Paredes is a 55 y.o.  male with a PMHx of  HTN, DM, CKD, and cardiomyopathy, who presented on 7/18/25, with bilateral hand and foot numbness x3 days, for which neurology is consulted. Initially, on neurological exam he had no objective sensory changes, however he has a positive romberg sign as well as a wide based gait with inability to perform tandem gait, also had diffuse hyperreflexia. Initially suspected spinal cord etiology but MRI findings unable to explain distribution of neuropathy. MRI brain personally reviewed and shows no acute abnormalities. Concern for AIDP increased due to ataxia, bilateral abducens palsy, dysphagia, and then diminished reflexes compared to hyperreflexia prior. LP was done, which was significant for albuminocytologic dissociation. Other CSF analysis was negative for infectious etiology, ganglioside panel was unremarkable, and VLCFA, paraneoplastic, and autoimmune etiologies were pending at discharge. Patient was then given 5 day course of IVIG with symptomatic improvement. Additionally, EMG was performed, which was consistent with demyelinating process.     Patient hospitalization was complicated by dysphagia, SIADH, and aspiration pneumonia. Patient developed dysphagia, diminished reflexes, and an acute drop in Na to  123 over the course of two days. For this culmination of symptoms, patient was transferred to the ICU for hypertonic saline and NG tube. As sodium stabilized and patient cleared by SLP for swallowing, patient was transferred back to floor, where he was continued on daily salt tablets and was put on 1500 mL fluid restriction. Additionally, patient developed aspiration pneumonia, correlated on CT A/P. Patient was started on 7-day course Zosyn with improvement.      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 Laterality and location unable to explain bilateral symptoms and significant gait issues.    Follow-ups:  - Neurology 2-6 weeks after discharge for AIDP follow-up  - PCP 2 to 6 weeks after discharge for post hospital follow-up for SIADH and sodium check  - Needs routine outpatient CT chest in 6-12 months due to subpleural upper lobe nodule seen on CT C/A/P 7/21/25    Medication changes:  - NaCl tablet 0.5 mg tablet, can wean off of sodium when hyponatremia normalizes  - Gabapentin 300 mg TID        Pertinent Physical Exam At Time of Discharge  Physical Exam  Vitals and nursing note reviewed.   Constitutional:       Appearance: Normal appearance.   HENT:      Head: Normocephalic and atraumatic.      Nose: Nose normal.      Mouth/Throat:      Mouth: Mucous membranes are moist.      Pharynx: Oropharynx is clear.     Eyes:      General: No visual field deficit.     Extraocular Movements: Extraocular movements intact.      Left eye: Nystagmus (left beating with leftward gaze) present.      Pupils: Pupils are equal, round, and reactive to light.       Cardiovascular:      Rate and Rhythm: Normal rate and regular rhythm.   Pulmonary:      Effort: Pulmonary effort is normal. No respiratory distress.      Breath sounds: Normal breath sounds.     Musculoskeletal:      Cervical back: Normal range of motion.     Skin:     General: Skin is warm and dry.      Neurological:      Mental Status: He is alert and oriented to person, place, and time.      Cranial Nerves: No cranial nerve deficit, dysarthria or facial asymmetry.      Sensory: No sensory deficit.      Motor: No weakness, tremor, atrophy, abnormal muscle tone, seizure activity or pronator drift.      Coordination: Coordination normal.      Deep Tendon Reflexes: Reflexes abnormal.      Reflex Scores:       Bicep reflexes are 1+ on the right side and 1+ on the left side.       Brachioradialis reflexes are 1+ on the right side and 1+ on the left side.       Patellar reflexes are 1+ on the right side and 1+ on the left side.       Achilles reflexes are 0 on the right side and 0 on the left side.        Home Medications     Medication List      START taking these medications     gabapentin 300 mg capsule; Commonly known as: Neurontin; Take 1 capsule   (300 mg) by mouth 3 times a day.   sodium chloride 1,000 mg tablet; Take 0.5 tablets (0.5 g) by mouth once   daily.; Start taking on: July 30, 2025     CHANGE how you take these medications     metoprolol succinate XL 50 mg 24 hr tablet; Commonly known as:   Toprol-XL; Take 1 tablet (50 mg) by mouth once daily. Do not crush or   chew.; What changed: Another medication with the same name was removed.   Continue taking this medication, and follow the directions you see here.     CONTINUE taking these medications     amLODIPine 10 mg tablet; Commonly known as: Norvasc; Take 1 tablet (10   mg) by mouth once daily.   atorvastatin 40 mg tablet; Commonly known as: Lipitor; Take 1 tablet (40   mg) by mouth once daily.   cetirizine 10 mg tablet; Commonly known as: ZyrTEC; Take 1 tablet (10   mg) by mouth once daily.   chlorthalidone 25 mg tablet; Commonly known as: Hygroton; Take 1.5   tablets (37.5 mg) by mouth once daily.   Dexcom G7  misc; Generic drug: blood-glucose,,cont; Use   as instructed   Dexcom G7 Sensor device; Generic drug: blood-glucose sensor;  Change   every 10 days   ergocalciferol 1250 mcg (50,000 units) capsule; Commonly known as:   Vitamin D-2; TAKE 1 CAPSULE (72585 UNITS) BY MOUTH ONE TIME PER WEEK   fluticasone 50 mcg/actuation nasal spray; Commonly known as: Flonase;   Administer 1 spray into each nostril once daily. Shake gently. Before   first use, prime pump. After use, clean tip and replace cap.   losartan 100 mg tablet; Commonly known as: Cozaar; TAKE 1 TABLET BY   MOUTH EVERY DAY   metFORMIN  mg 24 hr tablet; Commonly known as: Glucophage-XR; Take   1 tablet (500 mg) by mouth 2 times daily (morning and late afternoon). Do   not crush, chew, or split.   potassium chloride CR 10 mEq ER tablet; Commonly known as: Klor-Con;   Take 1 tablet (10 mEq) by mouth 2 times a day. Do not crush, chew, or   split.   tamsulosin 0.4 mg 24 hr capsule; Commonly known as: Flomax; Take 1   capsule (0.4 mg) by mouth once daily.     STOP taking these medications     acyclovir 5 % cream; Commonly known as: Zovirax       Outpatient Follow-Up  Future Appointments   Date Time Provider Department Center   8/7/2025 12:00 PM Ortiz Rosales RDN, [id number] None   8/28/2025  1:20 PM Diane Bush MD [id number]       Sharri Reid DO

## 2025-07-29 NOTE — PROGRESS NOTES
Care Transitions Progress Note: Per medical team patient is medically ready.  Dispo: Precert remains pending for [facility] at this time.  OT updates requested.

## 2025-07-30 VITALS
BODY MASS INDEX: 30.27 KG/M2 | RESPIRATION RATE: 20 BRPM | SYSTOLIC BLOOD PRESSURE: 116 MMHG | DIASTOLIC BLOOD PRESSURE: 78 MMHG | HEART RATE: 77 BPM | TEMPERATURE: 98.2 F | OXYGEN SATURATION: 97 % | HEIGHT: 65 IN | WEIGHT: 181.66 LBS

## 2025-07-30 LAB
ALBUMIN SERPL BCP-MCNC: 3.5 G/DL (ref 3.4–5)
ANION GAP SERPL CALC-SCNC: 9 MMOL/L (ref 10–20)
BUN SERPL-MCNC: 28 MG/DL (ref 6–23)
CALCIUM SERPL-MCNC: 9.1 MG/DL (ref 8.6–10.6)
CHLORIDE SERPL-SCNC: 96 MMOL/L (ref 98–107)
CO2 SERPL-SCNC: 33 MMOL/L (ref 21–32)
CREAT SERPL-MCNC: 1.46 MG/DL (ref 0.5–1.3)
EGFRCR SERPLBLD CKD-EPI 2021: 56 ML/MIN/1.73M*2
GLUCOSE SERPL-MCNC: 124 MG/DL (ref 74–99)
PHOSPHATE SERPL-MCNC: 3.7 MG/DL (ref 2.5–4.9)
PHYTANATE SERPL-SCNC: 0.35 UMOL/L (ref 0.25–2.07)
POTASSIUM SERPL-SCNC: 3.4 MMOL/L (ref 3.5–5.3)
PRISTANATE SERPL-SCNC: <0.1 UMOL/L (ref 0–0.26)
PRISTANATE/PHYTANATE SERPL-SRTO: NORMAL {RATIO} (ref 0–0.28)
SODIUM SERPL-SCNC: 135 MMOL/L (ref 136–145)
VLCFA C22:0 SERPL-SCNC: 55.86 UMOL/L (ref 28.94–93.5)
VLCFA C24:0 SERPL-SCNC: 48.23 UMOL/L (ref 24.25–77.75)
VLCFA C24:0/C22:0 SERPL-SRTO: 0.86 {RATIO} (ref 0.64–1.02)
VLCFA C26:0 SERPL-SCNC: 0.32 UMOL/L (ref 0.17–0.73)
VLCFA C26:0/C22:0 SERPL-SRTO: 0.01 {RATIO} (ref 0–0.01)
VLCFA PATTERN SERPL-IMP: NORMAL

## 2025-07-30 PROCEDURE — 2500000001 HC RX 250 WO HCPCS SELF ADMINISTERED DRUGS (ALT 637 FOR MEDICARE OP)

## 2025-07-30 PROCEDURE — 97530 THERAPEUTIC ACTIVITIES: CPT | Mod: GO

## 2025-07-30 PROCEDURE — 97116 GAIT TRAINING THERAPY: CPT | Mod: GP

## 2025-07-30 PROCEDURE — 99238 HOSP IP/OBS DSCHRG MGMT 30/<: CPT

## 2025-07-30 PROCEDURE — 97530 THERAPEUTIC ACTIVITIES: CPT | Mod: GP

## 2025-07-30 PROCEDURE — 36415 COLL VENOUS BLD VENIPUNCTURE: CPT

## 2025-07-30 PROCEDURE — 80069 RENAL FUNCTION PANEL: CPT

## 2025-07-30 RX ADMIN — METOPROLOL TARTRATE 12.5 MG: 25 TABLET, FILM COATED ORAL at 09:23

## 2025-07-30 RX ADMIN — GABAPENTIN 300 MG: 300 CAPSULE ORAL at 09:23

## 2025-07-30 RX ADMIN — CETIRIZINE HYDROCHLORIDE 10 MG: 10 TABLET ORAL at 09:23

## 2025-07-30 RX ADMIN — LOSARTAN POTASSIUM 100 MG: 100 TABLET, FILM COATED ORAL at 09:23

## 2025-07-30 RX ADMIN — GABAPENTIN 300 MG: 300 CAPSULE ORAL at 15:08

## 2025-07-30 RX ADMIN — ACETAMINOPHEN 650 MG: 325 TABLET ORAL at 09:24

## 2025-07-30 RX ADMIN — AMLODIPINE BESYLATE 10 MG: 10 TABLET ORAL at 09:23

## 2025-07-30 RX ADMIN — Medication 1500 ML: at 09:26

## 2025-07-30 RX ADMIN — THIAMINE HCL TAB 100 MG 100 MG: 100 TAB at 09:23

## 2025-07-30 RX ADMIN — SODIUM CHLORIDE 0.5 G: 1 TABLET ORAL at 09:24

## 2025-07-30 ASSESSMENT — PAIN SCALES - GENERAL: PAINLEVEL_OUTOF10: 0 - NO PAIN

## 2025-07-30 ASSESSMENT — COGNITIVE AND FUNCTIONAL STATUS - GENERAL
TURNING FROM BACK TO SIDE WHILE IN FLAT BAD: A LITTLE
WALKING IN HOSPITAL ROOM: A LOT
PERSONAL GROOMING: A LITTLE
CLIMB 3 TO 5 STEPS WITH RAILING: TOTAL
DAILY ACTIVITIY SCORE: 14
STANDING UP FROM CHAIR USING ARMS: A LITTLE
DRESSING REGULAR LOWER BODY CLOTHING: A LOT
DRESSING REGULAR UPPER BODY CLOTHING: A LOT
EATING MEALS: A LITTLE
MOVING TO AND FROM BED TO CHAIR: A LOT
TOILETING: A LOT
MOBILITY SCORE: 14
MOVING FROM LYING ON BACK TO SITTING ON SIDE OF FLAT BED WITH BEDRAILS: A LITTLE
HELP NEEDED FOR BATHING: A LOT

## 2025-07-30 ASSESSMENT — PAIN - FUNCTIONAL ASSESSMENT
PAIN_FUNCTIONAL_ASSESSMENT: 0-10
PAIN_FUNCTIONAL_ASSESSMENT: 0-10

## 2025-07-30 NOTE — CARE PLAN
The patient's goals for the shift include  safety    The clinical goals for the shift include PT WILL REMAIN SAFE AND FREE OF FALL AND INJURY      Problem: Discharge Planning  Goal: Discharge to home or other facility with appropriate resources  Outcome: Progressing     Problem: Chronic Conditions and Co-morbidities  Goal: Patient's chronic conditions and co-morbidity symptoms are monitored and maintained or improved  Outcome: Progressing     Problem: Nutrition  Goal: Nutrient intake appropriate for maintaining nutritional needs  Outcome: Progressing     Problem: Skin  Goal: Decreased wound size/increased tissue granulation at next dressing change  Outcome: Progressing  Goal: Participates in plan/prevention/treatment measures  Outcome: Progressing  Goal: Prevent/manage excess moisture  Outcome: Progressing  Goal: Prevent/minimize sheer/friction injuries  Outcome: Progressing  Goal: Promote/optimize nutrition  Outcome: Progressing  Goal: Promote skin healing  Outcome: Progressing     Problem: Fall/Injury  Goal: Not fall by end of shift  Outcome: Progressing  Goal: Be free from injury by end of the shift  Outcome: Progressing  Goal: Verbalize understanding of personal risk factors for fall in the hospital  Outcome: Progressing  Goal: Verbalize understanding of risk factor reduction measures to prevent injury from fall in the home  Outcome: Progressing  Goal: Use assistive devices by end of the shift  Outcome: Progressing  Goal: Pace activities to prevent fatigue by end of the shift  Outcome: Progressing     Problem: Pain  Goal: Takes deep breaths with improved pain control throughout the shift  Outcome: Progressing  Goal: Turns in bed with improved pain control throughout the shift  Outcome: Progressing  Goal: Walks with improved pain control throughout the shift  Outcome: Progressing  Goal: Performs ADL's with improved pain control throughout shift  Outcome: Progressing  Goal: Participates in PT with improved pain  control throughout the shift  Outcome: Progressing  Goal: Free from opioid side effects throughout the shift  Outcome: Progressing  Goal: Free from acute confusion related to pain meds throughout the shift  Outcome: Progressing

## 2025-07-30 NOTE — PROGRESS NOTES
Ayla Paredes is a 55 y.o. male on day 12 of admission presenting with AIDP (acute inflammatory demyelinating polyneuropathy) (Multi).    Subjective   No acute events overnight. Patient notes persistent numbness and tingling to bilateral hands and feet, worse in the feet as compared to the hands. Patient otherwise denies dizziness, lightheadedness, chills, difficulty swallowing, SOB, CP, palpitations, and abdominal pain.       Objective    24 Hour Vitals  Temp:  [36.2 °C (97.2 °F)-36.7 °C (98.1 °F)] 36.2 °C (97.2 °F)  Heart Rate:  [74-88] 88  Resp:  [17-20] 20  BP: (104-118)/(72-78) 118/72      Physical Exam     Mental State: Alert and oriented x4. Answers questions appropriately.     Cranial Nerves:   CN 2 : Tracking appropriately, no visual field deficits, peripheral vision intact throughout  CN 3, 4, 6: PERRL, EOMI without gaze restriction but fatigable left-beating nystagmus, no ptosis, accomodation reflex intact  CN 5: Sensation to light and sharp intact bilaterally, muscles of mastication intact bilaterally  CN 7: Symmetric facial strength. Nasolabial folds symmetric.   CN 8: Hearing intact to voice and finger rub bilaterally  CN 9/10/12: Evidence of pharyngeal dysfunction given low amplitude speech changes, hoarseness. Otherwise symmetric palate elevation.  CN 11: Symmetric strength with shoulder shrug and head turn bilaterally  CN 12: Normal strength of the tongue      Motor: Muscle bulk and tone were normal in both upper and lower extremities. No fasciculations, tremor or other abnormal movements were present.      Strength                                             R          L  Shoulder abduction (C4-C5)                5          5  Elbow flexion (C5-C6)                          5          5  Elbow extension (C7-C8)                     5          5  Hand     5        5   Hip flexion (IP, L2-L3)                          5         5  Knee extension (Fem, L2-L4)               5          5  Knee  flexion (Sci, L5-S1)                      5          5  DorsiFlex (DePer, L5-S1l)                     5          5  PlantarFlex (Tibial, S1-S2)                    5          5    Reflexes:  R/L: Biceps 1+/1+, brachioradialis 1+/1+, patellar 1+/1+, Achilles 0/0, plantar down bilaterally, ankle clonus absent bilaterally     Sensory:   Intact to crude and sharp touch in bilateral upper and lower extremities.    Coordination:  No dysmetria on finger-to-nose and heel-to-shin bilaterally, joint proprioception to digits bilaterally, Romberg deferred given pain with getting out of bed    Gait:  Deferred as patient reported pain when attempting to get out of bed    Medications - per EMR    Results from last 72 hours   Lab Units 07/29/25  0854 07/28/25  0716   GLUCOSE mg/dL 106* 136*   SODIUM mmol/L 135* 133*   POTASSIUM mmol/L 3.7 3.0*   CHLORIDE mmol/L 97* 96*   CO2 mmol/L 31 29   ANION GAP mmol/L 11 11   BUN mg/dL 23 27*   CREATININE mg/dL 1.33* 1.69*   EGFR mL/min/1.73m*2 63 47*   CALCIUM mg/dL 9.4 8.7   PHOSPHORUS mg/dL 3.5 3.4   ALBUMIN g/dL 3.5 3.3*   MAGNESIUM mg/dL  --  2.06   POCT CALCIUM IONIZED (MMOL/L) IN BLOOD mmol/L  --  1.11       Results from last 72 hours   Lab Units 07/28/25  0716   WBC AUTO x10*3/uL 11.9*   NRBC AUTO /100 WBCs 0.4*   RBC AUTO x10*6/uL 2.95*   HEMOGLOBIN g/dL 8.9*   HEMATOCRIT % 27.2*   MCV fL 92   MCH pg 30.2   MCHC g/dL 32.7   RDW % 14.7*   PLATELETS AUTO x10*3/uL 449   NEUTROS PCT AUTO % 57.3   IG PCT AUTO % 1.0*   LYMPHS PCT AUTO % 28.8   MONOS PCT AUTO % 10.2   EOS PCT AUTO % 1.8   BASOS PCT AUTO % 0.9   NEUTROS ABS x10*3/uL 6.82   IG AUTO x10*3/uL 0.12   LYMPHS ABS AUTO x10*3/uL 3.43   MONOS ABS AUTO x10*3/uL 1.21*   EOS ABS AUTO x10*3/uL 0.21   BASOS ABS AUTO x10*3/uL 0.11*       Imaging  EMG & nerve conduction  Result Date: 7/28/2025  Impression: This is a markedly abnormal study.  EMG of left upper and lower extremities showed electrophysiologic evidence of an acquired sensory and  motor demyelinating neuropathy as can be seen in Guillan Hurlock Syndrome or an Acute form of Chronic Inflammatory Demyelinating Polyneuropathy (CIDP). Demyelinating features include: Temporal dispersion in all motor waveforms distally and proximally except for the peroneal study recording the tibialis anterior, a prolonged ulnar motor distal latency, and impersistent and/or absent F-waves. In addition, there is notable conduction block of the ulnar nerve CMAP across the elbow which could be due to either the underlying demyelinating neuropathy or compressive ulnar neuropathy at the elbow. The median motor distal latency was prolonged which  could be due to either the underlying demyelinating neuropathy or there may be a component of a median neuropathy across the wrist as well. A repeat EMG study can be considered after resolution of acute symptoms to reassess for entrapment of the ulnar neuropathy across the elbow and median neuropathy across the wrist if indicated. Study was completed 13 days after symptoms onset. No significant axonal loss was seen; however, a repeat study in a few weeks if clinically indicated can further assess for axonal loss which can take time to develop on EMG study. Supriya Andrea MD (Neuromuscular Fellow) Carmela Ashton MD (Neuromuscular Attending) -------------------------------------------------------------------------------------------------------------------------------------------------------------------------------------------------------------------------------------------------------------- --------------------------------------------------------------------------------------------------------------------------------------------------------------------------------------------------------------------------------------------------------------  -------------------------------------------------------------------------------------------------------------------------------------------------------------------------------------------------------------------------------------------------------------- -------------------------------------------------------------------------------------------------------------------------------------------------------------------------------------------------------------------------------------------------------------- -------------------------------------------------------------------------------------------------------------------------------------------------------------------------------------------------------------------------------------------------------------- -----------------------------------------------------------------------------------------------------------------------------------------------------------------------------------------------         Assessment/Plan    Ayla Paredes is a 55 y.o.  male with a PMHx of  HTN, DM, CKD, cardiomyopathy, admitted on 7/18/2025 after developing acute onset stocking and glove paresthesias 3 days prior. On original exam no objective sensory changes, ataxia, diffuse hyperreflexia. However on repeat exam 7/21 he continues to have ataxia but also diminished reflexes and dysphagia which has continued on subsequent examination. Patient also developed bilateral abducens palsy. S/p 5 day course of IVIG (completed 7/25/25), dysphagia, abducens palsy, and ataxia have improved, reflexes 1+ throughout except for Achilles. Patient also had comorbid acute onset hyponatremia, etiology c/f SIADH, which may occur secondary to dysautonomia seen with AIDP, which is improving.    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 MRI pan spine with evidence of multilevel  degenerative disc disease but no hyper intensities or enhancement to suggest central demyelinating lesion. Thus far HIV, nutritional labs, VDRL, cryptococcal antigen, CSF culture/smear, meningitis pathogen panel, ganglioside panel were normal. LP conducted 7/22/25 with albuminocytologic dissociation, CSF Igg and IGG/albumin index elevation. Pending additional neuropathy and LP labs as below. 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  S/P 5 doses of IVIG. EMG performed 7/28/25 demonstrated demyelinating process, consistent with AIDP, Ocampo Bennett variant.    Updates 7/30:  - Continue sodium chloride tablet 1 g daily, sodium up-trending at 135  - Planning for transfer to acute rehabilitation pending pre-cert    # c/f Ocampo bennett syndrome  #Acute symmetric stocking and glove neuropathy   #Ataxia, Dysphagia, Hyporeflexia   :: Albuminocytologic dissociation noted on CSF cytology, culture negative  :: S/P IVIG 5 dose (7/21-7/25)  :: Bilateral end gaze restriction resolved  - Pending serum paraneoplastic panel, VLCF, encephalopathy-autoimmune CSF evaluation  - Stopped tube feeds, Removed NG tube, now on po diet.  - Continue fluid restriction 1500 mL for SIADH --> hyponatremia, but now with Gatorade   - EMG 7/28/25 consistent with demyelinating process  - Start gabapentin 100 mg BID for pain    #RENATO  #Hyponatremia, likely SIADH   :: Baseline Cr 1.4-1.5  :: However, Cr on admission 1.28  :: Cause likely pre-renal from diarrhea  - Now with sodium and osm check BID    #Concern for Aspiration Pneumonia   #Dysphagia  :: Evidence on CT 7/21/25   Plan:  - Complete empiric zosyn 7/28/25 (follow for renally dosing)    #HTN  :: BP trending /65-78 past 24 hours, increased from prior to days, Na up-trending to 135 from 133 yesterday  :: Chlorthalidone d/c'd 7/28/25 secondary to episodes of hypotension and continued hyponatremia in setting of SIADH  :: BP goal: normotension  Plan:   - c/w prn  labetalol and hydralazine alternation   - c/w losartan 100 mg daily   - c/w metoprolol tartrate 12.5 mg daily   - c/w amlodipine 10 mg daily      #HLD   - c/w atorvastatin 40 mg daily     #T2DM  -Glucoses within range   - hold metformin   - Accuchecks & ISS AC&HS     F: PRN  E: PRN  N: CCD 90, Easy to Chew, Thin Liquid, 1500 mL fluid restriction   A: PIV, CVC     O2: RA, PRN to maintain spO2>94%  Bowel Regimen: miralax, senokot (holding due to Bms)  DVT ppx: Lovenox      Code Status: Presumed full code        Patient seen and discussed with attending, Dr. Alston.    Sharri Reid, DO   Neurology PGY-1

## 2025-07-30 NOTE — PROGRESS NOTES
07/30/25 1611   Discharge Planning   Expected Discharge Disposition Rehab  ([facility])   Does the patient need discharge transport arranged? Yes   [facility] coordination needed? Yes   Has discharge transport been arranged? Yes   What day is the transport expected? 07/30/25   What time is the transport expected? 1700     Transitional Care Coordination Discharge Planning Note:  Patient medically ready for discharge 07/30  Patient going to [facility] on pre-authorization   Transport confirmed for (1700) MD, AR, Bedside nurse, Pt and pt's spouse KeonNicole aware and agree.  Blue form given to the   N2N number given to the bedside nurse.  Discharge papers sent to facility via careport.    TCC to follow for discharge planning.    Belia Zepeda  PRN, TCC  [phone]  [email or web address]         This TCC will continue to follow and update the plan as needed.

## 2025-07-30 NOTE — PROGRESS NOTES
Physical Therapy    Physical Therapy Treatment    Patient Name: Ayla Paredes  MRN: [id number]  Department: [place] [id number]  Room: [address] [place] [address]  Today's Date: 7/30/2025  Time Calculation  Start Time: 1005  Stop Time: 1031  Time Calculation (min): 26 min    Assessment/Plan   PT Assessment  Barriers to Discharge Home: Physical needs, Caregiver assistance  Caregiver Assistance: Caregiver assistance needed per identified barriers - however, level of patient's required assistance exceeds assistance available at home  Physical Needs: High falls risk due to function or environment  Evaluation/Treatment Tolerance: Patient tolerated treatment well  Medical Staff Made Aware: Yes  End of Session Communication: Bedside nurse, Care Coordinator  Assessment Comment: Pt motivated with excellent effort.  Able to ambulate to ambulate to bathroom today.  Still ataxic with sensory deficits, and a high falls risk. Remains appropriate for high intensity PT at time of d/c.  End of Session Patient Position: Bed, 3 rail up, Alarm on  PT Plan  Inpatient/Swing Bed or Outpatient: Inpatient  PT Plan  Treatment/Interventions: Bed mobility, Transfer training, Gait training, Balance training, Neuromuscular re-education, Endurance training, Therapeutic exercise, Therapeutic activity, Home exercise program, Postural re-education, Strengthening, Stair training  PT Plan: Ongoing PT  PT Frequency: 5 times per week  PT Discharge Recommendations: High intensity level of continued care  Equipment Recommended upon Discharge: Wheeled walker  PT Recommended Transfer Status: Assist x1, Assistive device  PT - OK to Discharge: Yes    PT Visit Info:  PT Received On: 07/30/25  Response to Previous Treatment: Patient with no complaints from previous session.     General Visit Information:   General  Family/Caregiver Present: Yes  Caregiver Feedback: Fiance present and supportive  Prior to Session Communication: Bedside nurse  Patient Position Received: Bed, 3 rail up, Alarm  off, not on at start of session  General Comment: Supine.  Pt reporting needing bedpan, but then was agreeable    Subjective   Precautions:  Precautions  Medical Precautions: Fall precautions    Objective   Pain:  Pain Assessment  Pain Assessment: 0-10  0-10 (Numeric) Pain Score:  (Not rated, pt reporting parasthesias BUE/LE.  Had already been medicated)  Pain Interventions: Ambulation/increased activity, Repositioned  Response to Interventions: Resting quietly (Tolerated PT)  Cognition:  Cognition  Overall Cognitive Status:  (Flat affect)  Arousal/Alertness: Appropriate responses to stimuli  Orientation Level: Oriented X4  Following Commands: Follows one step commands with repetition  Impulsive: Moderately    Treatments:    Bed Mobility 1  Bed Mobility 1: Supine to sitting, Sitting to supine  Level of Assistance 1: Minimum assistance, Minimal verbal cues    Ambulation/Gait Training 1  Surface 1: Level tile  Device 1: Rolling walker  Assistance 1: Moderate assistance, Moderate verbal cues, Minimal tactile cues  Quality of Gait 1: Narrow base of support, Inconsistent stride length, Ataxic, Soft knee(s)  Comments/Distance (ft) 1: 20 feet x2 with seated break  Transfer 1  Transfer From 1: Sit to, Stand to  Transfer to 1: Sit  Transfer Device 1: Walker  Transfer Level of Assistance 1: Minimum assistance, Moderate verbal cues, Minimal tactile cues  Transfers 2  Transfer From 2: Stand to, Toilet to  Transfer to 2: Stand  Transfer Device 2: Walker  Transfer Level of Assistance 2: Minimum assistance, Moderate verbal cues, Minimal tactile cues  Trials/Comments 2: Standing 2 minutes with Min A for pericare    Outcome Measures:     [facility] Basic Mobility  Turning from your back to your side while in a flat bed without using bedrails: A little  Moving from lying on your back to sitting on the side of a flat bed without using bedrails: A little  Moving to and from bed to chair (including a wheelchair): A lot  Standing up from a  chair using your arms (e.g. wheelchair or bedside chair): A little  To walk in hospital room: A lot  Climbing 3-5 steps with railing: Total  Basic Mobility - Total Score: 14    Education Documentation  Mobility Training, taught by Alex Romero, PT at 7/30/2025 11:38 AM.  Learner: Patient  Readiness: Acceptance  Method: Explanation  Response: Verbalizes Understanding    Education Comments  No comments found.    OP EDUCATION:       Encounter Problems       Encounter Problems (Active)       Mobility       STG - Patient will ambulate >75ft, wheeled walker, CGA (Progressing)       Start:  07/19/25    Expected End:  08/02/25               PT Transfers       STG - Patient to transfer to and from sit to supine CGA (Progressing)       Start:  07/19/25    Expected End:  08/02/25            STG - Patient will transfer sit to and from stand CGA (Progressing)       Start:  07/19/25    Expected End:  08/02/25

## 2025-07-30 NOTE — PROGRESS NOTES
Occupational Therapy    OT Treatment    Patient Name: Ayla Paredes  MRN: [id number]  Today's Date: 7/30/2025  Time Calculation  Start Time: 0912  Stop Time: 0922  Time Calculation (min): 10 min       223/223-A    Assessment:  Prognosis: Good  Barriers to Discharge Home: Physical needs  Physical Needs: 24hr mobility assistance needed, 24hr ADL assistance needed  Evaluation/Treatment Tolerance: Patient limited by fatigue (but able to participate)  End of Session Communication: Bedside nurse, Care Coordinator  End of Session Patient Position: Up in chair, Alarm off, caregiver present (RN present in room providing meds and addressing pt's requests)  OT Assessment Results: Decreased ADL status, Decreased upper extremity range of motion, Decreased upper extremity strength, Decreased safe judgment during ADL, Decreased sensation, Decreased fine motor control, Decreased functional mobility, Decreased gross motor control, Decreased IADLs, Decreased trunk control for functional activities, Decreased endurance  Prognosis: Good  Barriers to Discharge: Decreased caregiver support  Evaluation/Treatment Tolerance: Patient limited by fatigue (but able to participate)  Barriers to Participation: Comorbidities, Other (Comment) (but able to participate. Pt appears very frustrated with current deficits which likely impacts overall functional performance. Pt would likley benefit from rehab psychology C/S for psychosocial support upon D/C to rehab.)    Plan:  Treatment Interventions: ADL retraining, Functional transfer training, UE strengthening/ROM, Endurance training, Patient/family training, Equipment evaluation/education, Neuromuscular reeducation, Fine motor coordination activities, Compensatory technique education, Continued evaluation  OT Frequency: 5 times per week (during acute hospital stay)  OT Discharge Recommendations: High intensity level of continued care (Based on current functional status and rehab potential, patient is  "anticipated to tolerate and benefit from 5 or more days per week of skilled rehabilitative therapy after discharge from this acute inpatient hospitalization.)  Equipment Recommended upon Discharge: Wheeled walker (Tub bench)  OT Recommended Transfer Status: Assist of 1 (with RW, pt is impulsive and requires cues for safety and to slow pace to maximize safety.)  OT - OK to Discharge:  (OT Tx note completed.)  Treatment Interventions: ADL retraining, Functional transfer training, UE strengthening/ROM, Endurance training, Patient/family training, Equipment evaluation/education, Neuromuscular reeducation, Fine motor coordination activities, Compensatory technique education, Continued evaluation    Subjective        07/30/25 0912   OT Last Visit   OT Received On 07/30/25   General   Reason for Referral presenting for bilateral hand and foot numbness and ataxia. multilevel cervical spondylosis with posterior disc osteophyte complexes causing C5-C6 and C6-C7 central canal narrowing. 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   Past Medical History Relevant to Rehab HTN, DM, CKD   Family/Caregiver Present Yes  (Fiance present, asleep on B/S cot.)   Prior to Session Communication Bedside nurse   Patient Position Received Bed, 3 rail up;Alarm off, not on at start of session   General Comment Pt received in supine, agreeable to therapy with encouragement.   Precautions   Medical Precautions Fall precautions  (Per sign on door \"no water, gatorade only, 1400 ml restriction/day\".)   Pain Assessment   Pain Assessment 0-10   0-10 (Numeric) Pain Score   (Pt c/o burning and aching pain B/L LE's/toes > hands, not rated. RN aware and in room providing medication upon OT's exit from room.)   Cognition   Overall Cognitive Status   (Pt presents with irritable and flat affect. Agreeable to therapy " "with encouragement. Pt with c/o \"no breakfast, no meds this AM\". RN made aware of pt's c/o pain and request to eat. RN in room at end of session and addressing pt's requests.)   Orientation Level Oriented X4   Impulsive Moderately   Coordination   Movements are Fluid and Coordinated No   Lower Body Coordination Ataxia noted with mobilization to B/S chair.   Coordination Comment Pt able to oppose each finger to thumb. Reports he can \"barely\" feed himself and drops items. Will continue to assess.   UE Dressing   UE Dressing Level of Assistance   (Anticipate at least min to mod assist based on pt's noted functional deficits.)   LE Dressing   Sock Level of Assistance   (Anticipate at least max assist 2' pt's decreased dynamic sitting balance, decreased overall activity tolerance, decreased strength, and c/o pain.)   Bed Mobility   Bed Mobility Yes   Bed Mobility 1   Bed Mobility 1 Supine to sitting   Level of Assistance 1 Moderate assistance;Moderate verbal cues;Moderate tactile cues   Bed Mobility Comments 1 HOB slightly elevated. Pt able to achieve 50% sidelying into sitting with SBA but then with LOB back onto bed, stated, \"I can't do it\". Mod assist to bring trunk fully upright onto EOB. Pt appears frustrated with all attempted mobilization tasks,  impulsive.   Functional Mobility   Functional Mobility Performed Yes   Functional Mobility 1   Surface 1 Level tile   Device 1 Rolling walker   Assistance 1 Moderate assistance   Quality of Functional Mobility 1 Inconsistent stride length   Comments 1 Pt took 3-4 steps to B/S chair, unsteady, ataxic stepping noted. Pt is impulsive with all mobility tasks, LOB noted during task with mod assist to recover. Refer to PT notes re: LE/gait details.   Transfers   Transfer Yes   Transfer 1   Transfer From 1 Bed to   Transfer to 1 Stand   Technique 1 Sit to stand   Transfer Device 1 Walker   Transfer Level of Assistance 1 Minimum assistance;Moderate verbal cues;Minimal tactile " "cues   Trials/Comments 1 Cues for safe hand placement in prep for transition, pt stated, \"I can do it just like this\" while pulling up on RW unsafely despite cues. Will continue to assess.   Transfers 2   Transfer From 2 Stand to   Transfer to 2 Sit   Technique 2 Stand to sit   Transfer Device 2 Walker   Transfer Level of Assistance 2 Moderate assistance;Moderate verbal cues;Minimal tactile cues   Trials/Comments 2 Pt with decreased eccentric control in stand-sit. impulsive with task despite cues for safety.   Static Sitting Balance   Static Sitting-Level of Assistance   (Close SBA initially, but with LOB at times requiring min assist to recover. Pt stated stated, \"I can't sit by myself too long\". When asked why, pt stated, \"pain\".)   Static Standing Balance   Static Standing-Level of Assistance Moderate assistance  (with RW, + LOB requiring mod assist to recover while attempting to take steps to chair.)   IP OT Assessment   Prognosis Good   Barriers to Discharge Home Physical needs   Physical Needs 24hr mobility assistance needed;24hr ADL assistance needed   Evaluation/Treatment Tolerance Patient limited by fatigue  (but able to participate)   End of Session Communication Bedside nurse;Care Coordinator   End of Session Patient Position Up in chair;Alarm off, caregiver present  (RN present in room providing meds and addressing pt's requests)   OT Assessment   OT Assessment Results Decreased ADL status;Decreased upper extremity range of motion;Decreased upper extremity strength;Decreased safe judgment during ADL;Decreased sensation;Decreased fine motor control;Decreased functional mobility;Decreased gross motor control;Decreased IADLs;Decreased trunk control for functional activities;Decreased endurance   Barriers to Discharge Decreased caregiver support   Barriers to Participation Comorbidities;Other (Comment)  (but able to participate. Pt appears very frustrated with current deficits which likely impacts overall " functional performance. Pt would likley benefit from rehab psychology C/S for psychosocial support upon D/C to rehab.)   Inpatient/Swing Bed or Outpatient   Inpatient/Swing Bed or Outpatient Inpatient   Inpatient Plan   Treatment Interventions ADL retraining;Functional transfer training;UE strengthening/ROM;Endurance training;Patient/family training;Equipment evaluation/education;Neuromuscular reeducation;Fine motor coordination activities;Compensatory technique education;Continued evaluation   OT Frequency 5 times per week  (during acute hospital stay)   OT Discharge Recommendations High intensity level of continued care  (Based on current functional status and rehab potential, patient is anticipated to tolerate and benefit from 5 or more days per week of skilled rehabilitative therapy after discharge from this acute inpatient hospitalization.)   Equipment Recommended upon Discharge Wheeled walker  (Tub bench)   OT Recommended Transfer Status Assist of 1  (with RW, pt is impulsive and requires cues for safety and to slow pace to maximize safety.)   OT - OK to Discharge   (OT Tx note completed.)       Outcome Measures:[facility] Daily Activity  Putting on and taking off regular lower body clothing: A lot  Bathing (including washing, rinsing, drying): A lot  Putting on and taking off regular upper body clothing: A lot  Toileting, which includes using toilet, bedpan or urinal: A lot  Taking care of personal grooming such as brushing teeth: A little  Eating Meals: A little  Daily Activity - Total Score: 14  Education Documentation  Body Mechanics, taught by Shi Morrison OT at 7/30/2025  9:51 AM.  Learner: Patient  Readiness: Acceptance  Method: Explanation  Response: Needs Reinforcement    Precautions, taught by Shi Morrison OT at 7/30/2025  9:51 AM.  Learner: Patient  Readiness: Acceptance  Method: Explanation  Response: Needs Reinforcement    ADL Training, taught by Shi Morrison OT at 7/30/2025  9:51 AM.  Learner:  Patient  Readiness: Acceptance  Method: Explanation  Response: Needs Reinforcement    Education Comments  No comments found.            Goals:  Encounter Problems       Encounter Problems (Active)       ADLs       Patient will perform UB and LB bathing with minimal assist  level of assistance in supported sitting. (Progressing)       Start:  07/21/25    Expected End:  08/04/25            Patient with complete upper body dressing with independent level of assistance donning and doffing all UE clothes while edge of bed  (Progressing)       Start:  07/21/25    Expected End:  08/04/25            Patient with complete lower body dressing with contact guard assist level of assistance donning and doffing all LE clothes  with PRN adaptive equipment while edge of bed  (Progressing)       Start:  07/21/25    Expected End:  08/04/25            Patient will complete daily grooming tasks with modified independent level of assistance and PRN adaptive equipment while standing. (Progressing)       Start:  07/21/25    Expected End:  08/04/25            Patient will complete toileting including hygiene clothing management/hygiene with minimal assist  level of assistance. (Progressing)       Start:  07/21/25    Expected End:  08/04/25               BALANCE       Pt will maintain dynamic sitting balance during ADL task with independent level of assistance in order to demonstrate decreased risk of falling and improved postural control. (Progressing)       Start:  07/21/25    Expected End:  08/04/25               BALANCE       Pt will maintain dynamic standing balance during ADL task with modified independent level of assistance in order to demonstrate decreased risk of falling and improved postural control. (Progressing)       Start:  07/21/25    Expected End:  08/04/25               EXERCISE/STRENGTHENING       Pt will improve FMC/GMC to WNL to complete ADLs independently using bilateral integration without increase in time.   (Progressing)       Start:  07/21/25    Expected End:  08/04/25               MOBILITY       Patient will perform Functional mobility Household distances/Community Distances with Mod I level of assistance and least restrictive device in order to improve safety and functional mobility. (Progressing)       Start:  07/21/25    Expected End:  08/04/25               TRANSFERS       Patient will perform bed mobility modified independent level of assistance and bed rails in order to improve safety and independence with mobility (Progressing)       Start:  07/21/25    Expected End:  08/04/25            Patient will complete functional transfers with least restrictive device with Mod I level of assistance. (Progressing)       Start:  07/21/25    Expected End:  08/04/25 07/30/25 at 9:52 AM - Shi Morrison OT

## 2025-07-30 NOTE — NURSING NOTE
Patient being discharged to [facility], report called to Reza.  Reviewed pt's hx, reason for stay & events during stay & current assessment.  Reviewed issues with hyponatremia & had fluid restriction, but will now be able to drink whatever he wants.  All questions answered.  PATRICE Maldonado RN

## 2025-07-31 ENCOUNTER — LAB REQUISITION (OUTPATIENT)
Dept: LAB | Facility: HOSPITAL | Age: 55
End: 2025-07-31
Payer: COMMERCIAL

## 2025-07-31 LAB
AMPHIPHYSIN IGG SER QL IA: NEGATIVE
ANION GAP SERPL CALC-SCNC: 16 MMOL/L (ref 10–20)
ANNOTATION COMMENT IMP: NORMAL
BUN SERPL-MCNC: 25 MG/DL (ref 6–23)
CALCIUM SERPL-MCNC: 8.6 MG/DL (ref 8.6–10.3)
CHLORIDE SERPL-SCNC: 97 MMOL/L (ref 98–107)
CO2 SERPL-SCNC: 26 MMOL/L (ref 21–32)
CREAT SERPL-MCNC: 1.38 MG/DL (ref 0.5–1.3)
CV2 AB SERPL QL IF: NEGATIVE
EGFRCR SERPLBLD CKD-EPI 2021: 60 ML/MIN/1.73M*2
ERYTHROCYTE [DISTWIDTH] IN BLOOD BY AUTOMATED COUNT: 19.1 % (ref 11.5–14.5)
EST. AVERAGE GLUCOSE BLD GHB EST-MCNC: 80 MG/DL
GLIAL NUC TYPE 1 AB SER QL IF: NEGATIVE
GLUCOSE BLD MANUAL STRIP-MCNC: 110 MG/DL (ref 74–99)
GLUCOSE BLD MANUAL STRIP-MCNC: 148 MG/DL (ref 74–99)
GLUCOSE SERPL-MCNC: 109 MG/DL (ref 74–99)
HBA1C MFR BLD: 4.4 % (ref ?–5.7)
HCT VFR BLD AUTO: 23.6 % (ref 41–52)
HGB BLD-MCNC: 7.7 G/DL (ref 13.5–17.5)
HU1 AB SER QL: NEGATIVE
HU2 AB SER QL IF: NEGATIVE
HU3 AB SER QL: NEGATIVE
MCH RBC QN AUTO: 30.2 PG (ref 26–34)
MCHC RBC AUTO-ENTMCNC: 32.6 G/DL (ref 32–36)
MCV RBC AUTO: 93 FL (ref 80–100)
NRBC BLD-RTO: 1.8 /100 WBCS (ref 0–0)
PARANEOPLASTIC AB SER-IMP: NORMAL
PCA-1 AB SER QL IF: NEGATIVE
PCA-2 AB SER QL IF: NEGATIVE
PCA-TR AB SER QL IF: NEGATIVE
PLATELET # BLD AUTO: 518 X10*3/UL (ref 150–450)
POTASSIUM SERPL-SCNC: 4 MMOL/L (ref 3.5–5.3)
RBC # BLD AUTO: 2.55 X10*6/UL (ref 4.5–5.9)
SODIUM SERPL-SCNC: 135 MMOL/L (ref 136–145)
WBC # BLD AUTO: 15 X10*3/UL (ref 4.4–11.3)

## 2025-07-31 PROCEDURE — 80048 BASIC METABOLIC PNL TOTAL CA: CPT

## 2025-07-31 PROCEDURE — 83036 HEMOGLOBIN GLYCOSYLATED A1C: CPT

## 2025-07-31 PROCEDURE — 85027 COMPLETE CBC AUTOMATED: CPT

## 2025-08-02 ENCOUNTER — LAB REQUISITION (OUTPATIENT)
Dept: LAB | Facility: HOSPITAL | Age: 55
End: 2025-08-02
Payer: COMMERCIAL

## 2025-08-02 LAB
ERYTHROCYTE [DISTWIDTH] IN BLOOD BY AUTOMATED COUNT: 20.9 % (ref 11.5–14.5)
HCT VFR BLD AUTO: 24.6 % (ref 41–52)
HGB BLD-MCNC: 8.1 G/DL (ref 13.5–17.5)
MCH RBC QN AUTO: 31.3 PG (ref 26–34)
MCHC RBC AUTO-ENTMCNC: 32.9 G/DL (ref 32–36)
MCV RBC AUTO: 95 FL (ref 80–100)
NRBC BLD-RTO: 1.4 /100 WBCS (ref 0–0)
PLATELET # BLD AUTO: 515 X10*3/UL (ref 150–450)
RBC # BLD AUTO: 2.59 X10*6/UL (ref 4.5–5.9)
WBC # BLD AUTO: 12.7 X10*3/UL (ref 4.4–11.3)

## 2025-08-02 PROCEDURE — 85027 COMPLETE CBC AUTOMATED: CPT

## 2025-08-04 ENCOUNTER — LAB REQUISITION (OUTPATIENT)
Dept: LAB | Facility: HOSPITAL | Age: 55
End: 2025-08-04
Payer: COMMERCIAL

## 2025-08-04 DIAGNOSIS — E22.2 SIADH (SYNDROME OF INAPPROPRIATE ADH PRODUCTION) (MULTI): ICD-10-CM

## 2025-08-04 LAB
AMPAR2 IGG CSF QL CBA IFA: NEGATIVE
AMPHIPHYSIN AB CSF QL IF: NEGATIVE
ANNOTATION COMMENT IMP: NORMAL
BASOPHILS # BLD AUTO: 0.05 X10*3/UL (ref 0–0.1)
BASOPHILS NFR BLD AUTO: 0.7 %
CASPR2 IGG CSF QL CBA IFA: NEGATIVE
CV2 AB CSF QL IF: NEGATIVE
DPPX IGG CSF QL CBA IFA: NEGATIVE
EOSINOPHIL # BLD AUTO: 0.09 X10*3/UL (ref 0–0.7)
EOSINOPHIL NFR BLD AUTO: 1.2 %
ERYTHROCYTE [DISTWIDTH] IN BLOOD BY AUTOMATED COUNT: 21.9 % (ref 11.5–14.5)
FUNGUS SPEC CULT: NORMAL
FUNGUS SPEC FUNGUS STN: NORMAL
GABABR IGG CSF QL CBA IFA: NEGATIVE
GAD65 IGG+IGM CSF IA-SCNC: 0 NMOL/L
GFAP ALPHA IGG CSF QL IF: NEGATIVE
GLIAL NUC TYPE 1 AB CSF QL IF: NEGATIVE
HCT VFR BLD AUTO: 28.7 % (ref 41–52)
HGB BLD-MCNC: 9.1 G/DL (ref 13.5–17.5)
HU1 AB CSF QL IF: NEGATIVE
HU2 AB CSF QL IF: NEGATIVE
HU3 AB CSF QL IF: NEGATIVE
IGLON5 IGG CSF QL CBA IFA: NEGATIVE
IMM GRANULOCYTES # BLD AUTO: 0.03 X10*3/UL (ref 0–0.7)
IMM GRANULOCYTES NFR BLD AUTO: 0.4 % (ref 0–0.9)
IMMUNOLOGIST REVIEW: NORMAL
LGI1 IGG CSF QL CBA IFA: NEGATIVE
LYMPHOCYTES # BLD AUTO: 2.03 X10*3/UL (ref 1.2–4.8)
LYMPHOCYTES NFR BLD AUTO: 27.7 %
MCH RBC QN AUTO: 31 PG (ref 26–34)
MCHC RBC AUTO-ENTMCNC: 31.7 G/DL (ref 32–36)
MCV RBC AUTO: 98 FL (ref 80–100)
MGLUR1 IGG CSF QL IF: NEGATIVE
MONOCYTES # BLD AUTO: 0.74 X10*3/UL (ref 0.1–1)
MONOCYTES NFR BLD AUTO: 10.1 %
NEUROCHONDRIN AB CSF QL IF: NEGATIVE
NEUTROPHILS # BLD AUTO: 4.4 X10*3/UL (ref 1.2–7.7)
NEUTROPHILS NFR BLD AUTO: 59.9 %
NIF IGG CSF QL IF: NEGATIVE
NMDAR1 IGG CSF QL CBA IFA: NEGATIVE
NRBC BLD-RTO: 0.7 /100 WBCS (ref 0–0)
PCA-1 AB CSF QL IF: NEGATIVE
PCA-2 AB CSF QL IF: NEGATIVE
PCA-TR AB CSF QL IF: NEGATIVE
PDE10A AB SPEC QL IF: NEGATIVE
PLATELET # BLD AUTO: 510 X10*3/UL (ref 150–450)
RBC # BLD AUTO: 2.94 X10*6/UL (ref 4.5–5.9)
SEPTIN-7 IGG CSF QL IF: NEGATIVE
TRIM46 SPEC QL IF: NEGATIVE
WBC # BLD AUTO: 7.3 X10*3/UL (ref 4.4–11.3)

## 2025-08-04 PROCEDURE — 85025 COMPLETE CBC W/AUTO DIFF WBC: CPT

## 2025-08-07 ENCOUNTER — APPOINTMENT (OUTPATIENT)
Dept: RADIOLOGY | Facility: HOSPITAL | Age: 55
End: 2025-08-07
Payer: COMMERCIAL

## 2025-08-07 ENCOUNTER — APPOINTMENT (OUTPATIENT)
Dept: CARDIOLOGY | Facility: HOSPITAL | Age: 55
End: 2025-08-07
Payer: COMMERCIAL

## 2025-08-07 ENCOUNTER — HOSPITAL ENCOUNTER (OUTPATIENT)
Facility: HOSPITAL | Age: 55
Setting detail: OBSERVATION
Discharge: NURSING, REHAB OR LONG-TERM CARE | End: 2025-08-09
Attending: EMERGENCY MEDICINE
Payer: COMMERCIAL

## 2025-08-07 DIAGNOSIS — R07.9 CHEST PAIN, UNSPECIFIED TYPE: Primary | ICD-10-CM

## 2025-08-07 PROBLEM — R20.2 PARESTHESIAS: Status: ACTIVE | Noted: 2025-08-07

## 2025-08-07 PROBLEM — Z78.9 IMPAIRED MOBILITY AND ACTIVITIES OF DAILY LIVING: Status: ACTIVE | Noted: 2025-08-07

## 2025-08-07 PROBLEM — M62.81 MUSCLE WEAKNESS (GENERALIZED): Status: ACTIVE | Noted: 2025-08-07

## 2025-08-07 PROBLEM — Z74.09 IMPAIRED MOBILITY AND ACTIVITIES OF DAILY LIVING: Status: ACTIVE | Noted: 2025-08-07

## 2025-08-07 PROBLEM — R07.1 CHEST PAIN ON BREATHING: Status: ACTIVE | Noted: 2025-08-07

## 2025-08-07 PROBLEM — R27.0 ATAXIA: Status: ACTIVE | Noted: 2025-08-07

## 2025-08-07 LAB
ALBUMIN SERPL BCP-MCNC: 4.1 G/DL (ref 3.4–5)
ALP SERPL-CCNC: 95 U/L (ref 33–120)
ALT SERPL W P-5'-P-CCNC: 26 U/L (ref 10–52)
ANION GAP SERPL CALC-SCNC: 10 MMOL/L (ref 10–20)
ANION GAP SERPL CALC-SCNC: 17 MMOL/L (ref 10–20)
AST SERPL W P-5'-P-CCNC: 22 U/L (ref 9–39)
BASOPHILS # BLD AUTO: 0.05 X10*3/UL (ref 0–0.1)
BASOPHILS NFR BLD AUTO: 0.8 %
BILIRUB SERPL-MCNC: 0.7 MG/DL (ref 0–1.2)
BUN SERPL-MCNC: 31 MG/DL (ref 6–23)
BUN SERPL-MCNC: 33 MG/DL (ref 6–23)
CALCIUM SERPL-MCNC: 8.9 MG/DL (ref 8.6–10.3)
CALCIUM SERPL-MCNC: 9.2 MG/DL (ref 8.6–10.3)
CARDIAC TROPONIN I PNL SERPL HS: 42 NG/L (ref 0–20)
CARDIAC TROPONIN I PNL SERPL HS: 46 NG/L (ref 0–20)
CHLORIDE SERPL-SCNC: 96 MMOL/L (ref 98–107)
CHLORIDE SERPL-SCNC: 98 MMOL/L (ref 98–107)
CO2 SERPL-SCNC: 26 MMOL/L (ref 21–32)
CO2 SERPL-SCNC: 30 MMOL/L (ref 21–32)
CREAT SERPL-MCNC: 1.67 MG/DL (ref 0.5–1.3)
CREAT SERPL-MCNC: 1.76 MG/DL (ref 0.5–1.3)
D DIMER PPP FEU-MCNC: 1987 NG/ML FEU
EGFRCR SERPLBLD CKD-EPI 2021: 45 ML/MIN/1.73M*2
EGFRCR SERPLBLD CKD-EPI 2021: 48 ML/MIN/1.73M*2
EOSINOPHIL # BLD AUTO: 0.13 X10*3/UL (ref 0–0.7)
EOSINOPHIL NFR BLD AUTO: 2.2 %
ERYTHROCYTE [DISTWIDTH] IN BLOOD BY AUTOMATED COUNT: 20.5 % (ref 11.5–14.5)
FERRITIN SERPL-MCNC: 291 NG/ML (ref 20–300)
GLUCOSE SERPL-MCNC: 90 MG/DL (ref 74–99)
GLUCOSE SERPL-MCNC: 91 MG/DL (ref 74–99)
HCT VFR BLD AUTO: 30.3 % (ref 41–52)
HGB BLD-MCNC: 9.9 G/DL (ref 13.5–17.5)
IMM GRANULOCYTES # BLD AUTO: 0.02 X10*3/UL (ref 0–0.7)
IMM GRANULOCYTES NFR BLD AUTO: 0.3 % (ref 0–0.9)
INR PPP: 1.1 (ref 0.9–1.1)
IRON SATN MFR SERPL: 19 % (ref 25–45)
IRON SERPL-MCNC: 55 UG/DL (ref 35–150)
LYMPHOCYTES # BLD AUTO: 2.16 X10*3/UL (ref 1.2–4.8)
LYMPHOCYTES NFR BLD AUTO: 36.5 %
MAGNESIUM SERPL-MCNC: 2.5 MG/DL (ref 1.6–2.4)
MCH RBC QN AUTO: 31.4 PG (ref 26–34)
MCHC RBC AUTO-ENTMCNC: 32.7 G/DL (ref 32–36)
MCV RBC AUTO: 96 FL (ref 80–100)
MONOCYTES # BLD AUTO: 0.5 X10*3/UL (ref 0.1–1)
MONOCYTES NFR BLD AUTO: 8.5 %
NEUTROPHILS # BLD AUTO: 3.05 X10*3/UL (ref 1.2–7.7)
NEUTROPHILS NFR BLD AUTO: 51.7 %
NRBC BLD-RTO: 0 /100 WBCS (ref 0–0)
PLATELET # BLD AUTO: 524 X10*3/UL (ref 150–450)
POTASSIUM SERPL-SCNC: 3.6 MMOL/L (ref 3.5–5.3)
POTASSIUM SERPL-SCNC: 3.6 MMOL/L (ref 3.5–5.3)
PROT SERPL-MCNC: 7.8 G/DL (ref 6.4–8.2)
PROTHROMBIN TIME: 12.1 SECONDS (ref 9.8–12.4)
RBC # BLD AUTO: 3.15 X10*6/UL (ref 4.5–5.9)
SODIUM SERPL-SCNC: 134 MMOL/L (ref 136–145)
SODIUM SERPL-SCNC: 135 MMOL/L (ref 136–145)
TIBC SERPL-MCNC: 288 UG/DL (ref 240–445)
UIBC SERPL-MCNC: 233 UG/DL (ref 110–370)
WBC # BLD AUTO: 5.9 X10*3/UL (ref 4.4–11.3)

## 2025-08-07 PROCEDURE — 82374 ASSAY BLOOD CARBON DIOXIDE: CPT | Performed by: EMERGENCY MEDICINE

## 2025-08-07 PROCEDURE — G0378 HOSPITAL OBSERVATION PER HR: HCPCS

## 2025-08-07 PROCEDURE — 36415 COLL VENOUS BLD VENIPUNCTURE: CPT | Performed by: EMERGENCY MEDICINE

## 2025-08-07 PROCEDURE — 96360 HYDRATION IV INFUSION INIT: CPT | Mod: 59

## 2025-08-07 PROCEDURE — 71045 X-RAY EXAM CHEST 1 VIEW: CPT

## 2025-08-07 PROCEDURE — 71045 X-RAY EXAM CHEST 1 VIEW: CPT | Mod: FOREIGN READ | Performed by: RADIOLOGY

## 2025-08-07 PROCEDURE — 85379 FIBRIN DEGRADATION QUANT: CPT | Performed by: EMERGENCY MEDICINE

## 2025-08-07 PROCEDURE — 96361 HYDRATE IV INFUSION ADD-ON: CPT

## 2025-08-07 PROCEDURE — 80053 COMPREHEN METABOLIC PANEL: CPT | Performed by: EMERGENCY MEDICINE

## 2025-08-07 PROCEDURE — 85025 COMPLETE CBC W/AUTO DIFF WBC: CPT | Performed by: EMERGENCY MEDICINE

## 2025-08-07 PROCEDURE — 84484 ASSAY OF TROPONIN QUANT: CPT | Performed by: EMERGENCY MEDICINE

## 2025-08-07 PROCEDURE — 71275 CT ANGIOGRAPHY CHEST: CPT

## 2025-08-07 PROCEDURE — 80048 BASIC METABOLIC PNL TOTAL CA: CPT | Mod: CCI | Performed by: EMERGENCY MEDICINE

## 2025-08-07 PROCEDURE — 2500000004 HC RX 250 GENERAL PHARMACY W/ HCPCS (ALT 636 FOR OP/ED): Performed by: EMERGENCY MEDICINE

## 2025-08-07 PROCEDURE — 99285 EMERGENCY DEPT VISIT HI MDM: CPT | Mod: 25 | Performed by: EMERGENCY MEDICINE

## 2025-08-07 PROCEDURE — 2500000002 HC RX 250 W HCPCS SELF ADMINISTERED DRUGS (ALT 637 FOR MEDICARE OP, ALT 636 FOR OP/ED): Performed by: NURSE PRACTITIONER

## 2025-08-07 PROCEDURE — 2500000001 HC RX 250 WO HCPCS SELF ADMINISTERED DRUGS (ALT 637 FOR MEDICARE OP): Performed by: NURSE PRACTITIONER

## 2025-08-07 PROCEDURE — 83540 ASSAY OF IRON: CPT | Performed by: NURSE PRACTITIONER

## 2025-08-07 PROCEDURE — 82728 ASSAY OF FERRITIN: CPT | Performed by: NURSE PRACTITIONER

## 2025-08-07 PROCEDURE — 2500000004 HC RX 250 GENERAL PHARMACY W/ HCPCS (ALT 636 FOR OP/ED): Performed by: NURSE PRACTITIONER

## 2025-08-07 PROCEDURE — 93005 ELECTROCARDIOGRAM TRACING: CPT

## 2025-08-07 PROCEDURE — 71275 CT ANGIOGRAPHY CHEST: CPT | Mod: FOREIGN READ | Performed by: RADIOLOGY

## 2025-08-07 PROCEDURE — 99222 1ST HOSP IP/OBS MODERATE 55: CPT | Performed by: NURSE PRACTITIONER

## 2025-08-07 PROCEDURE — 2550000001 HC RX 255 CONTRASTS: Performed by: EMERGENCY MEDICINE

## 2025-08-07 PROCEDURE — 83735 ASSAY OF MAGNESIUM: CPT | Performed by: EMERGENCY MEDICINE

## 2025-08-07 PROCEDURE — 85610 PROTHROMBIN TIME: CPT | Performed by: EMERGENCY MEDICINE

## 2025-08-07 PROCEDURE — 96372 THER/PROPH/DIAG INJ SC/IM: CPT | Performed by: NURSE PRACTITIONER

## 2025-08-07 RX ORDER — POTASSIUM CHLORIDE 750 MG/1
10 TABLET, FILM COATED, EXTENDED RELEASE ORAL 2 TIMES DAILY
Status: DISCONTINUED | OUTPATIENT
Start: 2025-08-07 | End: 2025-08-09

## 2025-08-07 RX ORDER — PANTOPRAZOLE SODIUM 40 MG/1
40 TABLET, DELAYED RELEASE ORAL
Status: DISCONTINUED | OUTPATIENT
Start: 2025-08-08 | End: 2025-08-09 | Stop reason: HOSPADM

## 2025-08-07 RX ORDER — GABAPENTIN 300 MG/1
300 CAPSULE ORAL 3 TIMES DAILY
Status: DISCONTINUED | OUTPATIENT
Start: 2025-08-07 | End: 2025-08-09 | Stop reason: HOSPADM

## 2025-08-07 RX ORDER — DEXTROSE 50 % IN WATER (D50W) INTRAVENOUS SYRINGE
12.5
Status: DISCONTINUED | OUTPATIENT
Start: 2025-08-07 | End: 2025-08-09 | Stop reason: HOSPADM

## 2025-08-07 RX ORDER — METFORMIN HYDROCHLORIDE 500 MG/1
500 TABLET, EXTENDED RELEASE ORAL
Status: DISCONTINUED | OUTPATIENT
Start: 2025-08-08 | End: 2025-08-09 | Stop reason: HOSPADM

## 2025-08-07 RX ORDER — TAMSULOSIN HYDROCHLORIDE 0.4 MG/1
0.4 CAPSULE ORAL DAILY
Status: DISCONTINUED | OUTPATIENT
Start: 2025-08-08 | End: 2025-08-09 | Stop reason: HOSPADM

## 2025-08-07 RX ORDER — ENOXAPARIN SODIUM 100 MG/ML
40 INJECTION SUBCUTANEOUS EVERY 24 HOURS
Status: DISCONTINUED | OUTPATIENT
Start: 2025-08-07 | End: 2025-08-09 | Stop reason: HOSPADM

## 2025-08-07 RX ORDER — AMLODIPINE BESYLATE 10 MG/1
10 TABLET ORAL DAILY
Status: DISCONTINUED | OUTPATIENT
Start: 2025-08-08 | End: 2025-08-09 | Stop reason: HOSPADM

## 2025-08-07 RX ORDER — ACETAMINOPHEN 160 MG/5ML
650 SOLUTION ORAL EVERY 4 HOURS PRN
Status: DISCONTINUED | OUTPATIENT
Start: 2025-08-07 | End: 2025-08-09 | Stop reason: HOSPADM

## 2025-08-07 RX ORDER — ATORVASTATIN CALCIUM 40 MG/1
40 TABLET, FILM COATED ORAL DAILY
Status: DISCONTINUED | OUTPATIENT
Start: 2025-08-08 | End: 2025-08-08

## 2025-08-07 RX ORDER — LOSARTAN POTASSIUM 50 MG/1
100 TABLET ORAL DAILY
Status: DISCONTINUED | OUTPATIENT
Start: 2025-08-08 | End: 2025-08-09 | Stop reason: HOSPADM

## 2025-08-07 RX ORDER — CHLORTHALIDONE 25 MG/1
37.5 TABLET ORAL DAILY
Status: DISCONTINUED | OUTPATIENT
Start: 2025-08-08 | End: 2025-08-09 | Stop reason: HOSPADM

## 2025-08-07 RX ORDER — DEXTROSE 50 % IN WATER (D50W) INTRAVENOUS SYRINGE
25
Status: DISCONTINUED | OUTPATIENT
Start: 2025-08-07 | End: 2025-08-09 | Stop reason: HOSPADM

## 2025-08-07 RX ORDER — ACETAMINOPHEN 650 MG/1
650 SUPPOSITORY RECTAL EVERY 4 HOURS PRN
Status: DISCONTINUED | OUTPATIENT
Start: 2025-08-07 | End: 2025-08-09 | Stop reason: HOSPADM

## 2025-08-07 RX ORDER — SODIUM CHLORIDE 1000 MG
500 TABLET, SOLUBLE MISCELLANEOUS DAILY
Status: DISCONTINUED | OUTPATIENT
Start: 2025-08-08 | End: 2025-08-09 | Stop reason: HOSPADM

## 2025-08-07 RX ORDER — PANTOPRAZOLE SODIUM 40 MG/10ML
40 INJECTION, POWDER, LYOPHILIZED, FOR SOLUTION INTRAVENOUS
Status: DISCONTINUED | OUTPATIENT
Start: 2025-08-08 | End: 2025-08-09 | Stop reason: HOSPADM

## 2025-08-07 RX ORDER — ACETAMINOPHEN 325 MG/1
650 TABLET ORAL EVERY 4 HOURS PRN
Status: DISCONTINUED | OUTPATIENT
Start: 2025-08-07 | End: 2025-08-09 | Stop reason: HOSPADM

## 2025-08-07 RX ORDER — METOPROLOL SUCCINATE 50 MG/1
50 TABLET, EXTENDED RELEASE ORAL DAILY
Status: DISCONTINUED | OUTPATIENT
Start: 2025-08-08 | End: 2025-08-09 | Stop reason: HOSPADM

## 2025-08-07 RX ADMIN — ENOXAPARIN SODIUM 40 MG: 100 INJECTION SUBCUTANEOUS at 23:01

## 2025-08-07 RX ADMIN — GABAPENTIN 300 MG: 300 CAPSULE ORAL at 23:02

## 2025-08-07 RX ADMIN — IOHEXOL 75 ML: 350 INJECTION, SOLUTION INTRAVENOUS at 15:27

## 2025-08-07 RX ADMIN — POTASSIUM CHLORIDE 10 MEQ: 750 TABLET, FILM COATED, EXTENDED RELEASE ORAL at 23:02

## 2025-08-07 RX ADMIN — SODIUM CHLORIDE, SODIUM LACTATE, POTASSIUM CHLORIDE, AND CALCIUM CHLORIDE 1000 ML: .6; .31; .03; .02 INJECTION, SOLUTION INTRAVENOUS at 14:44

## 2025-08-07 RX ADMIN — ACETAMINOPHEN 650 MG: 325 TABLET ORAL at 23:02

## 2025-08-07 ASSESSMENT — COGNITIVE AND FUNCTIONAL STATUS - GENERAL
STANDING UP FROM CHAIR USING ARMS: A LOT
CLIMB 3 TO 5 STEPS WITH RAILING: A LOT
DRESSING REGULAR LOWER BODY CLOTHING: A LITTLE
WALKING IN HOSPITAL ROOM: A LOT
HELP NEEDED FOR BATHING: A LITTLE
DRESSING REGULAR UPPER BODY CLOTHING: A LITTLE
MOBILITY SCORE: 18
TOILETING: A LITTLE
DAILY ACTIVITIY SCORE: 20

## 2025-08-07 ASSESSMENT — PAIN SCALES - GENERAL
PAINLEVEL_OUTOF10: 0 - NO PAIN
PAINLEVEL_OUTOF10: 10 - WORST POSSIBLE PAIN
PAINLEVEL_OUTOF10: 3
PAINLEVEL_OUTOF10: 0 - NO PAIN
PAINLEVEL_OUTOF10: 0 - NO PAIN
PAINLEVEL_OUTOF10: 10 - WORST POSSIBLE PAIN
PAINLEVEL_OUTOF10: 0 - NO PAIN

## 2025-08-07 ASSESSMENT — PAIN DESCRIPTION - LOCATION: LOCATION: HEAD

## 2025-08-07 ASSESSMENT — PAIN DESCRIPTION - DESCRIPTORS: DESCRIPTORS: ACHING

## 2025-08-07 ASSESSMENT — PAIN - FUNCTIONAL ASSESSMENT
PAIN_FUNCTIONAL_ASSESSMENT: 0-10
PAIN_FUNCTIONAL_ASSESSMENT: 0-10

## 2025-08-07 NOTE — H&P
HPI: Ayla Paredes is a 55 y.o. male, with a PMH of HTN, HLD, cardiomyopathy with recovery, CKD, DM type IIwho presented to [facility] ED on 8/7/2025 for chest pain.    Recent admission at [facility] 7/18-7/30 -acute onset stocking and glove paresthesia, ataxia, hyporeflexia and dysphagia. Diagnosed with AIDP (cisneros bennett variant) and completed 5 day course of IVIG  Stay c/b aspiration pneumonia, hyponatremia requiring brief ICU transfer for hypertonic fluids. Discharged to [facility] on 7/30    Presented to the ED today with chest pain. Patient states he was doing physical therapy and lifting weights/doing curls when he had sudden onset of chest cramps - bandlike under his ribcage. He states he felt like it was due to muscle strain. Resolved after 30 minutes with no reoccurrences since. Denies SOB/ palpatations/ diaphoretic/ dizziness/ lightheadness/ syncope/ near syncope/ nausea/ indigestion/ PND/ orthopnea/ weight gain/ edema.  [facility] requested a cardiology evaluation before accepting him back.     Smoking hx - no  Alcohol use no  Drug use - no   Denies any family cardiac history     Coronary CT angiogram 12/2023 showed no significant atherosclerotic changes or stenotic disease with a total CACS of 20.3   Echocardiogram 7/2025 showed an improved EF of 65-70%    ED Course: soft BP otherwise VSS. Labs notable for Cr./BUN 1.76/33, h/h 9.9/30.3, platelets 524, d dimer 1987, trop 46->42  EKG NSR HR 64, no acute ischemic changes  CXR with no acute findings  CTA chest Negative for pulmonary embolism or thoracic aortic dissection.   Mild cardiac enlargement without pericardial effusion is stable.   Linear bands of atelectasis in the lower lobes.   0.5 cm subpleural nodule right middle lobe is unchanged.   Received: 1L LR    Patient History   PMH: He has a past medical history of Hypertension.  PSH: He has a past surgical history that includes CT angio coronary art with heartflow if score >30% (12/19/2023).  SH: He reports that he  has never smoked. He has been exposed to tobacco smoke. He has never used smokeless tobacco. He reports that he does not currently use alcohol after a past usage of about 4.0 standard drinks of alcohol per week. He reports that he does not use drugs.  FH: family history includes Heart attack in his father.     RX Allergies[1]  Current Outpatient Medications   Medication Instructions    amLODIPine (NORVASC) 10 mg, oral, Daily    atorvastatin (LIPITOR) 40 mg, oral, Daily    cetirizine (ZYRTEC) 10 mg, oral, Daily    chlorthalidone (HYGROTON) 37.5 mg, oral, Daily    Dexcom G7  misc Use as instructed    Dexcom G7 Sensor device Change every 10 days    ergocalciferol (VITAMIN D-2) 50,000 Units, oral, Once Weekly    fluticasone (Flonase) 50 mcg/actuation nasal spray 1 spray, Each Nostril, Daily, Shake gently. Before first use, prime pump. After use, clean tip and replace cap.    gabapentin (NEURONTIN) 300 mg, oral, 3 times daily    losartan (COZAAR) 100 mg, oral, Daily    metFORMIN XR (GLUCOPHAGE-XR) 500 mg, oral, 2 times daily (morning and late afternoon), Do not crush, chew, or split.    metoprolol succinate XL (TOPROL-XL) 50 mg, oral, Daily, Do not crush or chew.    potassium chloride CR (Klor-Con) 10 mEq ER tablet 10 mEq, oral, 2 times daily, Do not crush, chew, or split.    sodium chloride 0.5 g, oral, Daily    tamsulosin (FLOMAX) 0.4 mg, oral, Daily     Review of Systems   ROS: 10-point review of systems was performed and is otherwise negative except as noted in HPI.        Heart Rate:  [72-92]   Temperature:  [37.1 °C (98.7 °F)]   Respirations:  [16-18]   BP: (102-119)/(68-85)   Weight:  [82.9 kg (182 lb 12.2 oz)]   Pulse Ox:  [96 %-100 %]      0-10 (Numeric) Pain Score: 10 - Worst possible pain   Vitals:    08/07/25 1122   Weight: 82.9 kg (182 lb 12.2 oz)        Physical Exam:  Vitals and nursing notes reviewed.  GENERAL: Alert and awake, cooperative; in no acute distress  SKIN: Warm and dry, cap refill  <2  HEENT: Normocephalic, PEERL, mucous membranes pink and moist  CARDIAC: Regular rate and rhythm, S1S2, no murmurs or abnormal heart sounds  CHEST: Normal respiratory effort, no abnormal breath sounds  ABDOMEN: soft, non-distended, non-tender with palpation  EXTREMITIES: No lower extremity edema, normal pulses all 4 extremities  NEURO: Alert and oriented, mental status at baseline, no focal deficits  PSYCH: Behavior and affect as expected     Medications  Scheduled Medications[2]Continuous Medications[3]PRN Medications[4]    Diagnostic Results   CBC- 2025:  1:37 PM  5.9 9.9 524    30.3      BMP- 2025:  4:20 PM  134 31 _ 90   3.6 1.67 30    Estimated Creatinine Clearance: 49.6 mL/min (A) (by C-G formula based on SCr of 1.67 mg/dL (H)).     CA: 8.9 PROTIEN: 7.8 ALT: 26 Total Bili: 0.7 M.50   PHOS: 3.7 ALBUMIN: 4.1 AST: 22   Alk Phos: 95      COAGS- 2025:  1:37 PM  1.1   12.1 22     CV Labs  Troponin I, High Sensitivity   Date/Time Value Ref Range Status   2025 02:44 PM 42 (H) 0 - 20 ng/L Final   2025 01:37 PM 46 (H) 0 - 20 ng/L Final   2025 04:54 AM 51 (HH) 0 - 20 ng/L Final     Comment:     Previous result verified on 2025 0442 on specimen/case [id number] called with component [facility] for procedure Troponin I, High Sensitivity, Initial with value 52 ng/L.   2025 03:31 AM 52 (HH) 0 - 20 ng/L Final   2024 07:35 PM 33 0 - 53 ng/L Final   2024 06:32 PM 30 0 - 53 ng/L Final     Troponin I, High Sensitivity (CMC)   Date/Time Value Ref Range Status   2025 01:48 PM 31 0 - 53 ng/L Final   2025 01:23 PM 34 0 - 53 ng/L Final     BNP   Date/Time Value Ref Range Status   2024 06:32 PM 67 0 - 99 pg/mL Final     POC HEMOGLOBIN A1c   Date/Time Value Ref Range Status   2025 11:43 AM 6.5 4.2 - 6.5 % Final   2025 03:59 PM 6.0 4.2 - 6.5 % Final     Hemoglobin A1C   Date/Time Value Ref Range Status   2025 05:29 AM 4.4 See comment % Final      LDL-CHOLESTEROL   Date/Time Value Ref Range Status   07/03/2025 11:07  (H) mg/dL (calc) Final     Comment:     Reference range: <100     Desirable range <100 mg/dL for primary prevention;    <70 mg/dL for patients with CHD or diabetic patients   with > or = 2 CHD risk factors.     LDL-C is now calculated using the Rene-Sawyer   calculation, which is a validated novel method providing   better accuracy than the Friedewald equation in the   estimation of LDL-C.   Rene SS et al. NESTOR. 2013;310(19): 5813-0098   (http://[employer].PlayPhilo.Com/faq/HPD385)     04/09/2025 01:03  (H) mg/dL (calc) Final     Comment:     LDL-C levels > or = 190 mg/dL may indicate familial   hypercholesterolemia (FH). Clinical assessment and   measurement of blood lipid levels should be   considered for all first degree relatives of patients  with an FH diagnosis. LDL Cholesterol (LDL-C)  levels > or = 300 mg/dL may indicate homozygous  familial hypercholesterolemia (HoFH). Untreated,   these extremely high LDL-C levels can result in  premature CV events and mortality. Patients should  be identified early and provided appropriate  interventions to reduce the cumulative LDL-C  burden from birth.     For questions about testing for familial  hypercholesterolemia, please call [employer] at [email or web address]..INFO.     Juan C T, et al. J National Lipid Association   Recommendations for Patient-Centered  Management of Dyslipidemia: Part 1 Journal of   Clinical Lipidology 2015;9(2), 129-169.  MARICARMEN Pemberton et al. (2014). Homozygous familial  hypercholesterolaemia: new insights and guidance  for clinicians to improve detection and clinical  management.  Heart Journal, 35(32),  6350-9001.  Reference range: <100     Desirable range <100 mg/dL for primary prevention;    <70 mg/dL for patients with CHD or diabetic patients   with > or = 2 CHD risk factors.     LDL-C is now calculated using the  Jakob   calculation, which is a validated novel method providing   better accuracy than the Friedewald equation in the   estimation of LDL-C.   Rene PATHAK et al. NESTOR. 2013;310(19): 5253-2033   (http://[facility].CoworkingON/faq/YQP661)       LDL Calculated   Date/Time Value Ref Range Status   07/26/2024 09:23  (H) <=99 mg/dL Final     Comment:                                 Near   Borderline      AGE      Desirable  Optimal    High     High     Very High     0-19 Y     0 - 109     ---    110-129   >/= 130     ----    20-24 Y     0 - 119     ---    120-159   >/= 160     ----      >24 Y     0 -  99   100-129  130-159   160-189     >/=190     11/27/2023 12:10  (H) <=99 mg/dL Final     Comment:                                 Near   Borderline      AGE      Desirable  Optimal    High     High     Very High     0-19 Y     0 - 109     ---    110-129   >/= 130     ----    20-24 Y     0 - 119     ---    120-159   >/= 160     ----      >24 Y     0 -  99   100-129  130-159   160-189     >/=190         Pertinent Imaging  CT angio chest for pulmonary embolism  Result Date: 8/7/2025  STUDY: CT Angiogram of the Chest; 08/07/2025, 3:39 PM INDICATION: Chest pain and elevated D dimer. COMPARISON: CXR 08/07/2025, CT CAP 07/21/2025, CXR 07/20/2025. ACCESSION NUMBER(S): [id number] ORDERING CLINICIAN: ALEXANDER CHAPMAN TECHNIQUE:  CTA of the chest was performed with intravenous contrast. Images are reviewed and processed at a workstation according to the CT angiogram protocol with 3-D and/or MIP post processing imaging generated.  Omnipaque 350, 75 mL was administered intravenously. 436.91 DICOM images received. Automated mA/kV exposure control was utilized and patient examination was performed in strict accordance with principles of ALARA. FINDINGS: Pulmonary arteries are adequately opacified without acute or chronic filling defects.  The thoracic aorta is normal in course and caliber without dissection  or aneurysm. Mild cardiac enlargement. No pericardial effusion.  Thoracic lymph nodes are not enlarged. There is no pleural effusion, pleural thickening, or pneumothorax. The airways are patent. Lungs are clear without consolidation. Subpleural 4.8 mm right middle nodule image 163 series 601. Atelectasis in the lower lobes. Upper abdomen demonstrates no acute pathology. There are no acute fractures.  No suspicious bony lesions.    Negative for pulmonary embolism or thoracic aortic dissection. Mild cardiac enlargement without pericardial effusion is stable. Linear bands of atelectasis in the lower lobes. 0.5 cm subpleural nodule right middle lobe is unchanged. Signed by Michael Mayberry MD    XR chest 1 view  Result Date: 8/7/2025  STUDY: Chest Radiograph;  08/07/2025, 12:22 PM INDICATION: Chest pain for a unspecified amount of time. COMPARISON: None Available. ACCESSION NUMBER(S): [id number] ORDERING CLINICIAN: ALEXANDER CHAPMAN TECHNIQUE:  Frontal chest was obtained at 12:22 hours. FINDINGS: CARDIOMEDIASTINAL SILHOUETTE: Cardiomediastinal silhouette is normal in size and configuration.  LUNGS: Lungs are clear.  ABDOMEN: No remarkable upper abdominal findings.  BONES: No acute osseous changes.    No acute abnormality. Signed by Bret Evans,              Assessment/Plan   Ayla Paredes is a 55 y.o. male, with a PMH of HTN, HLD, cardiomyopathy with recovery, CKD, DM type IIwho presented to [facility] ED on 8/7/2025 for chest pain.    Recent admission at [facility] 7/18-7/30 -acute onset stocking and glove paresthesia, ataxia, hyporeflexia and dysphagia. Diagnosed with AIDP (cisneros bennett variant) and completed 5 day course of IVIG  Stay c/b aspiration pneumonia, hyponatremia requiring brief ICU transfer for hypertonic fluids. Discharged to [facility] on 7/30    Presented to the ED today with chest pain. Patient states he was doing physical therapy and lifting weights/doing curls when he had sudden onset of chest cramps -  bandlike under his ribcage. He states he felt like it was due to muscle strain. Resolved after 30 minutes with no reoccurrences since. Denies SOB/ palpatations/ diaphoretic/ dizziness/ lightheadness/ syncope/ near syncope/ nausea/ indigestion/ PND/ orthopnea/ weight gain/ edema.  [facility] requested a cardiology evaluation before accepting him back.     Smoking hx - no  Alcohol use no  Drug use - no   Denies any family cardiac history     Coronary CT angiogram 12/2023 showed no significant atherosclerotic changes or stenotic disease with a total CACS of 20.3   Echocardiogram 7/2025 showed an improved EF of 65-70%    ED Course: soft BP otherwise VSS. Labs notable for Cr./BUN 1.76/33, h/h 9.9/30.3, platelets 524, d dimer 1987, trop 46->42  EKG NSR HR 64, no acute ischemic changes  CXR with no acute findings  CTA chest Negative for pulmonary embolism or thoracic aortic dissection.   Mild cardiac enlargement without pericardial effusion is stable.   Linear bands of atelectasis in the lower lobes.   0.5 cm subpleural nodule right middle lobe is unchanged.   Received: 1L LR    Exertional chest pain  Trop 46->46 (trending down from 52->51 on prior admission)  CTA negative for PE  EKG with no acute ischemic changes  Monitor on tele overnight  Cardiology consultation    HTN  Soft pressures on presentation  S/p IVF  Will hold antihypertensives for now and monitor     HLD  Continue statin     RENATO on CKD  Likely prerenal   S/p IVF bolus   Repeat AM labs  Hold nephrotoxic agents     Normocytic Anemia  H/h significantly lower than baseline  Will add on iron studies, b12, folate  No signs of overt bleeding   Check FOBT    Recent admission   AIDP s/p IVIG  Dysphagia, patient states he is on a soft diet, aspiration precautions   Aspiration pneumonia, complete course of atb    Hyponatremia, etiology c/f SIADH, which may occur secondary to dysautonomia seen with AIDP. Continue salt tabs, will liberalize fluid restriction for now     DM  type II  Accuchecks ACHS, monitor need for SSI, hypoglycemia protocol     GI/VTE PPX: PPI, SQ lovenox     Chart, medical history, and labs/testing reviewed in detail.     Disposition: Discharge once medically cleared and stable, pending cardiology consultation/clearance     From [facility]   PT/OT ordered     UGO Rivero-CNP   Observation/Internal Med [facility]  08/07/25  6:56 PM  Total time of 60 minutes spent on professional and overall care, with >50% of time dedicated to counseling/coordination of care.    Virtual or Telephone Consent    An interactive audio and video telecommunication system which permits real time communications between the patient (at the originating site) and provider (at the distant site) was utilized to provide this telehealth service.   Verbal consent was requested and obtained from Ayla Paredes on this date, 08/07/25 for a telehealth visit and the patient's location was confirmed at the time of the visit.           [1] No Known Allergies  [2] [3] [4]    Himanshu Angelo  Orthopaedic Surgery  [address], [address]  [place] [address]  Phone: [phone]  Fax: [phone]  Follow Up Time:     Mason Stanford  Orthopaedic Surgery  [address], [address]  [place] [address]  Phone: [phone]  Fax: [phone]  Follow Up Time:     Magno Gan  Orthopaedic Trauma  [address], [address]  [place] [address]  Phone: [phone]  Fax: [phone]  Follow Up Time:

## 2025-08-07 NOTE — ED PROVIDER NOTES
History/Exam limitations: none.   Additional history was obtained from patient, EMS personnel, and past medical records.          HPI:    Ayla Paredes is a 55 y.o. male PMH HTN, DM, CKD, cardiomyopathy, recent hospitalization for AIDP now at  rehab presenting for evaluation of lower chest upper abdominal pain.  Patient states that he was doing arm curls and exerting himself at physical therapy today.  He states that he began having pain in his upper abdomen and lower chest area.  He states that it did not travel anywhere.  He reports that the pain resolved within 30 minutes.  Initially was somewhat pleuritic but has no further pain currently.  No lower extremity edema.  No fever, chills, cough.  No current abdominal pain.  Was constipated after his hospitalization however that resolved and he is now having normal bowel movements.  No tearing type sensation to back.          Physical Exam:  ED Triage Vitals [08/07/25 1122]   Temperature Heart Rate Respirations BP   37.1 °C (98.7 °F) 72 18 115/84      Pulse Ox Temp Source Heart Rate Source Patient Position   100 % Oral Monitor Sitting      BP Location FiO2 (%)     Right arm --        GEN:      Alert, NAD  Eyes:       PERRL, EOMI  HENT:      NC/AT, OP clear, airway patent, MM  CV:      RRR, no MRG, no LE pitting edema, 2+ radial and pedal pulses  PULM:     CTAB, no w/r/r, easy WOB, symmetric chest rise  ABD:      Soft, NT, ND, no masses, BS +  :       No CVA TTP  NEURO:   A&Ox3, 4+/5 strength bilateral hip flexion, 5/5 strength in remaining lower extremity muscle groups bilaterally in upper extremities, SI LT throughout  MSK:      FROM, no joint deformities or swelling, no e/o trauma  SKIN:       Warm and dry  PSYCH:    Appropriate mood and affect         MDM/ED Course:   Ayla Paredes is a 55 y.o. male PMH HTN, DM, CKD, cardiomyopathy, recent hospitalization for AIDP now at  rehab presenting for evaluation of lower chest upper abdominal pain.   Vitals  reassuring.  Exam as documented above.  Differential includes ACS, PNA, PE, aortic dissection/pathology, pneumothorax, pericardial effusion, pericarditis, myocarditis, GERD, musculoskeletal pain, pleurisy.  Low suspicion for aortic dissection given patient's symptoms ultimately resolved without intervention.  Given recent hospitalization differential included PE and D-dimer had been obtained.  It was elevated at 1987.  Troponin mildly elevated at 46 with repeat 42, downtrending and below recent baseline reassuring.  Chemistry with RENATO with creatinine 1.76, sodium near baseline.  IV fluids were given and patient's creatinine improved.  Sodium remained near baseline.  Chest x-ray with no acute findings per radiology read.  CT angio chest for PE with no evidence of pulmonary embolism, mild cardiac parchment similar to prior.  Patient neurologic exam consistent with baseline.  On arrival patient was deferring labs and workup,  rehab would not allow him to leave AMA and return to rehab and ultimately patient agreed to have labs and workup performed.  Following workup patient requested either discharge or signing out AMA, nursing staff reached out to the rehab facility again and they will not accept patient back without further cardiac workup per the rehab provider.  Patient ultimately agreeable to hospitalization.  Remained chest pain-free.  Patient was accepted by admitting provider for continued management stable condition.    EKG reviewed by me: 11:18 AM normal sinus rhythm, rate 73, normal axis, normal intervals, no STEMI, T wave inversion in lead III, similar to prior EKGs.    EKG reviewed by me: 2:49 PM normal sinus rhythm, rate 64, normal axis, normal intervals, no STEMI, T wave inversion in lead III, similar to prior EKGs.     Diagnoses as of 08/09/25 1323   Chest pain, unspecified type         Chronic medical conditions affecting care listed in MDM. I independently reviewed imaging studies and agreed with  radiology reads. I reviewed recent and relevant outside records including PCP notes, Prior discharge summaries, and prior radiology reports.    Procedure  Procedures    Diagnosis:   1.  Chest pain    Dispo: Hospitalized in stable condition      Disclaimer: Portions of this note were dictated by speech recognition. An attempt at proof reading was made to minimize errors. Minor errors in transcription may be present.  Please call if questions.     Chance Macdonald MD  08/09/25 9029

## 2025-08-07 NOTE — PROGRESS NOTES
Pharmacy Medication History     Source of Information: FACILITY MEDICATION LIST    Additional concerns with the patient's PTA list.     Notified Provider via Haiku : No    The following updates were made to the Prior to Admission medication list:     Medications ADDED:   N/A  Medications CHANGED:  N/A  Medications REMOVED:   N/A  Medications NOT TAKING:   N/A    Allergy reviewed : Yes    Meds [facility] Beds : [id number] [place]    Outpatient pharmacy confirmed and updated in chart : Yes    Pharmacy name: [id number] [place], [place]    The list below reflectives the updated PTA list. Please review each medication in order reconciliation for additional clarification and justification.    Prior to Admission Medications   Prescriptions Last Dose      Dexcom G7  misc       Sig: Use as instructed   Dexcom G7 Sensor device       Sig: Change every 10 days   amLODIPine (Norvasc) 10 mg tablet       Sig: Take 1 tablet (10 mg) by mouth once daily.   atorvastatin (Lipitor) 40 mg tablet       Sig: Take 1 tablet (40 mg) by mouth once daily.   cetirizine (ZyrTEC) 10 mg tablet       Sig: Take 1 tablet (10 mg) by mouth once daily.   chlorthalidone (Hygroton) 25 mg tablet       Sig: Take 1.5 tablets (37.5 mg) by mouth once daily.   ergocalciferol (Vitamin D-2) 1.25 MG (18684 UT) capsule       Sig: TAKE 1 CAPSULE (13231 UNITS) BY MOUTH ONE TIME PER WEEK   fluticasone (Flonase) 50 mcg/actuation nasal spray       Sig: Administer 1 spray into each nostril once daily. Shake gently. Before first use, prime pump. After use, clean tip and replace cap.   gabapentin (Neurontin) 300 mg capsule       Sig: Take 1 capsule (300 mg) by mouth 3 times a day.   losartan (Cozaar) 100 mg tablet       Sig: TAKE 1 TABLET BY MOUTH EVERY DAY   metFORMIN XR (Glucophage-XR) 500 mg 24 hr tablet       Sig: Take 1 tablet (500 mg) by mouth 2 times daily (morning and late afternoon). Do not crush, chew, or split.   metoprolol succinate XL (Toprol-XL) 50 mg 24 hr tablet        Sig: Take 1 tablet (50 mg) by mouth once daily. Do not crush or chew.   potassium chloride CR (Klor-Con) 10 mEq ER tablet       Sig: Take 1 tablet (10 mEq) by mouth 2 times a day. Do not crush, chew, or split.   sodium chloride 1,000 mg tablet       Sig: Take 0.5 tablets (0.5 g) by mouth once daily.   tamsulosin (Flomax) 0.4 mg 24 hr capsule       Sig: Take 1 capsule (0.4 mg) by mouth once daily.      Facility-Administered Medications: None       The list below reflectives the updated allergy list. Please review each documented allergy for additional clarification and justification.    No Known Allergies       08/07/25 at 2:14 PM - Penelope Pham

## 2025-08-07 NOTE — ED TRIAGE NOTES
Patient coming in today for epigastric abdominal pain/chest pain that lasted for about 30 minutes during a therapy session. Reports that it hurt to take a deep breath initially. Patient reports that symptoms have fully resolved. Initially hypotensive for EMS 80s/40s. Facility concerned for PE.     Given medication yesterday due to constipation.

## 2025-08-08 ENCOUNTER — APPOINTMENT (OUTPATIENT)
Dept: CARDIOLOGY | Facility: HOSPITAL | Age: 55
End: 2025-08-08
Payer: COMMERCIAL

## 2025-08-08 LAB
ANION GAP SERPL CALC-SCNC: 15 MMOL/L (ref 10–20)
ATRIAL RATE: 64 BPM
ATRIAL RATE: 73 BPM
BUN SERPL-MCNC: 27 MG/DL (ref 6–23)
CALCIUM SERPL-MCNC: 9.5 MG/DL (ref 8.6–10.3)
CARDIAC TROPONIN I PNL SERPL HS: 47 NG/L (ref 0–20)
CHLORIDE SERPL-SCNC: 98 MMOL/L (ref 98–107)
CO2 SERPL-SCNC: 26 MMOL/L (ref 21–32)
CREAT SERPL-MCNC: 1.51 MG/DL (ref 0.5–1.3)
EGFRCR SERPLBLD CKD-EPI 2021: 54 ML/MIN/1.73M*2
ERYTHROCYTE [DISTWIDTH] IN BLOOD BY AUTOMATED COUNT: 19.7 % (ref 11.5–14.5)
FOLATE SERPL-MCNC: 8.5 NG/ML
GLUCOSE BLD MANUAL STRIP-MCNC: 113 MG/DL (ref 74–99)
GLUCOSE BLD MANUAL STRIP-MCNC: 119 MG/DL (ref 74–99)
GLUCOSE BLD MANUAL STRIP-MCNC: 134 MG/DL (ref 74–99)
GLUCOSE BLD MANUAL STRIP-MCNC: 91 MG/DL (ref 74–99)
GLUCOSE BLD MANUAL STRIP-MCNC: 92 MG/DL (ref 74–99)
GLUCOSE SERPL-MCNC: 94 MG/DL (ref 74–99)
HCT VFR BLD AUTO: 28.1 % (ref 41–52)
HGB BLD-MCNC: 9.2 G/DL (ref 13.5–17.5)
MCH RBC QN AUTO: 31 PG (ref 26–34)
MCHC RBC AUTO-ENTMCNC: 32.7 G/DL (ref 32–36)
MCV RBC AUTO: 95 FL (ref 80–100)
NRBC BLD-RTO: 0 /100 WBCS (ref 0–0)
P AXIS: 14 DEGREES
P AXIS: 29 DEGREES
P OFFSET: 174 MS
P OFFSET: 179 MS
P ONSET: 123 MS
P ONSET: 129 MS
PLATELET # BLD AUTO: 528 X10*3/UL (ref 150–450)
POTASSIUM SERPL-SCNC: 3.4 MMOL/L (ref 3.5–5.3)
PR INTERVAL: 170 MS
PR INTERVAL: 188 MS
Q ONSET: 214 MS
Q ONSET: 217 MS
QRS COUNT: 11 BEATS
QRS COUNT: 12 BEATS
QRS DURATION: 100 MS
QRS DURATION: 102 MS
QT INTERVAL: 362 MS
QT INTERVAL: 394 MS
QTC CALCULATION(BAZETT): 398 MS
QTC CALCULATION(BAZETT): 406 MS
QTC FREDERICIA: 386 MS
QTC FREDERICIA: 402 MS
R AXIS: -1 DEGREES
R AXIS: 0 DEGREES
RBC # BLD AUTO: 2.97 X10*6/UL (ref 4.5–5.9)
SARS-COV-2 RNA RESP QL NAA+PROBE: NOT DETECTED
SODIUM SERPL-SCNC: 136 MMOL/L (ref 136–145)
T AXIS: 14 DEGREES
T AXIS: 2 DEGREES
T OFFSET: 395 MS
T OFFSET: 414 MS
VENTRICULAR RATE: 64 BPM
VENTRICULAR RATE: 73 BPM
VIT B12 SERPL-MCNC: 684 PG/ML (ref 211–911)
WBC # BLD AUTO: 4.9 X10*3/UL (ref 4.4–11.3)

## 2025-08-08 PROCEDURE — 96372 THER/PROPH/DIAG INJ SC/IM: CPT | Performed by: NURSE PRACTITIONER

## 2025-08-08 PROCEDURE — 82607 VITAMIN B-12: CPT | Mod: AHULAB | Performed by: NURSE PRACTITIONER

## 2025-08-08 PROCEDURE — 99223 1ST HOSP IP/OBS HIGH 75: CPT

## 2025-08-08 PROCEDURE — 36415 COLL VENOUS BLD VENIPUNCTURE: CPT | Performed by: NURSE PRACTITIONER

## 2025-08-08 PROCEDURE — 93005 ELECTROCARDIOGRAM TRACING: CPT

## 2025-08-08 PROCEDURE — 82947 ASSAY GLUCOSE BLOOD QUANT: CPT

## 2025-08-08 PROCEDURE — 97165 OT EVAL LOW COMPLEX 30 MIN: CPT | Mod: GO

## 2025-08-08 PROCEDURE — 2500000001 HC RX 250 WO HCPCS SELF ADMINISTERED DRUGS (ALT 637 FOR MEDICARE OP): Performed by: NURSE PRACTITIONER

## 2025-08-08 PROCEDURE — 87635 SARS-COV-2 COVID-19 AMP PRB: CPT | Performed by: NURSE PRACTITIONER

## 2025-08-08 PROCEDURE — 2500000004 HC RX 250 GENERAL PHARMACY W/ HCPCS (ALT 636 FOR OP/ED): Performed by: NURSE PRACTITIONER

## 2025-08-08 PROCEDURE — 99232 SBSQ HOSP IP/OBS MODERATE 35: CPT | Performed by: NURSE PRACTITIONER

## 2025-08-08 PROCEDURE — 2500000002 HC RX 250 W HCPCS SELF ADMINISTERED DRUGS (ALT 637 FOR MEDICARE OP, ALT 636 FOR OP/ED): Performed by: NURSE PRACTITIONER

## 2025-08-08 PROCEDURE — 84484 ASSAY OF TROPONIN QUANT: CPT

## 2025-08-08 PROCEDURE — G0378 HOSPITAL OBSERVATION PER HR: HCPCS

## 2025-08-08 PROCEDURE — 80048 BASIC METABOLIC PNL TOTAL CA: CPT | Performed by: NURSE PRACTITIONER

## 2025-08-08 PROCEDURE — 82746 ASSAY OF FOLIC ACID SERUM: CPT | Mod: AHULAB | Performed by: NURSE PRACTITIONER

## 2025-08-08 PROCEDURE — 2500000001 HC RX 250 WO HCPCS SELF ADMINISTERED DRUGS (ALT 637 FOR MEDICARE OP)

## 2025-08-08 PROCEDURE — 97530 THERAPEUTIC ACTIVITIES: CPT | Mod: GP

## 2025-08-08 PROCEDURE — 85027 COMPLETE CBC AUTOMATED: CPT | Performed by: NURSE PRACTITIONER

## 2025-08-08 PROCEDURE — 97162 PT EVAL MOD COMPLEX 30 MIN: CPT | Mod: GP

## 2025-08-08 RX ORDER — ATORVASTATIN CALCIUM 40 MG/1
40 TABLET, FILM COATED ORAL NIGHTLY
Status: DISCONTINUED | OUTPATIENT
Start: 2025-08-08 | End: 2025-08-09 | Stop reason: HOSPADM

## 2025-08-08 RX ADMIN — POTASSIUM CHLORIDE 10 MEQ: 750 TABLET, FILM COATED, EXTENDED RELEASE ORAL at 21:06

## 2025-08-08 RX ADMIN — ENOXAPARIN SODIUM 40 MG: 100 INJECTION SUBCUTANEOUS at 21:06

## 2025-08-08 RX ADMIN — POTASSIUM CHLORIDE 10 MEQ: 750 TABLET, FILM COATED, EXTENDED RELEASE ORAL at 12:44

## 2025-08-08 RX ADMIN — ATORVASTATIN CALCIUM 40 MG: 40 TABLET, FILM COATED ORAL at 21:06

## 2025-08-08 RX ADMIN — GABAPENTIN 300 MG: 300 CAPSULE ORAL at 21:06

## 2025-08-08 RX ADMIN — SODIUM CHLORIDE 0.5 G: 1 TABLET ORAL at 12:43

## 2025-08-08 SDOH — SOCIAL STABILITY: SOCIAL INSECURITY: DO YOU FEEL ANYONE HAS EXPLOITED OR TAKEN ADVANTAGE OF YOU FINANCIALLY OR OF YOUR PERSONAL PROPERTY?: NO

## 2025-08-08 SDOH — HEALTH STABILITY: MENTAL HEALTH: HOW OFTEN DO YOU HAVE SIX OR MORE DRINKS ON ONE OCCASION?: NEVER

## 2025-08-08 SDOH — HEALTH STABILITY: PHYSICAL HEALTH: ON AVERAGE, HOW MANY MINUTES DO YOU ENGAGE IN EXERCISE AT THIS LEVEL?: PATIENT DECLINED

## 2025-08-08 SDOH — SOCIAL STABILITY: SOCIAL INSECURITY: DOES ANYONE TRY TO KEEP YOU FROM HAVING/CONTACTING OTHER FRIENDS OR DOING THINGS OUTSIDE YOUR HOME?: NO

## 2025-08-08 SDOH — ECONOMIC STABILITY: TRANSPORTATION INSECURITY: IN THE PAST 12 MONTHS, HAS LACK OF TRANSPORTATION KEPT YOU FROM MEDICAL APPOINTMENTS OR FROM GETTING MEDICATIONS?: NO

## 2025-08-08 SDOH — HEALTH STABILITY: PHYSICAL HEALTH
HOW OFTEN DO YOU NEED TO HAVE SOMEONE HELP YOU WHEN YOU READ INSTRUCTIONS, PAMPHLETS, OR OTHER WRITTEN MATERIAL FROM YOUR DOCTOR OR PHARMACY?: NEVER

## 2025-08-08 SDOH — SOCIAL STABILITY: SOCIAL NETWORK: HOW OFTEN DO YOU GET TOGETHER WITH FRIENDS OR RELATIVES?: MORE THAN THREE TIMES A WEEK

## 2025-08-08 SDOH — ECONOMIC STABILITY: HOUSING INSECURITY: IN THE PAST 12 MONTHS, HOW MANY TIMES HAVE YOU MOVED WHERE YOU WERE LIVING?: 0

## 2025-08-08 SDOH — ECONOMIC STABILITY: FOOD INSECURITY: WITHIN THE PAST 12 MONTHS, THE FOOD YOU BOUGHT JUST DIDN'T LAST AND YOU DIDN'T HAVE MONEY TO GET MORE.: NEVER TRUE

## 2025-08-08 SDOH — ECONOMIC STABILITY: HOUSING INSECURITY: AT ANY TIME IN THE PAST 12 MONTHS, WERE YOU HOMELESS OR LIVING IN A SHELTER (INCLUDING NOW)?: NO

## 2025-08-08 SDOH — HEALTH STABILITY: MENTAL HEALTH: HOW OFTEN DO YOU HAVE A DRINK CONTAINING ALCOHOL?: NEVER

## 2025-08-08 SDOH — SOCIAL STABILITY: SOCIAL INSECURITY: ARE YOU MARRIED, WIDOWED, DIVORCED, SEPARATED, NEVER MARRIED, OR LIVING WITH A PARTNER?: LIVING WITH PARTNER

## 2025-08-08 SDOH — SOCIAL STABILITY: SOCIAL NETWORK
IN A TYPICAL WEEK, HOW MANY TIMES DO YOU TALK ON THE PHONE WITH FAMILY, FRIENDS, OR NEIGHBORS?: MORE THAN THREE TIMES A WEEK

## 2025-08-08 SDOH — ECONOMIC STABILITY: FOOD INSECURITY: HOW HARD IS IT FOR YOU TO PAY FOR THE VERY BASICS LIKE FOOD, HOUSING, MEDICAL CARE, AND HEATING?: NOT HARD AT ALL

## 2025-08-08 SDOH — HEALTH STABILITY: MENTAL HEALTH: HOW MANY DRINKS CONTAINING ALCOHOL DO YOU HAVE ON A TYPICAL DAY WHEN YOU ARE DRINKING?: PATIENT DOES NOT DRINK

## 2025-08-08 SDOH — SOCIAL STABILITY: SOCIAL INSECURITY: WITHIN THE LAST YEAR, HAVE YOU BEEN AFRAID OF YOUR PARTNER OR EX-PARTNER?: PATIENT DECLINED

## 2025-08-08 SDOH — SOCIAL STABILITY: SOCIAL INSECURITY: WERE YOU ABLE TO COMPLETE ALL THE BEHAVIORAL HEALTH SCREENINGS?: YES

## 2025-08-08 SDOH — SOCIAL STABILITY: SOCIAL NETWORK
DO YOU BELONG TO ANY CLUBS OR ORGANIZATIONS SUCH AS CHURCH GROUPS, UNIONS, FRATERNAL OR ATHLETIC GROUPS, OR SCHOOL GROUPS?: PATIENT DECLINED

## 2025-08-08 SDOH — SOCIAL STABILITY: SOCIAL NETWORK: HOW OFTEN DO YOU ATTEND MEETINGS OF THE CLUBS OR ORGANIZATIONS YOU BELONG TO?: PATIENT DECLINED

## 2025-08-08 SDOH — SOCIAL STABILITY: SOCIAL INSECURITY: ABUSE: ADULT

## 2025-08-08 SDOH — SOCIAL STABILITY: SOCIAL INSECURITY: DO YOU FEEL UNSAFE GOING BACK TO THE PLACE WHERE YOU ARE LIVING?: NO

## 2025-08-08 SDOH — ECONOMIC STABILITY: HOUSING INSECURITY: IN THE LAST 12 MONTHS, WAS THERE A TIME WHEN YOU WERE NOT ABLE TO PAY THE MORTGAGE OR RENT ON TIME?: NO

## 2025-08-08 SDOH — HEALTH STABILITY: MENTAL HEALTH
DO YOU FEEL STRESS - TENSE, RESTLESS, NERVOUS, OR ANXIOUS, OR UNABLE TO SLEEP AT NIGHT BECAUSE YOUR MIND IS TROUBLED ALL THE TIME - THESE DAYS?: NOT AT ALL

## 2025-08-08 SDOH — SOCIAL STABILITY: SOCIAL INSECURITY: HAVE YOU HAD THOUGHTS OF HARMING ANYONE ELSE?: NO

## 2025-08-08 SDOH — SOCIAL STABILITY: SOCIAL INSECURITY: ARE YOU OR HAVE YOU BEEN THREATENED OR ABUSED PHYSICALLY, EMOTIONALLY, OR SEXUALLY BY ANYONE?: NO

## 2025-08-08 SDOH — ECONOMIC STABILITY: FOOD INSECURITY: WITHIN THE PAST 12 MONTHS, YOU WORRIED THAT YOUR FOOD WOULD RUN OUT BEFORE YOU GOT THE MONEY TO BUY MORE.: NEVER TRUE

## 2025-08-08 SDOH — HEALTH STABILITY: PHYSICAL HEALTH
ON AVERAGE, HOW MANY DAYS PER WEEK DO YOU ENGAGE IN MODERATE TO STRENUOUS EXERCISE (LIKE A BRISK WALK)?: PATIENT DECLINED

## 2025-08-08 SDOH — SOCIAL STABILITY: SOCIAL NETWORK: HOW OFTEN DO YOU ATTEND CHURCH OR RELIGIOUS SERVICES?: PATIENT DECLINED

## 2025-08-08 SDOH — SOCIAL STABILITY: SOCIAL INSECURITY: ARE THERE ANY APPARENT SIGNS OF INJURIES/BEHAVIORS THAT COULD BE RELATED TO ABUSE/NEGLECT?: NO

## 2025-08-08 SDOH — SOCIAL STABILITY: SOCIAL INSECURITY: HAS ANYONE EVER THREATENED TO HURT YOUR FAMILY OR YOUR PETS?: NO

## 2025-08-08 SDOH — SOCIAL STABILITY: SOCIAL INSECURITY: HAVE YOU HAD ANY THOUGHTS OF HARMING ANYONE ELSE?: NO

## 2025-08-08 ASSESSMENT — COGNITIVE AND FUNCTIONAL STATUS - GENERAL
MOVING TO AND FROM BED TO CHAIR: A LITTLE
CLIMB 3 TO 5 STEPS WITH RAILING: A LOT
WALKING IN HOSPITAL ROOM: A LOT
MOBILITY SCORE: 15
STANDING UP FROM CHAIR USING ARMS: A LOT
DRESSING REGULAR LOWER BODY CLOTHING: A LITTLE
DRESSING REGULAR LOWER BODY CLOTHING: A LOT
DRESSING REGULAR LOWER BODY CLOTHING: A LITTLE
STANDING UP FROM CHAIR USING ARMS: A LOT
WALKING IN HOSPITAL ROOM: A LOT
HELP NEEDED FOR BATHING: A LOT
WALKING IN HOSPITAL ROOM: A LOT
DRESSING REGULAR UPPER BODY CLOTHING: A LITTLE
CLIMB 3 TO 5 STEPS WITH RAILING: TOTAL
EATING MEALS: A LITTLE
STANDING UP FROM CHAIR USING ARMS: A LITTLE
PERSONAL GROOMING: A LITTLE
MOBILITY SCORE: 18
HELP NEEDED FOR BATHING: A LITTLE
TOILETING: A LITTLE
MOVING FROM LYING ON BACK TO SITTING ON SIDE OF FLAT BED WITH BEDRAILS: A LITTLE
CLIMB 3 TO 5 STEPS WITH RAILING: A LOT
DRESSING REGULAR UPPER BODY CLOTHING: A LITTLE
TURNING FROM BACK TO SIDE WHILE IN FLAT BAD: A LITTLE
TOILETING: A LITTLE
DAILY ACTIVITIY SCORE: 20
MOBILITY SCORE: 18
HELP NEEDED FOR BATHING: A LITTLE
PATIENT BASELINE BEDBOUND: NO
DAILY ACTIVITIY SCORE: 19
DRESSING REGULAR UPPER BODY CLOTHING: A LITTLE
PERSONAL GROOMING: A LITTLE
TOILETING: A LOT
DAILY ACTIVITIY SCORE: 15

## 2025-08-08 ASSESSMENT — ACTIVITIES OF DAILY LIVING (ADL)
DRESSING YOURSELF: INDEPENDENT
ADL_ASSISTANCE: INDEPENDENT
TOILETING: INDEPENDENT
JUDGMENT_ADEQUATE_SAFELY_COMPLETE_DAILY_ACTIVITIES: YES
LACK_OF_TRANSPORTATION: NO
LACK_OF_TRANSPORTATION: NO
ADEQUATE_TO_COMPLETE_ADL: YES
BATHING: INDEPENDENT
HEARING - RIGHT EAR: FUNCTIONAL
HEARING - LEFT EAR: FUNCTIONAL
LACK_OF_TRANSPORTATION: NO
PATIENT'S MEMORY ADEQUATE TO SAFELY COMPLETE DAILY ACTIVITIES?: YES
WALKS IN HOME: INDEPENDENT
GROOMING: INDEPENDENT
FEEDING YOURSELF: INDEPENDENT
ADL_ASSISTANCE: INDEPENDENT

## 2025-08-08 ASSESSMENT — PAIN SCALES - GENERAL
PAINLEVEL_OUTOF10: 10 - WORST POSSIBLE PAIN
PAINLEVEL_OUTOF10: 3
PAINLEVEL_OUTOF10: 1
PAINLEVEL_OUTOF10: 3
PAINLEVEL_OUTOF10: 10 - WORST POSSIBLE PAIN

## 2025-08-08 ASSESSMENT — LIFESTYLE VARIABLES
SKIP TO QUESTIONS 9-10: 1
SKIP TO QUESTIONS 9-10: 1
HOW OFTEN DO YOU HAVE A DRINK CONTAINING ALCOHOL: NEVER
AUDIT-C TOTAL SCORE: 0
PRESCIPTION_ABUSE_PAST_12_MONTHS: NO
HOW MANY STANDARD DRINKS CONTAINING ALCOHOL DO YOU HAVE ON A TYPICAL DAY: PATIENT DOES NOT DRINK
AUDIT-C TOTAL SCORE: 0
HOW OFTEN DO YOU HAVE 6 OR MORE DRINKS ON ONE OCCASION: NEVER
SUBSTANCE_ABUSE_PAST_12_MONTHS: NO
AUDIT-C TOTAL SCORE: 0

## 2025-08-08 ASSESSMENT — PAIN - FUNCTIONAL ASSESSMENT
PAIN_FUNCTIONAL_ASSESSMENT: 0-10

## 2025-08-08 ASSESSMENT — PATIENT HEALTH QUESTIONNAIRE - PHQ9
1. LITTLE INTEREST OR PLEASURE IN DOING THINGS: NOT AT ALL
SUM OF ALL RESPONSES TO PHQ9 QUESTIONS 1 & 2: 0
2. FEELING DOWN, DEPRESSED OR HOPELESS: NOT AT ALL

## 2025-08-08 ASSESSMENT — PAIN DESCRIPTION - DESCRIPTORS
DESCRIPTORS: ACHING
DESCRIPTORS: ACHING

## 2025-08-08 NOTE — PROGRESS NOTES
Physical Therapy    Physical Therapy Evaluation & Treatment    Patient Name: Ayla Paredes  MRN: [id number]  Department: [place] [id number]  Room: [address] [place] [address]  Today's Date: 8/8/2025   Time Calculation  Start Time: 0905  Stop Time: 0931  Time Calculation (min): 26 min    Assessment/Plan   PT Assessment  PT Assessment Results: Decreased strength, Decreased endurance, Impaired balance, Decreased mobility, Pain  Rehab Prognosis: Good  Evaluation/Treatment Tolerance: Patient tolerated treatment well  Medical Staff Made Aware: Yes  End of Session Communication: Bedside nurse  Assessment Comment: Pt presents with decreased strength, endurance, functional mobility. Pt is currently below his baseline and will benefit from on going PT services to return to [facility].  End of Session Patient Position: Bed, 3 rail up, Alarm on   IP OR SWING BED PT PLAN  Inpatient or Swing Bed: Inpatient  PT Plan  Treatment/Interventions: Bed mobility, Transfer training, Gait training, Stair training, Balance training, Neuromuscular re-education, Strengthening, Endurance training, Therapeutic exercise, Therapeutic activity  PT Plan: Ongoing PT  PT Frequency: 3 times per week  PT Discharge Recommendations: High intensity level of continued care  Equipment Recommended upon Discharge: Wheeled walker  PT Recommended Transfer Status: Assist x1  PT - OK to Discharge: Yes      Subjective     PT Visit Info:  PT Received On: 08/08/25  General Visit Information:  General  Reason for Referral: chest pain  Referred By: FERNANDO Rivero  Past Medical History Relevant to Rehab: Medical History[1]   Family/Caregiver Present: Yes  Caregiver Feedback: Alisiaance present  Co-Treatment: OT  Co-Treatment Reason: To maximize pt safety, mobility and performance.  Prior to Session Communication: Bedside nurse  Patient Position Received: Bed, 3 rail up, Alarm on  Preferred Learning Style: auditory, verbal, visual  General Comment: Pt agreeable and cooperative to OT/PT  genevieve.  Home Living:  Home Living  Type of Home: House  Lives With: Significant other (fiance)  Home Layout: One level  Home Access: Stairs to enter with rails  Entrance Stairs-Rails: Both  Entrance Stairs-Number of Steps: 4+1  Home Living Comments: Resident was in  rehab  for intense rehab services since his last d/c from hospital in last month.  Prior Level of Function:  Prior Function Per Pt/Caregiver Report  Level of [place]: Independent with ADLs and functional transfers, Independent with homemaking with ambulation  ADL Assistance: Independent  Homemaking Assistance: Independent  Ambulatory Assistance: Independent (without AD)  Precautions:  Precautions  Medical Precautions: Fall precautions    Objective   Pain:  Pain Assessment  Pain Assessment: 0-10  0-10 (Numeric) Pain Score: 10 - Worst possible pain  Pain Type: Acute pain  Pain Location: Back  Cognition:  Cognition  Overall Cognitive Status: Within Functional Limits  Orientation Level: Oriented X4    General Assessments:  General Observation  General Observation: supine in bed, hospital gown, telemetry, bed alarm intact.     Activity Tolerance  Endurance: Tolerates 10 - 20 min exercise with multiple rests    Strength  Strength Comments: B LE grossly WFL throughout.  Postural Control  Postural Control: Within Functional Limits    Static Sitting Balance  Static Sitting-Balance Support: Bilateral upper extremity supported, Feet supported  Static Sitting-Level of Assistance: Close supervision  Dynamic Sitting Balance  Dynamic Sitting-Balance Support: Bilateral upper extremity supported, Feet supported  Dynamic Sitting-Level of Assistance: Close supervision    Static Standing Balance  Static Standing-Balance Support: Bilateral upper extremity supported  Static Standing-Level of Assistance: Minimum assistance  Dynamic Standing Balance  Dynamic Standing-Balance Support: Bilateral upper extremity supported  Dynamic Standing-Level of Assistance: Minimum  assistance  Functional Assessments:  Bed Mobility  Bed Mobility: Yes  Bed Mobility 1  Bed Mobility 1: Supine to sitting, Sitting to supine  Level of Assistance 1: Close supervision  Bed Mobility Comments 1: HOB elevated.    Transfers  Transfer: Yes  Transfer 1  Technique 1: Sit to stand, Stand to sit  Transfer Device 1: Walker, Gait belt  Transfer Level of Assistance 1: Minimum assistance  Trials/Comments 1: unsteady, requires cues for proper hand placement to enhance safety.    Ambulation/Gait Training  Ambulation/Gait Training Performed: Yes  Ambulation/Gait Training 1  Surface 1: Level tile  Device 1: Rolling walker  Gait Support Devices: Gait belt  Assistance 1: Minimum assistance (x2)  Quality of Gait 1:  (unsteady)  Comments/Distance (ft) 1: 5    Stairs  Stairs: No  Extremity/Trunk Assessments:  RLE   RLE : Within Functional Limits  LLE   LLE : Within Functional Limits  Treatments:  Therapeutic Activity  Therapeutic Activity Performed: Yes  Therapeutic Activity 1: Transfers from one bed to another bed with Minimum assistance using RW.  Outcome Measures:  [facility] Basic Mobility  Turning from your back to your side while in a flat bed without using bedrails: A little  Moving from lying on your back to sitting on the side of a flat bed without using bedrails: A little  Moving to and from bed to chair (including a wheelchair): A little  Standing up from a chair using your arms (e.g. wheelchair or bedside chair): A little  To walk in hospital room: A lot  Climbing 3-5 steps with railing: Total  Basic Mobility - Total Score: 15    Encounter Problems       Encounter Problems (Active)       Mobility       STG - Patient will ambulate 75 feet with CGA using LRAD.       Start:  08/08/25    Expected End:  08/22/25            STG - Patient will ascend and descend 5 stairs with Minimum assistance using B rails.       Start:  08/08/25    Expected End:  08/22/25               PT Transfers       LTG - Patient will transfer from  one surface to another with supervision using LRAD.       Start:  08/08/25    Expected End:  08/22/25            STG - Patient will perform bed mobility with I.       Start:  08/08/25    Expected End:  08/22/25                   Education Documentation  Precautions, taught by Micki Andrews PT at 8/8/2025 10:48 AM.  Learner: Family, Patient  Readiness: Acceptance  Method: Explanation  Response: Verbalizes Understanding    Mobility Training, taught by Micki Andrews PT at 8/8/2025 10:48 AM.  Learner: Family, Patient  Readiness: Acceptance  Method: Explanation  Response: Verbalizes Understanding    Education Comments  No comments found.           [1]   Past Medical History:  Diagnosis Date    Hypertension

## 2025-08-08 NOTE — PROGRESS NOTES
Occupational Therapy    Evaluation    Patient Name: Ayla Paredes  MRN: [id number]  Department: [place] [address]  Room: [address] [place] [address]  Today's Date: 8/8/2025  Time Calculation  Start Time: 0904  Stop Time: 0930  Time Calculation (min): 26 min        Assessment:  OT Assessment: Pt presents with decrease balance, endurance, strength and safety awareness, impeding ADL performance and functional mobility. Pt would benefit from skilled OT services to address these deficits and facilitate highest level of function.  Prognosis: Good  Barriers to Discharge Home: Caregiver assistance, Physical needs  Caregiver Assistance: Caregiver assistance needed per identified barriers - however, level of patient's required assistance exceeds assistance available at home  Physical Needs: Stair navigation into home limited by function/safety, 24hr mobility assistance needed, 24hr ADL assistance needed, High falls risk due to function or environment  Evaluation/Treatment Tolerance: Patient limited by fatigue, Patient limited by pain  Medical Staff Made Aware: Yes  End of Session Communication: Bedside nurse  End of Session Patient Position: Bed, 3 rail up, Alarm on  OT Assessment Results: Decreased ADL status, Decreased upper extremity range of motion, Decreased upper extremity strength, Decreased safe judgment during ADL, Decreased endurance, Decreased functional mobility, Decreased IADLs, Decreased fine motor control  Prognosis: Good  Barriers to Discharge: Decreased caregiver support  Evaluation/Treatment Tolerance: Patient limited by fatigue, Patient limited by pain  Medical Staff Made Aware: Yes  Strengths: Ability to acquire knowledge, Housing layout, Premorbid level of function, Support and attitude of living partners  Barriers to Participation: Comorbidities  Plan:  Treatment Interventions: ADL retraining, Functional transfer training, UE strengthening/ROM, Endurance training, Patient/family training, Equipment evaluation/education, Fine motor  coordination activities, Compensatory technique education  OT Frequency: 3 times per week (During this acute inpatient hospitalization.)  OT Discharge Recommendations: High intensity level of continued care (Based on current functional status and rehab potential, patient is anticipated to tolerate and benefit from 5 or more days per week of skilled rehabilitative therapy after discharge from this acute inpatient hospitalization.)  Equipment Recommended upon Discharge: Wheeled walker  OT Recommended Transfer Status: Assist of 1  OT - OK to Discharge: Yes (Per POC)  Treatment Interventions: ADL retraining, Functional transfer training, UE strengthening/ROM, Endurance training, Patient/family training, Equipment evaluation/education, Fine motor coordination activities, Compensatory technique education    Subjective     OT Visit Info:  OT Received On: 08/08/25  General:  General  Reason for Referral: 55 y.o. male presenting with abdominal, chest pain and hypotension from [facility]. CT (-) for PE.  Referred By: UGO Rivero-CNP  Past Medical History Relevant to Rehab: Medical History[1]  Family/Caregiver Present: Yes  Caregiver Feedback: Fiance present  Co-Treatment: PT  Co-Treatment Reason: To maximize pt safety, mobility and performance.  Prior to Session Communication: Bedside nurse  Patient Position Received: Bed, 3 rail up, Alarm on  Preferred Learning Style: auditory, verbal, visual  General Comment: Pt agreeable and cooperative to OT/PT eval. RN requesting for pt to move to bed from ED bed, increase time required for set up.  Precautions:  Medical Precautions: Fall precautions      Pain:  Pain Assessment  Pain Assessment: 0-10  0-10 (Numeric) Pain Score: 10 - Worst possible pain  Pain Type: Acute pain  Pain Location: Back    Objective   Cognition:  Overall Cognitive Status: Within Functional Limits  Orientation Level: Oriented X4           Home Living:  Type of Home: House  Lives With: Significant  other (Fiance)  Home Adaptive Equipment: None  Home Layout: One level  Home Access: Stairs to enter with rails  Entrance Stairs-Rails: Both  Entrance Stairs-Number of Steps: 4+1  Bathroom Shower/Tub: Tub/shower unit, Walk-in shower  Bathroom Toilet: Handicapped height  Bathroom Equipment: None  Prior Function:  Level of [place]: Independent with ADLs and functional transfers, Independent with homemaking with ambulation  ADL Assistance: Independent  Homemaking Assistance: Independent  Ambulatory Assistance: Independent (No AD)  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[employer])  Prior Function Comments: Pt denies any recent falls, (+) drives.     ADL:  UE Dressing Assistance: Minimal  UE Dressing Deficit: Supervision/safety, Verbal cueing, Increased time to complete, Fasteners  LE Dressing Assistance: Maximal  LE Dressing Deficit: Don/doff R sock, Don/doff L sock  ADL Comments: Pt reports has been using AE for LB dressing at rehab.  Activity Tolerance:  Endurance: Tolerates 10 - 20 min exercise with multiple rests  Bed Mobility/Transfers: Bed Mobility  Bed Mobility: Yes  Bed Mobility 1  Bed Mobility 1: Supine to sitting  Level of Assistance 1: Close supervision  Bed Mobility Comments 1: HOB elevated, able to manage BLE/trunk.  Bed Mobility 2  Bed Mobility  2: Sitting to supine  Level of Assistance 2: Minimum assistance  Bed Mobility Comments 2: Min A for manage BLE into bed.    Transfers  Transfer: Yes (Pt reports has been working on STS transfers at rehab.)  Transfer 1  Technique 1: Sit to stand, Stand to sit  Transfer Device 1: Walker, Gait belt  Transfer Level of Assistance 1: Minimum assistance  Trials/Comments 1: Min A to elevate and steady with cues for proper hand placement and body positioning. Assist with controlled descent onto bed due to decrease eccentric control.      Functional Mobility:  Functional Mobility  Functional Mobility Performed: Yes  Functional Mobility 1  Device 1: Rolling  walker  Functional Mobility Support Devices: Gait belt  Assistance 1: Minimum assistance (x2)  Comments 1: Pt completed x short distance functional mobility ~5 ft with FWW, Min A x2 for steadying, sequencing, and walker management.  Sitting Balance:  Static Sitting Balance  Static Sitting-Balance Support: Feet supported  Static Sitting-Level of Assistance: Close supervision  Static Sitting-Comment/Number of Minutes: EOB  Dynamic Sitting Balance  Dynamic Sitting-Balance Support: Feet supported  Dynamic Sitting-Level of Assistance: Close supervision, Contact guard  Dynamic Sitting-Balance: Reaching for objects, Reaching across midline  Dynamic Sitting-Comments: EOB  Standing Balance:  Static Standing Balance  Static Standing-Balance Support: Bilateral upper extremity supported  Static Standing-Level of Assistance: Contact guard, Minimum assistance  Static Standing-Comment/Number of Minutes: FWW  Dynamic Standing Balance  Dynamic Standing-Balance Support: Bilateral upper extremity supported  Dynamic Standing-Level of Assistance: Minimum assistance (x1-2)  Dynamic Standing-Balance: Turning  Dynamic Standing-Comments: FWW   Sensation:  Sensation Comment: Pt reports N/T in feet and hands  Strength:  Strength Comments: Limited bilateral shoulder flexion  Perception:  Inattention/Neglect: Appears intact  Coordination:  Coordination Comment: Grossly impaired, did not functionally assess at this time.   Hand Function:  Gross Grasp: Functional  Coordination:  (Grossly functional)  Extremities: RUE   RUE : Exceptions to WFL  RUE Strength  RUE Overall Strength: Greater than or equal to 3/5 as evidenced by functional mobility and LUE   LUE: Exceptions to WFL  LUE Strength  LUE Overall Strength: Greater than or equal to 3/5 as evidenced by functional mobility    Outcome Measures:[facility] Daily Activity  Putting on and taking off regular lower body clothing: A lot  Bathing (including washing, rinsing, drying): A lot  Putting on and  taking off regular upper body clothing: A little  Toileting, which includes using toilet, bedpan or urinal: A lot  Taking care of personal grooming such as brushing teeth: A little  Eating Meals: A little  Daily Activity - Total Score: 15        Education Documentation  Body Mechanics, taught by Camille Metz OT at 8/8/2025 10:30 AM.  Learner: Patient  Readiness: Acceptance  Method: Explanation  Response: Verbalizes Understanding    Precautions, taught by Camille Metz OT at 8/8/2025 10:30 AM.  Learner: Patient  Readiness: Acceptance  Method: Explanation  Response: Verbalizes Understanding    ADL Training, taught by Camille Metz OT at 8/8/2025 10:30 AM.  Learner: Patient  Readiness: Acceptance  Method: Explanation  Response: Verbalizes Understanding    Education Comments  No comments found.        OP EDUCATION:       Goals:  Encounter Problems       Encounter Problems (Active)       ADLs       Patient will perform UB and LB sponge bathing with stand by assist level of assistance and use of adaptive techniques/equipment.       Start:  08/08/25    Expected End:  08/22/25            Patient with complete upper body dressing with modified independent level of assistance donning and doffing all UE clothes with PRN adaptive equipment while seated.       Start:  08/08/25    Expected End:  08/22/25            Patient with complete lower body dressing with stand by assist  level of assistance donning and doffing all LE clothes  with PRN adaptive equipment while seated.       Start:  08/08/25    Expected End:  08/22/25            Patient will complete daily grooming tasks with set-up level of assistance and PRN adaptive equipment while seated.       Start:  08/08/25    Expected End:  08/22/25            Patient will complete toileting including hygiene clothing management/hygiene with stand by assist level of assistance and raised toilet seat and grab bars.       Start:  08/08/25    Expected End:  08/22/25                MOBILITY       Patient will perform Functional mobility x Household distances/Community Distances with supervision level of assistance and least restrictive device in order to improve safety and functional mobility.       Start:  08/08/25    Expected End:  08/22/25               TRANSFERS       Patient will perform bed mobility modified independent level of assistance and bed rails in order to improve safety and independence with mobility       Start:  08/08/25    Expected End:  08/22/25            Patient will complete sit to stand transfer with supervision level of assistance and least restrictive device in order to improve safety and prepare for out of bed mobility.       Start:  08/08/25    Expected End:  08/22/25                                     [1]   Past Medical History:  Diagnosis Date    Hypertension

## 2025-08-08 NOTE — CARE PLAN
The patient's goals for the shift include cardiology consult completion.      The clinical goals for the shift include Remain HDS    Problem: Pain - Adult  Goal: Verbalizes/displays adequate comfort level or baseline comfort level  Outcome: Progressing     Problem: Safety - Adult  Goal: Free from fall injury  Outcome: Progressing     Problem: Discharge Planning  Goal: Discharge to home or other facility with appropriate resources  Outcome: Progressing     Problem: Chronic Conditions and Co-morbidities  Goal: Patient's chronic conditions and co-morbidity symptoms are monitored and maintained or improved  Outcome: Progressing     Problem: Nutrition  Goal: Nutrient intake appropriate for maintaining nutritional needs  Outcome: Progressing

## 2025-08-08 NOTE — CONSULTS
History Of Present Illness:    Outpatient cardiologist Dr. Banks:  Ayla Paredes is a 55 y.o. male with a past medical history of CT cardiac scoring 42(6/25), cardiomyopathy EF 45% on TTE 5/22, coronary CTA 12/23 revealed normal coronary anatomy w/o evidence of significant atherosclerotic changes or stenotic disease; mild atherosclerotic calcification in the proximal LCX with no significant stenosis; most recent TTE 7/25 revealed EF recovery  to now 65-70%, hypertension, diabetes mellitus type 2, CKD stage III, anxiety, depression.  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  Course c/b dysphagia, SIADH and aspiration PNA-he was managed in the ICU receiving hypertonic saline and IV antibiotic therapy.  When patient was stabilized, he was transferred to SNF. Patient presents to [facility] with assessment 8/7/2025 with c/o chest/abdominal pain during physical therapy. CT angio of the chest negative for pulmonary embolism.  Cardiology consulted for further evaluation of chest pain, HS trop 46/42.    Patient reports that he is here for further evaluation of sudden onset of abdominal straining/pulled muscle sensation during physical therapy.  He denies any symptoms of chest pain or dyspnea.  He states that he was at his skilled nursing facility undergoing PT when he was being worked a little harder and longer than usual and as a result experienced abdominal pulled muscle discomfort.  He denies any associated symptoms of chest pain, dyspnea, dizziness, lightheadedness, falls or syncope.  He was transferred to [facility] ED for further evaluation.  Currently, patient reports that he feels comfortable at rest, with no chest pain or abdominal discomfort.  He reports that at baseline in relation to his newly diagnosed [place] Barré syndrome, that he has numbness and tingling to his upper and lower extremities.  He reports that physical  therapy has helped him regain minimal strength.    Hospital course:  Initial EKG: Sinus rhythm, minimal voltage criteria for LVH, inferior infarct age undetermined, HR 73  Arrival vital signs: Afebrile 98.7, HR 72, /84, 100% on room air  Current vital signs: /66, HR 86, 97% on room air  Pertinent imaging/Labs: HS trop 46/42, , K3.6, CR 1.76/1.67, mag 2.50, COVID testing negative, CT angio of the chest negative for pulmonary embolism      Inpatient EKG 8/7/2025, Sinus rhythm, minimal voltage criteria for LVH, inferior infarct age undetermined, HR 73        Home CV meds:  Amlodipine 10 mg p.o. daily, atorvastatin 40 mg p.o. daily, chlorthalidone 37.5 mg p.o. daily, losartan 100 mg p.o. daily, metoprolol XL 50 mg p.o. daily, potassium chloride 10 mEq p.o. twice daily, sodium chloride at 1000 mg 0.5 tablets p.o. daily      All other systems reviewed and negative unless as mentioned in HPI.       Past Medical/ Surgical History:  CT cardiac scoring 42(6/25)  Cardiomyopathy EF 45% on TTE 5/22-EF recovery as of TTE 7/25 with EF 65 to 70%  Hypertension  Diabetes mellitus type II  CKD stage III  Anxiety  Depression  AIDP/[place] Barré syndrome (acute inflammatory demyelinating polyneuropathy) 7/25  SIADH (7/25)  Aspiration PNA (7/25)      Social History:  Denies smoking, alcohol or illicit drug use     Family History:  Reviewed, not pertinent to presenting problem    Past Cardiology Tests:  Echocardiogram 7/21/2025:  CONCLUSIONS:   1. The left ventricular systolic function is normal with a visually estimated ejection fraction of 65-70%.   2. Spectral Doppler shows a Grade I (impaired relaxation pattern) of left ventricular diastolic filling with normal left atrial filling pressure.   3. There is no evidence of left ventricular hypertrophy.   4. There is normal right ventricular global systolic function.   5. The pulmonary artery is not well visualized.    CT cardiac scoring 6/28/2025:  FINDINGS:  The score  "and distribution of calcium in the coronary arteries is as  follows:      LM: 0.  LAD: 0.  LCx: 33.  RCA: 9.      Total: 42.    Echocardiogram 3/12/2024:  CONCLUSIONS:   1. Left ventricular systolic function is normal with a 55-60% estimated ejection fraction.    Coronary CT angiogram with heart flow 2023:  Indication cardiomyopathy  IMPRESSION:  1.  Normal coronary anatomy without evidence of significant  atherosclerotic changes or stenotic disease.  2. Mild focus of atherosclerotic calcification in the proximal LCX  causing no significant stenosis.  3. Right dominant system.  4. Coronary artery calcium score 20.3.    Echocardiogram 5/3/2022  CONCLUSIONS:   1. The left ventricular systolic function is mildly decreased with a 45% estimated ejection fraction.   2. There is global hypokinesis of the left ventricle with minor regional variations.       Allergies:  Patient has no known allergies.    ROS:  10 point review of systems including (Constitutional, Eyes, ENMT, Respiratory, Cardiac, Gastrointestinal, Neurological, Psychiatric, and Hematologic) was performed and is otherwise negative.    Objective Data:  Last Recorded Vitals:  Vitals:    25 0425 25 0531 25 0800 25 0830   BP: 120/83 119/81 112/83 104/66   BP Location: Right arm Right arm Right arm Left arm   Patient Position: Lying Lying Lying Lying   Pulse: 72 79 69 86   Resp: 19  17 18   Temp: 36.7 °C (98.1 °F) 37 °C (98.6 °F) 36.9 °C (98.4 °F) 35.9 °C (96.7 °F)   TempSrc: Temporal Temporal Temporal Temporal   SpO2: 99% 99% 99% 97%   Weight: 82.9 kg (182 lb 12.2 oz)      Height: 1.651 m (5' 5\")        Medical Gas Therapy: None (Room air)  Weight  Av.9 kg (182 lb 12.2 oz)  Min: 82.9 kg (182 lb 12.2 oz)  Max: 82.9 kg (182 lb 12.2 oz)      LABS:  CMP:  Results from last 7 days   Lab Units 25  1620 25  1337   SODIUM mmol/L 134* 135*   POTASSIUM mmol/L 3.6 3.6   CHLORIDE mmol/L 98 96*   CO2 mmol/L 30 26   ANION GAP " "mmol/L 10 17   BUN mg/dL 31* 33*   CREATININE mg/dL 1.67* 1.76*   EGFR mL/min/1.73m*2 48* 45*   MAGNESIUM mg/dL  --  2.50*   ALBUMIN g/dL  --  4.1   ALT U/L  --  26   AST U/L  --  22   BILIRUBIN TOTAL mg/dL  --  0.7     CBC:  Results from last 7 days   Lab Units 08/07/25  1337 08/04/25  0410 08/02/25  0500   WBC AUTO x10*3/uL 5.9 7.3 12.7*   HEMOGLOBIN g/dL 9.9* 9.1* 8.1*   HEMATOCRIT % 30.3* 28.7* 24.6*   PLATELETS AUTO x10*3/uL 524* 510* 515*   MCV fL 96 98 95     COAG:   Results from last 7 days   Lab Units 08/07/25  1337   INR  1.1     ABO: No results found for: \"ABO\"  HEME/ENDO:  Results from last 7 days   Lab Units 08/07/25  1620   FERRITIN ng/mL 291   IRON SATURATION % 19*      CARDIAC:   Results from last 7 days   Lab Units 08/07/25  1444 08/07/25  1337   TROPHS ng/L 42* 46*             Last I/O:    Intake/Output Summary (Last 24 hours) at 8/8/2025 0907  Last data filed at 8/8/2025 0425  Gross per 24 hour   Intake --   Output 500 ml   Net -500 ml     Net IO Since Admission: -500 mL [08/08/25 0907]          Inpatient Medications:  Scheduled Medications[1]  PRN Medications[2]  Continuous Medications[3]  Outpatient Medications:  Current Outpatient Medications   Medication Instructions    amLODIPine (NORVASC) 10 mg, oral, Daily    atorvastatin (LIPITOR) 40 mg, oral, Daily    cetirizine (ZYRTEC) 10 mg, oral, Daily    chlorthalidone (HYGROTON) 37.5 mg, oral, Daily    Dexcom G7  misc Use as instructed    Dexcom G7 Sensor device Change every 10 days    ergocalciferol (VITAMIN D-2) 50,000 Units, oral, Once Weekly    fluticasone (Flonase) 50 mcg/actuation nasal spray 1 spray, Each Nostril, Daily, Shake gently. Before first use, prime pump. After use, clean tip and replace cap.    gabapentin (NEURONTIN) 300 mg, oral, 3 times daily    losartan (COZAAR) 100 mg, oral, Daily    metFORMIN XR (GLUCOPHAGE-XR) 500 mg, oral, 2 times daily (morning and late afternoon), Do not crush, chew, or split.    metoprolol " succinate XL (TOPROL-XL) 50 mg, oral, Daily, Do not crush or chew.    potassium chloride CR (Klor-Con) 10 mEq ER tablet 10 mEq, oral, 2 times daily, Do not crush, chew, or split.    sodium chloride 0.5 g, oral, Daily    tamsulosin (FLOMAX) 0.4 mg, oral, Daily       Physical Exam:  General: Aged appearing male, alert and oriented x 3, NAD  HEENT:  Pupils equal and reactive.  Normocephalic.  Moist mucosa.    Neck:  No thyromegaly.  Normal Jugular Venous Pressure.  Cardiovascular:  Regular rate and rhythm.  No cardiac murmurs noted.  Normal S1 and S2.  Pulmonary:  Clear to auscultation bilaterally.  No supplemental oxygen  Abdomen:  Soft. Non-tender.   Non-distended.  Positive bowel sounds.  Lower Extremities:  2+ pedal pulses. No LE edema.  Neurologic:  Cranial nerves intact.  No focal deficit.  Reports bilateral hands/arms and lower extremity numbness and tingling  Skin: Skin warm and dry, normal skin turgor.   Psychiatric: Flat affect     Assessment/Plan   Outpatient cardiologist Dr. Banks:  Ayla Paredes is a 55 y.o. male with a past medical history of CT cardiac scoring 42(6/25), cardiomyopathy EF 45% on TTE 5/22, coronary CTA 12/23 revealed normal coronary anatomy w/o evidence of significant atherosclerotic changes or stenotic disease; mild atherosclerotic calcification in the proximal LCX with no significant stenosis; most recent TTE 7/25 revealed EF recovery  to now 65-70%, hypertension, diabetes mellitus type 2, CKD stage III, anxiety, depression.  Recent admission 7/18/2025 with AIDP/[place] Barré syndrome (acute inflammatory demyelinating polyneuropathy)-followed by neurology.  Course c/b dysphagia, SIADH and aspiration PNA-he was managed in the ICU receiving hypertonic saline and IV antibiotic therapy.  When patient was stabilized, he was transferred to SNF. Patient [place] [facility] [place] 8/7/2025 with c/o chest/abdominal pain during physical therapy. CT angio of the chest negative for  pulmonary embolism.  Cardiology consulted for further evaluation of chest pain, HS trop 46/42.    Home CV meds:  Amlodipine 10 mg p.o. daily, atorvastatin 40 mg p.o. daily, chlorthalidone 37.5 mg p.o. daily, losartan 100 mg p.o. daily, metoprolol XL 50 mg p.o. daily, potassium chloride 10 mEq p.o. twice daily, sodium chloride at 1000 mg 0.5 tablets p.o. daily    I reviewed all EKGs, labs and imaging reports  Patient currently not on telemetry    1.  Chest pain.  Patient denies any occurrences of chest pain  - Mildly elevated troponins, flat trend.  No chest pain or new ischemic EKG changes; likely 2/2 CKD and autoimmune disorder. Non-MI troponin elevation/ acute non-traumatic myocardial injury- core measures do not apply.       2. CT cardiac scoring 42 (6/25)  On outpatient atorvastatin    3. Cardiomyopathy EF 45% on TTE 5/22-EF recovery as of TTE 7/25 with EF 65 to 70%    4. Hypertension.  Acceptable given current circumstances    Recommendations after discussion with cardiologist Dr. Hernandez:  No ACS symptomatology noted; troponins flat, EKG without any new ischemic changes  Recent echo with normal EF 65-70%, no WMA noted  Continue outpatient CV medication regimen  Cardiology follow-up with his outpatient cardiologist postdischarge  No further orders from a cardiac perspective  Cardiology will sign off  Please call with questions    Code Status:  Full Code    FERNANDO HernandezInpatient consult to Cardiology  Consult performed by: FERNANDO Hernandez  Consult ordered by: FERNANDO Rivero  Reason for consult: Chest pain  Assessment/Recommendations:   -Nonischemic myocardial injury: Mildly elevated HS troponin with flat trend, no ACS symptoms, no ischemic ECG changes; prior CTA chest negative for PE.  No ACS protocol; continue outpatient cardiac medications; monitor for recurrence of symptoms; follow-up with outpatient cardiology.  -Recovered nonischemic cardiomyopathy: Prior EF 45%  (5/22) with recovery to 65-70% on recent TTE; no wall motion abnormalities.  Continue current GDMT (losartan, metoprolol, atorvastatin); maintain BP control; monitor volume status.  -Hypertension: Current BP within acceptable range (104-115/66-84) on multidrug regimen.  Continue home regimen (amlodipine, chlorthalidone, losartan, metoprolol); monitor for hypotension during rehab.  -Type 2 diabetes mellitus. Chronic diagnosis; increased cardiovascular risk.  Continue current glycemic management per primary team; reinforce importance of glycemic control for cardiovascular protection.   -CKD stage III: Baseline Cr ~1.6-1.8 mg/dL; stable from prior. Avoid nephrotoxins; monitor renal function with ongoing diuretic and antihypertensive therapy; adjust medications as needed.  -AIDP/Guillain-Barré syndrome (recent) Recent ICU admission with dysphagia, SIADH, aspiration pneumonia; currently in SNF for rehab. Continue neurology-directed rehab; monitor for autonomic instability; ensure safe progression in PT to avoid musculoskeletal strain.  -Dyslipidemia Coronary calcium score 42; on atorvastatin. Continue atorvastatin 40 mg daily; reassess lipid profile outpatient.  -Anxiety/depression Chronic, noted on history.          ==========================  Attending note  ==========================  Both the YANDY and I have had a face to face encounter with the patient today. I have examined the patient and edited the documented physical examination as necessary.  I personally reviewed the patient's recent labs, medications, orders, EKGs, and pertinent cardiac imaging.  I have reviewed the YANDY's encounter note, approve the YANDY's documentation and have edited the note to reflect the diagnostic and therapeutic plan.    Cardiology was consulted for evaluation of reported chest discomfort in a 55-year-old male with history of recovered nonischemic cardiomyopathy (EF 65-70% on 7/25), hypertension, diabetes, CKD stage III, and recent  hospitalization for AIDP/Guillain-Barré complicated by SIADH and aspiration pneumonia. Patient’s symptoms occurred during physical therapy and were described as abdominal muscle strain rather than cardiac pain; he is now asymptomatic. CTA chest was negative for PE, high-sensitivity troponin showed a flat mild elevation (46-42) without ischemic ECG changes, and exam is benign. Findings are most consistent with nonischemic myocardial injury in the setting of CKD and recent systemic illness; no evidence for ACS.    Relevant data  -CTA Chest-No PE, no acute intrathoracic pathology-No further imaging indicated from cardiac standpoint.  -High-Sensitivity Troponin - 46-42, flat trend, mild elevation-Attributed to CKD/systemic illness; no ACS therapy indicated.  -ECG-Sinus rhythm, minimal voltage criteria for LVH, old inferior infarct pattern, no acute ST-T changes-Continue baseline monitoring only if new symptoms arise.  -TTE (7/25/25) EF 65-70%, no WMA. No acute intervention; continue GDMT  -Basic Metabolic Panel Na 134, K 3.6, Cr 1.76. Maintain electrolyte stability; monitor renal function with ongoing antihypertensive therapy.    Assessment and plan  -Nonischemic myocardial injury: Mildly elevated HS troponin with flat trend, no ACS symptoms, no ischemic ECG changes; prior CTA chest negative for PE.  No ACS protocol; continue outpatient cardiac medications; monitor for recurrence of symptoms; follow-up with outpatient cardiology.  -Recovered nonischemic cardiomyopathy: Prior EF 45% (5/22) with recovery to 65-70% on recent TTE; no wall motion abnormalities.  Continue current GDMT (losartan, metoprolol, atorvastatin); maintain BP control; monitor volume status.  -Hypertension: Current BP within acceptable range (104-115/66-84) on multidrug regimen.  Continue home regimen (amlodipine, chlorthalidone, losartan, metoprolol); monitor for hypotension during rehab.  -Type 2 diabetes mellitus. Chronic diagnosis; increased  cardiovascular risk.  Continue current glycemic management per primary team; reinforce importance of glycemic control for cardiovascular protection.   -CKD stage III: Baseline Cr ~1.6-1.8 mg/dL; stable from prior. Avoid nephrotoxins; monitor renal function with ongoing diuretic and antihypertensive therapy; adjust medications as needed.  -AIDP/Guillain-Barré syndrome (recent) Recent ICU admission with dysphagia, SIADH, aspiration pneumonia; currently in SNF for rehab. Continue neurology-directed rehab; monitor for autonomic instability; ensure safe progression in PT to avoid musculoskeletal strain.  -Dyslipidemia Coronary calcium score 42; on atorvastatin. Continue atorvastatin 40 mg daily; reassess lipid profile outpatient.  -Anxiety/depression Chronic, noted on history.    Gabriel Hernandez MD             [1]   Scheduled medications   Medication Dose Route Frequency    [Held by provider] amLODIPine  10 mg oral Daily    atorvastatin  40 mg oral Daily    [Held by provider] chlorthalidone  37.5 mg oral Daily    enoxaparin  40 mg subcutaneous q24h    gabapentin  300 mg oral TID    [Held by provider] losartan  100 mg oral Daily    [Held by provider] metFORMIN XR  500 mg oral BID    [Held by provider] metoprolol succinate XL  50 mg oral Daily    pantoprazole  40 mg oral Daily before breakfast    Or    pantoprazole  40 mg intravenous Daily before breakfast    potassium chloride CR  10 mEq oral BID    sodium chloride  500 mg oral Daily    tamsulosin  0.4 mg oral Daily   [2]   PRN medications   Medication    acetaminophen    Or    acetaminophen    Or    acetaminophen    dextrose    dextrose    glucagon    glucagon   [3]   Continuous Medications   Medication Dose Last Rate

## 2025-08-08 NOTE — PROGRESS NOTES
08/08/25 1018   Discharge Planning   Living Arrangements Spouse/significant other   Support Systems Spouse/significant other   Type of Residence Private residence   Home or Post Acute Services Post acute facilities (Rehab/SNF/etc)   Type of Post Acute Facility Services Rehab   Expected Discharge Disposition Rehab   Does the patient need discharge transport arranged? Yes   [place] coordination needed? Yes   Financial Resource Strain   How hard is it for you to pay for the very basics like food, housing, medical care, and heating? Not hard   Housing Stability   In the last 12 months, was there a time when you were not able to pay the mortgage or rent on time? N   At any time in the past 12 months, were you homeless or living in a shelter (including now)? N   Transportation Needs   In the past 12 months, has lack of transportation kept you from medical appointments or from getting medications? no   In the past 12 months, has lack of transportation kept you from meetings, work, or from getting things needed for daily living? No   Patient Choice   Patient / Family choosing to utilize agency / facility established prior to hospitalization Yes  (return to [facility])     Met with patient and wife at bedside to discuss discharge plans.  Patient does want to return to [facility].  [facility] is able to accept back--patient will need new auth--facility will start.  Will need pt/ot notes in order to start auth.      [id number] PT/OT notes attached and sent to [facility] in [facility].  Asked for them to start auth.

## 2025-08-08 NOTE — CARE PLAN
Problem: Pain - Adult  Goal: Verbalizes/displays adequate comfort level or baseline comfort level  Outcome: Progressing     Problem: Safety - Adult  Goal: Free from fall injury  Outcome: Progressing     Problem: Discharge Planning  Goal: Discharge to home or other facility with appropriate resources  Outcome: Progressing     Problem: Chronic Conditions and Co-morbidities  Goal: Patient's chronic conditions and co-morbidity symptoms are monitored and maintained or improved  Outcome: Progressing     Problem: Nutrition  Goal: Nutrient intake appropriate for maintaining nutritional needs  Outcome: Progressing     Problem: Skin  Goal: Decreased wound size/increased tissue granulation at next dressing change  Outcome: Progressing  Goal: Participates in plan/prevention/treatment measures  Outcome: Progressing  Goal: Prevent/manage excess moisture  Outcome: Progressing  Goal: Prevent/minimize sheer/friction injuries  Outcome: Progressing  Goal: Promote/optimize nutrition  Outcome: Progressing  Goal: Promote skin healing  Outcome: Progressing   The patient's goals for the shift include      The clinical goals for the shift include Pt will remain HDS throughout this shift.    Over the shift, the patient did not make progress toward the following goals. Barriers to progression include cardiomegaly. Recommendations to address these barriers include ECHO.

## 2025-08-08 NOTE — PROGRESS NOTES
"Ayla Paredes is a 55 y.o. male on day 0 of admission presenting with Chest pain.    Subjective   NPO after midnight for possible stress test  Cardiology evaluated patient and discussed reasons no further testing needed   Cleared to return back to acute rehab  Updated therapy notes as requested by acute rehab needs new auth started     Objective     Physical Exam  Constitutional:       Appearance: Normal appearance.      Comments: C/o of feeling stiff and wanting to get back to rehab as soon as possible    Pulmonary:      Effort: Pulmonary effort is normal.     Skin:     General: Skin is warm and dry.     Neurological:      Mental Status: He is alert and oriented to person, place, and time.      Motor: Weakness present.         Last Recorded Vitals  Blood pressure 121/83, pulse 73, temperature 36.9 °C (98.4 °F), temperature source Temporal, resp. rate 17, height 1.651 m (5' 5\"), weight 82.9 kg (182 lb 12.2 oz), SpO2 95%.  Intake/Output last 3 Shifts:  I/O last 3 completed shifts:  In: - (0 mL/kg)   Out: 500 (6 mL/kg) [Urine:500 (0.2 mL/kg/hr)]  Weight: 82.9 kg     Relevant Results    Results for orders placed or performed during the hospital encounter of 08/07/25 (from the past 24 hours)   POCT GLUCOSE   Result Value Ref Range    POCT Glucose 92 74 - 99 mg/dL   Sars-CoV-2 RT PCR, Symptomatic   Result Value Ref Range    Coronavirus 2019, PCR Not Detected Not Detected   POCT GLUCOSE   Result Value Ref Range    POCT Glucose 91 74 - 99 mg/dL   Vitamin B12   Result Value Ref Range    Vitamin B12 684 211 - 911 pg/mL   Folate   Result Value Ref Range    Folate, Serum 8.5 >5.0 ng/mL   CBC   Result Value Ref Range    WBC 4.9 4.4 - 11.3 x10*3/uL    nRBC 0.0 0.0 - 0.0 /100 WBCs    RBC 2.97 (L) 4.50 - 5.90 x10*6/uL    Hemoglobin 9.2 (L) 13.5 - 17.5 g/dL    Hematocrit 28.1 (L) 41.0 - 52.0 %    MCV 95 80 - 100 fL    MCH 31.0 26.0 - 34.0 pg    MCHC 32.7 32.0 - 36.0 g/dL    RDW 19.7 (H) 11.5 - 14.5 %    Platelets 528 (H) 150 - " 450 x10*3/uL   Basic metabolic panel   Result Value Ref Range    Glucose 94 74 - 99 mg/dL    Sodium 136 136 - 145 mmol/L    Potassium 3.4 (L) 3.5 - 5.3 mmol/L    Chloride 98 98 - 107 mmol/L    Bicarbonate 26 21 - 32 mmol/L    Anion Gap 15 10 - 20 mmol/L    Urea Nitrogen 27 (H) 6 - 23 mg/dL    Creatinine 1.51 (H) 0.50 - 1.30 mg/dL    eGFR 54 (L) >60 mL/min/1.73m*2    Calcium 9.5 8.6 - 10.3 mg/dL   Troponin I, High Sensitivity   Result Value Ref Range    Troponin I, High Sensitivity 47 (H) 0 - 20 ng/L   POCT GLUCOSE   Result Value Ref Range    POCT Glucose 113 (H) 74 - 99 mg/dL   POCT GLUCOSE   Result Value Ref Range    POCT Glucose 134 (H) 74 - 99 mg/dL          Assessment & Plan      Exertional chest pain  Trop 46->46 (trending down from 52->51 on prior admission)  CTA negative for PE  EKG with no acute ischemic changes  Monitor   Cardiology consulted and ok to go back to acute reb no further testing      HTN  Resume norvasc and metoprolol and hold losartan & clorthalidone and add back with RENATO when able      HLD  Continue statin      RENATO on CKD  Likely prerenal   Follow labs   Hold nephrotoxic agents      Normocytic Anemia  H/h significantly lower than baseline  iron studies, b12, folate  No signs of overt bleeding        Recent admission   AIDP s/p IVIG  Dysphagia, patient states he is on a soft diet, aspiration precautions   Aspiration pneumonia, complete course of atb    Hyponatremia, etiology c/f SIADH, which may occur secondary to dysautonomia seen with AIDP. Continue salt tabs     DM type II  Accuchecks ACHS, monitor need for SSI, hypoglycemia protocol      GI/VTE PPX: PPI, SQ lovenox       Disposition: Discharge back to acute reb when auth completed      From [facility] > to return     UGO Pool-CNP  Discussed with Dr. Rosenthal

## 2025-08-09 VITALS
TEMPERATURE: 98.1 F | SYSTOLIC BLOOD PRESSURE: 107 MMHG | DIASTOLIC BLOOD PRESSURE: 74 MMHG | OXYGEN SATURATION: 96 % | HEART RATE: 78 BPM | HEIGHT: 65 IN | RESPIRATION RATE: 18 BRPM | BODY MASS INDEX: 30.45 KG/M2 | WEIGHT: 182.76 LBS

## 2025-08-09 PROBLEM — R07.1 CHEST PAIN ON BREATHING: Status: RESOLVED | Noted: 2025-08-07 | Resolved: 2025-08-09

## 2025-08-09 LAB
ALBUMIN SERPL BCP-MCNC: 3.5 G/DL (ref 3.4–5)
ALP SERPL-CCNC: 78 U/L (ref 33–120)
ALT SERPL W P-5'-P-CCNC: 20 U/L (ref 10–52)
ANION GAP SERPL CALC-SCNC: 14 MMOL/L (ref 10–20)
AST SERPL W P-5'-P-CCNC: 18 U/L (ref 9–39)
BILIRUB SERPL-MCNC: 0.4 MG/DL (ref 0–1.2)
BUN SERPL-MCNC: 25 MG/DL (ref 6–23)
CALCIUM SERPL-MCNC: 9 MG/DL (ref 8.6–10.3)
CHLORIDE SERPL-SCNC: 99 MMOL/L (ref 98–107)
CO2 SERPL-SCNC: 26 MMOL/L (ref 21–32)
CREAT SERPL-MCNC: 1.56 MG/DL (ref 0.5–1.3)
EGFRCR SERPLBLD CKD-EPI 2021: 52 ML/MIN/1.73M*2
ERYTHROCYTE [DISTWIDTH] IN BLOOD BY AUTOMATED COUNT: 19.4 % (ref 11.5–14.5)
GLUCOSE BLD MANUAL STRIP-MCNC: 129 MG/DL (ref 74–99)
GLUCOSE BLD MANUAL STRIP-MCNC: 143 MG/DL (ref 74–99)
GLUCOSE BLD MANUAL STRIP-MCNC: 155 MG/DL (ref 74–99)
GLUCOSE SERPL-MCNC: 111 MG/DL (ref 74–99)
HCT VFR BLD AUTO: 25 % (ref 41–52)
HGB BLD-MCNC: 8.3 G/DL (ref 13.5–17.5)
MCH RBC QN AUTO: 31 PG (ref 26–34)
MCHC RBC AUTO-ENTMCNC: 33.2 G/DL (ref 32–36)
MCV RBC AUTO: 93 FL (ref 80–100)
NRBC BLD-RTO: 0 /100 WBCS (ref 0–0)
PLATELET # BLD AUTO: 506 X10*3/UL (ref 150–450)
POTASSIUM SERPL-SCNC: 3.2 MMOL/L (ref 3.5–5.3)
PROT SERPL-MCNC: 7.3 G/DL (ref 6.4–8.2)
RBC # BLD AUTO: 2.68 X10*6/UL (ref 4.5–5.9)
SODIUM SERPL-SCNC: 136 MMOL/L (ref 136–145)
WBC # BLD AUTO: 5 X10*3/UL (ref 4.4–11.3)

## 2025-08-09 PROCEDURE — 85027 COMPLETE CBC AUTOMATED: CPT

## 2025-08-09 PROCEDURE — 99239 HOSP IP/OBS DSCHRG MGMT >30: CPT | Performed by: INTERNAL MEDICINE

## 2025-08-09 PROCEDURE — 2500000001 HC RX 250 WO HCPCS SELF ADMINISTERED DRUGS (ALT 637 FOR MEDICARE OP): Performed by: NURSE PRACTITIONER

## 2025-08-09 PROCEDURE — 80053 COMPREHEN METABOLIC PANEL: CPT

## 2025-08-09 PROCEDURE — 2500000002 HC RX 250 W HCPCS SELF ADMINISTERED DRUGS (ALT 637 FOR MEDICARE OP, ALT 636 FOR OP/ED): Performed by: INTERNAL MEDICINE

## 2025-08-09 PROCEDURE — 99222 1ST HOSP IP/OBS MODERATE 55: CPT | Performed by: PHYSICAL MEDICINE & REHABILITATION

## 2025-08-09 PROCEDURE — 36415 COLL VENOUS BLD VENIPUNCTURE: CPT

## 2025-08-09 PROCEDURE — G0378 HOSPITAL OBSERVATION PER HR: HCPCS

## 2025-08-09 PROCEDURE — 82947 ASSAY GLUCOSE BLOOD QUANT: CPT

## 2025-08-09 RX ORDER — POTASSIUM CHLORIDE 20 MEQ/1
20 TABLET, EXTENDED RELEASE ORAL ONCE
Status: COMPLETED | OUTPATIENT
Start: 2025-08-09 | End: 2025-08-09

## 2025-08-09 RX ORDER — POTASSIUM CHLORIDE 20 MEQ/1
40 TABLET, EXTENDED RELEASE ORAL ONCE
Status: CANCELLED | OUTPATIENT
Start: 2025-08-09 | End: 2025-08-09

## 2025-08-09 RX ADMIN — ACETAMINOPHEN 650 MG: 325 TABLET ORAL at 09:56

## 2025-08-09 RX ADMIN — POTASSIUM CHLORIDE EXTENDED-RELEASE 20 MEQ: 1500 TABLET ORAL at 08:41

## 2025-08-09 RX ADMIN — AMLODIPINE BESYLATE 10 MG: 10 TABLET ORAL at 08:40

## 2025-08-09 RX ADMIN — METOPROLOL SUCCINATE 50 MG: 50 TABLET, EXTENDED RELEASE ORAL at 08:40

## 2025-08-09 RX ADMIN — SODIUM CHLORIDE 0.5 G: 1 TABLET ORAL at 08:40

## 2025-08-09 ASSESSMENT — COGNITIVE AND FUNCTIONAL STATUS - GENERAL
STANDING UP FROM CHAIR USING ARMS: A LITTLE
WALKING IN HOSPITAL ROOM: A LOT
CLIMB 3 TO 5 STEPS WITH RAILING: A LOT
DAILY ACTIVITIY SCORE: 21
DRESSING REGULAR LOWER BODY CLOTHING: A LITTLE
MOBILITY SCORE: 18
HELP NEEDED FOR BATHING: A LITTLE
TOILETING: A LITTLE
MOVING TO AND FROM BED TO CHAIR: A LITTLE

## 2025-08-09 ASSESSMENT — PAIN SCALES - GENERAL: PAINLEVEL_OUTOF10: 8

## 2025-08-09 ASSESSMENT — PAIN - FUNCTIONAL ASSESSMENT: PAIN_FUNCTIONAL_ASSESSMENT: 0-10

## 2025-08-09 ASSESSMENT — PAIN DESCRIPTION - LOCATION: LOCATION: HEAD

## 2025-08-09 NOTE — PROGRESS NOTES
08/09/25 1340   Discharge Planning   Support Systems Spouse/significant other  (notified about transport )   Expected Discharge Disposition Rehab  ([facility]; report [phone]; [phone])   Does the patient need discharge transport arranged? Yes   [facility] coordination needed? Yes   Has discharge transport been arranged? Yes   What day is the transport expected? 08/09/25   What time is the transport expected? 7758

## 2025-08-09 NOTE — DISCHARGE SUMMARY
DISCHARGE SUMMARY    Admitting Provider: Timi Rosenthal MD  Discharge Provider: Timi Rosenthal MD  Primary Care Physician: Prudence Clayton MD PhD [phone]  Cardiologist: Gabriel Hernandez MD     Admission Date: 8/7/2025       Discharge Date: 8/9/2025  Discharge Disposition: Inpatient Rehab Facility (IRF)  Code Status at Discharge: Full Code    Present on Admission:   Chest pain       Hospital Course   Ayla Paredes is a 55 y.o. year old male, with a PMH of HTN, HLD, cardiomyopathy with recovery, CKD, DM type, who presented to [facility] ED on 8/7/2025 for chest Pian . Patient was admitted under observation status on 8/7/2025 for further workup. Hospital course as below.  Labs notable for Cr./BUN 1.76/33, h/h 9.9/30.3, platelets 524, d dimer 1987, trop 46->42>47  EKG NSR HR 64, no acute ischemic changes  CXR with no acute findings  CTA chest Negative for pulmonary embolism or thoracic aortic dissection.   Mild cardiac enlargement without pericardial effusion is stable.  Linear bands of atelectasis in the lower lobes.   0.5 cm subpleural nodule right middle lobe is unchanged.   Patient did receive IVF in ED for low BP.  Prior EF 45% (5/22) with recovery to 65-70% on recent TTE; Spectral Doppler shows a Grade I (impaired relaxation pattern) of left ventricular diastolic filling with normal left atrial filling pressure. There is no evidence of left ventricular hypertrophy. There is normal right ventricular global systolic function.    Patent's BP was in lower limit during hospital stay, Losartan was held for same Patient should be monitored for hypotension while on medications.  Cardiology saw the Patient. Per Cardiology Patient has nonischemic myocardial injury. Patient is recommended to continue his current home mediations.  Patient to be monitored for recurrent symptoms follow up outpatient cardiology. Labs in one week from discharge.   Patient also had RENATO on CKD, baseline Creatinine 1.6-1.8.  Creatinine on discharge 1.76-> 1.56 Patient to avoid nephrotoxins, monitor renal function and adjust medication per Provider as needed.   Patient also had hypokalemia with K of 3.2, Potassium on discharge was replaced with 20mg of PO Potassium.     Patient was discharged to Acute Rehab in stable condition with planned outpatient FU with PCP in one week of discharge from facility, Cardiology and Nephrology appointment outpatient.    Objective    Discharge Vitals  Heart Rate: 78  Resp: 18  BP: 107/74  Temp: 36.7 °C (98.1 °F)  Weight: 82.9 kg (182 lb 12.2 oz)    Physical Exam  Vitals and nursing notes reviewed.  GENERAL: Alert and awake, cooperative; in no acute distress  SKIN: Warm and dry, cap refill <2  HEENT: Normocephalic, PEERL, mucous membranes pink and moist  NECK: No JVD or hepatojugular reflex  CARDIAC: Regular rate and rhythm, S1S2, no murmurs or abnormal heart sounds  CHEST: Normal respiratory effort, no abnormal breath sounds  ABDOMEN: Soft, non-distended, non-tender with palpation  EXTREMITIES: No lower extremity edema, normal pulses all 4 extremities  NEURO: Alert and oriented, mental status at baseline, no focal deficits  PSYCH: Behavior and affect as expected     Issues Requiring Follow-up:   Test Results Pending at Discharge  Pending Labs       No current pending labs.          Discharge Medication List     Medication List      CONTINUE taking these medications     amLODIPine 10 mg tablet; Commonly known as: Norvasc; Take 1 tablet (10   mg) by mouth once daily.   atorvastatin 40 mg tablet; Commonly known as: Lipitor; Take 1 tablet (40   mg) by mouth once daily.   cetirizine 10 mg tablet; Commonly known as: ZyrTEC; Take 1 tablet (10   mg) by mouth once daily.   chlorthalidone 25 mg tablet; Commonly known as: [place]; Take 1.5   tablets (37.5 mg) by mouth once daily.   Dexcom G7  misc; Generic drug: blood-glucose,,cont; Use   as instructed   Dexcom G7 Sensor device; Generic drug:  blood-glucose sensor; Change   every 10 days   ergocalciferol 1250 mcg (50,000 units) capsule; Commonly known as:   Vitamin D-2; TAKE 1 CAPSULE (14703 UNITS) BY MOUTH ONE TIME PER WEEK   fluticasone 50 mcg/actuation nasal spray; Commonly known as: Flonase;   Administer 1 spray into each nostril once daily. Shake gently. Before   first use, prime pump. After use, clean tip and replace cap.   gabapentin 300 mg capsule; Commonly known as: Neurontin; Take 1 capsule   (300 mg) by mouth 3 times a day.   losartan 100 mg tablet; Commonly known as: Cozaar; TAKE 1 TABLET BY   MOUTH EVERY DAY   metFORMIN  mg 24 hr tablet; Commonly known as: Glucophage-XR; Take   1 tablet (500 mg) by mouth 2 times daily (morning and late afternoon). Do   not crush, chew, or split.   metoprolol succinate XL 50 mg 24 hr tablet; Commonly known as:   Toprol-XL; Take 1 tablet (50 mg) by mouth once daily. Do not crush or   chew.   potassium chloride CR 10 mEq ER tablet; Commonly known as: Klor-Con;   Take 1 tablet (10 mEq) by mouth 2 times a day. Do not crush, chew, or   split.   sodium chloride 1,000 mg tablet; Take 0.5 tablets (0.5 g) by mouth once   daily.   tamsulosin 0.4 mg 24 hr capsule; Commonly known as: Flomax; Take 1   capsule (0.4 mg) by mouth once daily.         Outpatient Follow-Up  Future Appointments   Date Time Provider Department Center   8/28/2025  1:20 PM Diane Bush MD [id number]     - Patient instructed to FU with PCP in 1 week and Cardiology in 2-3 weeks, referral order(s) placed    Chart, medical history, and labs/testing reviewed in detail.   Plan of care discussed and discharge plan agreed upon with staff attending, Dr. Rosenthal.     UGO Stubbs-CNP   Observation/Internal Med [facility]  [facility]  08/09/25 12:48 PM    On day of discharge, vital signs were stable and no acute distress was noted. All questions were answered and much support was given. Patient/family verbalized  understanding. After VS and labs were reviewed, it was determined the patient was stable for discharge. Hospital day of discharge management- spent >30 minutes coordinating the discharge and counseling/educating patient and family regarding discharge instructions.

## 2025-08-09 NOTE — CARE PLAN
The patient's goals for the shift include Pt will have no new symptoms throughout this shift.    The clinical goals for the shift include maintain pain and free of injury    Problem: Safety - Adult  Goal: Free from fall injury  Outcome: Progressing     Problem: Pain - Adult  Goal: Verbalizes/displays adequate comfort level or baseline comfort level  Outcome: Progressing     Problem: Chronic Conditions and Co-morbidities  Goal: Patient's chronic conditions and co-morbidity symptoms are monitored and maintained or improved  Outcome: Progressing     Problem: Skin  Goal: Decreased wound size/increased tissue granulation at next dressing change  Outcome: Progressing     Problem: Skin  Goal: Prevent/minimize sheer/friction injuries  Outcome: Progressing     Problem: Skin  Goal: Prevent/manage excess moisture  Outcome: Progressing

## 2025-08-09 NOTE — DISCHARGE INSTRUCTIONS
Please monitor Blood pressure and monitor for hypotension  while in the Rehab facility  Patient to continue Blood pressure medications  that he was on prior to admission  Please follow up with Nephrology on 8/28 appointment is scheduled  Follow up with outpatient cardiology referral placed  Please follow up with PCP in one week of discharge from Rehab facility  Please get cbc and bmp in one week from discharge

## 2025-08-09 NOTE — HOSPITAL COURSE
Continue current GDMT (losartan, metoprolol, atorvastatin); maintain BP control; monitor volume status.  -Hypertension: Current BP within acceptable range (104-115/66-84) on multidrug regimen.  Continue home regimen (amlodipine, chlorthalidone, losartan, metoprolol); monitor for hypotension during rehab.  -Type 2 diabetes mellitus. Chronic diagnosis; increased cardiovascular risk.  Continue current glycemic management per primary team; reinforce importance of glycemic control for cardiovascular protection.   -CKD stage III: Baseline Cr ~1.6-1.8 mg/dL; stable from prior. Avoid nephrotoxins; monitor renal function with ongoing diuretic and antihypertensive therapy; adjust medications as needed.  -AIDP/Guillain-Barré syndrome (recent) Recent ICU admission with dysphagia, SIADH, aspiration pneumonia; currently in SNF for rehab. Continue neurology-directed rehab; monitor for autonomic instability; ensure safe progression in PT to avoid musculoskeletal strain.  -Dyslipidemia Coronary calcium score 42; on atorvastatin. Continue atorvastatin 40 mg daily; reassess lipid profile outpatient.

## 2025-08-09 NOTE — CARE PLAN
The patient's goals for the shift include will like to get better and go back to rehab.    The clinical goals for the shift include keep patient safe and free from falls.      Problem: Safety - Adult  Goal: Free from fall injury  Outcome: Progressing

## 2025-08-10 ENCOUNTER — LAB REQUISITION (OUTPATIENT)
Dept: LAB | Facility: HOSPITAL | Age: 55
End: 2025-08-10
Payer: COMMERCIAL

## 2025-08-10 LAB
ANION GAP SERPL CALC-SCNC: 16 MMOL/L (ref 10–20)
BUN SERPL-MCNC: 21 MG/DL (ref 6–23)
CALCIUM SERPL-MCNC: 9.2 MG/DL (ref 8.6–10.3)
CHLORIDE SERPL-SCNC: 100 MMOL/L (ref 98–107)
CO2 SERPL-SCNC: 25 MMOL/L (ref 21–32)
CREAT SERPL-MCNC: 1.39 MG/DL (ref 0.5–1.3)
EGFRCR SERPLBLD CKD-EPI 2021: 60 ML/MIN/1.73M*2
ERYTHROCYTE [DISTWIDTH] IN BLOOD BY AUTOMATED COUNT: 19.4 % (ref 11.5–14.5)
GLUCOSE SERPL-MCNC: 92 MG/DL (ref 74–99)
HCT VFR BLD AUTO: 28.3 % (ref 41–52)
HGB BLD-MCNC: 9 G/DL (ref 13.5–17.5)
MCH RBC QN AUTO: 30.3 PG (ref 26–34)
MCHC RBC AUTO-ENTMCNC: 31.8 G/DL (ref 32–36)
MCV RBC AUTO: 95 FL (ref 80–100)
NRBC BLD-RTO: 0 /100 WBCS (ref 0–0)
PLATELET # BLD AUTO: 532 X10*3/UL (ref 150–450)
POTASSIUM SERPL-SCNC: 3.9 MMOL/L (ref 3.5–5.3)
RBC # BLD AUTO: 2.97 X10*6/UL (ref 4.5–5.9)
SODIUM SERPL-SCNC: 137 MMOL/L (ref 136–145)
WBC # BLD AUTO: 5 X10*3/UL (ref 4.4–11.3)

## 2025-08-10 PROCEDURE — 80048 BASIC METABOLIC PNL TOTAL CA: CPT

## 2025-08-10 PROCEDURE — 85027 COMPLETE CBC AUTOMATED: CPT

## 2025-08-11 LAB
FUNGUS SPEC CULT: NORMAL
FUNGUS SPEC FUNGUS STN: NORMAL

## 2025-08-15 ENCOUNTER — LAB REQUISITION (OUTPATIENT)
Dept: LAB | Facility: HOSPITAL | Age: 55
End: 2025-08-15
Payer: COMMERCIAL

## 2025-08-15 LAB
ANION GAP SERPL CALC-SCNC: 16 MMOL/L (ref 10–20)
BUN SERPL-MCNC: 16 MG/DL (ref 6–23)
CALCIUM SERPL-MCNC: 9.2 MG/DL (ref 8.6–10.3)
CHLORIDE SERPL-SCNC: 107 MMOL/L (ref 98–107)
CO2 SERPL-SCNC: 22 MMOL/L (ref 21–32)
CREAT SERPL-MCNC: 1.21 MG/DL (ref 0.5–1.3)
EGFRCR SERPLBLD CKD-EPI 2021: 71 ML/MIN/1.73M*2
ERYTHROCYTE [DISTWIDTH] IN BLOOD BY AUTOMATED COUNT: 17.7 % (ref 11.5–14.5)
GLUCOSE SERPL-MCNC: 64 MG/DL (ref 74–99)
HCT VFR BLD AUTO: 30.3 % (ref 41–52)
HGB BLD-MCNC: 9.2 G/DL (ref 13.5–17.5)
MCH RBC QN AUTO: 30.3 PG (ref 26–34)
MCHC RBC AUTO-ENTMCNC: 30.4 G/DL (ref 32–36)
MCV RBC AUTO: 100 FL (ref 80–100)
NRBC BLD-RTO: 0 /100 WBCS (ref 0–0)
PLATELET # BLD AUTO: 518 X10*3/UL (ref 150–450)
POTASSIUM SERPL-SCNC: 4.7 MMOL/L (ref 3.5–5.3)
RBC # BLD AUTO: 3.04 X10*6/UL (ref 4.5–5.9)
SODIUM SERPL-SCNC: 140 MMOL/L (ref 136–145)
WBC # BLD AUTO: 5.9 X10*3/UL (ref 4.4–11.3)

## 2025-08-15 PROCEDURE — 85027 COMPLETE CBC AUTOMATED: CPT

## 2025-08-15 PROCEDURE — 80048 BASIC METABOLIC PNL TOTAL CA: CPT

## 2025-08-22 ENCOUNTER — LAB REQUISITION (OUTPATIENT)
Dept: LAB | Facility: HOSPITAL | Age: 55
End: 2025-08-22
Payer: COMMERCIAL

## 2025-08-22 ENCOUNTER — DOCUMENTATION (OUTPATIENT)
Dept: HOME HEALTH SERVICES | Facility: HOME HEALTH | Age: 55
End: 2025-08-22
Payer: COMMERCIAL

## 2025-08-22 LAB
ANION GAP SERPL CALC-SCNC: 15 MMOL/L (ref 10–20)
BUN SERPL-MCNC: 17 MG/DL (ref 6–23)
CALCIUM SERPL-MCNC: 9.1 MG/DL (ref 8.6–10.3)
CHLORIDE SERPL-SCNC: 105 MMOL/L (ref 98–107)
CO2 SERPL-SCNC: 24 MMOL/L (ref 21–32)
CREAT SERPL-MCNC: 1.24 MG/DL (ref 0.5–1.3)
EGFRCR SERPLBLD CKD-EPI 2021: 69 ML/MIN/1.73M*2
ERYTHROCYTE [DISTWIDTH] IN BLOOD BY AUTOMATED COUNT: 15.9 % (ref 11.5–14.5)
GLUCOSE SERPL-MCNC: 83 MG/DL (ref 74–99)
HCT VFR BLD AUTO: 31.4 % (ref 41–52)
HGB BLD-MCNC: 9.8 G/DL (ref 13.5–17.5)
MCH RBC QN AUTO: 30.2 PG (ref 26–34)
MCHC RBC AUTO-ENTMCNC: 31.2 G/DL (ref 32–36)
MCV RBC AUTO: 97 FL (ref 80–100)
NRBC BLD-RTO: 0 /100 WBCS (ref 0–0)
PLATELET # BLD AUTO: 399 X10*3/UL (ref 150–450)
POTASSIUM SERPL-SCNC: 4.3 MMOL/L (ref 3.5–5.3)
RBC # BLD AUTO: 3.24 X10*6/UL (ref 4.5–5.9)
SODIUM SERPL-SCNC: 140 MMOL/L (ref 136–145)
WBC # BLD AUTO: 5.9 X10*3/UL (ref 4.4–11.3)

## 2025-08-22 PROCEDURE — 80048 BASIC METABOLIC PNL TOTAL CA: CPT

## 2025-08-22 PROCEDURE — 85027 COMPLETE CBC AUTOMATED: CPT

## 2025-08-24 ENCOUNTER — HOME CARE VISIT (OUTPATIENT)
Dept: HOME HEALTH SERVICES | Facility: HOME HEALTH | Age: 55
End: 2025-08-24
Payer: COMMERCIAL

## 2025-08-24 VITALS
RESPIRATION RATE: 16 BRPM | TEMPERATURE: 97.6 F | HEART RATE: 73 BPM | DIASTOLIC BLOOD PRESSURE: 86 MMHG | SYSTOLIC BLOOD PRESSURE: 124 MMHG

## 2025-08-24 PROCEDURE — G0151 HHCP-SERV OF PT,EA 15 MIN: HCPCS

## 2025-08-24 ASSESSMENT — BALANCE ASSESSMENTS
SITTING DOWN: 1 - USES ARMS OR NOT SMOOTH MOTION
ATTEMPTS TO ARISE: 2 - ABLE TO RISE, ONE ATTEMPT
BALANCE SCORE: 11
SITTING BALANCE: 1 - STEADY, SAFE
TURNING 360 DEGREES STEPS: 0 - DISCONTINUOUS STEPS
ARISES: 1 - ABLE, USES ARMS TO HELP
STANDING BALANCE: 1 - STEADY BUT WIDE STANCE AND USES CANE OR OTHER SUPPORT
IMMEDIATE STANDING BALANCE FIRST 5 SECONDS: 2 - STEADY WITHOUT WALKER OR OTHER SUPPORT
NUDGED SCORE: 2
EYES CLOSED AT MAXIMUM POSITION NUDGED: 1 - STEADY
NUDGED: 2 - STEADY
ARISING SCORE: 1

## 2025-08-24 ASSESSMENT — GAIT ASSESSMENTS
TRUNK: 1 - NO SWAY BUT FLEXION OF KNEES OR BACK OR SPREADS ARMS WHILE WALKING
STEP SYMMETRY: 1 - RIGHT AND LEFT STEP LENGTH APPEAR EQUAL
WALKING STANCE: 0 - HEELS APART
TRUNK SCORE: 1
INITIATION OF GAIT IMMEDIATELY AFTER GO: 0 - ANY HESITANCY OR MULTIPLE ATTEMPTS TO START
GAIT SCORE: 5
BALANCE AND GAIT SCORE: 16
PATH: 1 - MILD/MODERATE DEVIATION OR USES WALKING AID
STEP CONTINUITY: 0 - STOPPING OR DISCONTINUITY BETWEEN STEPS
PATH SCORE: 1

## 2025-08-24 ASSESSMENT — ENCOUNTER SYMPTOMS
AGGRESSION WITHIN DEFINED LIMITS: 1
PAIN: 1
ANGER WITHIN DEFINED LIMITS: 1
PAIN SEVERITY GOAL: 0/10
SLEEP QUALITY: FAIR
PAIN LOCATION: BACK
HIGHEST PAIN SEVERITY IN PAST 24 HOURS: 9/10
SUBJECTIVE PAIN PROGRESSION: WAXING AND WANING
PAIN LOCATION - PAIN SEVERITY: 8/10
LOWEST PAIN SEVERITY IN PAST 24 HOURS: 0/10
PERSON REPORTING PAIN: PATIENT

## 2025-08-24 ASSESSMENT — ACTIVITIES OF DAILY LIVING (ADL)
AMBULATION ASSISTANCE ON FLAT SURFACES: 1
ENTERING_EXITING_HOME: STAND BY ASSIST
OASIS_M1830: 03

## 2025-08-28 ENCOUNTER — HOME CARE VISIT (OUTPATIENT)
Dept: HOME HEALTH SERVICES | Facility: HOME HEALTH | Age: 55
End: 2025-08-28
Payer: COMMERCIAL

## 2025-08-28 ENCOUNTER — APPOINTMENT (OUTPATIENT)
Dept: NEPHROLOGY | Facility: CLINIC | Age: 55
End: 2025-08-28
Payer: COMMERCIAL

## 2025-08-28 VITALS
WEIGHT: 191 LBS | SYSTOLIC BLOOD PRESSURE: 89 MMHG | DIASTOLIC BLOOD PRESSURE: 62 MMHG | BODY MASS INDEX: 31.78 KG/M2 | HEART RATE: 74 BPM

## 2025-08-28 DIAGNOSIS — E22.2 SIADH (SYNDROME OF INAPPROPRIATE ADH PRODUCTION) (MULTI): Primary | ICD-10-CM

## 2025-08-28 PROCEDURE — 3078F DIAST BP <80 MM HG: CPT | Performed by: INTERNAL MEDICINE

## 2025-08-28 PROCEDURE — 99214 OFFICE O/P EST MOD 30 MIN: CPT | Performed by: INTERNAL MEDICINE

## 2025-08-28 PROCEDURE — G0157 HHC PT ASSISTANT EA 15: HCPCS | Mod: CQ

## 2025-08-28 PROCEDURE — 3074F SYST BP LT 130 MM HG: CPT | Performed by: INTERNAL MEDICINE

## 2025-08-28 RX ORDER — ACETAMINOPHEN 500 MG
500 TABLET ORAL EVERY 4 HOURS PRN
COMMUNITY
Start: 2025-06-17

## 2025-08-28 ASSESSMENT — PAIN SCALES - GENERAL: PAINLEVEL_OUTOF10: 0-NO PAIN

## 2025-08-29 LAB
ALBUMIN SERPL-MCNC: 4.1 G/DL (ref 3.6–5.1)
BUN SERPL-MCNC: 27 MG/DL (ref 7–25)
BUN/CREAT SERPL: 17 (CALC) (ref 6–22)
CALCIUM SERPL-MCNC: 9.4 MG/DL (ref 8.6–10.3)
CHLORIDE SERPL-SCNC: 102 MMOL/L (ref 98–110)
CO2 SERPL-SCNC: 31 MMOL/L (ref 20–32)
CREAT SERPL-MCNC: 1.59 MG/DL (ref 0.7–1.3)
EGFRCR SERPLBLD CKD-EPI 2021: 51 ML/MIN/1.73M2
GLUCOSE SERPL-MCNC: 93 MG/DL (ref 65–99)
OSMOLALITY SERPL: 294 MOSM/KG (ref 278–305)
PHOSPHATE SERPL-MCNC: 4.3 MG/DL (ref 2.5–4.5)
POTASSIUM SERPL-SCNC: 4.4 MMOL/L (ref 3.5–5.3)
SODIUM SERPL-SCNC: 140 MMOL/L (ref 135–146)

## 2025-08-30 ENCOUNTER — HOME CARE VISIT (OUTPATIENT)
Dept: HOME HEALTH SERVICES | Facility: HOME HEALTH | Age: 55
End: 2025-08-30
Payer: COMMERCIAL

## 2025-09-01 LAB — OSMOLALITY UR: NORMAL MOSM/KG

## 2025-09-02 ENCOUNTER — RESULTS FOLLOW-UP (OUTPATIENT)
Dept: NEPHROLOGY | Facility: HOSPITAL | Age: 55
End: 2025-09-02
Payer: COMMERCIAL

## 2025-09-02 ENCOUNTER — HOME CARE VISIT (OUTPATIENT)
Dept: HOME HEALTH SERVICES | Facility: HOME HEALTH | Age: 55
End: 2025-09-02
Payer: COMMERCIAL

## 2025-09-02 PROCEDURE — G0157 HHC PT ASSISTANT EA 15: HCPCS | Mod: CQ

## 2025-09-03 ENCOUNTER — HOME CARE VISIT (OUTPATIENT)
Dept: HOME HEALTH SERVICES | Facility: HOME HEALTH | Age: 55
End: 2025-09-03
Payer: COMMERCIAL

## 2025-09-03 ENCOUNTER — APPOINTMENT (OUTPATIENT)
Dept: PRIMARY CARE | Facility: CLINIC | Age: 55
End: 2025-09-03
Payer: COMMERCIAL

## 2025-09-03 VITALS — OXYGEN SATURATION: 98 % | HEART RATE: 88 BPM | SYSTOLIC BLOOD PRESSURE: 113 MMHG | DIASTOLIC BLOOD PRESSURE: 66 MMHG

## 2025-09-03 PROCEDURE — G0152 HHCP-SERV OF OT,EA 15 MIN: HCPCS

## 2025-09-03 ASSESSMENT — ACTIVITIES OF DAILY LIVING (ADL)
GROOMING_CURRENT_FUNCTION: INDEPENDENT
AMBULATION ASSISTANCE: 1
DRESSING_UB_CURRENT_FUNCTION: INDEPENDENT
PHYSICAL TRANSFERS ASSESSED: 1
BATHING ASSESSED: 1
AMBULATION ASSISTANCE: INDEPENDENT
CURRENT_FUNCTION: INDEPENDENT
TOILETING: 1
PREPARING MEALS: INDEPENDENT
PHYSICAL_TRANSFERS_DEVICES: NO AD
BATHING_CURRENT_FUNCTION: INDEPENDENT
GROOMING ASSESSED: 1
DRESSING_LB_CURRENT_FUNCTION: INDEPENDENT
TOILETING: INDEPENDENT
BATHING EQUIPMENT USED: WALK IN SHOWER

## 2025-09-03 ASSESSMENT — ENCOUNTER SYMPTOMS
HIGHEST PAIN SEVERITY IN PAST 24 HOURS: 8/10
PAIN LOCATION - PAIN QUALITY: ACHE
PAIN LOCATION - PAIN SEVERITY: 8/10
PAIN LOCATION - PAIN QUALITY: ACHE
PAIN: 1
PAIN LOCATION: RIGHT FOOT
LOWEST PAIN SEVERITY IN PAST 24 HOURS: 2/10
PAIN LOCATION - RELIEVING FACTORS: SITTING DOWN
PAIN LOCATION - RELIEVING FACTORS: SITTING DOWN
PAIN LOCATION - PAIN FREQUENCY: WITH ACTIVITY
PAIN LOCATION - PAIN FREQUENCY: WITH ACTIVITY
PAIN LOCATION: LEFT FOOT
PAIN LOCATION - PAIN SEVERITY: 8/10
PERSON REPORTING PAIN: PATIENT

## 2025-09-04 ASSESSMENT — ENCOUNTER SYMPTOMS
PAIN LOCATION - EXACERBATING FACTORS: WALKING
PAIN LOCATION - EXACERBATING FACTORS: WALKING

## 2025-09-05 ENCOUNTER — HOME CARE VISIT (OUTPATIENT)
Dept: HOME HEALTH SERVICES | Facility: HOME HEALTH | Age: 55
End: 2025-09-05
Payer: COMMERCIAL

## 2025-09-05 ENCOUNTER — OFFICE VISIT (OUTPATIENT)
Dept: NEUROLOGY | Facility: CLINIC | Age: 55
End: 2025-09-05
Payer: COMMERCIAL

## 2025-09-05 DIAGNOSIS — G96.9 CNS LESION: Primary | ICD-10-CM

## 2025-09-05 DIAGNOSIS — G61.0 GBS (GUILLAIN BARRE SYNDROME): ICD-10-CM

## 2025-09-05 DIAGNOSIS — D17.9 LIPOMA, UNSPECIFIED SITE: ICD-10-CM

## 2025-09-05 PROCEDURE — 99204 OFFICE O/P NEW MOD 45 MIN: CPT | Performed by: STUDENT IN AN ORGANIZED HEALTH CARE EDUCATION/TRAINING PROGRAM

## 2025-09-11 ENCOUNTER — APPOINTMENT (OUTPATIENT)
Dept: NEUROLOGY | Facility: CLINIC | Age: 55
End: 2025-09-11
Payer: COMMERCIAL

## 2025-09-19 ENCOUNTER — APPOINTMENT (OUTPATIENT)
Dept: PHYSICAL MEDICINE AND REHAB | Facility: CLINIC | Age: 55
End: 2025-09-19
Payer: COMMERCIAL

## 2025-10-13 ENCOUNTER — APPOINTMENT (OUTPATIENT)
Dept: NEUROLOGY | Facility: CLINIC | Age: 55
End: 2025-10-13
Payer: COMMERCIAL